# Patient Record
Sex: FEMALE | Race: WHITE | NOT HISPANIC OR LATINO | Employment: OTHER | URBAN - METROPOLITAN AREA
[De-identification: names, ages, dates, MRNs, and addresses within clinical notes are randomized per-mention and may not be internally consistent; named-entity substitution may affect disease eponyms.]

---

## 2017-01-03 ENCOUNTER — ALLSCRIPTS OFFICE VISIT (OUTPATIENT)
Dept: OTHER | Facility: OTHER | Age: 62
End: 2017-01-03

## 2017-01-12 ENCOUNTER — GENERIC CONVERSION - ENCOUNTER (OUTPATIENT)
Dept: OTHER | Facility: OTHER | Age: 62
End: 2017-01-12

## 2017-01-31 ENCOUNTER — GENERIC CONVERSION - ENCOUNTER (OUTPATIENT)
Dept: OTHER | Facility: OTHER | Age: 62
End: 2017-01-31

## 2017-02-02 ENCOUNTER — GENERIC CONVERSION - ENCOUNTER (OUTPATIENT)
Dept: OTHER | Facility: OTHER | Age: 62
End: 2017-02-02

## 2017-02-03 ENCOUNTER — APPOINTMENT (OUTPATIENT)
Dept: LAB | Age: 62
End: 2017-02-03
Payer: MEDICARE

## 2017-02-03 ENCOUNTER — TRANSCRIBE ORDERS (OUTPATIENT)
Dept: ADMINISTRATIVE | Age: 62
End: 2017-02-03

## 2017-02-03 DIAGNOSIS — E78.5 HYPERLIPIDEMIA: ICD-10-CM

## 2017-02-03 DIAGNOSIS — E11.9 TYPE 2 DIABETES MELLITUS WITHOUT COMPLICATIONS (HCC): ICD-10-CM

## 2017-02-03 LAB
ALBUMIN SERPL BCP-MCNC: 3.8 G/DL (ref 3.5–5)
ALP SERPL-CCNC: 122 U/L (ref 46–116)
ALT SERPL W P-5'-P-CCNC: 40 U/L (ref 12–78)
ANION GAP SERPL CALCULATED.3IONS-SCNC: 9 MMOL/L (ref 4–13)
AST SERPL W P-5'-P-CCNC: 25 U/L (ref 5–45)
BILIRUB SERPL-MCNC: 0.3 MG/DL (ref 0.2–1)
BUN SERPL-MCNC: 23 MG/DL (ref 5–25)
CALCIUM SERPL-MCNC: 10 MG/DL (ref 8.3–10.1)
CHLORIDE SERPL-SCNC: 101 MMOL/L (ref 100–108)
CHOLEST SERPL-MCNC: 160 MG/DL (ref 50–200)
CO2 SERPL-SCNC: 28 MMOL/L (ref 21–32)
CREAT SERPL-MCNC: 1.29 MG/DL (ref 0.6–1.3)
EST. AVERAGE GLUCOSE BLD GHB EST-MCNC: 146 MG/DL
GFR SERPL CREATININE-BSD FRML MDRD: 42.3 ML/MIN/1.73SQ M
GLUCOSE SERPL-MCNC: 122 MG/DL (ref 65–140)
HBA1C MFR BLD: 6.7 % (ref 4.2–6.3)
HDLC SERPL-MCNC: 57 MG/DL (ref 40–60)
LDLC SERPL CALC-MCNC: 81 MG/DL (ref 0–100)
POTASSIUM SERPL-SCNC: 3.7 MMOL/L (ref 3.5–5.3)
PROT SERPL-MCNC: 8.4 G/DL (ref 6.4–8.2)
SODIUM SERPL-SCNC: 138 MMOL/L (ref 136–145)
TRIGL SERPL-MCNC: 109 MG/DL

## 2017-02-03 PROCEDURE — 36415 COLL VENOUS BLD VENIPUNCTURE: CPT

## 2017-02-03 PROCEDURE — 83036 HEMOGLOBIN GLYCOSYLATED A1C: CPT

## 2017-02-03 PROCEDURE — 80053 COMPREHEN METABOLIC PANEL: CPT

## 2017-02-03 PROCEDURE — 80061 LIPID PANEL: CPT

## 2017-02-06 ENCOUNTER — APPOINTMENT (OUTPATIENT)
Dept: PHYSICAL THERAPY | Facility: CLINIC | Age: 62
End: 2017-02-06
Payer: MEDICARE

## 2017-02-06 PROCEDURE — 97116 GAIT TRAINING THERAPY: CPT

## 2017-02-06 PROCEDURE — G8978 MOBILITY CURRENT STATUS: HCPCS

## 2017-02-06 PROCEDURE — 97162 PT EVAL MOD COMPLEX 30 MIN: CPT

## 2017-02-06 PROCEDURE — G8979 MOBILITY GOAL STATUS: HCPCS

## 2017-02-14 ENCOUNTER — ALLSCRIPTS OFFICE VISIT (OUTPATIENT)
Dept: OTHER | Facility: OTHER | Age: 62
End: 2017-02-14

## 2017-02-14 DIAGNOSIS — E55.9 VITAMIN D DEFICIENCY: ICD-10-CM

## 2017-02-14 DIAGNOSIS — F11.20 UNCOMPLICATED OPIOID DEPENDENCE (HCC): ICD-10-CM

## 2017-02-14 DIAGNOSIS — Z79.891 LONG TERM CURRENT USE OF OPIATE ANALGESIC: ICD-10-CM

## 2017-02-14 DIAGNOSIS — M54.50 LOW BACK PAIN: ICD-10-CM

## 2017-02-14 DIAGNOSIS — E11.9 TYPE 2 DIABETES MELLITUS WITHOUT COMPLICATIONS (HCC): ICD-10-CM

## 2017-02-17 ENCOUNTER — ALLSCRIPTS OFFICE VISIT (OUTPATIENT)
Dept: OTHER | Facility: OTHER | Age: 62
End: 2017-02-17

## 2017-02-23 ENCOUNTER — GENERIC CONVERSION - ENCOUNTER (OUTPATIENT)
Dept: OTHER | Facility: OTHER | Age: 62
End: 2017-02-23

## 2017-02-24 ENCOUNTER — GENERIC CONVERSION - ENCOUNTER (OUTPATIENT)
Dept: OTHER | Facility: OTHER | Age: 62
End: 2017-02-24

## 2017-02-28 ENCOUNTER — GENERIC CONVERSION - ENCOUNTER (OUTPATIENT)
Dept: OTHER | Facility: OTHER | Age: 62
End: 2017-02-28

## 2017-03-21 ENCOUNTER — ALLSCRIPTS OFFICE VISIT (OUTPATIENT)
Dept: OTHER | Facility: OTHER | Age: 62
End: 2017-03-21

## 2017-05-16 ENCOUNTER — ALLSCRIPTS OFFICE VISIT (OUTPATIENT)
Dept: OTHER | Facility: OTHER | Age: 62
End: 2017-05-16

## 2017-05-16 DIAGNOSIS — F11.20 UNCOMPLICATED OPIOID DEPENDENCE (HCC): ICD-10-CM

## 2017-05-16 DIAGNOSIS — Z79.891 LONG TERM CURRENT USE OF OPIATE ANALGESIC: ICD-10-CM

## 2017-05-16 DIAGNOSIS — M47.816 SPONDYLOSIS OF LUMBAR REGION WITHOUT MYELOPATHY OR RADICULOPATHY: ICD-10-CM

## 2017-06-19 ENCOUNTER — TRANSCRIBE ORDERS (OUTPATIENT)
Dept: LAB | Age: 62
End: 2017-06-19

## 2017-06-19 ENCOUNTER — APPOINTMENT (OUTPATIENT)
Dept: LAB | Age: 62
End: 2017-06-19
Payer: MEDICARE

## 2017-06-19 DIAGNOSIS — F11.20 UNCOMPLICATED OPIOID DEPENDENCE (HCC): ICD-10-CM

## 2017-06-19 DIAGNOSIS — E55.9 VITAMIN D DEFICIENCY: ICD-10-CM

## 2017-06-19 DIAGNOSIS — Z79.891 LONG TERM CURRENT USE OF OPIATE ANALGESIC: ICD-10-CM

## 2017-06-19 DIAGNOSIS — E11.9 TYPE 2 DIABETES MELLITUS WITHOUT COMPLICATIONS (HCC): ICD-10-CM

## 2017-06-19 LAB
25(OH)D3 SERPL-MCNC: 22.4 NG/ML (ref 30–100)
EST. AVERAGE GLUCOSE BLD GHB EST-MCNC: 143 MG/DL
HBA1C MFR BLD: 6.6 % (ref 4.2–6.3)

## 2017-06-19 PROCEDURE — 83036 HEMOGLOBIN GLYCOSYLATED A1C: CPT

## 2017-06-19 PROCEDURE — 36415 COLL VENOUS BLD VENIPUNCTURE: CPT

## 2017-06-19 PROCEDURE — 82306 VITAMIN D 25 HYDROXY: CPT

## 2017-06-20 ENCOUNTER — ALLSCRIPTS OFFICE VISIT (OUTPATIENT)
Dept: OTHER | Facility: OTHER | Age: 62
End: 2017-06-20

## 2017-06-26 ENCOUNTER — GENERIC CONVERSION - ENCOUNTER (OUTPATIENT)
Dept: OTHER | Facility: OTHER | Age: 62
End: 2017-06-26

## 2017-07-11 ENCOUNTER — ALLSCRIPTS OFFICE VISIT (OUTPATIENT)
Dept: OTHER | Facility: OTHER | Age: 62
End: 2017-07-11

## 2017-07-11 DIAGNOSIS — Z79.899 OTHER LONG TERM (CURRENT) DRUG THERAPY: ICD-10-CM

## 2017-07-11 DIAGNOSIS — F11.20 UNCOMPLICATED OPIOID DEPENDENCE (HCC): ICD-10-CM

## 2017-07-11 DIAGNOSIS — G89.4 CHRONIC PAIN SYNDROME: ICD-10-CM

## 2017-09-05 ENCOUNTER — ALLSCRIPTS OFFICE VISIT (OUTPATIENT)
Dept: OTHER | Facility: OTHER | Age: 62
End: 2017-09-05

## 2017-09-22 ENCOUNTER — ALLSCRIPTS OFFICE VISIT (OUTPATIENT)
Dept: OTHER | Facility: OTHER | Age: 62
End: 2017-09-22

## 2017-09-22 DIAGNOSIS — M54.16 RADICULOPATHY OF LUMBAR REGION: ICD-10-CM

## 2017-09-22 DIAGNOSIS — M54.50 LOW BACK PAIN: ICD-10-CM

## 2017-09-22 DIAGNOSIS — M48.061 SPINAL STENOSIS OF LUMBAR REGION: ICD-10-CM

## 2017-10-02 ENCOUNTER — HOSPITAL ENCOUNTER (OUTPATIENT)
Dept: RADIOLOGY | Age: 62
Discharge: HOME | End: 2017-10-02
Payer: MEDICARE

## 2017-10-02 DIAGNOSIS — M54.50 LOW BACK PAIN: ICD-10-CM

## 2017-10-02 DIAGNOSIS — M54.16 RADICULOPATHY OF LUMBAR REGION: ICD-10-CM

## 2017-10-02 DIAGNOSIS — M48.061 SPINAL STENOSIS OF LUMBAR REGION: ICD-10-CM

## 2017-10-02 PROCEDURE — 72131 CT LUMBAR SPINE W/O DYE: CPT

## 2017-10-03 ENCOUNTER — GENERIC CONVERSION - ENCOUNTER (OUTPATIENT)
Dept: OTHER | Facility: OTHER | Age: 62
End: 2017-10-03

## 2017-10-18 ENCOUNTER — GENERIC CONVERSION - ENCOUNTER (OUTPATIENT)
Dept: OTHER | Facility: OTHER | Age: 62
End: 2017-10-18

## 2017-10-24 ENCOUNTER — ALLSCRIPTS OFFICE VISIT (OUTPATIENT)
Dept: RADIOLOGY | Facility: CLINIC | Age: 62
End: 2017-10-24
Payer: MEDICARE

## 2017-10-26 NOTE — PROGRESS NOTES
Assessment  1  Pain syndrome, chronic (338 4) (G89 4)   2  Low back pain (724 2) (M54 5)   3  Lumbar canal stenosis (724 02) (M48 06)   4  Lumbar radiculopathy (724 4) (M54 16)    Plan   Low back pain, Lumbar canal stenosis, Lumbar radiculopathy, Pain syndrome, chronic    · Topiramate 50 MG Oral Tablet; TAKE 2 TABLETS TWICE DAILY   Rx By: Valentino Dayhoff; Dispense: 30 Days ; #:120 Tablet; Refill: 1;For: Low back pain, Lumbar canal stenosis, Lumbar radiculopathy, Pain syndrome, chronic; LORENZA = N; Rx auto-faxed to [id number] Last Updated By: System, [employer]; 9/5/2017 10:40:33 AM  Low back pain, Lumbar canal stenosis, Lumbar radiculopathy, Pain syndrome, chronic,  Sacroiliitis, Spondylosis of lumbar region without myelopathy or radiculopathy    · TraMADol HCl - 50 MG Oral Tablet; 1-2 TAB PO TID PRN PAIN   Rx By: Valentino Dayhoff; Dispense: 30 Days ; #:180 Tablet; Refill: 1;For: Low back pain, Lumbar canal stenosis, Lumbar radiculopathy, Pain syndrome, chronic, Sacroiliitis, Spondylosis of lumbar region without myelopathy or radiculopathy; LORENZA = N; Print Rx  Pain syndrome, chronic    · Methocarbamol 750 MG Oral Tablet; TAKE 1/2 -1 TABLET 3 TIMES DAILY   Rx By: Valentino Dayhoff; Dispense: 30 Days ; #:90 Tablet; Refill: 1;For: Pain syndrome, chronic; LORENZA = N; Rx auto-faxed to [id number] Last Updated By: System, [employer]; 9/5/2017 10:40:31 AM    Follow-up visit in 2 months Evaluation and Treatment  Follow-up with [facility] for  med refills  Status: Complete  Done: [id number]  Ordered; For: Pain syndrome, chronic;  Ordered By: Valentino Dayhoff  Performed:   Due: 79VGY1822; Last Updated By: Jose Luis Lyons; 9/5/2017 10:49:46 AM     Discussion/Summary    The patient presents today for a follow-up office visit  The patient is currently being treated for chronic pain syndrome, low back pain, lumbar radiculopathy, and lumbar canal stenosis   The patient continues to take Topamax 50 mg 2 tablets twice per day, along with methocarbamol 1 three times per day, and tramadol 50mg she averages 3 tablets daily  Her primary care doctor gives her meloxicam 15 mg daily  She reports 50-75% relief of her pain symptoms at the current medications and no side effects or issues  this time, I would like the patient to continue the tramadol since it does provide her moderate relief of her pain symptoms  She was given a two-month supply of the medication  can continue with her Topamax, and methocarbamol as prescribed, both were refilled today  PDMP was reviewed today and was appropriate  will follow up in 8 weeks for medication refills  Patient is able to Self-Care  The treatment plan was reviewed with the patient/guardian  The patient/guardian understands and agrees with the treatment plan      Chief Complaint  1  Back Pain  low back and leg pain      History of Present Illness  The patient presents today for a follow-up office visit  The patient is currently being treated for chronic pain syndrome, low back pain, lumbar radiculopathy, and lumbar canal stenosis  The patient continues to take Topamax 50 mg 2 tablets twice per day, along with methocarbamol 1 three times per day, and tramadol 50mg she averages 3 tablets daily  Her primary care doctor gives her meloxicam 15 mg daily  She reports 50-75% relief of her pain symptoms at the current medications and no side effects or issues  the patient rates her pain 8/10, this is constant in nature and described as aching, cramping, and numbness to her low back and down her entire right leg to the foot   have personally reviewed and/or updated the patient's past medical history, past surgical history, family history, social history, current medications, allergies, and vital signs today       Shira Alexandra presents with complaints of gradual onset of constant episodes of severe bilateral lower back pain, described as sharp, dull and aching, radiating to the right buttock, right thigh and right lower leg  On a scale of 1 to 10, the patient rates the pain as 9  Symptoms are not improved by NSAIDs  Symptoms are made worse by standing, sitting, bending and walking down stairs  Symptoms are unchanged  Review of Systems    Constitutional: no fever,-- no recent weight gain-- and-- no recent weight loss  Eyes: no double vision-- and-- no blurry vision  Cardiovascular: no chest pain,-- no palpitations-- and-- no lower extremity edema  Respiratory: no complaints of shortness of breath-- and-- no wheezing  Musculoskeletal: difficulty walking, but-- no muscle weakness,-- no joint stiffness,-- no joint swelling,-- no limb swelling,-- no pain in extremity-- and-- no decreased range of motion  Neurological: no dizziness,-- no difficulty swallowing,-- no memory loss,-- no loss of consciousness-- and-- no seizures  Gastrointestinal: no nausea,-- no vomiting,-- no constipation-- and-- no diarrhea  Genitourinary: no difficulty initiating urine stream,-- no genital pain-- and-- no frequent urination  Integumentary: no complaints of skin rash  Psychiatric: no depression  Endocrine: no excessive thirst,-- no adrenal disease,-- no hypothyroidism-- and-- no hyperthyroidism  Hematologic/Lymphatic: no tendency for easy bruising-- and-- no tendency for easy bleeding  Active Problems  1  Analgesic use (V58 69) (Z79 899)   2  Asthma (493 90) (J45 909)   3  Depression (311) (F32 9)   4  DM2 (diabetes mellitus, type 2) (250 00) (E11 9)   5  Encounter for screening for cardiovascular disorders (V81 2) (Z13 6)   6  Esophageal reflux (530 81) (K21 9)   7  Hand paresthesia (782 0) (R20 2)   8  Hyperlipidemia (272 4) (E78 5)   9  Hypertension (401 9) (I10)   10  Low back pain (724 2) (M54 5)   11  Lumbar canal stenosis (724 02) (M48 06)   12  Lumbar radiculopathy (724 4) (M54 16)   13  Morbid or severe obesity due to excess calories (278 01) (E66 01)   14  Myalgia and myositis (729 1)   15   Opioid dependence (304 00) (F11 20)   16  Osteoporosis screening (V82 81) (Z13 820)   17  Pain of right heel (729 5) (M79 671)   18  Pain syndrome, chronic (338 4) (G89 4)   19  Sacroiliac pain (724 6) (M53 3)   20  Sacroiliitis (720 2) (M46 1)   21  Spondylosis of lumbar region without myelopathy or radiculopathy (721 3) (M47 816)   22  Urine, incontinence, stress female (625 6) (N39 3)   23  Visit for screening mammogram (V76 12) (Z12 31)   24   (V61 07) (Z63 4)    Past Medical History  1  History of Allergic reaction to bee sting (989 5,E905 3) (T63 441A)   2  History of Lyme disease (V12 09) (Z86 19)   3  Need for pneumococcal vaccination (V03 82) (Z23)    Surgical History  1  History of Appendectomy   2  History of Cholecystectomy   3  History of Knee Replacement   4  History of Shoulder Surgery   5  History of Spinal Stereotaxis Stimulation Of Cord   6  History of Total Hip Replacement    Family History  Mother    1  Family history of Diabetes Mellitus (V18 0)  Father    2  Family history of Cancer  Family History    3  Family history of Brain Cancer (V16 8)   4  Family history of Breast Cancer (V16 3)   5  Family history of Cervical Cancer   6  Family history of Diabetes Mellitus (V18 0)   7  Family history of Hypertension (V17 49)   8  Family history of Ovarian Cancer (V16 41)   9  Family history of Stroke Complications    Social History   · Denied: History of Alcohol Use (History)   · Current every day smoker (305 1) (F17 200)   · Denied: History of Drug Use (305 90)   ·  (V61 07) (Z63 4)    Current Meds   1  Atorvastatin Calcium 10 MG Oral Tablet; TAKE 1 TABLET DAILY; Therapy: 41EPZ6716 to (([phone] 797-170)  Requested for: 54Dsb9812; Last   Rx:09Dyo0827 Ordered   2  [employer] Vitamin D3 1000 UNIT CAPS; TAKE 1 CAPSULE BY MOUTH TWICE DAILY; Therapy: 08IGA1644 to (Evaluate:18Uce3015)  Requested for: 65CXE0540; Last   Rx:48Doc2918 Ordered   3   EPINEPHrine 0 3 MG/0 3ML Injection Solution Auto-injector; INJECT 0 3ML   INTRAMUSCULARLY AS DIRECTED; Therapy: 20COR3148 to (Last Rx:03Apr2017)  Requested for: 25Nwi3908 Ordered   4  Glimepiride 1 MG Oral Tablet; Take 1 tablet twice daily; Therapy: 16Fxg8614 to (Evaluate:14Nov2017)  Requested for: 97MWR8767; Last   Rx:35Cfe8234 Ordered   5  Lisinopril-Hydrochlorothiazide 20-25 MG Oral Tablet; TAKE 1 TABLET DAILY; Therapy: 84Las7341 to (Evaluate:38Fkm8256)  Requested for: 07YGD6514; Last   Rx:19Mar2017 Ordered   6  Meloxicam 15 MG Oral Tablet; take 1 tablet once daily with a meal;   Therapy: 80Tzk3541 to (Evaluate:47Blk4598)  Requested for: 69WHU8014; Last   Rx:19Mar2017 Ordered   7  Methocarbamol 750 MG Oral Tablet; TAKE 1/2 -1 TABLET 3 TIMES DAILY; Therapy: 39DFJ8270 to (Evaluate:09Sep2017)  Requested for: 51YHG4552; Last   Rx:60Lba9079 Ordered   8  Omeprazole 20 MG Oral Capsule Delayed Release; Therapy: 63XTK0660 to (Last [facility])  Requested for: 00FFH8937 Ordered   9  OneTouch Ultra Blue In [employer]; test twice daily; Therapy: 45NWF4677 to (Evaluate:12Nov2017)  Requested for: 26Apr2017; Last   Rx:26Apr2017 Ordered   10  OneTouch Ultra Mini w/Device Kit; TEST ONCE DAILY    USE AS DIRECTED; Therapy: 86VUH2543 to (Last Rx:19May2016)  Requested for: 96BWB6217 Ordered   11  Topiramate 50 MG Oral Tablet; TAKE 2 TABLETS TWICE DAILY; Therapy: 30HJZ2546 to (Evaluate:09Sep2017)  Requested for: 08TKU8058; Last    Rx:72Nqx6838 Ordered   12  TraMADol HCl - 50 MG Oral Tablet; 1-2 TAB PO TID PRN PAIN;    Therapy: 69IRH6805 to (Evaluate:99Mdd8709); Last Rx:94Dsw1601 Ordered   13  Venlafaxine HCl ER 75 MG Oral Capsule Extended Release 24 Hour; TAKE 1 CAPSULE    DAILY; Therapy: 34XYA3408 to (Evaluate:30Oox5645)  Requested for: 19PZY2672; Last    Rx:31Mar2017 Ordered   14  Vitamin D 1000 UNIT CAPS; Take 1 capsule twice daily; Therapy: 03Amc7742 to (Last Rx:12Nov2014)  Requested for: 80BFR8743 Ordered    Allergies  1  Motrin TABS   2  Penicillins   3  MetFORMIN HCl TABS   4  Nortriptyline HCl CAPS   5  Tradjenta TABS   6  Aleve TABS   7  Augmentin TABS   8  Sulfa Drugs  9  Bee sting    Vitals  Vital Signs    Recorded: 05Sep2017 10:26AM   Temperature 98 3 F   Heart Rate 72   Systolic 868   Diastolic 73   Height 5 ft 6 in   Weight 304 lb    BMI Calculated 49 07   BSA Calculated 2 39   Pain Scale 9     Physical Exam    Constitutional   General appearance: Well developed, well nourished, alert, in no distress, non-toxic and no overt pain behavior  -- Endomorphic body habitus  Eyes   Sclera: anicteric   HEENT   Hearing grossly intact  Pulmonary   Respiratory effort: Even and unlabored  Psychiatric   Mood and affect: Mood and affect appropriate  Neurologic   Cranial nerves: Cranial nerves II-XII grossly intact  Musculoskeletal   Gait and station: Normal     Lumbar/Sacral Spine examination demonstrates Lumbosacral Spine:   Tenderness: right paraspinal-- and-- left paraspinal    ROM Lumbosacral Spine: Full except as noted: Flexion was not restricted-- and-- was painless  Extension was restricted-- and-- was painless  Left lateral flexion was not restricted-- and-- was painless  Right lateral flexion was not restricted-- and-- was painless  Rotation to the left was not restricted-- and-- was painless  Rotation to the right was restricted-- and-- was painful  Foot and ankle strength was normal bilaterally  Knee strength was normal bilaterally  Hip strength was normal bilaterally  Future Appointments    Date/Time Provider Specialty Site   11/06/2017 11:00 AM ANASTASIYA Spaulding  Internal Medicine  [address] [facility] INTERNAL MEDICINE [place]   10/27/2017 11:00 AM MARVIN Patel [facility] [address]     Signatures   Electronically signed by : Ming Ladd;  Sep  5 2017 10:49AM EST                       (Author)    Electronically signed by : ANASTASIYA Cervantes ; Sep 13 2017  3:02PM EST                       (Author)

## 2017-10-27 NOTE — PROGRESS NOTES
Assessment  1  Acute lumbar radiculopathy (724 4) (M54 16)   2  Pain syndrome, chronic (338 4) (G89 4)   3  Low back pain (724 2) (M54 5)   4  Lumbar canal stenosis (724 02) (M48 06)   5  Lumbar radiculopathy (724 4) (M54 16)    Plan  Acute lumbar radiculopathy, Low back pain, Lumbar canal stenosis, Lumbar  radiculopathy    · MethylPREDNISolone 4 MG Oral Tablet Therapy Pack (Medrol); Take medication as  directed on package   Rx By: Magalys Kirby; Dispense: 0 Days ; #:1 Tablet Therapy Pack; Refill: 0;For: Acute lumbar radiculopathy, Low back pain, Lumbar canal stenosis, Lumbar radiculopathy; LORENZA = N; Verified Transmission to [facility] #[id number] Last Updated By: System, [employer]; 9/22/2017 11:57:27 AM  Low back pain, Lumbar canal stenosis, Lumbar radiculopathy    · * CT SPINE LUMBAR WO CONTRAST; Status:Hold For - Scheduling; Requested  for:84Kon2346;    [facility]; Due:84Xeo4484; Ordered; For:Low back pain, Lumbar canal stenosis, Lumbar radiculopathy; Ordered By:Mellisa Springer;  Lumbar radiculopathy    · Follow-up visit in 2 months Evaluation and Treatment  Follow-up  Status: Hold For -  Scheduling  Requested for: 33WXH1598   Ordered; For: Lumbar radiculopathy; Ordered By: Magalys Kirby Performed:  Due: 15TIT8678    Discussion/Summary    This is a 51-year-old female patient presents the office for follow-up visit today  The patient is currently being treated for chronic pain syndrome, low back pain, lumbar canal stenosis, and lumbar radiculopathy  The patient does have a [employer] spinal cord stimulator implantation status post battery change by Dr Giulia Lin 12/2016  The patient reports that she has scheduled an earlier appointment due to significantly increased low back pain with right lower extremity radiculopathy in the S1 distribution since Sunday 9/16/17   The patient denies any trauma or inciting event and reports that she was at her sister's house and was going from a sitting to standing position when she experienced significant increased pain  the patient reports that she has been taking tramadol 50 mg 2 tablets every 3 hours for her pain symptoms  The patient reports that she is taking Topamax 50 mg 1 tablet in the morning and 1 tablet at bedtime  She reports that she takes methocarbamol 750 mg 3 times a day for her myofascial pain and muscle spasm  The patient reports that she did not go to the emergency room following this event because she did not want to sit in the waiting room  plan for this patient is as follows: This patient cannot obtain MRIs due to her spinal cord stimulator implant  I will order a CT of the patient's lumbar spine to evaluate the patient's recent onset of increased lumbosacral back pain with right lower extremity radiculopathy in the S1 distribution  I discussed with this patient that she should not take her tramadol 50 mg anymore than it as prescribed  The patient does have tramadol 50 mg ordered 1?2 tablets 3 times a day when necessary for her pain symptoms  I advised this patient that we will not be able to provide her with any more tramadol at this time or any other narcotic prescriptions  will order this patient a Medrol Dosepak  I did discuss with this patient that she should call her primary care provider before starting this medication to ensure that she is okay with the patient taking this medication as she is being monitored for her diabetes  The patient's last A1c in February 2017 was 6 6%  discussed with this patient that if there are any procedural interventions that we can offer once obtaining the results of her CT scan we would call her regarding this  I advised this patient that if the pain becomes worse or changes, or if the patient experiences bowel or bladder incontinence that she should present to the emergency department for evaluation  The patient should continue with her topiramate 50 mg 1 in the morning 1 at bedtime    The patient can continue to take methocarbamol 750 mg 3 times a day ARNP for her myofascial pain and muscle spasm  I advised this patient that she should also contact the [address] [place] [address] representatives as this patient may benefit from a reprogramming of her stimulator at this time  I will follow-up with this patient in 2 months  The patient has the current Goals: To provide this patient with adequate pain relief to improve quality of life and level of functioning  The patent has the current Barriers: Chronic pain syndrome  Chronic opioid use  The treatment plan was reviewed with the patient/guardian  The patient/guardian understands and agrees with the treatment plan   The patient was counseled regarding instructions for management,-- risk factor reductions,-- prognosis,-- patient and family education,-- impressions,-- risks and benefits of treatment options-- and-- importance of compliance with treatment  total time of encounter was 25 minutes  Chief Complaint  1  Back Pain  Back and right leg pain  History of Present Illness  This is a 82-year-old female patient presents the office for follow-up visit today  The patient is currently being treated for chronic pain syndrome, low back pain, lumbar canal stenosis, and lumbar radiculopathy  The patient does have a [employer] spinal cord stimulator implantation status post battery change by Dr Mcnulty Breath 12/2016  The patient reports that she has scheduled an earlier appointment due to significantly increased low back pain with right lower extremity radiculopathy in the S1 distribution since Sunday 9/16/17  The patient denies any trauma or inciting event and reports that she was at her sister's house and was going from a sitting to standing position when she experienced significant increased pain  the patient reports that she has been taking tramadol 50 mg 2 tablets every 3 hours for her pain symptoms   The patient reports that she is taking Topamax 50 mg 1 tablet in the morning and 1 tablet at bedtime  She reports that she takes methocarbamol 750 mg 3 times a day for her myofascial pain and muscle spasm  The patient reports that she did not go to the emergency room following this event because she did not want to sit in the waiting room  patient reports that her pain is worse since her previous visit rates her pain a 9/10 on the numerical pain rating scale  The patient reports that her pain is worse in the morning, evening, night and that her pain is constant and describes the pain as sharp-like in nature  The patient reports that the amount of pain relief that she is obtaining now with her current medication regimen treatment plan is not enough to make a real difference in her life  patient denies any bowel or bladder incontinence or saddle anesthesia  have personally reviewed and/or updated the patient's past medical history, past surgical history, family history, social history, allergies, and vital signs today  Other than as stated above, the patient denies any interval changes in medications, medical condition, mental condition, symptoms, or allergies since last office visit  Jennifer Wesley presents with complaints of constant episodes of bilateral lower back pain, described as sharp, radiating to the bilateral lower leg  On a scale of 1 to 10, the patient rates the pain as 9  Symptoms are worsening  Review of Systems    Constitutional: no fever,-- no recent weight gain-- and-- no recent weight loss  Eyes: no double vision-- and-- no blurry vision  Cardiovascular: no chest pain,-- no palpitations-- and-- no lower extremity edema  Respiratory: no complaints of shortness of breath-- and-- no wheezing  Musculoskeletal: pain in extremity R leg , but-- no difficulty walking,-- no muscle weakness,-- no joint stiffness,-- no joint swelling,-- no limb swelling-- and-- no decreased range of motion     Neurological: no dizziness,-- no difficulty swallowing,-- no memory loss,-- no loss of consciousness-- and-- no seizures  Gastrointestinal: no nausea,-- no vomiting,-- no constipation-- and-- no diarrhea  Genitourinary: no difficulty initiating urine stream,-- no genital pain-- and-- no frequent urination  Integumentary: no complaints of skin rash  Psychiatric: no depression  Endocrine: no excessive thirst,-- no adrenal disease,-- no hypothyroidism-- and-- no hyperthyroidism  Hematologic/Lymphatic: no tendency for easy bruising-- and-- no tendency for easy bleeding  ROS reviewed  Active Problems  1  Analgesic use (V58 69) (Z79 899)   2  Asthma (493 90) (J45 909)   3  Depression (311) (F32 9)   4  DM2 (diabetes mellitus, type 2) (250 00) (E11 9)   5  Encounter for screening for cardiovascular disorders (V81 2) (Z13 6)   6  Esophageal reflux (530 81) (K21 9)   7  Hand paresthesia (782 0) (R20 2)   8  Hyperlipidemia (272 4) (E78 5)   9  Hypertension (401 9) (I10)   10  Low back pain (724 2) (M54 5)   11  Lumbar canal stenosis (724 02) (M48 06)   12  Lumbar radiculopathy (724 4) (M54 16)   13  Morbid or severe obesity due to excess calories (278 01) (E66 01)   14  Myalgia and myositis (729 1)   15  Opioid dependence (304 00) (F11 20)   16  Osteoporosis screening (V82 81) (Z13 820)   17  Pain of right heel (729 5) (M79 671)   18  Pain syndrome, chronic (338 4) (G89 4)   19  Sacroiliac pain (724 6) (M53 3)   20  Sacroiliitis (720 2) (M46 1)   21  Spondylosis of lumbar region without myelopathy or radiculopathy (721 3) (M47 816)   22  Urine, incontinence, stress female (625 6) (N39 3)   23  Visit for screening mammogram (V76 12) (Z12 31)   24   (V61 07) (Z63 4)    Past Medical History  1  History of Allergic reaction to bee sting (989 5,E905 3) (T63 441A)   2  History of Lyme disease (V12 09) (Z86 19)   3  Need for pneumococcal vaccination (V03 82) (Z23)    The active problems and past medical history were reviewed and updated today        Surgical History  1  History of Appendectomy   2  History of Cholecystectomy   3  History of Knee Replacement   4  History of Shoulder Surgery   5  History of Spinal Stereotaxis Stimulation Of Cord   6  History of Total Hip Replacement    The surgical history was reviewed and updated today  Family History  Mother    1  Family history of Diabetes Mellitus (V18 0)  Father    2  Family history of Cancer  Family History    3  Family history of Brain Cancer (V16 8)   4  Family history of Breast Cancer (V16 3)   5  Family history of Cervical Cancer   6  Family history of Diabetes Mellitus (V18 0)   7  Family history of Hypertension (V17 49)   8  Family history of Ovarian Cancer (V16 41)   9  Family history of Stroke Complications    The family history was reviewed and updated today  Social History   · Denied: History of Alcohol Use (History)   · Current every day smoker (305 1) (F17 200)   · Denied: History of Drug Use (305 90)   ·  (V61 07) (Z63 4)  The social history was reviewed and updated today  The social history was reviewed and is unchanged  Current Meds   1  Atorvastatin Calcium 10 MG Oral Tablet; TAKE 1 TABLET DAILY; Therapy: 83BOG6988 to (77 873 135)  Requested for: 36Hsr5695; Last   Rx:06Apw1157 Ordered   2  [employer] Vitamin D3 1000 UNIT CAPS; TAKE 1 CAPSULE BY MOUTH TWICE DAILY; Therapy: 71NKW1601 to (Evaluate:86Kim4448)  Requested for: 94DTN0621; Last   Rx:84Oum2251 Ordered   3  EPINEPHrine 0 3 MG/0 3ML Injection Solution Auto-injector; INJECT 0 3ML   INTRAMUSCULARLY AS DIRECTED; Therapy: 47ONX9876 to (Last Rx:03Apr2017)  Requested for: 19Pmx5166 Ordered   4  Glimepiride 1 MG Oral Tablet; Take 1 tablet twice daily; Therapy: 85Upw8829 to (Evaluate:14Nov2017)  Requested for: 09JXW8229; Last   Rx:68Bjz6980 Ordered   5  Lisinopril-Hydrochlorothiazide 20-25 MG Oral Tablet; TAKE 1 TABLET DAILY;    Therapy: 82Dtv7519 to (Evaluate:68Dxy6941)  Requested for: 13GPZ8706; Last   Rx:19Mar2017 Ordered   6  Meloxicam 15 MG Oral Tablet; take 1 tablet once daily with a meal;   Therapy: 03Qnm6538 to (Evaluate:11Mar2018)  Requested for: 33Ydv3577; Last   Rx:14Msw4862 Ordered   7  Methocarbamol 750 MG Oral Tablet; TAKE 1/2 -1 TABLET 3 TIMES DAILY; Therapy: 18MZO8124 to (Ana Ennis)  Requested for: 79Bpg7762; Last   Rx:40Vep8608 Ordered   8  Omeprazole 20 MG Oral Capsule Delayed Release; Therapy: 17WNQ3069 to (Last Chaitanyadave Graham)  Requested for: 63ODF2967 Ordered   9  OneTouch Ultra Blue In [employer]; test twice daily; Therapy: 89KNA8190 to (Evaluate:12Nov2017)  Requested for: 86Kgr5451; Last   Rx:29Oan8645 Ordered   10  OneTouch Ultra Mini w/Device Kit; TEST ONCE DAILY    USE AS DIRECTED; Therapy: 07QXZ9397 to (Last Rx:68Zqt3714)  Requested for: 21DTV1840 Ordered   11  Topiramate 50 MG Oral Tablet; TAKE 2 TABLETS TWICE DAILY; Therapy: 69KYL1253 to (Ana Ennis)  Requested for: 26Vqq5881; Last    Rx:84Wfd7919 Ordered   12  TraMADol HCl - 50 MG Oral Tablet; 1-2 TAB PO TID PRN PAIN;    Therapy: 75USE6134 to (Evaluate:04Nov2017); Last Rx:47Gml6451 Ordered   13  Venlafaxine HCl ER 75 MG Oral Capsule Extended Release 24 Hour; TAKE 1 CAPSULE    DAILY; Therapy: 01QSZ0413 to (Evaluate:30May2017)  Requested for: 26ADQ8886; Last    Rx:31Mar2017 Ordered   14  Vitamin D 1000 UNIT CAPS; Take 1 capsule twice daily; Therapy: 57FXK1537 to (Last Rx:12Nov2014)  Requested for: 97LRC7759 Ordered    The medication list was reviewed and updated today  Allergies  1  Motrin TABS   2  Penicillins   3  MetFORMIN HCl TABS   4  Nortriptyline HCl CAPS   5  Tradjenta TABS   6  Aleve TABS   7  Augmentin TABS   8  Sulfa Drugs  9   Bee sting    Vitals  Vital Signs    Recorded: 45GPY1305 11:11AM   Temperature 98 2 F   Heart Rate 94   Respiration 16   Systolic 144   Diastolic 68   Height 5 ft 6 in   Weight 301 lb    BMI Calculated 48 58   BSA Calculated 2 38   Pain Scale 9     Physical Exam    Constitutional General appearance: Abnormal  -- Endomorphic body habitus  Eyes   Sclera: anicteric   HEENT   Hearing grossly intact  Neck   Neck: Supple, symmetric, trachea midline, no masses  Pulmonary   Respiratory effort: Even and unlabored  Cardiovascular   Examination of extremities: No edema or pitting edema present  Abdomen   Abdomen: Soft, non-tender, non-distended  Skin   Skin and subcutaneous tissue: Normal without rashes or lesions, well hydrated  Psychiatric   Mood and affect: Abnormal  -- Agitated  Neurologic   Cranial nerves: Cranial nerves II-XII grossly intact  the muscle tone was normal   Musculoskeletal   Gait and station: Abnormal  -- Antalgic  Lumbar/Sacral Spine examination demonstrates  5/5 lower extremity strength in all muscle groups bilaterally  Positive seated straight leg raise on the right  Future Appointments    Date/Time Provider Specialty Site   11/06/2017 11:00 AM ANASTASIYA Castillo   Internal Medicine [facility] INTERNAL MEDICINE [place]   11/03/2017 10:00 AM MARVIN Lennon Pain Management [facility] SPINE     Signatures   Electronically signed by : Justyn Joseph; Sep 22 2017  1:02PM EST                       (Author)    Electronically signed by : ANASTASIYA Roe ; Sep 26 2017  8:16AM EST                       (Author)

## 2017-10-30 ENCOUNTER — ALLSCRIPTS OFFICE VISIT (OUTPATIENT)
Dept: OTHER | Facility: OTHER | Age: 62
End: 2017-10-30

## 2017-10-31 NOTE — PROGRESS NOTES
Assessment  1  Acute lumbar radiculopathy (724 4) (M54 16)   2  Pain syndrome, chronic (338 4) (G89 4)   3  Low back pain (724 2) (M54 5)   4  Lumbar canal stenosis (724 02) (M48 061)   5  Lumbar radiculopathy (724 4) (M54 16)    Plan  Low back pain, Lumbar canal stenosis, Lumbar radiculopathy, Pain syndrome, chronic,  Sacroiliitis, Spondylosis of lumbar region without myelopathy or radiculopathy    · TraMADol HCl - 50 MG Oral Tablet   Rx By: Jude Rodarte; Dispense: 30 Days ; #:180 Tablet; Refill: 1;For: Low back pain, Lumbar canal stenosis, Lumbar radiculopathy, Pain syndrome, chronic, Sacroiliitis, Spondylosis of lumbar region without myelopathy or radiculopathy; LORENZA = N; Print Rx  Lumbar radiculopathy    · Follow-up visit in 3 months Evaluation and Treatment  Follow-up with [facility] for med refills   Status: Hold For - Scheduling  Requested for: 11MYO9584   Ordered; For: Lumbar radiculopathy; Ordered By: Jude Rodarte Performed:  Due: 19BTW4342  Pain syndrome, chronic    · Methocarbamol 750 MG Oral Tablet; TAKE 1/2 -1 TABLET 3 TIMES DAILY   Rx By: Jude Rodarte; Dispense: 30 Days ; #:90 Tablet; Refill: 2;For: Pain syndrome, chronic; LORENZA = N; Verified Transmission to [id number] Last Updated By: SystemCheckr; 10/30/2017 11:25:38 AM    Discussion/Summary    The patient presents today for a follow-up office visit  The patient is currently being treated for chronic pain syndrome, low back pain, lumbar radiculopathy, and lumbar canal stenosis  The patient continues to take Topamax 50 mg 2 tablets twice per day, along with methocarbamol 1 three times per day, and tramadol 50mg twice daily that she has left from her september prescription  She was taking much more than prescribed at her last office visit which is a breach of contract and Zaheer List advised her we would not be able to prescribe this medication anymore    this time, the patient can continue with the methocarbamol and Topamax as prescribed, she only requires refills of the methocarbamol today  she was in the office she did have a reprogramming with Jacques Medina from [facility] she states that this is so far so good as it is covering her back pain that was higher up  did recently have a right L5-S1 transforaminal epidural steroid injection with Dr Jovanny Waldron on October 24, 2017  She states that her leg pain is completely gone at this point  PDMP was reviewed today and was appropriate  should return in 12 weeks for medication refills  Patient is able to Self-Care  The treatment plan was reviewed with the patient/guardian  The patient/guardian understands and agrees with the treatment plan      Chief Complaint  1  Back Pain  low back and leg pain; improved      History of Present Illness  The patient presents today for a follow-up office visit  The patient is currently being treated for chronic pain syndrome, low back pain, lumbar radiculopathy, and lumbar canal stenosis  The patient continues to take Topamax 50 mg 2 tablets twice per day, along with methocarbamol 1 three times per day, and tramadol 50mg twice daily that she has left from her september prescription  She was taking much more than prescribed at her last office visit which is a breach of contract and Kameron Jones advised her we would not be able to prescribe this medication anymore  the patient rates her pain 8/10, this is constant in nature and bothersome throughout the entirety of the day  She describes her pain as aching and localizes pain across her low back   have personally reviewed and/or updated the patient's past medical history, past surgical history, family history, social history, current medications, allergies, and vital signs today  Gloria Wilson presents with complaints of constant episodes of bilateral lower back pain, described as aching, radiating to the right buttock  On a scale of 1 to 10, the patient rates the pain as 8  Symptoms are unchanged        Review of Systems    Constitutional: no fever,-- no recent weight gain-- and-- no recent weight loss  Eyes: no double vision-- and-- no blurry vision  Cardiovascular: no chest pain,-- no palpitations-- and-- no lower extremity edema  Respiratory: no complaints of shortness of breath-- and-- no wheezing  Musculoskeletal: difficulty walking, but-- no muscle weakness,-- no joint stiffness,-- no joint swelling,-- no limb swelling,-- no pain in extremity-- and-- no decreased range of motion  Neurological: no dizziness,-- no difficulty swallowing,-- no memory loss,-- no loss of consciousness-- and-- no seizures  Gastrointestinal: no nausea,-- no vomiting,-- no constipation-- and-- no diarrhea  Genitourinary: no difficulty initiating urine stream,-- no genital pain-- and-- no frequent urination  Integumentary: no complaints of skin rash  Psychiatric: no depression  Endocrine: no excessive thirst,-- no adrenal disease,-- no hypothyroidism-- and-- no hyperthyroidism  Hematologic/Lymphatic: no tendency for easy bruising-- and-- no tendency for easy bleeding  Active Problems  1  Acute lumbar radiculopathy (724 4) (M54 16)   2  Analgesic use (V58 69) (Z79 899)   3  Asthma (493 90) (J45 909)   4  Depression (311) (F32 9)   5  DM2 (diabetes mellitus, type 2) (250 00) (E11 9)   6  Encounter for screening for cardiovascular disorders (V81 2) (Z13 6)   7  Esophageal reflux (530 81) (K21 9)   8  Hand paresthesia (782 0) (R20 2)   9  Hyperlipidemia (272 4) (E78 5)   10  Hypertension (401 9) (I10)   11  Low back pain (724 2) (M54 5)   12  Lumbar canal stenosis (724 02) (M48 061)   13  Lumbar radiculopathy (724 4) (M54 16)   14  Morbid or severe obesity due to excess calories (278 01) (E66 01)   15  Myalgia and myositis (729 1)   16  Opioid dependence (304 00) (F11 20)   17  Osteoporosis screening (V82 81) (Z13 820)   18  Pain of right heel (729 5) (M79 671)   19  Pain syndrome, chronic (338 4) (G89 4)   20   Sacroiliac pain (724 6) (M53 3)   21  Sacroiliitis (720 2) (M46 1)   22  Spondylosis of lumbar region without myelopathy or radiculopathy (721 3) (M47 816)   23  Urine, incontinence, stress female (625 6) (N39 3)   24  Visit for screening mammogram (V76 12) (Z12 31)   25   (V61 07) (Z63 4)    Past Medical History  1  History of Allergic reaction to bee sting (989 5,E905 3) (T63 441A)   2  History of Lyme disease (V12 09) (Z86 19)   3  Need for pneumococcal vaccination (V03 82) (Z23)    Surgical History  1  History of Appendectomy   2  History of Cholecystectomy   3  History of Knee Replacement   4  History of Shoulder Surgery   5  History of Spinal Stereotaxis Stimulation Of Cord   6  History of Total Hip Replacement    Family History  Mother    1  Family history of Diabetes Mellitus (V18 0)  Father    2  Family history of Cancer  Family History    3  Family history of Brain Cancer (V16 8)   4  Family history of Breast Cancer (V16 3)   5  Family history of Cervical Cancer   6  Family history of Diabetes Mellitus (V18 0)   7  Family history of Hypertension (V17 49)   8  Family history of Ovarian Cancer (V16 41)   9  Family history of Stroke Complications    Social History   · Denied: History of Alcohol Use (History)   · Current every day smoker (305 1) (F17 200)   · Denied: History of Drug Use (305 90)   ·  (V61 07) (Z63 4)    Current Meds   1  Atorvastatin Calcium 10 MG Oral Tablet; TAKE 1 TABLET DAILY; Therapy: 09RSH1093 to (96 543789)  Requested for: 25Oct2016; Last   Rx:54Ywe3472 Ordered   2  [employer] Vitamin D3 1000 UNIT CAPS; TAKE 1 CAPSULE BY MOUTH TWICE DAILY; Therapy: 61SRQ6791 to (Evaluate:29Pzn3227)  Requested for: 02COS1506; Last   Rx:18Xsi5774 Ordered   3  EPINEPHrine 0 3 MG/0 3ML Injection Solution Auto-injector; INJECT 0 3ML   INTRAMUSCULARLY AS DIRECTED; Therapy: 87YGU4975 to (Last Rx:05Cru3915)  Requested for: 28Cfa7573 Ordered   4  Glimepiride 1 MG Oral Tablet;  Take 1 tablet twice daily; Therapy: 44Kyh8233 to (Evaluate:14Nov2017)  Requested for: 05QOV4308; Last   Rx:73Bux5849 Ordered   5  Lisinopril-Hydrochlorothiazide 20-25 MG Oral Tablet; TAKE 1 TABLET DAILY; Therapy: 57Afu4271 to (Evaluate:54Sdi5293)  Requested for: 17TVD3198; Last   Rx:19Mar2017 Ordered   6  Meloxicam 15 MG Oral Tablet; take 1 tablet once daily with a meal;   Therapy: 10Xvg9643 to (Evaluate:11Mar2018)  Requested for: 23Ybh0450; Last   Rx:76Mge5477 Ordered   7  Methocarbamol 750 MG Oral Tablet; TAKE 1/2 -1 TABLET 3 TIMES DAILY; Therapy: 16DKE6357 to ([facility])  Requested for: 06Jvj6827; Last   Rx:61Hpg1863 Ordered   8  Omeprazole 20 MG Oral Capsule Delayed Release; Therapy: 41RYF6758 to (Last Farooq Sandoval)  Requested for: 77ZJI1759 Ordered   9  Postling Blue In [employer]; test twice daily; Therapy: 67WPS1104 to (Evaluate:12Nov2017)  Requested for: 60Khb9377; Last   Rx:66Puw0301 Ordered   10  OneTouch Ultra Mini w/Device Kit; TEST ONCE DAILY    USE AS DIRECTED; Therapy: 36LAX1258 to (Last Rx:19May2016)  Requested for: 05SNH3296 Ordered   11  Topiramate 50 MG Oral Tablet; TAKE 2 TABLETS TWICE DAILY; Therapy: 67DZK6807 to ([facility])  Requested for: 82Vgh4391; Last    Rx:56Hxo5989 Ordered   12  TraMADol HCl - 50 MG Oral Tablet; 1-2 TAB PO TID PRN PAIN;    Therapy: 16FPG9294 to (Evaluate:04Nov2017); Last Rx:25Gid9951 Ordered   13  Venlafaxine HCl ER 75 MG Oral Capsule Extended Release 24 Hour; TAKE 1 CAPSULE    DAILY; Therapy: 88TJI5657 to (Evaluate:30May2017)  Requested for: 85FSM7148; Last    Rx:31Mar2017 Ordered   14  Vitamin D 1000 UNIT CAPS; Take 1 capsule twice daily; Therapy: 35Xjl5880 to (Last Rx:12Nov2014)  Requested for: 07TVL5403 Ordered    Allergies  1  Motrin TABS   2  Penicillins   3  MetFORMIN HCl TABS   4  Nortriptyline HCl CAPS   5  Tradjenta TABS   6  Aleve TABS   7  Augmentin TABS   8  Sulfa Drugs  9   Bee sting    Vitals  Vital Signs    Recorded: 40VWB3508 11: 09AM   Heart Rate 88   Systolic 057   Diastolic 74   Height 5 ft 6 in   Weight 296 lb    BMI Calculated 47 78   BSA Calculated 2 36   Pain Scale 8     Physical Exam    Constitutional   General appearance: Well developed, well nourished, alert, in no distress, non-toxic and no overt pain behavior  -- Endomorphic body habitus  Eyes   Sclera: anicteric   HEENT   Hearing grossly intact  Pulmonary   Respiratory effort: Even and unlabored  Psychiatric   Mood and affect: Mood and affect appropriate  Neurologic   Cranial nerves: Cranial nerves II-XII grossly intact  Musculoskeletal   Gait and station: Normal        Future Appointments    Date/Time Provider Specialty Site   11/06/2017 11:00 AM ANASTASIYA Caldera   Internal Medicine [facility] [place] [facility]   01/23/2018 01:00 PM Roberto Salazar DO Pain Management [facility]     Signatures   Electronically signed by : Ming Murcia ; Oct 30 2017 11:52AM EST                       (Author)    Electronically signed by : ANASTASIYA Salamanca ; Oct 30 2017 12:09PM EST                       (Author)

## 2017-11-03 ENCOUNTER — GENERIC CONVERSION - ENCOUNTER (OUTPATIENT)
Dept: OTHER | Facility: OTHER | Age: 62
End: 2017-11-03

## 2017-11-06 ENCOUNTER — ALLSCRIPTS OFFICE VISIT (OUTPATIENT)
Dept: OTHER | Facility: OTHER | Age: 62
End: 2017-11-06

## 2017-11-06 DIAGNOSIS — M79.10 MYALGIA: ICD-10-CM

## 2017-11-06 DIAGNOSIS — M54.16 RADICULOPATHY OF LUMBAR REGION: ICD-10-CM

## 2017-11-06 DIAGNOSIS — G89.4 CHRONIC PAIN SYNDROME: ICD-10-CM

## 2017-11-06 DIAGNOSIS — M48.061 SPINAL STENOSIS OF LUMBAR REGION WITHOUT NEUROGENIC CLAUDICATION: ICD-10-CM

## 2017-11-06 DIAGNOSIS — M25.511 PAIN IN RIGHT SHOULDER: ICD-10-CM

## 2017-11-06 DIAGNOSIS — E78.5 HYPERLIPIDEMIA: ICD-10-CM

## 2017-11-06 DIAGNOSIS — E11.9 TYPE 2 DIABETES MELLITUS WITHOUT COMPLICATIONS (HCC): ICD-10-CM

## 2017-11-06 DIAGNOSIS — M54.50 LOW BACK PAIN: ICD-10-CM

## 2017-11-07 NOTE — PROGRESS NOTES
Assessment  1  DM2 (diabetes mellitus, type 2) (250 00) (E11 9)   2  Esophageal reflux (530 81) (K21 9)   3  Chronic right shoulder pain (719 41,338 29) (M25 511,G89 29)   4  Current every day smoker (305 1) (F17 200)   5  Hypertension (401 9) (I10)   6  Depression (311) (F32 9)   7  Pain syndrome, chronic (338 4) (G89 4)    Plan  Chronic right shoulder pain    · *1 - [facility]  *  Status: Active  Requested for: 07BIC0709  Care Summary provided  : Yes  Depression    · Venlafaxine HCl ER 75 MG Oral Capsule Extended Release 24 Hour (Effexor XR); TAKE 1  CAPSULE DAILY   · *VB - PHQ-9 Tool; Status:Complete;   Done: 61ZUV4889 12:00AM  DM2 (diabetes mellitus, type 2)    · (1) HEMOGLOBIN A1C; Status:Active; Requested QIM:59OXG1461;    · (1) MICROALBUMIN CREATININE RATIO, RANDOM URINE; Status:Active; Requested HON:89NYN4751;    · Begin a limited exercise program ; Status:Complete;   Done: 92RAO7571 11:39AM   · Continue with our present treatment plan ; Status:Complete;   Done: 02RMO4389 11:39AM   · Have your eyes examined by an eye doctor every year ; Status:Complete;   Done: 56WWF0648  11:39AM  Hyperlipidemia    · Atorvastatin Calcium 10 MG Oral Tablet (Lipitor); TAKE 1 TABLET DAILY  Low back pain, Lumbar canal stenosis, Lumbar radiculopathy, Pain syndrome, chronic    · Topiramate 50 MG Oral Tablet; TAKE 2 TABLETS TWICE DAILY  Need for immunization against influenza    · Fluzone Quadrivalent Intramuscular Suspension  Opioid dependence    · (1) MICROALBUMIN CREATININE RATIO, RANDOM URINE; Status:Canceled; Discussion/Summary  Discussion Summary:   She continues to follow with pain management  Counseling Documentation With Imm: The patient was counseled regarding instructions for management,-- risk factor reductions,-- prognosis,-- patient and family education,-- impressions,-- risks and benefits of treatment options,-- importance of compliance with treatment     Medication SE Review and Pt Understands Tx: Possible side effects of new medications were reviewed with the patient/guardian today  The treatment plan was reviewed with the patient/guardian  The patient/guardian understands and agrees with the treatment plan      Chief Complaint  Chief Complaint Free Text Note Form: Follow up visit and flu shotright shoulder pain      History of Present Illness  HPI: She got in trouble for taking an extra Tramadol  Her BS day rocketed with a steroid injection and no she has increased right shoulder pain   Gastroesophageal Reflux Disease (Follow-Up): The patient is being seen for follow-up of gastroesophageal reflux disease  The patient reports doing well  Comorbid Illnesses: NSDAIDs  The patient is currently asymptomatic  No associated symptoms are reported  Medications include omeprazole (Prilosec)  Medications:  the patient is adherent to her medication regimen, but-- she denies medication side effects  Disease management:  the patient is doing well with her goals  Shoulder Pain: The patient is being seen for an initial evaluation of shoulder pain  The patient is right hand dominant  Symptoms:  shoulder pain-- and-- shoulder stiffness, but-- no localized bruising,-- no localized swelling,-- no localized redness,-- no localized warmth,-- no localized numbness,-- no localized weakness-- and-- no decreased range of motion at the shoulder  The patient is currently experiencing symptoms  Current treatment includes activity modification, acetaminophen and ibuprofen  By report, there is good adherence with treatment, good tolerance of treatment and poor symptom control  Pertinent medical history:  prior shoulder surgery  The patient is currently able to do activities of daily living without limitations and unable to work  (  )   Hypertension (Follow-Up): The patient presents for follow-up of essential hypertension   The patient states she has been doing well with her blood pressure control since the last visit  She has no comorbid illnesses  Symptoms: The patient is currently asymptomatic  Home monitoring: The patient checks her blood pressure sporadically  Blood pressure control has been good  Lifestyle: Diet: She does not have a healthy diet  Weight Issues: She has weight concerns  Exercise: She does not exercise regularly  Smoking: She does not use tobacco Alcohol: She denies alcohol use  Drug Use: She denies drug use  Medications: Medication(s): a diuretic-- and-- an ACE inhibitor  Disease Management: the patient is doing well with her blood pressure goals  Diabetes Type II (Follow-Up): The patient states she has been stable with her Type II Diabetes control since the last visit  Comorbid Illnesses: hypertension,-- tobacco use,-- hyperlipidemia-- and-- obesity  She has no significant interval events  Symptoms: The patient is currently asymptomatic  Associated symptoms include no recent weight gain-- and-- no recent weight loss  Home monitoring: The patient checks her blood sugars regularly  -- Glycemic control has been good  Medications: the patient is adherent with her medication regimen  -- She denies medication side effects  Medication(s): Sulfonylurea-- and-- ACE inhibitors  The patient is doing well with her diabetes goals  Due For: a urine microalbumin,-- a hemoglobin A1c-- and-- a retinal exam    Depression (Follow-Up): The patient states her depression has improved since the last visit  They have had recurrent episodes of major depression  She describes this as mild  She has had no significant interval events  Interval Symptoms:   Structured Questionnaire:   Over the past 2 weeks, how often have you been bothered by the following problems? 1 ) Little interest or pleasure in doing things? Not at all    2 ) Feeling down, depressed or hopeless? Not at all    3 ) Trouble falling asleep or sleeping too much? Not at all    4 ) Feeling tired or having little energy?  Not at all    5 ) Poor appetite or overeating? Not at all    6 ) Feeling bad about yourself, or that you are a failure, or have let yourself or your family down? Not at all    7 ) Trouble concentrating on things, such as reading a newspaper or watching television? Not at all    8 ) Moving or speaking so slowly that other people could have noticed, or the opposite, moving or speaking faster than usual? Not at all  TOTAL SCORE: 0,-- severity of depression is mild  How difficult have these problems made it for you to do your work, take care of things at home, or get along with people? Not at all  Social Support: the patient has good social support  Medications: Medication(s): a SNRI  Review of Systems  Complete-Female:   Constitutional: as noted in HPI  Eyes: No complaints of eye pain, no red eyes, no eyesight problems, no discharge, no dry eyes, no itching of eyes  Cardiovascular: as noted in HPI  Respiratory: No complaints of shortness of breath, no wheezing, no cough, no SOB on exertion, no orthopnea, no PND  Gastrointestinal: as noted in HPI  Genitourinary: No complaints of dysuria, no incontinence, no pelvic pain, no dysmenorrhea, no vaginal discharge or bleeding  Musculoskeletal: as noted in HPI  Integumentary: No complaints of skin rash or lesions, no itching, no skin wounds, no breast pain or lump  Neurological: No complaints of headache, no confusion, no convulsions, no numbness, no dizziness or fainting, no tingling, no limb weakness, no difficulty walking  Psychiatric: as noted in HPI  Endocrine: as noted in HPI  Active Problems  1  Acute lumbar radiculopathy (724 4) (M54 16)   2  Analgesic use (V58 69) (Z79 899)   3  Asthma (493 90) (J45 909)   4  Depression (311) (F32 9)   5  DM2 (diabetes mellitus, type 2) (250 00) (E11 9)   6  Encounter for screening for cardiovascular disorders (V81 2) (Z13 6)   7  Esophageal reflux (530 81) (K21 9)   8  Hand paresthesia (782 0) (R20 2)   9  Hyperlipidemia (272 4) (E78 5)   10  Hypertension (401 9) (I10)   11  Low back pain (724 2) (M54 5)   12  Lumbar canal stenosis (724 02) (M48 061)   13  Lumbar radiculopathy (724 4) (M54 16)   14  Morbid or severe obesity due to excess calories (278 01) (E66 01)   15  Myalgia and myositis (729 1)   16  Opioid dependence (304 00) (F11 20)   17  Osteoporosis screening (V82 81) (Z13 820)   18  Pain of right heel (729 5) (M79 671)   19  Pain syndrome, chronic (338 4) (G89 4)   20  Sacroiliitis (720 2) (M46 1)   21  Spondylosis of lumbar region without myelopathy or radiculopathy (721 3) (M47 816)   22  Urine, incontinence, stress female (625 6) (N39 3)   23  Visit for screening mammogram (V76 12) (Z12 31)    Past Medical History  1  History of Allergic reaction to bee sting (989 5,E905 3) (T63 441A)   2  History of Lyme disease (V12 09) (Z86 19)  Active Problems And Past Medical History Reviewed: The active problems and past medical history were reviewed and updated today  Surgical History  1  History of Appendectomy   2  History of Cholecystectomy   3  History of Knee Replacement   4  History of Shoulder Surgery   5  History of Spinal Stereotaxis Stimulation Of Cord   6  History of Total Hip Replacement    Family History  Mother    1  Family history of Diabetes Mellitus (V18 0)  Father    2  Family history of Cancer  Family History    3  Family history of Brain Cancer (V16 8)   4  Family history of Breast Cancer (V16 3)   5  Family history of Cervical Cancer   6  Family history of Diabetes Mellitus (V18 0)   7  Family history of Hypertension (V17 49)   8  Family history of Ovarian Cancer (V16 41)   9  Family history of Stroke Complications    Social History   · Denied: History of Alcohol Use (History)   · Current every day smoker (305 1) (F17 200)   · Denied: History of Drug Use (305 90)  Social History Reviewed: The social history was reviewed and updated today  The social history was reviewed and is unchanged  Current Meds   1  Atorvastatin Calcium 10 MG Oral Tablet; TAKE 1 TABLET DAILY; Therapy: 87HOZ8402 to ([phone])  Requested for: 52Zfs3710; Last Rx:69Bcb5311   Ordered   2  [employer] Vitamin D3 1000 UNIT CAPS; TAKE 1 CAPSULE BY MOUTH TWICE DAILY; Therapy: 95AWY3013 to (Evaluate:61Ita1043)  Requested for: 69OCI2693; Last Rx:79Vyj6651   Ordered   3  EPINEPHrine 0 3 MG/0 3ML Injection Solution Auto-injector; INJECT 0 3ML INTRAMUSCULARLY AS   DIRECTED; Therapy: 15UES8742 to (Last Rx:03Apr2017)  Requested for: 19Zzk5811 Ordered   4  Glimepiride 1 MG Oral Tablet; Take 1 tablet twice daily; Therapy: 92Cjo7079 to (Evaluate:14Nov2017)  Requested for: 91SOB5332; Last Rx:18Bhf4163   Ordered   5  Lisinopril-Hydrochlorothiazide 20-25 MG Oral Tablet; TAKE 1 TABLET DAILY; Therapy: 85Lrj4120 to (Evaluate:26Wil5231)  Requested for: 58TLT7121; Last Rx:19Mar2017   Ordered   6  Meloxicam 15 MG Oral Tablet; take 1 tablet once daily with a meal;   Therapy: 32Ykw0862 to (Evaluate:11Mar2018)  Requested for: 86Luv4676; Last Rx:86Stf6030   Ordered   7  Methocarbamol 750 MG Oral Tablet; TAKE 1/2 -1 TABLET 3 TIMES DAILY; Therapy: 51OMI2487 to (([phone])  Requested for: 09RSH1816; Last Rx:30Oct2017   Ordered   8  Omeprazole 20 MG Oral Capsule Delayed Release; Therapy: 73SWE3669 to (Last Mary Reyes)  Requested for: 60CKO2681 Ordered   9  OneTouch Ultra Blue In [employer]; test twice daily; Therapy: 32ZOU7868 to (Evaluate:12Nov2017)  Requested for: 18Ujb4686; Last Rx:26Apr2017   Ordered   10  Topiramate 50 MG Oral Tablet; TAKE 2 TABLETS TWICE DAILY; Therapy: 84GPG8398 to (Katlyn Pritchard)  Requested for: 46Vjo0762; Last Rx:03Xoy8201    Ordered   11  Venlafaxine HCl ER 75 MG Oral Capsule Extended Release 24 Hour; TAKE 1 CAPSULE DAILY; Therapy: 81SLQ9964 to (Evaluate:76Ptu9782)  Requested for: 04GKN6336; Last Rx:31Mar2017    Ordered   12  Vitamin D 1000 UNIT CAPS; Take 1 capsule twice daily;     Therapy: 61CUE0228 to (Last Rx:12Nov2014)  Requested for: 66CFN0579 Ordered  Medication List Reviewed: The medication list was reviewed and updated today  Allergies  1  Motrin TABS   2  Penicillins   3  MetFORMIN HCl TABS   4  Nortriptyline HCl CAPS   5  Tradjenta TABS   6  Aleve TABS   7  Augmentin TABS   8  Sulfa Drugs  9  Bee sting    Vitals  Vital Signs    Recorded: 08UWX9312 11:32AM Recorded: 84GRA0266 10:49AM   Temperature  99 2 F   Heart Rate  92   Systolic 694 483   Diastolic 75 66   Height  5 ft 6 in   Weight  298 lb    BMI Calculated  48 1   BSA Calculated  2 37   O2 Saturation  98     Physical Exam    Constitutional   General appearance: Abnormal   morbidly obese  Pulmonary   Respiratory effort: No increased work of breathing or signs of respiratory distress  Auscultation of lungs: Clear to auscultation  Cardiovascular   Auscultation of heart: Normal rate and rhythm, normal S1 and S2, without murmurs  Examination of extremities for edema and/or varicosities: Normal     Carotid pulses: Normal     Abdomen   Abdomen: Non-tender, no masses  Lymphatic   Palpation of lymph nodes in neck: No lymphadenopathy  Musculoskeletal   Gait and station: Abnormal  -- wide based  Skin   Skin and subcutaneous tissue: Normal without rashes or lesions  Neurologic   Cranial nerves: Cranial nerves 2-12 intact  Psychiatric   Orientation to person, place, and time: Normal     Mood and affect: Normal          Results/Data  *VB - Foot Exam 20Jun2017 12:00AM Jay Camacho     Test Name Result Flag Reference   FOOT EXAM [id number]         [facility] Management  DM2 (diabetes mellitus, type 2)   *VB - Eye Exam; every 1 year; Next Due: 84DVS8483; Overdue    Future Appointments    Date/Time Provider Specialty Site   12/18/2017 11:30 AM ANASTASIYA Charlton   Internal Medicine [address] [facility] INTERNAL MEDICINE [place]   01/23/2018 01:00 PM Dallas Casas,  Pain Management [address]     Signatures   Electronically signed by : Rossy Pennington ANASTASIYA Downs ; Nov 6 2017 11:40AM EST                       (Author)

## 2017-11-15 ENCOUNTER — APPOINTMENT (OUTPATIENT)
Dept: PHYSICAL THERAPY | Facility: REHABILITATION | Age: 62
End: 2017-11-15
Payer: MEDICARE

## 2017-11-15 DIAGNOSIS — M25.511 PAIN IN RIGHT SHOULDER: ICD-10-CM

## 2017-11-15 PROCEDURE — G8991 OTHER PT/OT GOAL STATUS: HCPCS

## 2017-11-15 PROCEDURE — 97163 PT EVAL HIGH COMPLEX 45 MIN: CPT

## 2017-11-15 PROCEDURE — G8990 OTHER PT/OT CURRENT STATUS: HCPCS

## 2017-11-21 ENCOUNTER — APPOINTMENT (OUTPATIENT)
Dept: PHYSICAL THERAPY | Facility: REHABILITATION | Age: 62
End: 2017-11-21
Payer: MEDICARE

## 2017-11-22 ENCOUNTER — HOSPITAL ENCOUNTER (EMERGENCY)
Facility: HOSPITAL | Age: 62
Discharge: HOME | End: 2017-11-22
Attending: EMERGENCY MEDICINE | Admitting: EMERGENCY MEDICINE
Payer: MEDICARE

## 2017-11-22 ENCOUNTER — APPOINTMENT (EMERGENCY)
Dept: RADIOLOGY | Facility: HOSPITAL | Age: 62
End: 2017-11-22
Payer: MEDICARE

## 2017-11-22 VITALS
HEART RATE: 91 BPM | SYSTOLIC BLOOD PRESSURE: 144 MMHG | DIASTOLIC BLOOD PRESSURE: 60 MMHG | WEIGHT: 293 LBS | OXYGEN SATURATION: 100 % | TEMPERATURE: 98 F | RESPIRATION RATE: 20 BRPM | BODY MASS INDEX: 49.09 KG/M2

## 2017-11-22 DIAGNOSIS — M54.9 BACK PAIN: Primary | ICD-10-CM

## 2017-11-22 PROCEDURE — 71020 HB CHEST X-RAY 2VW FRONTAL&LATL: CPT

## 2017-11-22 PROCEDURE — 99283 EMERGENCY DEPT VISIT LOW MDM: CPT

## 2017-11-22 RX ORDER — TOPIRAMATE 50 MG/1
50 TABLET, FILM COATED ORAL DAILY
COMMUNITY
End: 2018-04-19

## 2017-11-22 RX ORDER — BUPIVACAINE HYDROCHLORIDE 5 MG/ML
10 INJECTION, SOLUTION EPIDURAL; INTRACAUDAL ONCE
Status: COMPLETED | OUTPATIENT
Start: 2017-11-22 | End: 2017-11-22

## 2017-11-22 RX ORDER — LIDOCAINE 50 MG/G
1 PATCH TOPICAL ONCE
Status: DISCONTINUED | OUTPATIENT
Start: 2017-11-22 | End: 2017-11-22 | Stop reason: HOSPADM

## 2017-11-22 RX ADMIN — BUPIVACAINE HYDROCHLORIDE 10 ML: 5 INJECTION, SOLUTION EPIDURAL; INTRACAUDAL at 15:06

## 2017-11-22 RX ADMIN — LIDOCAINE 1 PATCH: 50 PATCH TOPICAL at 15:06

## 2017-11-22 NOTE — ED ATTENDING ATTESTATION
Leon Bledsoe DO, saw and evaluated the patient  I have discussed the patient with the resident/non-physician practitioner and agree with the resident's/non-physician practitioner's findings, Plan of Care, and MDM as documented in the resident's/non-physician practitioner's note, except where noted  All available labs and Radiology studies were reviewed  At this point I agree with the current assessment done in the Emergency Department  I have conducted an independent evaluation of this patient including a focused history and a physical exam     ED Note - Raissa Ramirez 63 y o  female MRN: [id number]  Unit/Bed#: ED 14 Encounter: [id number]    History of Present Illness   HPI  Raissa Ramirez is a 63 y o  female who presents for evaluation of persistent left-sided back pain  Patient states that she woke approximately 2 weeks ago and noted pain to the left mid back  Exacerbated by movement  Slightly improved with rest   Poor sleep due to the pain  She denies any obvious injury  No recent illness  No excessive coughing  No urinary symptoms  No fever chills  Patient states that she has been trying to nurse the pain for the last 2 weeks but home prescriptions have been relatively ineffective  Patient does follow with Dr Lisbeth Plaza from Pain Management and had recently been on Tramadol until she had a right sciatic nerve injection  Patient denies any focal neurological deficits  No saddle anesthesia  No bowel or bladder abnormality  Patient is able to ambulate without difficulty  No abnormal or unintentional weight loss  No constitutional symptoms  Otherwise feels well  No other complaints  REVIEW OF SYSTEMS  See HPI for further details  12 systems reviewed and otherwise negative except as noted     Historical Information     PAST MEDICAL HISTORY  Past Medical History:   Diagnosis Date    Asthma     Chronic narcotic dependence ([facility] [address] )     Chronic pain syndrome     Depression     Diabetes mellitus ([facility] )     Esophageal reflux     Hyperlipidemia     Hypertension     Lumbar radiculopathy     Lyme disease     Obesity     Opioid dependence ([facility] )     PPD positive     had treatment w/ INH 15 years ago    Vitamin D deficiency        FAMILY HISTORY  Family History   Problem Relation Age of Onset    Diabetes Mother     Cancer Father        SOCIAL HISTORY  Social History     Social History    Marital status: /Civil Union     Spouse name: N/A    Number of children: N/A    Years of education: N/A     Social History Main Topics    Smoking status: Current Every Day Smoker     Packs/day: 1 00     Years: 5 00    Smokeless tobacco: Never Used      Comment: pt desires to quit in January    Alcohol use No    Drug use: No    Sexual activity: Not Asked     Other Topics Concern    None     Social History Narrative    None       SURGICAL HISTORY  Past Surgical History:   Procedure Laterality Date    APPENDECTOMY      CHOLECYSTECTOMY      JOINT REPLACEMENT      knee, total hip    MT REVISE/REMOVE SPINAL NEUROSTIM/ Left 12/13/2016    Procedure: REPLACEMENT BUTTOCK SPINAL CORD STIMULATOR IPG;  Surgeon: Tanmay Stewart MD;  Location:  MAIN OR;  Service: Neurosurgery    SHOULDER SURGERY      SPINAL CORD STIMULATOR IMPLANT       Meds/Allergies     CURRENT MEDICATIONS    Current Facility-Administered Medications:     lidocaine (LIDODERM) 5 % patch 1 patch, 1 patch, Transdermal, Once, Marcelina Cornet, DO    Current Outpatient Prescriptions:     atorvastatin (LIPITOR) 10 mg tablet, Take by mouth, Disp: , Rfl:     Canagliflozin (INVOKANA) 300 MG TABS, Take by mouth, Disp: , Rfl:     Cholecalciferol (CVS VITAMIN D3) 1000 UNITS capsule, Take by mouth, Disp: , Rfl:     EPINEPHrine (EPIPEN 2-JIMI) 0 3 mg/0 3 mL SOAJ, EpiPen 2-Jimi 0 3 MG/0 3ML Injection Solution Auto-injector INJECT 0 3ML INTRAMUSCULARLY AS DIRECTED    Quantity: 1;  Refills: 1   Eron Jason ;  [place] 10-Mar-2011 Active 2 Solution Auto-injector Pen, Disp: , Rfl:     Gabapentin, Once-Daily, (GRALISE) 600 MG TABS, Take by mouth, Disp: , Rfl:     glimepiride (AMARYL) 1 mg tablet, Take by mouth, Disp: , Rfl:     HYDROcodone-acetaminophen (NORCO) 7 5-325 mg per tablet, Take by mouth, Disp: , Rfl:     lisinopril-hydrochlorothiazide (PRINZIDE,ZESTORETIC) 20-25 MG per tablet, Take by mouth, Disp: , Rfl:     meloxicam (MOBIC) 15 mg tablet, Take by mouth, Disp: , Rfl:     methocarbamol (ROBAXIN) 750 mg tablet, Take by mouth, Disp: , Rfl:     Omeprazole 20 MG TBEC, Omeprazole 20 MG Oral Capsule Delayed Release  Quantity: 30 00; Refills: 0   Start [id number]-[id number] Active, Disp: , Rfl:     oxyCODONE-acetaminophen (PERCOCET) 5-325 mg per tablet, Take by mouth, Disp: , Rfl:     venlafaxine (EFFEXOR XR) 75 mg 24 hr capsule, Take by mouth, Disp: , Rfl:     (Not in a hospital admission)    ALLERGIES  Allergies   Allergen Reactions    Bee Venom Anaphylaxis    Other Other (See Comments)     Stainless steel and surgical steel  *Severe blistering*    Aleve [Naproxen Sodium] Hives    Augmentin [Amoxicillin-Pot Clavulanate] Hives    Metformin And Related Hives    Morphine And Related Hives    Motrin [Ibuprofen] Hives    Nortriptyline Hives    Penicillins Hives    Soy Lecithin [Lecithin] Hives    Sulfa Antibiotics Hives    Tradjenta [Linagliptin] Hives     Objective     PHYSICAL EXAM    VITAL SIGNS: Blood pressure 144/60, pulse 91, temperature 98 °F (36 7 °C), temperature source Oral, resp  rate 20, weight 134 kg (295 lb), SpO2 100 %      Constitutional:  Well developed, well nourished, no acute distress, non-toxic appearance   Eyes:  PERRL, EOMI, conjunctivae pink, sclerae non-icteric    HENT:  Normocephalic/Atraumatic, no rhinorrhea, mucous membranes moist, Tympanic Membranes clear   Neck: normal range of motion, no tenderness, supple   Respiratory:  No respiratory distress, normal breath sounds   Cardiovascular:  Normal rate, normal rhythm    GI:  Soft, non-tender, non-distended   :  No CVAT, no flank ecchymosis  Musculoskeletal:  BACK:  Focal paraspinal muscle tenderness on palpation noted to the left mid back at the area of the 7th rib  No crepitus or deformity  No rash  No midline spinal tenderness  No swelling or edema  Integument:  Pink, warm, dry, Well hydrated, no rash, no erythema, no bullae   Lymphatic:  No cervical/ tonsillar/ submandibular lymphadenopathy noted   Neurologic:  Awake, Alert & oriented x 3, CN 2-12 intact, no focal neurological deficits, motor function intact, strength 5/5 all extremities, normal sensory function, reflexes within normal limits, intact finger to nose, intact heal to shin, negative dysdiadochokinesia  Able to ambulate  Psychiatric:  Speech and behavior appropriate       ED COURSE and MDM:    Assessment/Plan   Assessment:  64 yo female presents for evaluation of persistent left-sided back pain  Patient states that she woke approximately 2 weeks ago and noted pain to the left mid back  Exacerbated by movement  Slightly improved with rest   Poor sleep due to the pain  She denies any obvious injury  No recent illness  No excessive coughing  No urinary symptoms  No fever chills  Patient states that she has been trying to nurse the pain for the last 2 weeks but home prescriptions have been relatively ineffective  Patient does follow with Dr Luis Ross from Pain Management and had recently been on Tramadol until she had a right sciatic nerve injection  Patient denies any focal neurological deficits  No saddle anesthesia  No bowel or bladder abnormality  Patient is able to ambulate without difficulty  No abnormal or unintentional weight loss  No constitutional symptoms  Otherwise feels well  No other complaints  Plan:  CXR, symptom management, disposition as appropriate  Portions of the record may have been created with voice recognition software   Occasional wrong word or "sound a like" substitutions may have occurred due to the inherent limitations of voice recognition software       ED Provider  Electronically Signed by

## 2017-11-22 NOTE — ED PROVIDER NOTES
History  Chief Complaint   Patient presents with    Back Pain     left sided back pain over past two weeks  pain worse with movement  denies abd pain/nausea/urinary symptoms  29-year-old female presenting to the ER today with a chief complaint of left-sided back pain  States the pain started 2 weeks ago  Describes sharp pain with side bending and rotation  Pain does not move  When symptoms continued came to the ER today for further evaluation treatment  Denies bowel or bladder incontinence  Denies saddle anesthesia  Denies weight loss, night sweats, history of cancer  Denies history of IV drug abuse  History provided by:  Patient   used: No    Back Pain   Location:  Thoracic spine  Quality:  Aching  Pain severity:  Mild  Pain is:  Unable to specify  Timing:  Constant  Progression:  Worsening  Chronicity:  New  Context: twisting    Relieved by:  Nothing  Worsened by: Movement and twisting  Ineffective treatments:  None tried  Associated symptoms: no abdominal pain, no abdominal swelling, no bladder incontinence, no bowel incontinence, no chest pain, no dysuria, no fever, no headaches, no leg pain, no numbness, no paresthesias, no pelvic pain, no perianal numbness, no tingling, no weakness and no weight loss    Risk factors: obesity    Risk factors: no hx of cancer, no hx of osteoporosis, no lack of exercise, no menopause, not pregnant, no recent surgery, no steroid use and no vascular disease        Prior to Admission Medications   Prescriptions Last Dose Informant Patient Reported?  Taking?   atorvastatin (LIPITOR) 10 mg tablet 11/21/2017 at Unknown time  Yes Yes   Sig: Take by mouth   glimepiride (AMARYL) 1 mg tablet 11/21/2017 at Unknown time  Yes Yes   Sig: Take by mouth 2 (two) times a day     lisinopril-hydrochlorothiazide (PRINZIDE,ZESTORETIC) 20-25 MG per tablet 11/21/2017 at Unknown time  Yes Yes   Sig: Take by mouth   meloxicam (MOBIC) 15 mg tablet 11/21/2017 at Unknown time  Yes Yes   Sig: Take by mouth   methocarbamol (ROBAXIN) 750 mg tablet 11/21/2017 at Unknown time  Yes Yes   Sig: Take 750 mg by mouth 2 (two) times a day     topiramate (TOPAMAX) 50 MG tablet 11/21/2017 at Unknown time  Yes Yes   Sig: Take 50 mg by mouth every 12 (twelve) hours   venlafaxine (EFFEXOR XR) 75 mg 24 hr capsule 11/21/2017 at Unknown time  Yes Yes   Sig: Take by mouth      Facility-Administered Medications: None       Past Medical History:   Diagnosis Date    Asthma     Chronic narcotic dependence (HCC)     Chronic pain syndrome     Depression     Diabetes mellitus ([facility]     Esophageal reflux     Hyperlipidemia     Hypertension     Lumbar radiculopathy     Lyme disease     Obesity     Opioid dependence ([facility] [facility]     PPD positive     had treatment w/ INH 15 years ago    Vitamin D deficiency        Past Surgical History:   Procedure Laterality Date    APPENDECTOMY      CHOLECYSTECTOMY      JOINT REPLACEMENT      knee, total hip    AR REVISE/REMOVE SPINAL NEUROSTIM/ Left 12/13/2016    Procedure: REPLACEMENT BUTTOCK SPINAL CORD STIMULATOR IPG;  Surgeon: Eleni Hernandez MD;  [facility];  Service: Neurosurgery    SHOULDER SURGERY      SPINAL CORD STIMULATOR IMPLANT         Family History   Problem Relation Age of Onset    Diabetes Mother     Cancer Father      I have reviewed and agree with the history as documented  Social History   Substance Use Topics    Smoking status: Current Every Day Smoker     Packs/day: 1 00     Years: 5 00    Smokeless tobacco: Never Used      Comment: pt desires to quit in January    Alcohol use No        Review of Systems   Constitutional: Negative  Negative for appetite change, chills, diaphoresis, fatigue, fever and weight loss  HENT: Negative  Eyes: Negative  Respiratory: Negative  Cardiovascular: Negative  Negative for chest pain     Gastrointestinal: Negative for abdominal pain, blood in stool, bowel incontinence, constipation, diarrhea, nausea and vomiting  Endocrine: Negative  Genitourinary: Negative for bladder incontinence, decreased urine volume, difficulty urinating, dyspareunia, dysuria, flank pain, frequency, hematuria, pelvic pain, urgency, vaginal bleeding, vaginal discharge and vaginal pain  Musculoskeletal: Positive for back pain  Skin: Negative  Allergic/Immunologic: Negative  Neurological: Negative  Negative for tingling, weakness, numbness, headaches and paresthesias  Psychiatric/Behavioral: Negative  All other systems reviewed and are negative  Physical Exam  ED Triage Vitals [11/22/17 1340]   Temperature Pulse Respirations Blood Pressure SpO2   98 °F (36 7 °C) 91 20 144/60 100 %      Temp Source Heart Rate Source Patient Position - Orthostatic VS BP Location FiO2 (%)   Oral -- -- -- --      Pain Score       Worst Possible Pain           Orthostatic Vital Signs  Vitals:    11/22/17 1340   BP: 144/60   Pulse: 91       Physical Exam   Constitutional: She is oriented to person, place, and time  She appears well-developed and well-nourished  HENT:   Head: Normocephalic and atraumatic  Right Ear: External ear normal    Left Ear: External ear normal    Nose: Nose normal    Mouth/Throat: Oropharynx is clear and moist    Eyes: Conjunctivae and EOM are normal  Pupils are equal, round, and reactive to light  Neck: Normal range of motion  Neck supple  No JVD present  No tracheal deviation present  No thyromegaly present  Cardiovascular: Normal rate, regular rhythm, normal heart sounds and intact distal pulses  Exam reveals no gallop and no friction rub  No murmur heard  Pulmonary/Chest: Effort normal and breath sounds normal  No stridor  No respiratory distress  She has no wheezes  She has no rales  She exhibits no tenderness  Abdominal: Soft  Bowel sounds are normal  She exhibits no distension and no mass  There is no tenderness  There is no rebound and no guarding  No hernia  Musculoskeletal: Normal range of motion  She exhibits tenderness  She exhibits no edema or deformity  Thoracic back: She exhibits tenderness and pain  She exhibits normal range of motion and no bony tenderness  Back:    Lymphadenopathy:     She has no cervical adenopathy  Neurological: She is alert and oriented to person, place, and time  She has normal reflexes  She displays normal reflexes  No cranial nerve deficit  She exhibits normal muscle tone  Coordination normal    Skin: Skin is warm  No rash noted  No erythema  No pallor  Psychiatric: She has a normal mood and affect  Her behavior is normal  Judgment and thought content normal    Nursing note and vitals reviewed  ED Medications  Medications   lidocaine (LIDODERM) 5 % patch 1 patch (1 patch Transdermal Medication Applied 11/22/17 1506)   bupivacaine (PF) (MARCAINE) 0 5 % injection 10 mL (10 mL Infiltration Given 11/22/17 1506)       Diagnostic Studies  Results Reviewed     None                 XR chest 2 views    (Results Pending)         Procedures  Nerve Block  Date/Time: 11/22/2017 3:20 PM  Performed by: Td Spann  Authorized by: Allison Escalante     Patient location:  ED  Consent:     Consent obtained:  Verbal    Consent given by:  Patient  Universal protocol:     Patient identity confirmed:  Verbally with patient  Indications:     Indications:  Pain relief  Location:     Body area:  Trunk    Laterality:  Left  Pre-procedure details:     Skin preparation:  2% chlorhexidine  Skin anesthesia (see MAR for exact dosages):     Skin anesthesia method:  None  Procedure details (see MAR for exact dosages): Block needle gauge:  24 G    Anesthetic injected:  Bupivacaine 0 5% w/o epi    Steroid injected:  None    Injection procedure:  Anatomic landmarks identified  Post-procedure details:     Dressing:  None    Outcome:  Anesthesia achieved    Patient tolerance of procedure:   Tolerated well, no immediate complications          Phone Consults  ED Phone Contact    ED Course  ED Course                                MDM  Number of Diagnoses or Management Options  Back pain: new and requires workup     Amount and/or Complexity of Data Reviewed  Clinical lab tests: ordered and reviewed  Tests in the radiology section of CPT®: ordered and reviewed  Tests in the medicine section of CPT®: reviewed and ordered  Decide to obtain previous medical records or to obtain history from someone other than the patient: yes  Independent visualization of images, tracings, or specimens: yes    Patient Progress  Patient progress: stable    CritCare Time    Disposition  Final diagnoses:   Back pain     Time reflects when diagnosis was documented in both MDM as applicable and the Disposition within this note     Time User Action Codes Description Comment    11/22/2017  3:20 PM Adrianna De Leon Add [M54 9] Back pain       ED Disposition     ED Disposition Condition Comment    Discharge  Jaylin Ferrer discharge to home/self care  Condition at discharge: Good        Follow-up Information     Follow up With Specialties Details Why Contact Info    Ines Fernandez MD Internal Medicine Schedule an appointment as soon as possible for a visit  [address] [place] [address]  [phone]          Patient's Medications   Discharge Prescriptions    No medications on file     No discharge procedures on file  ED Provider  Attending physically available and evaluated Jaylin Ferrer I managed the patient along with the ED Attending      Electronically Signed by         Rosemarie Velasco DO  Resident  11/22/17 7386

## 2017-11-22 NOTE — DISCHARGE INSTRUCTIONS
Acute Low Back Pain   WHAT YOU NEED TO KNOW:   Acute low back pain is sudden discomfort in your lower back area that lasts for up to 6 weeks  The discomfort makes it difficult to tolerate activity  DISCHARGE INSTRUCTIONS:   Return to the emergency department if:   · You have severe pain  · You have sudden stiffness and heaviness on both buttocks down to both legs  · You have numbness or weakness in one leg, or pain in both legs  · You have numbness in your genital area or across your lower back  · You cannot control your urine or bowel movements  Contact your healthcare provider if:   · You have a fever  · You have pain at night or when you rest     · Your pain does not get better with treatment  · You have pain that worsens when you cough or sneeze  · You suddenly feel something pop or snap in your back  · You have questions or concerns about your condition or care  Medicines: The following medicines may be ordered by your healthcare provider:  · Acetaminophen  decreases pain  It is available without a doctor's order  Ask how much to take and how often to take it  Follow directions  Acetaminophen can cause liver damage if not taken correctly  · NSAIDs  help decrease swelling and pain  This medicine is available with or without a doctor's order  NSAIDs can cause stomach bleeding or kidney problems in certain people  If you take blood thinner medicine, always ask your healthcare provider if NSAIDs are safe for you  Always read the medicine label and follow directions  · Prescription pain medicine  may be given  Ask your healthcare provider how to take this medicine safely  · Muscle relaxers  decrease pain by relaxing the muscles in your lower spine  · Take your medicine as directed  Contact your healthcare provider if you think your medicine is not helping or if you have side effects  Tell him of her if you are allergic to any medicine   Keep a list of the medicines, vitamins, and herbs you take  Include the amounts, and when and why you take them  Bring the list or the pill bottles to follow-up visits  Carry your medicine list with you in case of an emergency  Self-care:   · Stay active  as much as you can without causing more pain  Bed rest could make your back pain worse  Start with some light exercises such as walking  Avoid heavy lifting until your pain is gone  Ask for more information about the activities or exercises that are right for you  · Ice  helps decrease swelling, pain, and muscle spams  Put crushed ice in a plastic bag  Cover it with a towel  Place it on your lower back for 20 to 30 minutes every 2 hours  Do this for about 2 to 3 days after your pain starts, or as directed  · Heat  helps decrease pain and muscle spasms  Start to use heat after treatment with ice has stopped  Use a small towel dampened with warm water or a heating pad, or sit in a warm bath  Apply heat on the area for 20 to 30 minutes every 2 hours for as many days as directed  Alternate heat and ice  Prevent acute low back pain:   · Use proper body mechanics  ¨ Bend at the hips and knees when you  objects  Do not bend from the waist  Use your leg muscles as you lift the load  Do not use your back  Keep the object close to your chest as you lift it  Try not to twist or lift anything above your waist     ¨ Change your position often when you stand for long periods of time  Rest one foot on a small box or footrest, and then switch to the other foot often  ¨ Try not to sit for long periods of time  When you do, sit in a straight-backed chair with your feet flat on the floor  Never reach, pull, or push while you are sitting  · Do exercises that strengthen your back muscles  Warm up before you exercise  Ask your healthcare provider the best exercises for you  · Maintain a healthy weight  Ask your healthcare provider how much you should weigh   Ask him to help you create a weight loss plan if you are overweight  Follow up with your healthcare provider as directed:  Return for a follow-up visit if you still have pain after 1 to 3 weeks of treatment  You may need to visit an orthopedist if your back pain lasts more than 12 weeks  Write down your questions so you remember to ask them during your visits  © 2017 [employer]  Information is for End User's use only and may not be sold, redistributed or otherwise used for commercial purposes  All illustrations and images included in CareNotes® are the copyrighted property of [employer]  or [address]  The above information is an  only  It is not intended as medical advice for individual conditions or treatments  Talk to your doctor, nurse or pharmacist before following any medical regimen to see if it is safe and effective for you

## 2017-11-26 ENCOUNTER — APPOINTMENT (EMERGENCY)
Dept: RADIOLOGY | Facility: HOSPITAL | Age: 62
End: 2017-11-26
Payer: MEDICARE

## 2017-11-26 ENCOUNTER — HOSPITAL ENCOUNTER (OUTPATIENT)
Facility: HOSPITAL | Age: 62
Setting detail: OBSERVATION
Discharge: HOME | End: 2017-11-27
Attending: EMERGENCY MEDICINE | Admitting: INTERNAL MEDICINE
Payer: MEDICARE

## 2017-11-26 DIAGNOSIS — R29.898 TRANSIENT WEAKNESS OF LEFT LOWER EXTREMITY: ICD-10-CM

## 2017-11-26 DIAGNOSIS — M48.061 LUMBAR CANAL STENOSIS: ICD-10-CM

## 2017-11-26 DIAGNOSIS — Z96.89 STATUS POST INSERTION OF SPINAL CORD STIMULATOR: ICD-10-CM

## 2017-11-26 DIAGNOSIS — M54.42 ACUTE LEFT-SIDED LOW BACK PAIN WITH LEFT-SIDED SCIATICA: Primary | ICD-10-CM

## 2017-11-26 DIAGNOSIS — G89.4 CHRONIC PAIN SYNDROME: ICD-10-CM

## 2017-11-26 PROBLEM — E66.9 OBESITY: Status: ACTIVE | Noted: 2017-11-26

## 2017-11-26 PROBLEM — E11.9 DMII (DIABETES MELLITUS, TYPE 2) (HCC): Status: ACTIVE | Noted: 2017-11-26

## 2017-11-26 PROBLEM — F32.A DEPRESSION: Status: ACTIVE | Noted: 2017-11-26

## 2017-11-26 PROBLEM — M54.59 INTRACTABLE LOW BACK PAIN: Status: ACTIVE | Noted: 2017-11-26

## 2017-11-26 PROBLEM — I10 HTN (HYPERTENSION): Status: ACTIVE | Noted: 2017-11-26

## 2017-11-26 LAB
ANION GAP BLD CALC-SCNC: 17 MMOL/L (ref 4–13)
ATRIAL RATE: 66 BPM
BUN BLD-MCNC: 16 MG/DL (ref 5–25)
CA-I BLD-SCNC: 1.26 MMOL/L (ref 1.12–1.32)
CHLORIDE BLD-SCNC: 99 MMOL/L (ref 100–108)
CREAT BLD-MCNC: 1.1 MG/DL (ref 0.6–1.3)
GFR SERPL CREATININE-BSD FRML MDRD: 55 ML/MIN/1.73SQ M
GLUCOSE SERPL-MCNC: 186 MG/DL (ref 65–140)
GLUCOSE SERPL-MCNC: 74 MG/DL (ref 65–140)
HCT VFR BLD CALC: 42 % (ref 34.8–46.1)
HGB BLDA-MCNC: 14.3 G/DL (ref 11.5–15.4)
P AXIS: 55 DEGREES
PCO2 BLD: 27 MMOL/L (ref 21–32)
POTASSIUM BLD-SCNC: 3.5 MMOL/L (ref 3.5–5.3)
PR INTERVAL: 172 MS
QRS AXIS: 34 DEGREES
QRSD INTERVAL: 90 MS
QT INTERVAL: 406 MS
QTC INTERVAL: 425 MS
SODIUM BLD-SCNC: 138 MMOL/L (ref 136–145)
SPECIMEN SOURCE: ABNORMAL
T WAVE AXIS: 29 DEGREES
VENTRICULAR RATE: 66 BPM

## 2017-11-26 PROCEDURE — 99285 EMERGENCY DEPT VISIT HI MDM: CPT

## 2017-11-26 PROCEDURE — 72131 CT LUMBAR SPINE W/O DYE: CPT

## 2017-11-26 PROCEDURE — 85014 HEMATOCRIT: CPT

## 2017-11-26 PROCEDURE — 80047 BASIC METABLC PNL IONIZED CA: CPT

## 2017-11-26 PROCEDURE — 82948 REAGENT STRIP/BLOOD GLUCOSE: CPT

## 2017-11-26 PROCEDURE — 93005 ELECTROCARDIOGRAM TRACING: CPT | Performed by: EMERGENCY MEDICINE

## 2017-11-26 PROCEDURE — 96360 HYDRATION IV INFUSION INIT: CPT

## 2017-11-26 PROCEDURE — 71275 CT ANGIOGRAPHY CHEST: CPT

## 2017-11-26 RX ORDER — TOPIRAMATE 25 MG/1
100 TABLET ORAL EVERY 12 HOURS SCHEDULED
Status: DISCONTINUED | OUTPATIENT
Start: 2017-11-26 | End: 2017-11-27 | Stop reason: HOSPADM

## 2017-11-26 RX ORDER — GLIMEPIRIDE 1 MG/1
1 TABLET ORAL 2 TIMES DAILY
Status: DISCONTINUED | OUTPATIENT
Start: 2017-11-26 | End: 2017-11-27 | Stop reason: HOSPADM

## 2017-11-26 RX ORDER — ONDANSETRON 2 MG/ML
4 INJECTION INTRAMUSCULAR; INTRAVENOUS EVERY 6 HOURS PRN
Status: DISCONTINUED | OUTPATIENT
Start: 2017-11-26 | End: 2017-11-27 | Stop reason: HOSPADM

## 2017-11-26 RX ORDER — MELOXICAM 7.5 MG/1
15 TABLET ORAL DAILY
Status: DISCONTINUED | OUTPATIENT
Start: 2017-11-26 | End: 2017-11-27 | Stop reason: HOSPADM

## 2017-11-26 RX ORDER — ACETAMINOPHEN 325 MG/1
975 TABLET ORAL EVERY 8 HOURS SCHEDULED
Status: DISCONTINUED | OUTPATIENT
Start: 2017-11-26 | End: 2017-11-27 | Stop reason: HOSPADM

## 2017-11-26 RX ORDER — ATORVASTATIN CALCIUM 10 MG/1
10 TABLET, FILM COATED ORAL
Status: DISCONTINUED | OUTPATIENT
Start: 2017-11-26 | End: 2017-11-27 | Stop reason: HOSPADM

## 2017-11-26 RX ORDER — ACETAMINOPHEN 325 MG/1
975 TABLET ORAL ONCE
Status: COMPLETED | OUTPATIENT
Start: 2017-11-26 | End: 2017-11-26

## 2017-11-26 RX ORDER — LIDOCAINE 50 MG/G
1 PATCH TOPICAL ONCE
Status: COMPLETED | OUTPATIENT
Start: 2017-11-26 | End: 2017-11-27

## 2017-11-26 RX ORDER — DEXAMETHASONE SODIUM PHOSPHATE 4 MG/ML
4 INJECTION, SOLUTION INTRA-ARTICULAR; INTRALESIONAL; INTRAMUSCULAR; INTRAVENOUS; SOFT TISSUE EVERY 12 HOURS SCHEDULED
Status: DISCONTINUED | OUTPATIENT
Start: 2017-11-26 | End: 2017-11-27 | Stop reason: HOSPADM

## 2017-11-26 RX ORDER — VENLAFAXINE HYDROCHLORIDE 75 MG/1
75 CAPSULE, EXTENDED RELEASE ORAL DAILY
Status: DISCONTINUED | OUTPATIENT
Start: 2017-11-27 | End: 2017-11-27 | Stop reason: HOSPADM

## 2017-11-26 RX ORDER — TOPIRAMATE 100 MG/1
100 TABLET, FILM COATED ORAL
COMMUNITY
End: 2018-04-19 | Stop reason: SDUPTHER

## 2017-11-26 RX ORDER — METHOCARBAMOL 750 MG/1
750 TABLET, FILM COATED ORAL EVERY 8 HOURS SCHEDULED
Status: DISCONTINUED | OUTPATIENT
Start: 2017-11-26 | End: 2017-11-27 | Stop reason: HOSPADM

## 2017-11-26 RX ORDER — TOPIRAMATE 25 MG/1
50 TABLET ORAL EVERY 12 HOURS SCHEDULED
Status: DISCONTINUED | OUTPATIENT
Start: 2017-11-26 | End: 2017-11-26

## 2017-11-26 RX ORDER — TRAMADOL HYDROCHLORIDE 50 MG/1
100 TABLET ORAL EVERY 8 HOURS
Status: DISCONTINUED | OUTPATIENT
Start: 2017-11-26 | End: 2017-11-27 | Stop reason: HOSPADM

## 2017-11-26 RX ORDER — PANTOPRAZOLE SODIUM 40 MG/1
40 TABLET, DELAYED RELEASE ORAL
Status: DISCONTINUED | OUTPATIENT
Start: 2017-11-27 | End: 2017-11-27 | Stop reason: HOSPADM

## 2017-11-26 RX ORDER — NICOTINE 21 MG/24HR
1 PATCH, TRANSDERMAL 24 HOURS TRANSDERMAL DAILY
Status: DISCONTINUED | OUTPATIENT
Start: 2017-11-27 | End: 2017-11-27 | Stop reason: HOSPADM

## 2017-11-26 RX ADMIN — METHOCARBAMOL 750 MG: 750 TABLET ORAL at 22:03

## 2017-11-26 RX ADMIN — TOPIRAMATE 100 MG: 25 TABLET, FILM COATED ORAL at 22:02

## 2017-11-26 RX ADMIN — ACETAMINOPHEN 975 MG: 325 TABLET, FILM COATED ORAL at 13:54

## 2017-11-26 RX ADMIN — DEXAMETHASONE SODIUM PHOSPHATE 4 MG: 4 INJECTION, SOLUTION INTRAMUSCULAR; INTRAVENOUS at 20:05

## 2017-11-26 RX ADMIN — TRAMADOL HYDROCHLORIDE 100 MG: 50 TABLET, FILM COATED ORAL at 20:32

## 2017-11-26 RX ADMIN — IOHEXOL 85 ML: 350 INJECTION, SOLUTION INTRAVENOUS at 14:31

## 2017-11-26 RX ADMIN — GLIMEPIRIDE 1 MG: 1 TABLET ORAL at 20:02

## 2017-11-26 RX ADMIN — ACETAMINOPHEN 975 MG: 325 TABLET, FILM COATED ORAL at 22:02

## 2017-11-26 RX ADMIN — SODIUM CHLORIDE 1000 ML: 0.9 INJECTION, SOLUTION INTRAVENOUS at 13:54

## 2017-11-26 RX ADMIN — LIDOCAINE 1 PATCH: 50 PATCH TOPICAL at 13:55

## 2017-11-26 NOTE — ED ATTENDING ATTESTATION
Dallin Ohara MD, saw and evaluated the patient  I have discussed the patient with the resident/non-physician practitioner and agree with the resident's/non-physician practitioner's findings, Plan of Care, and MDM as documented in the resident's/non-physician practitioner's note, except where noted  All available labs and Radiology studies were reviewed  At this point I agree with the current assessment done in the Emergency Department  I have conducted an independent evaluation of this patient a history and physical is as follows:      Critical Care Time  CritCare Time  OA: 64 y/o f with h/o chronic back pain s/p spinal cord stimulator p/w L thoracic back pain x weeks  Pt denies any trauma/falls  Seen recently for same sxms within the past week, had trigger point injection which seemed to help initially and then pain returned so returns to the ED  Pt describes pain as burning/aching, constant, worse with laying flat and improves with leaning forward  She denies any change in urinary/bowel patterns but does admit to having occasional numbness in her LLE  No n/v  No cp/sob  No headache/dizziness  Per records pt has been treated by pain management for similar in the past including radiculopathy  PE, well developed f, uncomfortable in NAD, VSS ,Nc/AT, MMM, neck supple/FROM, -midline ttp over C, T or L spine, no stepoff or deformity, + ttp over low T and high L spine paraspinal region with trigger point reproduction of pain, 5/5 strength and intact sensation x 4, intact gait able to ambulate from wheelchair to stretcher but with discomfort  Negative babinski  A/p back pain, acute on chronic, pt with a spinal stimulator, unable to receive an MRI, will obtain CT T and L spine as well as CTA chest  Treat pain  Re-eval    Portions of the record may have been created with voice recognition software    Occasional wrong word or sound-a-like" substitutions may have occurred due to the inherent limitations of voice recognition software  Review chart carefully and recognize, using context, where substitutions have occurred

## 2017-11-26 NOTE — ED NOTES
Dr Talat Miller at patient bedside to medically evaluate patient       Bassam Anna RN  11/26/17 1998

## 2017-11-26 NOTE — ED PROVIDER NOTES
History  Chief Complaint   Patient presents with    Back Pain     L side of back hurts, L leg weakness  82-year-old female with past medical history of chronic back pain status post spinal cord stimulator placement in the lumbar region, chronic pain syndrome, chronic narcotic dependence, diabetes mellitus, hypertension, hyperlipidemia, and obesity who is presenting with acute on chronic back pain  Patient states that for the past several weeks she has had increasing left thoracic/upper lumbar paraspinal pain  This is different from patient's usual chronic pain, which has been well controlled after spinal cord stimulator placement  Patient states the pain waxes and wanes but never truly goes away  Patient describes the pain as if "someone is cutting me open " She does report associated weakness of her left lower extremity which occurred couple days prior to presentation  She has never had similar symptoms in the past   Patient was evaluated for this pain 5 days ago and received a trigger point injection  She does have a follow-up appointment with her pain management physician on Tuesday, November 28th  Otherwise, patient denies fever, unintentional weight loss, urinary incontinence, urinary retention, saddle anesthesia  Patient does not have a history of cancer, immunosuppression, or IV drug use  Plan:  Pain control with Tylenol and Lidoderm given patient's multiple allergies and history of chronic narcotic dependence  I-STAT to evaluate for creatinine and GFR  CT to evaluate for pulmonary embolism as potential cause for referred thoracic pain with thoracic and lumbar spine reconstructions to evaluate for acute pathology of the thoracic and lumbar spine  Prior to Admission Medications   Prescriptions Last Dose Informant Patient Reported?  Taking?   atorvastatin (LIPITOR) 10 mg tablet 11/26/2017 at Unknown time  Yes Yes   Sig: Take by mouth   glimepiride (AMARYL) 1 mg tablet 11/26/2017 at Unknown time  Yes Yes   Sig: Take by mouth 2 (two) times a day     lisinopril-hydrochlorothiazide (PRINZIDE,ZESTORETIC) 20-25 MG per tablet 11/26/2017 at Unknown time  Yes Yes   Sig: Take by mouth   meloxicam (MOBIC) 15 mg tablet 11/26/2017 at Unknown time  Yes Yes   Sig: Take by mouth   methocarbamol (ROBAXIN) 750 mg tablet 11/26/2017 at Unknown time  Yes Yes   Sig: Take 750 mg by mouth 2 (two) times a day     topiramate (TOPAMAX) 50 MG tablet 11/26/2017 at Unknown time  Yes Yes   Sig: Take 50 mg by mouth every 12 (twelve) hours   venlafaxine (EFFEXOR XR) 75 mg 24 hr capsule 11/25/2017 at Unknown time  Yes Yes   Sig: Take by mouth      Facility-Administered Medications: None       Past Medical History:   Diagnosis Date    Asthma     Chronic narcotic dependence (HCC)     Chronic pain syndrome     Depression     Diabetes mellitus (HCC)     Esophageal reflux     Hyperlipidemia     Hypertension     Lumbar radiculopathy     Lyme disease     Obesity     Opioid dependence ([facility] [address] )     PPD positive     had treatment w/ INH 15 years ago    Vitamin D deficiency        Past Surgical History:   Procedure Laterality Date    APPENDECTOMY      CHOLECYSTECTOMY      JOINT REPLACEMENT      knee, total hip    IL REVISE/REMOVE SPINAL NEUROSTIM/ Left 12/13/2016    Procedure: REPLACEMENT BUTTOCK SPINAL CORD STIMULATOR IPG;  Surgeon: David Solis MD;  Location: [facility] OR;  Service: Neurosurgery    SHOULDER SURGERY      SPINAL CORD STIMULATOR IMPLANT         Family History   Problem Relation Age of Onset    Diabetes Mother     Cancer Father      I have reviewed and agree with the history as documented      Social History   Substance Use Topics    Smoking status: Current Every Day Smoker     Packs/day: 1 00     Years: 5 00    Smokeless tobacco: Never Used      Comment: pt desires to quit in January    Alcohol use No        Review of Systems   Constitutional: Negative for diaphoresis, fever and unexpected weight change  HENT: Negative for congestion, rhinorrhea and sore throat  Eyes: Negative for pain, discharge and visual disturbance  Respiratory: Negative for cough, shortness of breath and wheezing  Cardiovascular: Negative for chest pain, palpitations and leg swelling  Gastrointestinal: Negative for abdominal pain, blood in stool, constipation, diarrhea, nausea and vomiting  Genitourinary: Negative for dysuria, flank pain and hematuria  Musculoskeletal: Positive for back pain  Negative for arthralgias and joint swelling  Skin: Negative for rash and wound  Allergic/Immunologic: Negative for environmental allergies and food allergies  Neurological: Positive for weakness  Negative for dizziness, seizures and numbness  Hematological: Negative for adenopathy  Psychiatric/Behavioral: Negative for confusion and hallucinations  Physical Exam  ED Triage Vitals   Temperature Pulse Respirations Blood Pressure SpO2   11/26/17 1124 11/26/17 1124 11/26/17 1124 11/26/17 1124 11/26/17 1124   98 2 °F (36 8 °C) 92 18 143/63 97 %      Temp Source Heart Rate Source Patient Position - Orthostatic VS BP Location FiO2 (%)   11/26/17 1912 11/26/17 1404 11/26/17 1404 11/26/17 1404 --   Oral Monitor Sitting Right arm       Pain Score       11/26/17 1124       Worst Possible Pain           Orthostatic Vital Signs  Vitals:    11/26/17 1124 11/26/17 1404 11/26/17 1642 11/26/17 1912   BP: 143/63 109/61 151/67 107/52   Pulse: 92 73 69 72   Patient Position - Orthostatic VS:  Sitting Sitting Sitting       Physical Exam   Constitutional: She is oriented to person, place, and time  She appears well-developed and well-nourished  No distress  HENT:   Head: Normocephalic and atraumatic  Right Ear: External ear normal    Left Ear: External ear normal    Eyes: Conjunctivae and EOM are normal  Pupils are equal, round, and reactive to light  Cardiovascular: Normal rate, regular rhythm and normal heart sounds      No murmur heard  Pulmonary/Chest: Effort normal and breath sounds normal  No respiratory distress  She has no wheezes  She has no rales  Abdominal: Soft  Bowel sounds are normal  She exhibits no distension  There is no tenderness  There is no guarding  Genitourinary:   Genitourinary Comments: Normal rectal tone on digital rectal examination  Musculoskeletal: Normal range of motion  She exhibits tenderness  She exhibits no deformity  Right shoulder: She exhibits tenderness  She exhibits no bony tenderness  Arms:  Neurological: She is alert and oriented to person, place, and time  She exhibits normal muscle tone  Patient has absent patellar reflexes bilaterally  She states this is her baseline  She has 5/5 strength with dorsiflexion, plantar flexion, EHL function, knee extension, and knee flexion the bilateral lower extremities  Skin: Skin is warm and dry  Capillary refill takes less than 2 seconds  Psychiatric: She has a normal mood and affect  Her behavior is normal  Thought content normal    Nursing note and vitals reviewed        ED Medications  Medications   lidocaine (LIDODERM) 5 % patch 1 patch (1 patch Transdermal Medication Applied 11/26/17 1355)   acetaminophen (TYLENOL) tablet 975 mg (not administered)   traMADol (ULTRAM) tablet 100 mg (not administered)   dexamethasone (DECADRON) injection 4 mg (not administered)   pantoprazole (PROTONIX) EC tablet 40 mg (not administered)   atorvastatin (LIPITOR) tablet 10 mg (not administered)   glimepiride (AMARYL) tablet 1 mg (not administered)   lisinopril-hydrochlorothiazide (PRINZIDE 20/25) combo dose (not administered)   meloxicam (MOBIC) tablet 15 mg (not administered)   methocarbamol (ROBAXIN) tablet 750 mg (not administered)   topiramate (TOPAMAX) tablet 50 mg (not administered)   venlafaxine (EFFEXOR-XR) 24 hr capsule 75 mg (not administered)   nicotine (NICODERM CQ) 14 mg/24hr TD 24 hr patch 1 patch (not administered)   ondansetron [facility]) injection 4 mg (not administered)   enoxaparin (LOVENOX) subcutaneous injection 40 mg (not administered)   insulin lispro (HumaLOG) 100 units/mL subcutaneous injection 2-12 Units (not administered)   sodium chloride 0 9 % bolus 1,000 mL (0 mL Intravenous Stopped 11/26/17 1454)   acetaminophen (TYLENOL) tablet 975 mg (975 mg Oral Given 11/26/17 1354)   iohexol (OMNIPAQUE) 350 MG/ML injection (SINGLE-DOSE) 85 mL (85 mL Intravenous Given 11/26/17 1431)       Diagnostic Studies  Results Reviewed     Procedure Component Value Units Date/Time    POCT Chem 8+ [[id number]]  (Abnormal) Collected:  11/26/17 1404    Lab Status:  Final result Updated:  11/26/17 1409     SODIUM, I-STAT 138 mmol/l      Potassium, i-STAT 3 5 mmol/L      Chloride, istat 99 (L) mmol/L      CO2, i-STAT 27 mmol/L      Anion Gap, Istat 17 (H) mmol/L      Calcium, Ionized i-STAT 1 26 mmol/L      BUN, I-STAT 16 mg/dl      Creatinine, i-STAT 1 1 mg/dl      eGFR 55 ml/min/1 73sq m      Glucose, i-STAT 74 mg/dl      Hct, i-STAT 42 %      Hgb, i-STAT 14 3 g/dl      Specimen Type VENOUS                 CT lumbar spine without contrast   Final Result by Marii Orozco MD (11/26 1602)      Stable lumbar spine when compared to 10/2/2017  Developmental narrowing the spinal canal       Moderate to severe degrees of stenosis as detailed level by level  Workstation performed: [id number]         CTA ED chest PE study   Final Result by LOW Yoon MD (11/26 7623)      Negative for pulmonary embolism  No acute pulmonary disease                    Workstation performed: [id number]               Procedures  ECG 12 Lead Documentation  Date/Time: 11/26/2017 5:20 PM  Performed by: Patrica Bhatti by: Meseret Rush     ECG reviewed by me, the ED Provider: yes    Patient location:  ED  Previous ECG:     Previous ECG:  Compared to current    Comparison ECG info:  December 6, 2016    Similarity:  No change Comparison to cardiac monitor: Yes    Interpretation:     Interpretation: normal    Rate:     ECG rate:  66    ECG rate assessment: normal    Rhythm:     Rhythm: sinus rhythm    Ectopy:     Ectopy: none    QRS:     QRS axis:  Normal    QRS intervals:  Normal  Conduction:     Conduction: normal    ST segments:     ST segments:  Normal  T waves:     T waves: normal    Comments:       EKG demonstrates normal sinus rhythm at 66 beats per minute  No change from prior EKG  Voltage is slightly decreased  No acute ischemic changes  Phone Consults  ED Phone Contact    ED Course  ED Course as of Nov 26 1950   Renee Pitch Nov 26, 2017   1203 Blood Pressure: 143/63   1203 Temperature: 98 2 °F (36 8 °C)   1203 Pulse: 92   1203 Respirations: 18   1203 SpO2: 97 %   1203 Triage vitals noted  Unremarkable  1410 eGFR: 55   1411 CREATININE, I-STAT: 1 1   1723 SLIM paged  MDM  Number of Diagnoses or Management Options  Acute left-sided low back pain with left-sided sciatica: new and requires workup  Chronic pain syndrome: established and worsening  Status post insertion of spinal cord stimulator: established and improving  Transient weakness of left lower extremity: new and requires workup  Diagnosis management comments:     72-year-old female with past medical history of chronic back pain who is presenting with acute pain different from her usual chronic pain  1   Back pain:  Patient does have a history of chronic back pain and is status post spinal cord stimulator placement for this  She states that this relieved her usual chronic pain  Patient does follow with pain management has an appointment for November 28th  However, she states that her current symptoms are different from her usual chronic pain  She also reported an episode of left lower extremity weakness which is new for her  CT results are reviewed above    CTA did not demonstrate any pulmonary embolism or acute pathology in thoracic spine, although evaluation below the level of T7 is limited due to artifact from the patient's spinal cord stimulator  CT of the lumbar spine did not demonstrate any changes from prior  Due to patient's new and increasing pain as well as transient neurologic deficit, she will require admission for pain control as well as further workup for her back pain  Patient does not have risk factors for spinal epidural abscess  No neoplastic lesions were visualized on CT therefore, this is an unlikely cause for back pain  Cauda equina remains a possibility given the patient's known disc disease and transient neurological deficit  CritCare Time    Disposition  Final diagnoses:   Acute left-sided low back pain with left-sided sciatica   Transient weakness of left lower extremity   Chronic pain syndrome   Status post insertion of spinal cord stimulator     Time reflects when diagnosis was documented in both MDM as applicable and the Disposition within this note     Time User Action Codes Description Comment    11/26/2017  5:32 PM Miachel Muzzy Add [M54 42] Acute left-sided low back pain with left-sided sciatica     11/26/2017  5:32 PM Miachel Muzzy Add [R29 898] Transient weakness of left lower extremity     11/26/2017  5:32 PM Miachel Muzzy Add [G89 4] Chronic pain syndrome     11/26/2017  5:33 PM Miachel Muzzy Add [Z98 890] Status post insertion of spinal cord stimulator     11/26/2017  7:39 PM Laymon Moulding Add [M48 061] Lumbar canal stenosis       ED Disposition     ED Disposition Condition Comment    Admit  Case was discussed with TANISHA and the patient's admission status was agreed to be Admission Status: observation status to the service of Dr Sixto Menendez          Follow-up Information    None       Current Discharge Medication List      CONTINUE these medications which have NOT CHANGED    Details   atorvastatin (LIPITOR) 10 mg tablet Take by mouth      glimepiride (AMARYL) 1 mg tablet Take by mouth 2 (two) times a day        lisinopril-hydrochlorothiazide (PRINZIDE,ZESTORETIC) 20-25 MG per tablet Take by mouth      meloxicam (MOBIC) 15 mg tablet Take by mouth      methocarbamol (ROBAXIN) 750 mg tablet Take 750 mg by mouth 2 (two) times a day        topiramate (TOPAMAX) 50 MG tablet Take 50 mg by mouth every 12 (twelve) hours      venlafaxine (EFFEXOR XR) 75 mg 24 hr capsule Take by mouth           No discharge procedures on file  ED Provider  Attending physically available and evaluated Budd Leyden I managed the patient along with the ED Attending      Electronically Signed by         Julian Tenorio MD  Resident  11/26/17 7865

## 2017-11-27 ENCOUNTER — GENERIC CONVERSION - ENCOUNTER (OUTPATIENT)
Dept: OTHER | Facility: OTHER | Age: 62
End: 2017-11-27

## 2017-11-27 VITALS
WEIGHT: 293 LBS | DIASTOLIC BLOOD PRESSURE: 53 MMHG | TEMPERATURE: 98.4 F | HEIGHT: 66 IN | RESPIRATION RATE: 18 BRPM | SYSTOLIC BLOOD PRESSURE: 96 MMHG | HEART RATE: 63 BPM | BODY MASS INDEX: 47.09 KG/M2 | OXYGEN SATURATION: 96 %

## 2017-11-27 PROBLEM — Z96.89 STATUS POST INSERTION OF SPINAL CORD STIMULATOR: Status: RESOLVED | Noted: 2017-11-26 | Resolved: 2017-11-27

## 2017-11-27 LAB
EST. AVERAGE GLUCOSE BLD GHB EST-MCNC: 154 MG/DL
GLUCOSE SERPL-MCNC: 146 MG/DL (ref 65–140)
GLUCOSE SERPL-MCNC: 238 MG/DL (ref 65–140)
HBA1C MFR BLD: 7 % (ref 4.2–6.3)
PLATELET # BLD AUTO: 216 THOUSANDS/UL (ref 149–390)
PMV BLD AUTO: 10.9 FL (ref 8.9–12.7)

## 2017-11-27 PROCEDURE — 85049 AUTOMATED PLATELET COUNT: CPT | Performed by: INTERNAL MEDICINE

## 2017-11-27 PROCEDURE — 82948 REAGENT STRIP/BLOOD GLUCOSE: CPT

## 2017-11-27 PROCEDURE — 83036 HEMOGLOBIN GLYCOSYLATED A1C: CPT | Performed by: INTERNAL MEDICINE

## 2017-11-27 PROCEDURE — 97162 PT EVAL MOD COMPLEX 30 MIN: CPT

## 2017-11-27 PROCEDURE — G8979 MOBILITY GOAL STATUS: HCPCS

## 2017-11-27 PROCEDURE — G8978 MOBILITY CURRENT STATUS: HCPCS

## 2017-11-27 RX ORDER — CYCLOBENZAPRINE HCL 10 MG
10 TABLET ORAL 3 TIMES DAILY PRN
Qty: 30 TABLET | Refills: 0 | Status: SHIPPED | OUTPATIENT
Start: 2017-11-27 | End: 2018-01-24

## 2017-11-27 RX ORDER — ACETAMINOPHEN 325 MG/1
975 TABLET ORAL 3 TIMES DAILY
Qty: 30 TABLET | Refills: 0 | Status: SHIPPED | OUTPATIENT
Start: 2017-11-27 | End: 2018-10-11

## 2017-11-27 RX ADMIN — PANTOPRAZOLE SODIUM 40 MG: 40 TABLET, DELAYED RELEASE ORAL at 05:47

## 2017-11-27 RX ADMIN — METHOCARBAMOL 750 MG: 750 TABLET ORAL at 14:36

## 2017-11-27 RX ADMIN — INSULIN LISPRO 4 UNITS: 100 INJECTION, SOLUTION INTRAVENOUS; SUBCUTANEOUS at 11:43

## 2017-11-27 RX ADMIN — GLIMEPIRIDE 1 MG: 1 TABLET ORAL at 08:49

## 2017-11-27 RX ADMIN — DEXAMETHASONE SODIUM PHOSPHATE 4 MG: 4 INJECTION, SOLUTION INTRAMUSCULAR; INTRAVENOUS at 08:42

## 2017-11-27 RX ADMIN — TRAMADOL HYDROCHLORIDE 100 MG: 50 TABLET, FILM COATED ORAL at 03:24

## 2017-11-27 RX ADMIN — NICOTINE 1 PATCH: 14 PATCH, EXTENDED RELEASE TRANSDERMAL at 08:44

## 2017-11-27 RX ADMIN — TOPIRAMATE 100 MG: 25 TABLET, FILM COATED ORAL at 08:42

## 2017-11-27 RX ADMIN — VENLAFAXINE HYDROCHLORIDE 75 MG: 75 CAPSULE, EXTENDED RELEASE ORAL at 08:44

## 2017-11-27 RX ADMIN — TRAMADOL HYDROCHLORIDE 100 MG: 50 TABLET, FILM COATED ORAL at 10:41

## 2017-11-27 RX ADMIN — ACETAMINOPHEN 975 MG: 325 TABLET, FILM COATED ORAL at 14:36

## 2017-11-27 RX ADMIN — ENOXAPARIN SODIUM 40 MG: 40 INJECTION SUBCUTANEOUS at 08:42

## 2017-11-27 RX ADMIN — ACETAMINOPHEN 975 MG: 325 TABLET, FILM COATED ORAL at 05:47

## 2017-11-27 RX ADMIN — METHOCARBAMOL 750 MG: 750 TABLET ORAL at 05:47

## 2017-11-27 NOTE — PLAN OF CARE
Problem: Potential for Falls  Goal: Patient will remain free of falls  INTERVENTIONS:  - Assess patient frequently for physical needs  -  Identify cognitive and physical deficits and behaviors that affect risk of falls    -  [place] fall precautions as indicated by assessment   - Educate patient/family on patient safety including physical limitations  - Instruct patient to call for assistance with activity based on assessment  - Modify environment to reduce risk of injury  - Consider OT/PT consult to assist with strengthening/mobility   Outcome: Progressing      Problem: PAIN - ADULT  Goal: Verbalizes/displays adequate comfort level or baseline comfort level  Interventions:  - Encourage patient to monitor pain and request assistance  - Assess pain using appropriate pain scale 0-10  - Administer analgesics based on type and severity of pain and evaluate response  - Implement non-pharmacological measures as appropriate and evaluate response  - Consider cultural and social influences on pain and pain management  - Notify physician/advanced practitioner if interventions unsuccessful or patient reports new pain   Outcome: Progressing      Problem: INFECTION - ADULT  Goal: Absence or prevention of progression during hospitalization  INTERVENTIONS:  - Assess and monitor for signs and symptoms of infection  - Monitor lab/diagnostic results  - Monitor all insertion sites, i e  indwelling lines, tubes, and drains  - Monitor endotracheal (as able) and nasal secretions for changes in amount and color  - [place] appropriate cooling/warming therapies per order  - Administer medications as ordered  - Instruct and encourage patient and family to use good hand hygiene technique  - Identify and instruct in appropriate isolation precautions for identified infection/condition   Outcome: Progressing    Goal: Absence of fever/infection during neutropenic period  INTERVENTIONS:  - Monitor WBC     Outcome: Progressing      Problem: SAFETY ADULT  Goal: Maintain or return to baseline ADL function  INTERVENTIONS:  -  Assess patient's ability to carry out ADLs; assess patient's baseline for ADL function and identify physical deficits which impact ability to perform ADLs (bathing, care of mouth/teeth, toileting, grooming, dressing, etc )  - Assess/evaluate cause of self-care deficits   - Assess range of motion  - Assess patient's mobility; develop plan if impaired  - Assess patient's need for assistive devices and provide as appropriate  - Encourage maximum independence but intervene and supervise when necessary  ¯ Involve family in performance of ADLs  ¯ Assess for home care needs following discharge   ¯ Request OT consult to assist with ADL evaluation and planning for discharge  ¯ Provide patient education as appropriate   Outcome: Progressing    Goal: Maintain or return mobility status to optimal level  INTERVENTIONS:  - Assess patient's baseline mobility status (ambulation, transfers, stairs, etc )    - Identify cognitive and physical deficits and behaviors that affect mobility  - Identify mobility aids required to assist with transfers and/or ambulation (gait belt, sit-to-stand, lift, walker, cane, etc )  - [place] fall precautions as indicated by assessment  - Record patient progress and toleration of activity level on Mobility SBAR; progress patient to next Phase/Stage  - Instruct patient to call for assistance with activity based on assessment  - Request Rehabilitation consult to assist with strengthening/weightbearing, etc    Outcome: Progressing      Problem: DISCHARGE PLANNING  Goal: Discharge to home or other facility with appropriate resources  INTERVENTIONS:  - Identify barriers to discharge w/patient and caregiver  - Arrange for needed discharge resources and transportation as appropriate  - Identify discharge learning needs (meds, wound care, etc )  - Arrange for interpretive services to assist at discharge as needed  - Refer to Case Management Department for coordinating discharge planning if the patient needs post-hospital services based on physician/advanced practitioner order or complex needs related to functional status, cognitive ability, or social support system   Outcome: Progressing      Problem: Knowledge Deficit  Goal: Patient/family/caregiver demonstrates understanding of disease process, treatment plan, medications, and discharge instructions  Complete learning assessment and assess knowledge base    Interventions:  - Provide teaching at level of understanding  - Provide teaching via preferred learning methods   Outcome: Progressing

## 2017-11-27 NOTE — PROGRESS NOTES
[facility] Internal Medicine Progress Note  Patient: Padmini Lee 63 y o  female   MRN: [id number]  PCP: Ganesh Ron MD  [facility] Encounter: [id number]  Date Of Visit: 11/27/17    Assessment/Plan:  ·   Principal Problem:    Intractable low back pain  Active Problems:    Chronic pain syndrome    Status post insertion of spinal cord stimulator    DMII (diabetes mellitus, type 2) (HCC)    Obesity    HTN (hypertension)    Lumbar canal stenosis    Depression    Intractable lower back pain  CTPE: unremarkable  CT Lumbar spine 11/26/17  Stable lumbar spine when compared to 10/2/2017  Developmental narrowing the spinal canal  Moderate to severe degrees of stenosis as detailed level by level  Morbid obesity may worsen this lower back pain based on the significant findings on CT   - avoid Mobic to low GFR  -continue pain  Control  -f/u Neurosurgery and Acute pain service    Diabetes mellitus type 2 - A1c7 0; controlled; continue those control ; continue sliding scale  Chronic pain syndrome- continue pain management and patient  Hypertension- continue meds  Morbid obesity- diet exercise and weight loss advised  Depression- continue med  Smoking cessation-nicotine patch            VTE Pharmacologic Prophylaxis:   Pharmacologic: Enoxaparin (Lovenox)  Mechanical VTE Prophylaxis in Place: Yes    Patient Centered Rounds: I have performed bedside rounds with nursing staff today      Discussions with Specialists or Other Care Team Provider: yes    Education and Discussions with Family / Patient: yes    Current Length of Stay: 0 day(s)    Current Patient Status: Observation   Certification Statement: The patient will continue to require additional inpatient hospital stay due to Management    Discharge Plan:  Home    Code Status: Level 1 - Full Code      Subjective:   Juanna Kelley left lumbar back pain, at level L3   No complaints of fever, chills, shortness of breath, chest pain, weakness/numbness/paresthesias to the extremities  Review of system negative otherwise    Objective:     Vitals:   Temp (24hrs), Av 6 °F (37 °C), Min:98 2 °F (36 8 °C), Max:99 °F (37 2 °C)    HR:  [63-92] 63  Resp:  [18] 18  BP: ()/(52-67) 96/53  SpO2:  [95 %-97 %] 96 %  Body mass index is 47 61 kg/m²  Input and Output Summary (last 24 hours): Intake/Output Summary (Last 24 hours) at 17 1005  Last data filed at 17 0928   Gross per 24 hour   Intake             2200 ml   Output                0 ml   Net             2200 ml       Physical Exam:  General Appearance: Morbidly obese Alert, cooperative, no distress, appropriately responsive    Head:    Normocephalic, without obvious abnormality, atraumatic, mucous membranes moist    Eyes:    Conjunctiva/corneas clear, EOM's intact   Neck:   Supple   Lungs:     Clear to auscultation bilaterally, respirations unlabored, no crackles or wheeze heard    Heart:    Regular rate and rhythm, S1 and S2 no murmur   Abdomen:     Soft, non-tender, bowel sounds active all four quadrants,     no masses, no organomegaly negative CTA tenderness   Extremities:   Extremities normal, atraumatic, no cyanosis or edema   Neurologic:  nonfocal         Additional Data:     Labs:      Results from last 7 days  Lab Units 17  0502 17  1404   I STAT HEMOGLOBIN g/dl  --  14 3   PLATELETS Thousands/uL 216  --        Results from last 7 days  Lab Units 17  1404   GLUCOSE, ISTAT mg/dl 74           * I Have Reviewed All Lab Data Listed Above  * Additional Pertinent Lab Tests Reviewed:  [facility] Admission Reviewed    Cultures:   Blood Culture: No results found for: BLOODCX  Urine Culture:   Lab Results   Component Value Date    URINECX >100,000 cfu/ml Mixed Contaminants X4 2016     Sputum Culture: No components found for: SPUTUMCX  Wound Culture: No results found for: WOUNDCULT    Last 24 Hours Medication List:     acetaminophen 975 mg Oral Q8H [address]   atorvastatin 10 mg Oral Daily With Dinner   dexamethasone 4 mg Intravenous Q12H [facility]   enoxaparin 40 mg Subcutaneous Daily   glimepiride 1 mg Oral BID   insulin lispro 2-12 Units Subcutaneous TID AC   lisinopril-hydrochlorothiazide (PRINZIDE 20/25) combo dose  Oral Daily   meloxicam 15 mg Oral Daily   methocarbamol 750 mg Oral Q8H [facility]   nicotine 1 patch Transdermal Daily   pantoprazole 40 mg Oral Early Morning   topiramate 100 mg Oral Q12H SUDHAKAR   traMADol 100 mg Oral Q8H   venlafaxine 75 mg Oral Daily        Today, Patient Was Seen By: Aissatou Luque MD    ** Please Note: Dragon 360 Dictation voice to text software may have been used in the creation of this document   **

## 2017-11-27 NOTE — SOCIAL WORK
Cm met with patient to discuss discharge for today  Per patient she stated that her daughter is en route to the hospital to transport her home  Cm inquired if she wanted to be part of the MEds to [facility], but patient stated that she wanted her prescriptions sent to her home pharmacy, [facility] [address]  Cholo obtained fax number from company, and sent fax  Cholo then informed that patient's ride is here and that she did not want home PT since she did not want and will not be home bound  She would prefer to have outpatient script mailed to her house  After consenting to mailing of script, patient walked off the floor

## 2017-11-27 NOTE — CONSULTS
Consultation - Inpatient Pain Management   Reva Caputo 63 y o  female MRN: [id number]  Unit/Bed#: [facility] 933-01 Encounter: [id number]               Assessment/Plan     Assessment:     Principal Problem:    Intractable low back pain  Active Problems:    Chronic pain syndrome    Status post insertion of spinal cord stimulator    DMII (diabetes mellitus, type 2) (HCC)    Obesity    HTN (hypertension)    Lumbar canal stenosis    Depression      Plan/Recommendations:   Chronic back pain  · Continue Tylenol 975mg Q8 hours   · Decadron per primary team  · Trial Flexeril 10mg x 1; if effective would stop the Robaxin and use Flexeril 10mg TID for spasms  · Decrease Ultram to 50mg Q8 hours PRN now and stop at time of d/c home  · Patient is no longer on opioid therapy at home  Bowel Management  · Add Colace 100mg BID and Senna 8 6mg tablet daily    Ok for discharge from a pain standpoint  No opioid prescriptions at time of discharge  Patient has a pain management follow-up appointment tomorrow with Dr Emeli Olea at 930am      Will sign off  Please call with any questions  Thank you for the consult  Reviewed with SLIM    History of Present Illness    Admit Date:  11/26/2017  Hospital Day:  0 days  Primary Service:  General Medicine  Attending Provider:  Meseret Stewart MD  Physician Requesting Consult: Meseret Stewart MD  Reason for Consult / Principal Problem: chronic back pain   HPI: Reva Caputo is a 63y o  year old female who presents with increased left sided back pain which has been worsening over the past month, inhibiting her ability to perform daily activities  History of chronic lower back and spinal cord stimulator that was recently adjusted  She follows with out patient pain management and was taken off tramadol in October due to breech of her opioid contract  Also s/p L5-S1 SHERLY on October 24th 2017 with complete resolution of RLE pain       I have reviewed the patient's controlled substance dispensing history in the Prescription Drug Monitoring Program in compliance with the [facility] regulations before recommending any controlled substances  Review of Systems:  + right arm paresthesias  + chronic back pain  + left sided acute back pain  Denied abdominal pain   + activity intolerance    Pain History:  Current pain location(s): left back  Pain Scale:   6-9  Severity:  Moderate to severe  Quality: sharp, burning  Aggravating and alleviating factors: pain is worse with increased ambulation or activity   Pain Management Physician:  [address] [facility]  Treatment History:  Lidocaine patches, lidocaine injections, and current pain regimen has not been effective in decreasing overall level of pain     Bowel Regimen: None    Historical Information   Past Medical History:   Diagnosis Date    Asthma     [facility] [address] [facility]     Chronic pain syndrome     Depression     Diabetes mellitus [facility] [address] [facility]     Esophageal reflux     Hyperlipidemia     Hypertension     Lumbar radiculopathy     Lyme disease     Obesity     Opioid dependence [facility] [address] [facility]     PPD positive     had treatment w/ INH 15 years ago    Vitamin D deficiency      Past Surgical History:   Procedure Laterality Date    APPENDECTOMY      CHOLECYSTECTOMY      JOINT REPLACEMENT      R knee, B/l total hip    NC REVISE/REMOVE SPINAL NEUROSTIM/ Left 12/13/2016    Procedure: REPLACEMENT BUTTOCK SPINAL CORD STIMULATOR IPG;  Surgeon: Marek Schuler MD;  Location: [facility] OR;  Service: Neurosurgery    SHOULDER SURGERY      SPINAL CORD STIMULATOR IMPLANT       Social History   History   Alcohol Use No     History   Drug Use No     History   Smoking Status    Current Every Day Smoker    Packs/day: 1 00    Years: 5 00   Smokeless Tobacco    Never Used     Comment: pt desires to quit in January     Family History: non-contributory    Meds/Allergies   Prior to Admission Medications  Prescriptions Prior to Admission   Medication    atorvastatin (LIPITOR) 10 mg tablet    glimepiride (AMARYL) 1 mg tablet    lisinopril-hydrochlorothiazide (PRINZIDE,ZESTORETIC) 20-25 MG per tablet    meloxicam (MOBIC) 15 mg tablet    methocarbamol (ROBAXIN) 750 mg tablet    topiramate (TOPAMAX) 100 mg tablet    topiramate (TOPAMAX) 50 MG tablet    venlafaxine (EFFEXOR XR) 75 mg 24 hr capsule     Hospital Medications  Current Facility-Administered Medications   Medication Dose Route Frequency    acetaminophen (TYLENOL) tablet 975 mg  975 mg Oral Q8H [address]    atorvastatin (LIPITOR) tablet 10 mg  10 mg Oral Daily With Dinner    dexamethasone (DECADRON) injection 4 mg  4 mg Intravenous Q12H [address]    enoxaparin (LOVENOX) subcutaneous injection 40 mg  40 mg Subcutaneous Daily    glimepiride (AMARYL) tablet 1 mg  1 mg Oral BID    insulin lispro (HumaLOG) 100 units/mL subcutaneous injection 2-12 Units  2-12 Units Subcutaneous TID AC    lisinopril-hydrochlorothiazide (PRINZIDE 20/25) combo dose   Oral Daily    meloxicam (MOBIC) tablet 15 mg  15 mg Oral Daily    methocarbamol (ROBAXIN) tablet 750 mg  750 mg Oral Q8H [address]    nicotine (NICODERM CQ) 14 mg/24hr TD 24 hr patch 1 patch  1 patch Transdermal Daily    ondansetron (ZOFRAN) injection 4 mg  4 mg Intravenous Q6H PRN    pantoprazole (PROTONIX) EC tablet 40 mg  40 mg Oral Early Morning    topiramate (TOPAMAX) tablet 100 mg  100 mg Oral Q12H SUDHAKAR    traMADol (ULTRAM) tablet 100 mg  100 mg Oral Q8H    venlafaxine (EFFEXOR-XR) 24 hr capsule 75 mg  75 mg Oral Daily       Allergies   Allergen Reactions    Bee Venom Anaphylaxis    Other Other (See Comments)     Stainless steel and surgical steel  *Severe blistering*    Aleve [Naproxen Sodium] Hives    Augmentin [Amoxicillin-Pot Clavulanate] Hives    Metformin And Related Hives    Morphine And Related Hives    Motrin [Ibuprofen] Hives    Nortriptyline Hives    Penicillins Hives    Soy Lecithin [Lecithin] Hives    Sulfa Antibiotics Hives    Tradjenta [Linagliptin] Hives       Objective   Temp:  [98 4 °F (36 9 °C)-99 °F (37 2 °C)] 98 4 °F (36 9 °C)  HR:  [63-76] 63  Resp:  [18] 18  BP: ()/(52-67) 96/53    Intake/Output Summary (Last 24 hours) at 11/27/17 1313  Last data filed at 11/27/17 1959   Gross per 24 hour   Intake             2200 ml   Output                0 ml   Net             2200 ml       Physical Exam:  General Appearance:    Alert, cooperative, no distress, appears stated age   Neurological:   Oriented to person, place, and time, normal affect    Head:    Normocephalic, without obvious abnormality, atraumatic   Eyes:    EOM's intact   Back:     Symmetric, left para-spinal muscle tenderness   Lungs:     Decreased, Clear to auscultation bilaterally, respirations unlabored   Chest Wall:    No tenderness or deformity   Abdomen:        Obese, Soft, no distention or tenderness, bowel sounds present    Heart:    Regular rate and rhythm, S1 and S2 normal   Extremities:   Extremities intact sensation to light touch   Skin:   Warm and dry     Lab Results:   Results from last 7 days  Lab Units 11/27/17  0502 11/26/17  1404   I STAT HEMOGLOBIN g/dl  --  14 3   PLATELETS Thousands/uL 216  --       Results from last 7 days  Lab Units 11/26/17  1404   GLUCOSE, ISTAT mg/dl 74       Imaging Studies: I have personally reviewed pertinent reports  Counseling / Coordination of Care  Total floor / unit time spent today 45 minutes  Greater than 50% of total time was spent with the patient and / or family counseling and / or coordination of care   A description of the counseling / coordination of care: Reviewed plan of care and medications with patient, RN staff, and primary care team     Ham Correa MS, RN-BC  Acute Pain

## 2017-11-27 NOTE — CONSULTS
Consultation - Neurosurgery   Radha Goddard 63 y o  female MRN: [id number]  [facility] Encounter: [id number]  Consult completed on 11/27/17 at 12:30      Consults    Assessment/Plan     Assessment:  1  Intractable low back pain  2  Lumbar canal stenosis  3  Chronic pain syndrome with opioid dependency  4  S/p insertion of Spinal cord stimulator in 2013  5  Obesity  6  Diabetes Mellitus, type II  7  Depression   8  hypertension    Plan:  · Exam: AAOx3  ROSE with weakness in left hip extension  LT and PP intact  3/3 JPS intact  DSS intact  · Imaging: personally reviewed and reviewed with attending on 11/27:  · 11/26 - CT lumbar wo: 1) stable lumbar spine when compared to 10/2/2017   2) Developmental narrowing the spinal canal   3) Moderate to severe degrees of stenosis  · PT/mobilize: recommend home PT when medically stable  Needs to be cleared for 2 steps  · Pain control: APS consulted  Patient has appointment with Dr Alireza Joseph tomorrow in regards to cervical stenosis  · SCS in place  · DVT ppx: SCDs, mobilize as tolerated, lovenox  · No neurosurgical intervention at this time anticipated  Patient may follow up with pain management as scheduled and outpatient in nsx office as needed  History of Present Illness   HPI: Radha Goddard is a 63y o  year old female with PMH including chronic pain syndrome, history of spinal cord stimulator, opioid dependency, obesity, hypertension, diabetes who presents with complaint of intractable low back pain  Patient has a history of SCS placement in 3/2013 with a recent reprogramming from the [address] [place] [address] representative a few months ago  She states she has a history of low back pain but typically rated 2-3 out of 10  She has had progression of her pain over the last 1-2 months that is inhibiting her daily activities  She follows with Dr Aysha You office   She states she takes Tylenol for the pain since over the past 2 months the PA-CLAU in the office discontinued her pain medications  The patient states she was honest with the SHERIF stating one day she required an extra Tramadol 50mg tablet  She saw Dr Asuncion Harrison for an steroid inject last on 10/24 which helped the pain  She state the pain is currently located slightly left of midline in her low back, has been constant and rates it 8-9 out of 10  She denies any radiation of the pain  She states lately her gait has been off but denies use of walker or cane  Review of Systems   Constitutional: Positive for activity change (due to pain)  HENT: Negative for tinnitus and trouble swallowing  Eyes: Negative for photophobia and visual disturbance  Respiratory: Negative for shortness of breath  Cardiovascular: Negative for chest pain  Gastrointestinal: Negative for abdominal pain, constipation, nausea and vomiting  Genitourinary: Negative for difficulty urinating, frequency and urgency  Musculoskeletal: Positive for back pain  Skin: Negative for rash  Neurological: Negative for weakness, numbness and headaches  Hematological: Does not bruise/bleed easily  Psychiatric/Behavioral: The patient is not nervous/anxious          Historical Information   Past Medical History:   Diagnosis Date    Asthma     Chronic [facility]otic dependence ([facility] [address] [facility]     Chronic pain syndrome     Depression     Diabetes mellitus (HCC)     Esophageal reflux     Hyperlipidemia     Hypertension     Lumbar radiculopathy     Lyme disease     Obesity     Opioid dependence ([facility] [address] [facility]     PPD positive     had treatment w/ INH 15 years ago    Vitamin D deficiency      Past Surgical History:   Procedure Laterality Date    APPENDECTOMY      CHOLECYSTECTOMY      JOINT REPLACEMENT      R knee, B/l total hip    OK REVISE/REMOVE SPINAL NEUROSTIM/ Left 12/13/2016    Procedure: REPLACEMENT BUTTOCK SPINAL CORD STIMULATOR IPG;  Surgeon: Jakub Vidal MD;  Location:  MAIN OR;  Service: Neurosurgery    SHOULDER SURGERY  SPINAL CORD STIMULATOR IMPLANT       History   Alcohol Use No     History   Drug Use No     History   Smoking Status    Current Every Day Smoker    Packs/day: 1 00    Years: 5 00   Smokeless Tobacco    Never Used     Comment: pt desires to quit in January     Family History   Problem Relation Age of Onset    Diabetes Mother     Cancer Mother     Cancer Father     Diabetes Maternal Grandmother     Cancer Paternal Uncle        Meds/Allergies   all current active meds have been reviewed and current meds:   Current Facility-Administered Medications   Medication Dose Route Frequency    acetaminophen (TYLENOL) tablet 975 mg  975 mg Oral Q8H [address]    atorvastatin (LIPITOR) tablet 10 mg  10 mg Oral Daily With Dinner    dexamethasone (DECADRON) injection 4 mg  4 mg Intravenous Q12H [address]    enoxaparin (LOVENOX) subcutaneous injection 40 mg  40 mg Subcutaneous Daily    glimepiride (AMARYL) tablet 1 mg  1 mg Oral BID    insulin lispro (HumaLOG) 100 units/mL subcutaneous injection 2-12 Units  2-12 Units Subcutaneous TID AC    lisinopril-hydrochlorothiazide (PRINZIDE 20/25) combo dose   Oral Daily    meloxicam (MOBIC) tablet 15 mg  15 mg Oral Daily    methocarbamol (ROBAXIN) tablet 750 mg  750 mg Oral Q8H [address]    nicotine (NICODERM CQ) 14 mg/24hr TD 24 hr patch 1 patch  1 patch Transdermal Daily    ondansetron (ZOFRAN) injection 4 mg  4 mg Intravenous Q6H PRN    pantoprazole (PROTONIX) EC tablet 40 mg  40 mg Oral Early Morning    topiramate (TOPAMAX) tablet 100 mg  100 mg Oral Q12H SUDHAKAR    traMADol (ULTRAM) tablet 100 mg  100 mg Oral Q8H    venlafaxine (EFFEXOR-XR) 24 hr capsule 75 mg  75 mg Oral Daily     Allergies   Allergen Reactions    Bee Venom Anaphylaxis    Other Other (See Comments)     Stainless steel and surgical steel  *Severe blistering*    Aleve [Naproxen Sodium] Hives    Augmentin [Amoxicillin-Pot Clavulanate] Hives    Metformin And Related Hives    Morphine And Related Hives    Motrin [Ibuprofen] Hives    Nortriptyline Hives    Penicillins Hives    Soy Lecithin [Lecithin] Hives    Sulfa Antibiotics Hives    Tradjenta [Linagliptin] Hives       Objective   I/O       11/25 0701 - 11/26 0700 11/26 0701 - 11/27 0700 11/27 0701 - 11/28 0700    P  O   960 240    IV Piggyback  1000     Total Intake(mL/kg)  1960 (14 6) 240 (1 8)    Net   +1960 +240           Unmeasured Urine Occurrence  2 x           Physical Exam   Constitutional: She is oriented to person, place, and time  Vital signs are normal  She appears well-developed and well-nourished  HENT:   Head: Normocephalic and atraumatic  Eyes: EOM are normal  Pupils are equal, round, and reactive to light  Cardiovascular: Normal rate  Pulmonary/Chest: Effort normal  No respiratory distress  Abdominal: Soft  There is no tenderness  Musculoskeletal:        Cervical back: She exhibits no bony tenderness  Thoracic back: She exhibits no bony tenderness  Lumbar back: She exhibits tenderness  Midline vertical lumbar scar present  Tenderness over the well healed scar  Left lumbar paraspinous muscle tenderness on palpation  Neurological: She is alert and oriented to person, place, and time  She has a normal Finger-Nose-Finger Test    Reflex Scores:       Bicep reflexes are 1+ on the right side and 1+ on the left side  Brachioradialis reflexes are 1+ on the right side and 1+ on the left side  Patellar reflexes are 0 on the right side and 0 on the left side  Achilles reflexes are 1+ on the right side and 1+ on the left side  Skin: Skin is warm  Psychiatric: She has a normal mood and affect  Her speech is normal      Neurologic Exam     Mental Status   Oriented to person, place, and time  Registration: recalls 3 of 3 objects  Follows 2 step commands  Attention: normal  Concentration: normal    Speech: speech is normal   Level of consciousness: alert  Knowledge: good  Able to name object  Able to repeat  Normal comprehension  Cranial Nerves     CN II   Visual fields full to confrontation  CN III, IV, VI   Pupils are equal, round, and reactive to light  Extraocular motions are normal    Right pupil: Size: 3 mm  Shape: regular  Reactivity: brisk  Left pupil: Size: 3 mm  Shape: regular  Reactivity: brisk  CN III: no CN III palsy  CN VI: no CN VI palsy  Nystagmus: none   Diplopia: none  Ophthalmoparesis: none  Upgaze: normal  Downgaze: normal  Conjugate gaze: present    CN VII   Facial expression full, symmetric  CN XII   Tongue deviation: none    Motor Exam   Muscle bulk: increased  Overall muscle tone: normal  Right arm pronator drift: absent  Left arm pronator drift: absent    Strength   Strength 5/5 except as noted  Left hip extension: 5-/5     Sensory Exam   Light touch normal    Proprioception normal    Pinprick normal      Gait, Coordination, and Reflexes     Coordination   Finger to nose coordination: normal    Tremor   Resting tremor: absent    Reflexes   Right brachioradialis: 1+  Left brachioradialis: 1+  Right biceps: 1+  Left biceps: 1+  Right patellar: 0  Left patellar: 0  Right achilles: 1+  Left achilles: 1+  Right Coyle: absent  Left Coyle: absent  Right ankle clonus: absent  Left ankle clonus: absent      Vitals:Blood pressure 96/53, pulse 63, temperature 98 4 °F (36 9 °C), temperature source Oral, resp  rate 18, height 5' 6" (1 676 m), weight 134 kg (295 lb), SpO2 96 %  ,Body mass index is 47 61 kg/m²  Lab Results:     Results from last 7 days  Lab Units 11/27/17  0502 11/26/17  1404   I STAT HEMOGLOBIN g/dl  --  14 3   PLATELETS Thousands/uL 216  --        Results from last 7 days  Lab Units 11/26/17  1404   GLUCOSE, ISTAT mg/dl 74                 No results found for: TROPONINT  ABG:No results found for: PHART, BJT1RHH, PO2ART, QUY7PNA, D7DQEFRV, BEART, SOURCE    Imaging Studies: I have personally reviewed pertinent reports     and I have personally reviewed pertinent films in PACS    EKG, Pathology, and Other Studies: I have personally reviewed pertinent reports  and I have personally reviewed pertinent films in PACS    VTE Prophylaxis: Sequential compression device (Venodyne)  and Enoxaparin (Lovenox)    Code Status: Level 1 - Full Code  Advance Directive and Living Will:      Power of :    POLST:      Counseling / Coordination of Care  I spent 45 minutes with the patient

## 2017-11-27 NOTE — DISCHARGE INSTR - AVS FIRST PAGE
Advised weight loss  Avoid taking tylenol, since it can cause severe liver damage, worsened when taking the statin cholesterol medication  Follow up with pain management specialist outpatient  Follow up with PCP in 1 week to evaluate your general well being

## 2017-11-27 NOTE — SOCIAL WORK
CM met with the Pt at bedside to explain the CM role and discuss any potential D/C needs  Pt lives alone in a 2 story home with 2STE  PTA IADL's, No DME, Pt drives  Pt reports hx of HHC and IP rehab at [facility] S/p hip replacement  Pt reports hx of Depression with no MH admission and does not see a psychiatrist  [facility] [address]  Pt's daughter can provide transportation home at D/C

## 2017-11-27 NOTE — PHYSICAL THERAPY NOTE
PHYSICAL THERAPY NOTE          Patient Name: Julio Lawrence  [id number] Date: 11/27/2017 11/27/17 1157   Note Type   Note type Eval only   Pain Assessment   Pain Assessment 0-10   Pain Score 6   Pain Type Acute pain   Pain Location Back   Pain Orientation Mid   Pain Descriptors Aching   Pain Frequency Constant/continuous   Pain Onset Ongoing   Clinical Progression Gradually improving   Effect of Pain on Daily Activities increased pain with all activities    Patient's Stated Pain Goal 2   Hospital Pain Intervention(s) Ambulation/increased activity; Emotional support   Response to Interventions pt reports feeing more comfortable post session    Home Living   Type of [address] [facility] Two level; Able to live on main level with bedroom/bathroom;Stairs to enter with rails  (2 GIDEON)   [address] [facility]  (pt denies the use of an AD for ambuation PTA)   Additional Comments pt reports living alone, however her daughter and son in law live next door and are able to assist pt if needed    Prior Function   Level of [place] Independent with ADLs and functional mobility   Lives With [address] in the last 6 months 0   Vocational Retired   Comments pt denies the use of an AD for ambulation PTA    Restrictions/Precautions   Weight Bearing Precautions Per Order No   Braces or Orthoses (pt denes)   Other Precautions Pain;Telemetry; Fall Risk   Cognition   Overall Cognitive Status WFL   Arousal/Participation Alert   Orientation Level Oriented to person;Oriented to place;Oriented to time   Memory Within functional limits   Following Commands Follows one step commands without difficulty   Comments pt denies any new complaints    RUE Assessment   RUE Assessment WFL   LUE Assessment   LUE Assessment WFL   RLE Assessment   RLE Assessment WFL   Strength RLE   RLE Overall Strength 4-/5 LLE Assessment   LLE Assessment WFL   Strength LLE   LLE Overall Strength 4-/5   Bed Mobility   Supine to Sit 6  Modified independent   Additional items Increased time required   Transfers   Sit to Stand 5  Supervision   Additional items Increased time required   Stand to Sit 5  Supervision   Additional items Increased time required   Ambulation/Elevation   Gait pattern Excessively slow; Short stride; Foward flexed; Wide LUZ   Gait Assistance 5  Supervision   Additional items Verbal cues   Assistive Device None   Distance 30ft x 2   Balance   Static Sitting Fair +   Static Standing Fair +   Ambulatory Fair   Endurance Deficit   Endurance Deficit Yes   Endurance Deficit Description limited by fatigue and pain    Activity Tolerance   Activity Tolerance Patient limited by fatigue;Patient limited by pain   Nurse Made Aware pt able to be seen per nsg   Assessment   Prognosis Fair   Problem List Decreased strength;Decreased range of motion;Decreased endurance;Decreased mobility;Pain;Obesity   Assessment Pt is 63 y o  female seen for PT evaluation s/p admit to [facility] on 11/26/2017  Pt presented w/ intractable low back pain  Pt was admitted with a primary dx of: intractable low back pain  PT now consulted for assessment of mobility and d/c needs  Pts current co morbidities effecting treatment include: DM, obesity, HTN, depression, lumbar spinal canal stenosis  Pts current clinical presentation is evolving due to ongoing pain with al activities, decreased mobility compared to baseline, and increased assistance needed from caregiver at current time  Prior to admission, pt was I with mobility at home without the use of an AD  Upon evaluation, pt currently is requiring mod I for bed mobility; S for transfers and S for ambulation w/ no AD    Pt presents at PT eval functioning below baseline and currently w/ overall mobility deficits 2* to: gait deviations, pain, decreased activity tolerance, decreased functional mobility tolerance and fall risk, decreased BLE strength  At conclusion of PT session pt was positioned back in chair/bed with phone and call bell within reach  Pt denies any further questions at this time  Pt will continue to benefit from skilled PT interventions to address stated impairments; to maximize functional mobility; for ongoing pt/ family training; and DME needs  PT is currently recommending Home PT  Pt/ family agreeable to plan and goals as stated on evaluation  PT will continue to follow during hospital stay  Barriers to Discharge Inaccessible home environment   Goals   Patient Goals to go home    STG Expiration Date 12/07/17   Short Term Goal #1 In 10 days pt will complete: 1) Bed mobility skills with mod I  2) Functional transfers with mod I  3) Ambulate 150' with least restrictive AD without LOB and stable vitals mod I  4) Stair training up/ down 2 step/s using rail/s with S  5) Improve balance grades to Good 6) Improve BLE strength by 1/2 grade  7) PT for ongoing pt and family education; DME needs and D/C planning to promote highest level of function in least restrictive environment  Plan   Treatment/Interventions Functional transfer training;LE strengthening/ROM; Elevations; Therapeutic exercise; Endurance training;Bed mobility;Gait training;Spoke to nursing   PT Frequency 5x/wk   Recommendation   Recommendation Home PT   Equipment Recommended (none)   PT - OK to Discharge Yes  (must clear 2 steps )   Modified Reji Scale   Modified Reji Scale 3   Barthel Index   Feeding 10   Bathing 5   Grooming Score 5   Dressing Score 5   Bladder Score 10   Bowels Score 10   Toilet Use Score 10   Transfers (Bed/Chair) Score 10   Mobility (Level Surface) Score 0   Stairs Score 0   Barthel Index Score 65   Melani Michel, PT

## 2017-11-27 NOTE — H&P
History and Physical - [facility] Internal Medicine    Patient Information: Corrinne Dally 63 y o  female MRN: [id number]  Unit/Bed#: [facility] 933-01 Encounter: [id number]  Admitting Physician: Charles Jones MD  PCP: Zeinab Corona MD  Date of Admission:  11/26/17    Assessment/Plan:    Hospital Problem List:     Principal Problem:    Intractable low back pain  Active Problems:    Chronic pain syndrome    Status post insertion of spinal cord stimulator    DMII (diabetes mellitus, type 2) (HCC)    Obesity    HTN (hypertension)    Lumbar canal stenosis    Depression      Plan for the Primary Problem(s):  · Acute on chronic lower back pain/lumbar radiculopathy in the setting of lumbar canal/foraminal  stenosis  · Pain control with Tylenol around the clock, Lidoderm  patch, muscle relaxant and short course steroid  Heating pad  · Patient is known to Dr Tho Barksdale  Will consult Neurosurgery for evaluation  · PT eval  · Consult acute pain service      Plan for Additional Problems:   · Type 2 diabetes--last A1c 6 6  Anticipate possible steroid induced hyperglycemia  NovoLog sliding scale for the time being and will adjust accordingly  · Chronic pain syndrome-- she is known to [facility]  · Hypertension--continue home meds  · Obesity  · Depression-- continue Effexor  · Smoking cessation-- nicotine patch    VTE Prophylaxis: Enoxaparin (Lovenox)  /  Code Status: Full code  POLST: POLST form is not discussed and not completed at this time  Anticipated Length of Stay:  Patient will be admitted on an Observation basis with an anticipated length of stay of  < 2 midnights  Justification for Hospital Stay: Above    Total Time for Visit, including Counseling / Coordination of Care: 20 minutes  Greater than 50% of this total time spent on direct patient counseling and coordination of care      Chief Complaint:   Back pain    History of Present Illness:    Corrinne Dally is a 63 y o  female with history of chronic back pain status post spinal cord stimulator placement, chronic pain syndrome, type 2 diabetes, obesity, hypertension and depression who presents with progressively worsening lower back pain for several days  Patient denies any notable trauma to her back  She reports 8 to 9/10 pain in her lower back and  leaning forward would help allleviate her pain  She had 2 episodes when she awoke feeling her left leg was numb whick quickly resolved  Currently denies paresthesia or weakness  Denies bowel or bladder dysfunction  She has been taking Tylenol and Robaxin for pain  Denies fever or chills  Denies dysuria or increasing urinary frequency  Review of Systems:    Review of Systems   Constitutional: Negative for appetite change, chills, diaphoresis, fatigue and fever  Respiratory: Negative for cough, choking, shortness of breath, wheezing and stridor  Neurological: Negative for tremors, syncope, speech difficulty and light-headedness  All other systems reviewed and are negative        Past Medical and Surgical History:     Past Medical History:   Diagnosis Date    Asthma     [facility]     Chronic pain syndrome     Depression     Diabetes mellitus (HCC)     Esophageal reflux     Hyperlipidemia     Hypertension     Lumbar radiculopathy     Lyme disease     Obesity     Opioid dependence ([facility] )     PPD positive     had treatment w/ INH 15 years ago    Vitamin D deficiency        Past Surgical History:   Procedure Laterality Date    APPENDECTOMY      CHOLECYSTECTOMY      JOINT REPLACEMENT      knee, total hip    AL REVISE/REMOVE SPINAL NEUROSTIM/ Left 12/13/2016    Procedure: REPLACEMENT BUTTOCK SPINAL CORD STIMULATOR IPG;  Surgeon: Cindy Huggins MD;  Location:  MAIN OR;  Service: Neurosurgery    SHOULDER SURGERY      SPINAL CORD STIMULATOR IMPLANT         Meds/Allergies:    Prior to Admission medications    Medication Sig Start Date End Date Taking? Authorizing Provider   atorvastatin (LIPITOR) 10 mg tablet Take by mouth 10/25/16  Yes Historical Provider, MD   glimepiride (AMARYL) 1 mg tablet Take by mouth 2 (two) times a day   7/19/16  Yes Historical Provider, MD   lisinopril-hydrochlorothiazide (PRINZIDE,ZESTORETIC) 20-25 MG per tablet Take by mouth 2/23/12  Yes Historical Provider, MD   meloxicam (MOBIC) 15 mg tablet Take by mouth 9/11/16  Yes Historical Provider, MD   methocarbamol (ROBAXIN) 750 mg tablet Take 750 mg by mouth 2 (two) times a day   7/31/15  Yes Historical Provider, MD   topiramate (TOPAMAX) 50 MG tablet Take 50 mg by mouth every 12 (twelve) hours   Yes Historical Provider, MD   venlafaxine (EFFEXOR XR) 75 mg 24 hr capsule Take by mouth 5/16/11  Yes Historical Provider, MD         Allergies:    Allergies   Allergen Reactions    Bee Venom Anaphylaxis    Other Other (See Comments)     Stainless steel and surgical steel  *Severe blistering*    Aleve [Naproxen Sodium] Hives    Augmentin [Amoxicillin-Pot Clavulanate] Hives    Metformin And Related Hives    Morphine And Related Hives    Motrin [Ibuprofen] Hives    Nortriptyline Hives    Penicillins Hives    Soy Lecithin [Lecithin] Hives    Sulfa Antibiotics Hives    Tradjenta [Linagliptin] Hives       Social History:     Marital Status:   Patient Pre-hospital Living Situation: Home  Patient Pre-hospital Level of Mobility:  Ambulatory  Patient Pre-hospital Diet Restrictions:  Diabetic diet    History   Alcohol Use No     History   Smoking Status    Current Every Day Smoker    Packs/day: 1 00    Years: 5 00   Smokeless Tobacco    Never Used     Comment: pt desires to quit in January     History   Drug Use No       Family History:    Family History   Problem Relation Age of Onset    Diabetes Mother     Cancer Father        Physical Exam:     Vitals:   Blood Pressure: 107/52 (11/26/17 1912)  Pulse: 72 (11/26/17 1912)  Temperature: 99 °F (37 2 °C) (11/26/17 1912)  Temp Source: Oral (11/26/17 1912)  Respirations: 18 (11/26/17 1912)  Height: 5' 6" (167 6 cm) (11/26/17 1912)  Weight - Scale: 134 kg (295 lb) (11/26/17 1912)  SpO2: 97 % (11/26/17 1912)    Physical Exam   Constitutional: She is oriented to person, place, and time  No distress  Eyes: Conjunctivae and EOM are normal  Pupils are equal, round, and reactive to light  Neck: Normal range of motion  Neck supple  Cardiovascular: Normal rate, regular rhythm, normal heart sounds and intact distal pulses  Pulmonary/Chest: Effort normal and breath sounds normal  No respiratory distress  She has no wheezes  Abdominal: Soft  Bowel sounds are normal  She exhibits no distension  There is no tenderness  Musculoskeletal: She exhibits no edema  Paravertebral tenderness   Neurological: She is alert and oriented to person, place, and time  Muscle power  grossly intact   Skin: Skin is warm and dry  She is not diaphoretic  No erythema  Psychiatric: She has a normal mood and affect  Additional Data:     Lab Results: I have personally reviewed pertinent reports  Results from last 7 days  Lab Units 11/26/17  1404   I STAT HEMOGLOBIN g/dl 14 3       Results from last 7 days  Lab Units 11/26/17  1404   GLUCOSE, ISTAT mg/dl 74           Imaging: I have personally reviewed pertinent reports  Xr Chest 2 Views    Result Date: 11/22/2017  Narrative: CHEST - DUAL ENERGY INDICATION:  Chronic back pain increasing in severity COMPARISON:  December 6, 2016 VIEWS:  PA (including soft tissue/bone algorithms) and lateral projections IMAGES:  4 FINDINGS:     Heart mediastinum are stable  There is a spinal stimulator device in place terminating at the T8 level unchanged in position  Visualized portions of the lead wires are intact  The lungs are clear  No pneumothorax or pleural effusion  Visualized osseous structures appear within normal limits for the patient's age       Impression: No active pulmonary disease  Workstation performed: [id number]     Ct Lumbar Spine Without Contrast    Result Date: 11/26/2017  Narrative: CT LUMBAR SPINE INDICATION: Back pain COMPARISON: CT performed on 10/2/2017 TECHNIQUE:  Contiguous axial images through the lumbar spine were obtained  Sagittal and coronal reconstructions were performed  Radiation dose length product (DLP) for this visit:  1263 63 mGy-cm   This examination, like all CT scans performed in the [facility], was performed utilizing techniques to minimize radiation dose exposure, including the use of iterative reconstruction and automated exposure control  IMAGE QUALITY:  Diagnostic  FINDINGS: ALIGNMENT:  Alignment is maintained  Developmental narrowing of the spinal canal  VERTEBRAL BODIES:  No focal lytic or blastic lesion  No acute fracture  DEGENERATIVE CHANGES: Lower Thoracic spine:  Normal lower thoracic disc spaces ] L1-2:  Bilateral facet arthropathy with ligament flavum thickening  Circumferential disc bulge  This is superimposed on developmental narrowing  Mild to moderate spinal canal stenosis  Mild right and mild left neural foraminal stenosis  No significant  interval change  L2-3:  Circumferential disc bulge  Ligamentum flavum thickening and facet arthropathy  This is superimposed on developmental narrowing  Mild to moderate spinal canal stenosis  Moderate right and moderate left neural foraminal stenosis  L3-4:  Circumferential disc bulge with ligamentum flavum and facet arthropathy  This is superimposed on developmental narrowing  Moderate spinal canal stenosis  Moderate to severe right and moderate to severe left neural foraminal stenosis  L4-5:  Circumferential disc bulge  Bilateral facet arthropathy  This is superimposed on developmental narrowing  Moderate spinal canal stenosis  Severe right and severe left neural foraminal stenosis  No significant interval change  L5-S1:  Circumferential disc bulge    Bilateral facet arthropathy  Of omental Mild spinal canal stenosis  Moderate to severe right and moderate to severe left neural foraminal stenosis  PARASPINAL SOFT TISSUES:  Aortoiliac calcifications  Residual contrast from prior examination  Impression: Stable lumbar spine when compared to 10/2/2017  Developmental narrowing the spinal canal  Moderate to severe degrees of stenosis as detailed level by level  Workstation performed: [id number]     Cta Ed Chest Pe Study    Result Date: 11/26/2017  Narrative: CTA - CHEST WITH IV CONTRAST - PULMONARY ANGIOGRAM INDICATION: Left-sided chest pain  COMPARISON: None  TECHNIQUE: CTA examination of the chest was performed using angiographic technique according to a protocol specifically tailored to evaluate for pulmonary embolism  Reformatted images were created in axial, sagittal, and coronal planes  In addition, coronal 3D MIP postprocessing was performed on the acquisition scanner  Radiation dose length product (DLP) for this visit:  505 58 mGy-cm   This examination, like all CT scans performed in the [facility], was performed utilizing techniques to minimize radiation dose exposure, including the use of iterative  reconstruction and automated exposure control  IV Contrast:  85 mL of iohexol (OMNIPAQUE)      FINDINGS: PULMONARY ARTERIAL TREE:  No pulmonary embolus is seen  LUNGS:  Lungs are clear  There is no tracheal or endobronchial lesion  PLEURA:  Unremarkable  HEART/AORTA:  Unremarkable for patient's age  MEDIASTINUM AND ANGE:  Unremarkable  CHEST WALL AND LOWER NECK:   Spinal cord stimulation electrode lead tip terminates at the superior endplate of T7  Visualized thyroid lobes unremarkable  VISUALIZED STRUCTURES IN THE UPPER ABDOMEN:  Unremarkable  OSSEOUS STRUCTURES:  No acute fracture or destructive osseous lesion  Impression: Negative for pulmonary embolism  No acute pulmonary disease      Workstation performed: [id number]         Allscripts / Epic Records Reviewed: Yes     ** Please Note: This note has been constructed using a voice recognition system   **

## 2017-11-27 NOTE — PROGRESS NOTES
Pt care rounding with Ludin Hernandez- plan is for pt to be seen by neurosx and pain management   mobic on hold do to pt  Kidney levels

## 2017-11-27 NOTE — DISCHARGE SUMMARY
Discharge Summary - [facility] Internal Medicine    Patient Information: Maryam Francis 63 y o  female MRN: [id number]  [facility] Encounter: [id number]    Discharging Physician / Practitioner: Richie Siddiqui MD  PCP: Madison Chicas MD  Admission Date: 11/26/2017  Discharge Date: 11/27/17    Reason for Admission: lower back pain    Discharge Diagnoses:     Principal Problem:    Intractable low back pain  Active Problems:    Chronic pain syndrome    Status post insertion of spinal cord stimulator    DMII (diabetes mellitus, type 2) (HCC)    Obesity    HTN (hypertension)    Lumbar canal stenosis    Depression  Resolved Problems:    * No resolved hospital problems  *      Consultations During Hospital Stay:  · Neurosurgery  · Acute pain service    Procedures Performed:     CT lumbar spine  CTA ruled out PE    Significant Findings / Test Results:     · none    Incidental Findings:   · none    Test Results Pending at Discharge (will require follow up):   · none     Outpatient Tests Requested:  none  Complications:none  Hospital Course:     Maryam Francis is a 63 y o  female patient who originally presented to the hospital on 11/26/2017 due to chronic lower back pain  On imaging of lumbar spine, narrowing of spinal canal noted, with moderate to severe degrees of stenosis noted  As per pain management service, patient should be on tylenol 975mg q8h, decadron, flexeril 10mg  Tid  Tramadol to discontinued upon discharge  Neurosurgery no acute intervention at this time  Condition at Discharge: good     Discharge Day Visit / Exam: 11/27/17    * Please refer to separate progress note for these details *    Discussion with Family: no, with patient      Discharge instructions/Information to patient and family:   See after visit summary for information provided to patient and family        Provisions for Follow-Up Care:  See after visit summary for information related to follow-up care and any pertinent home health orders  Disposition:     Home    For Discharges to [facility]F:   · Not Applicable to this Patient - Not Applicable to this Patient    Planned Readmission: none    Discharge Statement:  I spent 30 minutes discharging the patient  This time was spent on the day of discharge  I had direct contact with the patient on the day of discharge  Greater than 50% of the total time was spent examining patient, answering all patient questions, arranging and discussing plan of care with patient as well as directly providing post-discharge instructions  Additional time then spent on discharge activities  Discharge Medications:  See after visit summary for reconciled discharge medications provided to patient and family  ** Please Note: This note has been constructed using a voice recognition system   **

## 2017-11-27 NOTE — PLAN OF CARE
Problem: Potential for Falls  Goal: Patient will remain free of falls  INTERVENTIONS:  - Assess patient frequently for physical needs  -  Identify cognitive and physical deficits and behaviors that affect risk of falls    -  [place] fall precautions as indicated by assessment   - Educate patient/family on patient safety including physical limitations  - Instruct patient to call for assistance with activity based on assessment  - Modify environment to reduce risk of injury  - Consider OT/PT consult to assist with strengthening/mobility   Outcome: Progressing      Problem: PAIN - ADULT  Goal: Verbalizes/displays adequate comfort level or baseline comfort level  Interventions:  - Encourage patient to monitor pain and request assistance  - Assess pain using appropriate pain scale 0-10  - Administer analgesics based on type and severity of pain and evaluate response  - Implement non-pharmacological measures as appropriate and evaluate response  - Consider cultural and social influences on pain and pain management  - Notify physician/advanced practitioner if interventions unsuccessful or patient reports new pain   Outcome: Progressing      Problem: INFECTION - ADULT  Goal: Absence or prevention of progression during hospitalization  INTERVENTIONS:  - Assess and monitor for signs and symptoms of infection  - Monitor lab/diagnostic results  - Monitor all insertion sites, i e  indwelling lines, tubes, and drains  - Monitor endotracheal (as able) and nasal secretions for changes in amount and color  - [place] appropriate cooling/warming therapies per order  - Administer medications as ordered  - Instruct and encourage patient and family to use good hand hygiene technique  - Identify and instruct in appropriate isolation precautions for identified infection/condition   Outcome: Progressing    Goal: Absence of fever/infection during neutropenic period  INTERVENTIONS:  - Monitor WBC     Outcome: Progressing      Problem: SAFETY ADULT  Goal: Maintain or return to baseline ADL function  INTERVENTIONS:  -  Assess patient's ability to carry out ADLs; assess patient's baseline for ADL function and identify physical deficits which impact ability to perform ADLs (bathing, care of mouth/teeth, toileting, grooming, dressing, etc )  - Assess/evaluate cause of self-care deficits   - Assess range of motion  - Assess patient's mobility; develop plan if impaired  - Assess patient's need for assistive devices and provide as appropriate  - Encourage maximum independence but intervene and supervise when necessary  ¯ Involve family in performance of ADLs  ¯ Assess for home care needs following discharge   ¯ Request OT consult to assist with ADL evaluation and planning for discharge  ¯ Provide patient education as appropriate   Outcome: Progressing    Goal: Maintain or return mobility status to optimal level  INTERVENTIONS:  - Assess patient's baseline mobility status (ambulation, transfers, stairs, etc )    - Identify cognitive and physical deficits and behaviors that affect mobility  - Identify mobility aids required to assist with transfers and/or ambulation (gait belt, sit-to-stand, lift, walker, cane, etc )  - [place] fall precautions as indicated by assessment  - Record patient progress and toleration of activity level on Mobility SBAR; progress patient to next Phase/Stage  - Instruct patient to call for assistance with activity based on assessment  - Request Rehabilitation consult to assist with strengthening/weightbearing, etc    Outcome: Progressing      Problem: DISCHARGE PLANNING  Goal: Discharge to home or other facility with appropriate resources  INTERVENTIONS:  - Identify barriers to discharge w/patient and caregiver  - Arrange for needed discharge resources and transportation as appropriate  - Identify discharge learning needs (meds, wound care, etc )  - Arrange for interpretive services to assist at discharge as needed  - Refer to Case Management Department for coordinating discharge planning if the patient needs post-hospital services based on physician/advanced practitioner order or complex needs related to functional status, cognitive ability, or social support system   Outcome: Progressing      Problem: Knowledge Deficit  Goal: Patient/family/caregiver demonstrates understanding of disease process, treatment plan, medications, and discharge instructions  Complete learning assessment and assess knowledge base    Interventions:  - Provide teaching at level of understanding  - Provide teaching via preferred learning methods   Outcome: Progressing

## 2017-11-27 NOTE — CASE MANAGEMENT
Initial Clinical Review    Admission: Date/Time/Statement: 11/26/2017 @1734    Orders Placed This Encounter   Procedures    Place in Observation (expected length of stay for this patient is less than two midnights)     Standing Status:   Standing     Number of Occurrences:   1     Order Specific Question:   Admitting Physician     Answer:   Layne Kilgore     Order Specific Question:   Level of Care     Answer:   Med Surg [16]         ED: Date/Time/Mode of Arrival:   ED Arrival Information     Expected Arrival Acuity Means of Arrival Escorted By Service Admission Type    - 11/26/2017 11:18 Urgent Walk-In Self General Medicine Urgent    Arrival Complaint    back pain          Chief Complaint:   Chief Complaint   Patient presents with    Back Pain     L side of back hurts, L leg weakness  History of Illness:   Rikki Urbina is a 63 y o  female with history of chronic back pain status post spinal cord stimulator placement, chronic pain syndrome, type 2 diabetes, obesity, hypertension and depression who presents with progressively worsening lower back pain for several days  Patient denies any notable trauma to her back  She reports 8 to 9/10 pain in her lower back and  leaning forward would help allleviate her pain  She had 2 episodes when she awoke feeling her left leg was numb whick quickly resolved  Currently denies paresthesia or weakness  Denies bowel or bladder dysfunction  She has been taking Tylenol and Robaxin for pain  Denies fever or chills    Denies dysuria or increasing urinary frequency      ED Vital Signs:   ED Triage Vitals   Temperature Pulse Respirations Blood Pressure SpO2   11/26/17 1124 11/26/17 1124 11/26/17 1124 11/26/17 1124 11/26/17 1124   98 2 °F (36 8 °C) 92 18 143/63 97 %      Temp Source Heart Rate Source Patient Position - Orthostatic VS BP Location FiO2 (%)   11/26/17 1912 11/26/17 1404 11/26/17 1404 11/26/17 1404 --   Oral Monitor Sitting Right arm       Pain Score 11/26/17 1124       Worst Possible Pain        Wt Readings from Last 1 Encounters:   11/26/17 134 kg (295 lb)       Vital Signs (abnormal): WNL    Abnormal Labs/Diagnostic Test Results:  WNL  CT lumbar spine:  Stable lumbar spine when compared to 10/2/2017  Developmental narrowing the spinal canal     Moderate to severe degrees of stenosis as detailed level by level  ED Treatment:   Medication Administration from 11/26/2017 1118 to 11/26/2017 1808       Date/Time Order Dose Route Action     11/26/2017 1354 sodium chloride 0 9 % bolus 1,000 mL 1,000 mL Intravenous given     11/26/2017 1354 acetaminophen (TYLENOL) tablet 975 mg 975 mg Oral Given     11/26/2017 1355 lidocaine (LIDODERM) 5 % patch 1 patch 1 patch Transdermal Medication Applied     11/26/2017 1431 iohexol (OMNIPAQUE) 350 MG/ML injection (SINGLE-DOSE) 85 mL 85 mL Intravenous Given          Past Medical/Surgical History:    Active Ambulatory Problems     Diagnosis Date Noted    No Active Ambulatory Problems     Resolved Ambulatory Problems     Diagnosis Date Noted    No Resolved Ambulatory Problems     Past Medical History:   Diagnosis Date    Asthma     Chronic narcotic dependence ([address] )     Chronic pain syndrome     Depression     Diabetes mellitus ([address] )     Esophageal reflux     Hyperlipidemia     Hypertension     Lumbar radiculopathy     Lyme disease     Obesity     [facility]io[facility]     PPD positive     Vitamin D deficiency        Admitting Diagnosis: Back pain [M54 9]  Chronic pain syndrome [G89 4]  Transient weakness of left lower extremity [R29 898]  Status post insertion of spinal cord stimulator [Z98 890]  Acute left-sided low back pain with left-sided sciatica [M54 42]    Age/Sex: 63 y o  female    Assessment/Plan:    Hospital Problem List:      Principal Problem:    Intractable low back pain  Active Problems:    Chronic pain syndrome    Status post insertion of spinal cord stimulator    DMII (diabetes mellitus, type 2) ([facility] [address] [facility]    Obesity    HTN (hypertension)    Lumbar canal stenosis    Depression        Plan for the Primary Problem(s):  · Acute on chronic lower back pain/lumbar radiculopathy in the setting of lumbar canal/foraminal  stenosis  ? Pain control with Tylenol around the clock, Lidoderm  patch, muscle relaxant and short course steroid  Heating pad     ? Patient is known to Dr Ulloa Later  Will consult Neurosurgery for evaluation  ? PT eval  ? Consult acute pain service        Plan for Additional Problems:   · Type 2 diabetes--last A1c 6 6  Anticipate possible steroid induced hyperglycemia    NovoLog sliding scale for the time being and will adjust accordingly  · Chronic pain syndrome-- she is known to [facility]  · Hypertension--continue home meds  · Obesity  · Depression-- continue Effexor  · Smoking cessation-- nicotine patch     VTE Prophylaxis: Enoxaparin (Lovenox)  /  Code Status: Full code    Admission Orders:  Scheduled Meds:   acetaminophen 975 mg Oral Q8H [facility]   atorvastatin 10 mg Oral Daily With Dinner   dexamethasone 4 mg Intravenous Q12H [facility]   enoxaparin 40 mg Subcutaneous Daily   glimepiride 1 mg Oral BID   insulin lispro 2-12 Units Subcutaneous TID AC   lisinopril-hydrochlorothiazide (PRINZIDE 20/25) combo dose  Oral Daily   meloxicam 15 mg Oral Daily   methocarbamol 750 mg Oral Q8H [facility]   nicotine 1 patch Transdermal Daily   pantoprazole 40 mg Oral Early Morning   topiramate 100 mg Oral Q12H SUDHAKAR   traMADol 100 mg Oral Q8H   venlafaxine 75 mg Oral Daily     Continuous Infusions:    PRN Meds: ondansetron   Regular diet  Consult Neurosurgery  Consult Acute Pain Service  PT eval and treat

## 2017-11-28 ENCOUNTER — ALLSCRIPTS OFFICE VISIT (OUTPATIENT)
Dept: OTHER | Facility: OTHER | Age: 62
End: 2017-11-28

## 2017-11-29 ENCOUNTER — GENERIC CONVERSION - ENCOUNTER (OUTPATIENT)
Dept: OTHER | Facility: OTHER | Age: 62
End: 2017-11-29

## 2017-11-29 NOTE — PROGRESS NOTES
Assessment    1  Pain syndrome, chronic (338 4) (G89 4)   2  Low back pain (724 2) (M54 5)   3  Myofascial pain syndrome (729 1) (M79 1)    Plan  Low back pain, Lumbar canal stenosis, Lumbar radiculopathy, Myofascial painsyndrome, Pain syndrome, chronic    · *1 - [facility]  Consult, Evaluate and Treat, [place], Advance to [facility]  Status: Active  Requested for: [id number]   Ordered; Low back pain, Lumbar canal stenosis, Lumbar radiculopathy, Myofascial pain syndrome, Pain syndrome, chronic; Ordered By: [email or web address] 17 Performed:  Due: 00DQD3821  Care Summary provided  : Yes  Low back pain, Myofascial pain syndrome, Pain syndrome, chronic    · Follow-up visit in 6 weeks Evaluation and Treatment  Follow-up  Status: Hold For -Scheduling  Requested for: [id number]   Ordered; For: Low back pain, Myofascial pain syndrome, Pain syndrome, chronic; Ordered By: [address] Performed:  Due: 92HKT0007   · Injection Trigger Point 3 or More Muscles - POC; Status:Complete;   Done: 03ZAU9365   Perform: In Office; [phone]; Ordered;back pain, Myofascial pain syndrome, Pain syndrome, chronic; Ordered By:Benny Boston;    Discussion/Summary    My impressions and treatment recommendations were discussed in detail with the patient, who verbalized understanding and had no further questions  patient appears to have an acute muscle spasm involving her lumbar paraspinal, thoracic paraspinal, and left gluteal musculature  As such, I felt a reasonable to offer the patient trigger point injections since this could be potentially therapeutic   procedures, its risks, and benefits were explained in detail to the patient  Risks include but are not limited to bleeding, infection, hematoma formation, abscess formation, weakness, headache, failure the pain to improve, nerve irritation or damage, and potential worsening of the pain  The patient verbalized understanding and wished to proceed with the procedure    patient was also recently admitted to the hospital with severe muscle spasms  She reports that they prescribed her cyclobenzaprine as well as Tylenol over-the-counter  I discussed with her that she could take Tylenol over-the-counter to a maximum of 3000 mg per day  I also asked her to trial cyclobenzaprine over the next few days to see if that works better than the methocarbamol that I had prescribed for her  The patient verbalized understanding  The patient is to continue taking her topiramate as prescribed  also felt a reasonable to have the patient undergo a course of physical therapy 2-3 times per week for 4-6 weeks  is planned with the patient in 6 weeks time or sooner as warranted  Discharge instructions were provided  I personally saw and examined the patient and I agree with the above discussed plan of care  The patient has the current Goals: Decreased pain and improved level of functioning  Patient is able to Self-Care  Chief Complaint    1  Back Pain    History of Present Illness  Ms Shruti Russell is a pleasant 60-year-old  female who presents to [facility] for interval re-evaluation of the above-stated pain complaints  The patient has a past medical and chronic pain history as outlined in the impression section below  She was last seen on October 30, 2017 at which time she was maintained on topiramate 50 mg 2 tablets twice daily along with methocarbamol 750 mg 1/2 to 1 tablet 3 times daily as needed for muscle spasms  today's office visit, the patient's pain score is 9/10 on the verbal numerical pain rating scale  The patient is complaining of severe pain in the left side of her low back  She states that her pain is worse in the morning, evening, and night  Her pain is constant in nature and she reports the quality of her pain as âvision burning, dull/aching, sharp, and knife cutting like  â She has several trigger points noted in the left lumbar paraspinal musculature  She is amenable to undergoing trigger point injections at today's visit  than as stated above, the patient denies any interval changes in medications, medical condition, mental condition, symptoms, or allergies since the last office visit  [place] presents with complaints of gradual onset of constant episodes of severe left lower back pain, described as sharp, dull, aching and burning  On a scale of 1 to 10, the patient rates the pain as 9  Symptoms are unchanged  Review of Systems   Constitutional: no fever,-- no recent weight gain-- and-- no recent weight loss  Eyes: no double vision-- and-- no blurry vision  Cardiovascular: no chest pain,-- no palpitations-- and-- no lower extremity edema  Respiratory: no complaints of shortness of breath-- and-- no wheezing  Musculoskeletal: difficulty walking,-- joint stiffness-- and-- decreased range of motion, but-- no muscle weakness,-- no joint swelling,-- no limb swelling-- and-- no pain in extremity  Neurological: no dizziness,-- no difficulty swallowing,-- no memory loss,-- no loss of consciousness-- and-- no seizures  Gastrointestinal: no nausea,-- no vomiting,-- no constipation-- and-- no diarrhea  Genitourinary: no difficulty initiating urine stream,-- no genital pain-- and-- no frequent urination  Integumentary: no complaints of skin rash  Psychiatric: no depression  Endocrine: no excessive thirst,-- no adrenal disease,-- no hypothyroidism-- and-- no hyperthyroidism  Hematologic/Lymphatic: no tendency for easy bruising-- and-- no tendency for easy bleeding  ROS reviewed  Active Problems    1  Acute lumbar radiculopathy (724 4) (M54 16)   2  Analgesic use (V58 69) (Z79 899)   3  Asthma (493 90) (J45 909)   4  Chronic right shoulder pain (719 41,338 29) (M25 511,G89 29)   5  Depression (311) (F32 9)   6  DM2 (diabetes mellitus, type 2) (250 00) (E11 9)   7   Encounter for screening for cardiovascular disorders (V81 2) (Z13 6)   8  Esophageal reflux (530 81) (K21 9)   9  Hand paresthesia (782 0) (R20 2)   10  Hyperlipidemia (272 4) (E78 5)   11  Hypertension (401 9) (I10)   12  Low back pain (724 2) (M54 5)   13  Lumbar canal stenosis (724 02) (M48 061)   14  Lumbar radiculopathy (724 4) (M54 16)   15  Morbid or severe obesity due to excess calories (278 01) (E66 01)   16  Myalgia and myositis (729 1)   17  Need for immunization against influenza (V04 81) (Z23)   18  Opioid dependence (304 00) (F11 20)   19  Osteoporosis screening (V82 81) (Z13 820)   20  Pain of right heel (729 5) (M79 671)   21  Pain syndrome, chronic (338 4) (G89 4)   22  Sacroiliitis (720 2) (M46 1)   23  Spondylosis of lumbar region without myelopathy or radiculopathy (721 3) (M47 816)   24  Urine, incontinence, stress female (625 6) (N39 3)   25  Visit for screening mammogram (V76 12) (Z12 31)    Past Medical History  1  History of Allergic reaction to bee sting (989 5,E905 3) (T63 441A)   2  History of Lyme disease (V12 09) (Z86 19)    Surgical History  1  History of Appendectomy   2  History of Cholecystectomy   3  History of Knee Replacement   4  History of Shoulder Surgery   5  History of Spinal Stereotaxis Stimulation Of Cord   6  History of Total Hip Replacement    Family History  Mother    1  Family history of Diabetes Mellitus (V18 0)  Father    2  Family history of Cancer  Family History    3  Family history of Brain Cancer (V16 8)   4  Family history of Breast Cancer (V16 3)   5  Family history of Cervical Cancer   6  Family history of Diabetes Mellitus (V18 0)   7  Family history of Hypertension (V17 49)   8  Family history of Ovarian Cancer (V16 41)   9  Family history of Stroke Complications    Social History     · Denied: History of Alcohol Use (History)   · Current every day smoker (305 1) (F17 200)   · Denied: History of Drug Use (305 90)    Current Meds   1  Atorvastatin Calcium 10 MG Oral Tablet; TAKE 1 TABLET DAILY;  Therapy: 65IQD3098 to ([facility])  Requested for: 23KQS7008; Last Rx:06Nov2017 Ordered   2  [employer] Vitamin D3 1000 UNIT CAPS; TAKE 1 CAPSULE BY MOUTH TWICE DAILY; Therapy: 91TDW0466 to (Evaluate:72Sne7310)  Requested for: 88QDW7743; Last Rx:24Cgc3397 Ordered   3  EPINEPHrine 0 3 MG/0 3ML Injection Solution Auto-injector; INJECT 0 3ML INTRAMUSCULARLY AS DIRECTED; Therapy: 87SPL8274 to (Last Rx:03Apr2017)  Requested for: 77Jcx0007 Ordered   4  Glimepiride 1 MG Oral Tablet; Take 1 tablet twice daily; Therapy: 99Wqa3338 to (Evaluate:14Nov2017)  Requested for: 64VIW9911; Last Rx:26Lzc3637 Ordered   5  Lisinopril-Hydrochlorothiazide 20-25 MG Oral Tablet; TAKE 1 TABLET DAILY; Therapy: 46Paq2944 to (Evaluate:14Dec2017)  Requested for: 61PIE6158; Last Rx:19Mar2017 Ordered   6  Meloxicam 15 MG Oral Tablet; take 1 tablet once daily with a meal; Therapy: 97Jxg7940 to (Evaluate:11Mar2018)  Requested for: 50Kvi6272; Last Rx:27Vid6379 Ordered   7  Methocarbamol 750 MG Oral Tablet; TAKE 1/2 -1 TABLET 3 TIMES DAILY; Therapy: 08LNW0782 to ()  Requested for: 70NVY1455; Last Rx:22Coa8747 Ordered   8  Omeprazole 20 MG Oral Capsule Delayed Release; Therapy: 79ROK2131 to (Last Liana Danish)  Requested for: 89OMZ7904 Ordered   9  OneTouch Ultra Blue In [employer]; test twice daily; Therapy: 84RSD0661 to (Evaluate:12Nov2017)  Requested for: 91Amx8839; Last Rx:26Apr2017 Ordered   10  Topiramate 50 MG Oral Tablet; TAKE 2 TABLETS TWICE DAILY; Therapy: 87VJL5601 to (Evaluate:05Jan2018)  Requested for: 77BTV4554; Last  Rx:06Nov2017 Ordered   11  Venlafaxine HCl ER 75 MG Oral Capsule Extended Release 24 Hour; TAKE 1 CAPSULE  DAILY; Therapy: 56LZB8434 to )  Requested for: 64YKC3683; Last  Rx:06Nov2017 Ordered   12  Vitamin D 1000 UNIT CAPS; Take 1 capsule twice daily; Therapy: 45Bjn0726 to (Last Rx:12Nov2014)  Requested for: 85TKH5171 Ordered    Allergies  1  Motrin TABS   2  Penicillins   3  MetFORMIN HCl TABS   4  Nortriptyline HCl CAPS   5  Tradjenta TABS   6  Aleve TABS   7  Augmentin TABS   8  Sulfa Drugs  9  Bee sting    Vitals  Vital Signs    Recorded: 59TYF4285 09:15AM   Temperature 98 F   Heart Rate 88   Systolic 015   Diastolic 71   Height 5 ft 6 in   Weight 305 lb    BMI Calculated 49 23   BSA Calculated 2 39   Pain Scale 9       Physical Exam   Constitutional  General appearance: Well developed, well nourished, alert, in no distress, non-toxic and no overt pain behavior  Eyes  Sclera: anicteric  HEENT  Hearing grossly intact  Pulmonary  Respiratory effort: Even and unlabored  Cardiovascular  Examination of extremities: No edema or pitting edema present  Skin  Skin and subcutaneous tissue: Normal without rashes or lesions, well hydrated  Psychiatric  Mood and affect: Mood and affect appropriate  Neurologic  Cranial nerves: Cranial nerves II-XII grossly intact  Musculoskeletal  Gait and station: Normal    Lumbar/Sacral Spine examination demonstrates Lumbosacral Spine:  Appearance: Normal  Spinal alignment exhibits normal lordosis  Tenderness: None except at lumbar spine  Tender nodes is over several trigger points in the left lumbar paraspinal musculature  ROM Hips: Full  Foot and ankle strength was normal bilaterally  Knee strength was normal bilaterally  Hip strength was normal bilaterally  Procedure  ATTENDING PHYSICIAN: Morena Andrews MD   Trigger point injections x1 to the left thoracic paraspinal, x1 to the left lower trapezius,x 3 to the left lumbar paraspinal, and x1 to the left gluteal musculature with local anesthetic and steroid  DIAGNOSIS: Myofascial pain syndrome  DIAGNOSIS: Myofascial pain syndrome  BLOOD LOSS: Minimal   None  None  Today's procedure, its potential benefits as well as its risks and side effects were reviewed   Discussed risks of the procedure including bleeding, infection, reactions to the medications, failure the pain to improve, and potential worsening of the pain as well as pneumothorax were explained in detail to the patient, who verbalized understanding and wished to proceed  Written informed consent was thereby obtained  OF THE PROCEDURE: After written informed consent was obtained, the patient was placed in the sitting position on the examination table  Anatomical landmarks were identified via palpation  The trigger points were identified and marked after palpation  The skin overlying these 6 marked trigger points was prepared using Betadine swabs x3 in the usual sterile fashion  Strict aseptic technique was utilized throughout the procedure  A 6 mL injectate consisting of 5 mL of 1% lidocaine mixed with 1 mL of Depo-Medrol 40 mg/mL was drawn up sterilely  Using a 25-gauge 1 25 inch needle, 1 mL of the above injectate was administered to each of the marked trigger points following negative aspiration  patient tolerated the procedure well and all needles were removed with the tips intact  Hemostasis was maintained  There were no apparent complications  The skin was wiped clean and Band-Aids were placed as appropriate  The patient was monitored for an appropriate period of time following the procedure and remained hemodynamically stable and neurovascularly intact following the procedure as she was prior to the procedure  The patient was ultimately discharged to home with supervision in good condition  I was present for and participated in all key and critical portions of this procedure  Future Appointments    Date/Time Provider Specialty Site   12/18/2017 11:30 AM ANASTASIYA Hagen   Internal Medicine  [address] [facility] INTERNAL MEDICINE [place]   12/19/2017 03:00 PM Souleymane Elias DO Pain Management [address]       Signatures   Electronically signed by : ANASTASIYA Alexandre ; Nov 28 2017 10:19AM EST                       (Author)

## 2017-12-04 ENCOUNTER — ALLSCRIPTS OFFICE VISIT (OUTPATIENT)
Dept: OTHER | Facility: OTHER | Age: 62
End: 2017-12-04

## 2017-12-05 ENCOUNTER — APPOINTMENT (OUTPATIENT)
Dept: PHYSICAL THERAPY | Facility: REHABILITATION | Age: 62
End: 2017-12-05
Payer: MEDICARE

## 2017-12-05 ENCOUNTER — GENERIC CONVERSION - ENCOUNTER (OUTPATIENT)
Dept: OTHER | Facility: OTHER | Age: 62
End: 2017-12-05

## 2017-12-05 PROCEDURE — 97164 PT RE-EVAL EST PLAN CARE: CPT | Performed by: PHYSICAL THERAPIST

## 2017-12-05 PROCEDURE — G8982 BODY POS GOAL STATUS: HCPCS

## 2017-12-05 PROCEDURE — G8981 BODY POS CURRENT STATUS: HCPCS | Performed by: PHYSICAL THERAPIST

## 2017-12-08 ENCOUNTER — GENERIC CONVERSION - ENCOUNTER (OUTPATIENT)
Dept: PAIN MEDICINE | Facility: CLINIC | Age: 62
End: 2017-12-08

## 2017-12-08 ENCOUNTER — APPOINTMENT (OUTPATIENT)
Dept: PHYSICAL THERAPY | Facility: REHABILITATION | Age: 62
End: 2017-12-08
Payer: MEDICARE

## 2017-12-08 PROCEDURE — G8983 BODY POS D/C STATUS: HCPCS

## 2017-12-08 PROCEDURE — 97140 MANUAL THERAPY 1/> REGIONS: CPT

## 2017-12-08 PROCEDURE — 97014 ELECTRIC STIMULATION THERAPY: CPT

## 2017-12-08 PROCEDURE — G8982 BODY POS GOAL STATUS: HCPCS

## 2017-12-12 ENCOUNTER — APPOINTMENT (OUTPATIENT)
Dept: PHYSICAL THERAPY | Facility: REHABILITATION | Age: 62
End: 2017-12-12
Payer: MEDICARE

## 2017-12-15 ENCOUNTER — APPOINTMENT (OUTPATIENT)
Dept: PHYSICAL THERAPY | Facility: REHABILITATION | Age: 62
End: 2017-12-15
Payer: MEDICARE

## 2017-12-20 ENCOUNTER — GENERIC CONVERSION - ENCOUNTER (OUTPATIENT)
Dept: OTHER | Facility: OTHER | Age: 62
End: 2017-12-20

## 2017-12-21 ENCOUNTER — GENERIC CONVERSION - ENCOUNTER (OUTPATIENT)
Dept: OTHER | Facility: OTHER | Age: 62
End: 2017-12-21

## 2017-12-29 ENCOUNTER — GENERIC CONVERSION - ENCOUNTER (OUTPATIENT)
Dept: OTHER | Facility: OTHER | Age: 62
End: 2017-12-29

## 2018-01-03 DIAGNOSIS — M54.12 RADICULOPATHY OF CERVICAL REGION: ICD-10-CM

## 2018-01-03 DIAGNOSIS — Z12.31 ENCOUNTER FOR SCREENING MAMMOGRAM FOR MALIGNANT NEOPLASM OF BREAST: ICD-10-CM

## 2018-01-03 DIAGNOSIS — M54.2 CERVICALGIA: ICD-10-CM

## 2018-01-08 ENCOUNTER — GENERIC CONVERSION - ENCOUNTER (OUTPATIENT)
Dept: OTHER | Facility: OTHER | Age: 63
End: 2018-01-08

## 2018-01-08 ENCOUNTER — APPOINTMENT (OUTPATIENT)
Dept: LAB | Age: 63
End: 2018-01-08
Payer: MEDICARE

## 2018-01-08 ENCOUNTER — TRANSCRIBE ORDERS (OUTPATIENT)
Dept: LAB | Age: 63
End: 2018-01-08

## 2018-01-08 DIAGNOSIS — E78.5 HYPERLIPIDEMIA: ICD-10-CM

## 2018-01-08 DIAGNOSIS — E11.9 TYPE 2 DIABETES MELLITUS WITHOUT COMPLICATIONS (HCC): ICD-10-CM

## 2018-01-08 LAB
ALBUMIN SERPL BCP-MCNC: 3.5 G/DL (ref 3.5–5)
ALP SERPL-CCNC: 111 U/L (ref 46–116)
ALT SERPL W P-5'-P-CCNC: 29 U/L (ref 12–78)
ANION GAP SERPL CALCULATED.3IONS-SCNC: 8 MMOL/L (ref 4–13)
AST SERPL W P-5'-P-CCNC: 20 U/L (ref 5–45)
BILIRUB SERPL-MCNC: 0.29 MG/DL (ref 0.2–1)
BUN SERPL-MCNC: 27 MG/DL (ref 5–25)
CALCIUM SERPL-MCNC: 9.9 MG/DL (ref 8.3–10.1)
CHLORIDE SERPL-SCNC: 102 MMOL/L (ref 100–108)
CHOLEST SERPL-MCNC: 152 MG/DL (ref 50–200)
CO2 SERPL-SCNC: 26 MMOL/L (ref 21–32)
CREAT SERPL-MCNC: 1.13 MG/DL (ref 0.6–1.3)
CREAT UR-MCNC: 137 MG/DL
EST. AVERAGE GLUCOSE BLD GHB EST-MCNC: 151 MG/DL
GFR SERPL CREATININE-BSD FRML MDRD: 53 ML/MIN/1.73SQ M
GLUCOSE P FAST SERPL-MCNC: 141 MG/DL (ref 65–99)
HBA1C MFR BLD: 6.9 % (ref 4.2–6.3)
HDLC SERPL-MCNC: 66 MG/DL (ref 40–60)
LDLC SERPL CALC-MCNC: 70 MG/DL (ref 0–100)
MICROALBUMIN UR-MCNC: 16.3 MG/L (ref 0–20)
MICROALBUMIN/CREAT 24H UR: 12 MG/G CREATININE (ref 0–30)
POTASSIUM SERPL-SCNC: 3.9 MMOL/L (ref 3.5–5.3)
PROT SERPL-MCNC: 8 G/DL (ref 6.4–8.2)
SODIUM SERPL-SCNC: 136 MMOL/L (ref 136–145)
TRIGL SERPL-MCNC: 80 MG/DL

## 2018-01-08 PROCEDURE — 82043 UR ALBUMIN QUANTITATIVE: CPT

## 2018-01-08 PROCEDURE — 83036 HEMOGLOBIN GLYCOSYLATED A1C: CPT

## 2018-01-08 PROCEDURE — 36415 COLL VENOUS BLD VENIPUNCTURE: CPT

## 2018-01-08 PROCEDURE — 82570 ASSAY OF URINE CREATININE: CPT

## 2018-01-08 PROCEDURE — 80053 COMPREHEN METABOLIC PANEL: CPT

## 2018-01-08 PROCEDURE — 80061 LIPID PANEL: CPT

## 2018-01-10 NOTE — RESULT NOTES
Message   Recorded as Task   Date: 12/27/2016 01:15 PM, Created By: Jacqui Khan   Task Name: Med Renewal Request   Assigned To: [facility]   Regarding Patient: Yordan Deal, Status: Active   Comment:    Elaine Prasad - 27 Dec 2016 1:15 PM     TASK CREATED  Caller: Self; Renew Medication; [phone] (Home); [phone] (Work)  Received a Meera So from [employer] today from the pt  requesting a refill on the methocarbamol last filled on 11/8  She stated [facility] to [facility]  Thanks   Benny Boston - 27 Dec 2016 2:22 PM     TASK REPLIED TO: Previously Assigned To [facility]  Sent  Elaine Prasad - 27 Dec 2016 2:30 PM     TASK EDITED  S/w the pt  and she is aware          Signatures   Electronically signed by : Natividad Recinos, ; Dec 27 2016  2:30PM EST                       (Author)

## 2018-01-11 NOTE — MISCELLANEOUS
Message   Recorded as Task   Date: 01/31/2017 10:30 AM, Created By: Nuris Antonio   Task Name: Follow Up   Assigned To: [facility]   Regarding Patient: Deepa Pham, Status: Active   Comment:    Elva Marie - 31 Jan 2017 10:30 AM     TASK CREATED  Caller: Self; General Medical Question; [phone] (Home)  Pt called stating that she is having surgery w/Dr Bernard(podiatrist) for a heel spur the end of feb  Pt was asked by podiatrist if she has been diagnosed w/restless leg syndrome due to her leg spasms  Pt can be reached at [phone]  Reviewed progress notes, I don't see that diagnosis, will confirm w/Germaine Blanton - 31 Jan 2017 1:15 PM     TASK REPLIED TO: Previously Assigned To Wilhemenia Runner  no I have never diagnosed any patient I have seen here with restless leg syndrome  Elva Marie - 31 Jan 2017 1:29 PM     TASK EDITED   Nuris Antonio - 31 Jan 2017 1:38 PM     TASK EDITED  Pt aware  Active Problems    1  Allergic reaction to bee sting (989 5,E905 3) (T63 441A)   2  Analgesic use (V58 69) (Z79 899)   3  Asthma (493 90) (J45 909)   4  Battery end of life of spinal cord stimulator (V53 02) (Z46 2)   5  Depression (311) (F32 9)   6  DM2 (diabetes mellitus, type 2) (250 00) (E11 9)   7  Encounter for long-term use of opiate analgesic (V58 69) (Z79 891)   8  Encounter for screening for cardiovascular disorders (V81 2) (Z13 6)   9  Encounter for staple removal (V58 32) (Z48 02)   10  Esophageal reflux (530 81) (K21 9)   11  Hand paresthesia (782 0) (R20 2)   12  Hyperlipidemia (272 4) (E78 5)   13  Hypertension (401 9) (I10)   14  Low back pain (724 2) (M54 5)   15  Lumbar canal stenosis (724 02) (M48 06)   16  Lumbar radiculopathy (724 4) (M54 16)   17  Lyme disease (088 81) (A69 20)   18  Morbid or severe obesity due to excess calories (278 01) (E66 01)   19  Myalgia and myositis (729 1)   20   Opioid dependence (304 00) (F11 20) 21  Osteoporosis screening (V82 81) (Z13 820)   22  Pain of right heel (729 5) (M79 671)   23  Pain syndrome, chronic (338 4) (G89 4)   24  Postop check (V67 00) (Z09)   25  Sacroiliac pain (724 6) (M53 3)   26  Sacroiliitis (720 2) (M46 1)   27  Spondylosis of lumbar region without myelopathy or radiculopathy (721 3) (M47 816)   28  Urine, incontinence, stress female (625 6) (N39 3)   29  Visit for screening mammogram (V76 12) (Z12 31)   30  Vitamin D deficiency (268 9) (E55 9)    Current Meds   1  Atorvastatin Calcium 10 MG Oral Tablet (Lipitor); TAKE 1 TABLET DAILY; Therapy: 14ZAP7013 to (77 873 135)  Requested for: 25Oct2016; Last   Rx:36Meg7348 Ordered   2  [facility] Vitamin D3 1000 UNIT CAPS; TAKE 1 CAPSULE BY MOUTH TWICE DAILY; Therapy: 88HRC9347 to (Evaluate:63Mcg2435)  Requested for: 47POD1204; Last   Rx:83Jus5231 Ordered   3  Effexor XR 75 MG Oral Capsule Extended Release 24 Hour (Venlafaxine HCl ER); TAKE   1 CAPSULE DAILY; Therapy: 31FKQ8659 to (Dyanne Spurling)  Requested for: 39YGR5627; Last   Rx:17Oct2012 Ordered   4  EpiPen 2-Jimi 0 3 MG/0 3ML Injection Solution Auto-injector; INJECT 0 3ML   INTRAMUSCULARLY AS DIRECTED; Therapy: 19UIY9679 to (Evaluate:15Jun2016)  Requested for: 63PVZ4816; Last   Rx:39Uni1336 Ordered   5  Lisinopril-Hydrochlorothiazide 20-25 MG Oral Tablet; TAKE 1 TABLET DAILY; Therapy: 97Mvj5860 to (Evaluate:13Mar2017)  Requested for: 29EDQ1796; Last   Rx:16Jun2016 Ordered   6  Meloxicam 15 MG Oral Tablet; take 1 tablet once daily with a meal;   Therapy: 94Vlr5532 to (Evaluate:10Mar2017)  Requested for: 69Fhr8088; Last   Rx:05Hfd6260 Ordered   7  Methocarbamol 750 MG Oral Tablet; TAKE 1/2 -1 TABLET 3 TIMES DAILY; Therapy: 34GVH0712 to (Eryn Reilly)  Requested for: 40YOD4432; Last   SS:54JUB3952 Ordered   8  Omeprazole 20 MG Oral Capsule Delayed Release; Therapy: 55VHV2432 to (Trent Michael)  Requested for: 69GBC0646 Ordered   9   OneTouch Ultra Blue In Vitro Strip; TEST TWICE DAILY; Therapy: 89APP2607 to (Evaluate:11Nov2016)  Requested for: 32Rup1472; Last   Rx:25Apr2016 Ordered   10  OneTouch Ultra Mini w/Device Kit; TEST ONCE DAILY    USE AS DIRECTED; Therapy: 72XQA3561 to (Last Rx:73Hpw5846)  Requested for: 83HMX8880 Ordered   11  Oxycodone-Acetaminophen 5-325 MG Oral Tablet; TAKE 1 TABLET 2 TIMES DAILY AS    NEEDED FOR PAIN;    Therapy: 08FLS1928 to (Evaluate:03Mar2017); Last CD:78WWH5089 Ordered   12  Topiramate 50 MG Oral Tablet; TAKE 2 TABLETS TWICE DAILY; Therapy: 15CZK3761 to (Evaluate:10Feb2017)  Requested for: 59FSR1330; Last    Rx:11Jan2017 Ordered   13  Vitamin D 1000 UNIT CAPS; Take 1 capsule twice daily; Therapy: 85Qfe9252 to (Last Rx:12Nov2014)  Requested for: 64EGN2210 Ordered    Allergies    1  Motrin TABS   2  Penicillins   3  MetFORMIN HCl TABS   4  Nortriptyline HCl CAPS   5  Tradjenta TABS   6  Aleve TABS   7  Augmentin TABS   8  Sulfa Drugs    9   Bee sting    Signatures   Electronically signed by : Gregoria Trammell RN; Jan 31 2017  1:38PM EST                       (Author)

## 2018-01-12 VITALS
SYSTOLIC BLOOD PRESSURE: 134 MMHG | HEIGHT: 66 IN | DIASTOLIC BLOOD PRESSURE: 71 MMHG | TEMPERATURE: 98 F | WEIGHT: 293 LBS | BODY MASS INDEX: 47.09 KG/M2 | HEART RATE: 88 BPM

## 2018-01-12 NOTE — MISCELLANEOUS
Message   Recorded as Task   Date: 02/15/2017 10:51 AM, Created By: Sixto Olson   Task Name: Miscellaneous   Assigned To: [facility],Team   Regarding Patient: Christen Corbett, Status: In Progress   ChristopherRobert Fairbanks - 15 Feb 9988 47:47 AM     TASK CREATED  Submitted PA request for Nucynta 75mg through cover my meds  *** Pending ***    [employer] ID # [id number]   [facility] - 15 Feb 8172 50:14 AM     TASK EDITED  PA for Nucynta 75mg was denied, Dejan Engle is requesting the prescriber to explain why the preferred drugs such as Morphine IR tablet or Hydromorphone tablets have not worked or have had bad side effects  If the patient has not trialed these meds they are requesting that one of them be prescribed  Please advise  Ev Vick - 15 Feb 2017 2:17 PM     TASK REPLIED TO: Previously Assigned To Ev Vick  patient has trialed oxycodone and hydrocodone with no relief  Radha Rose is necessary due to its dual action mechanism to provide relief of her back and neuropathic pain  Mitzi Suresh - 20 Feb 2017 10:02 AM     TASK EDITED  **See Germaine's task below and please add the info from this call when you provide info to Dejan Engle  ***    Pt called and said she got a letter from Dejan Engle that they denied the Katheran Sails and want her to try Morphine and she said she wanted us to know that she is allergic to morphine  Told pt that I will add that to the info that our office provides to Dejan Engle  [facility] - 21 Feb 5277 4:28 PM     TASK EDITED  [employer] and started [employer]  Will hear back in 1-2 days per Lady Adrianna  [facility] - 21 Feb 5973 6:14 PM     TASK IN PROGRESS   [facility] - 22 Feb 6576 91:12 AM     TASK EDITED  [facility] comp and spoke with Kirk Wade - Case is still under review   [facility] - 23 Feb 3334 26:88 PM     TASK EDITED  Katheran Sails appeal approved  Patient aware  Active Problems    1   Allergic reaction to bee sting (989 5,E905 3) (T63 441A)   2  Analgesic use (V58 69) (Z79 899)   3  Asthma (493 90) (J45 909)   4  Battery end of life of spinal cord stimulator (V53 02) (Z46 2)   5  Depression (311) (F32 9)   6  DM2 (diabetes mellitus, type 2) (250 00) (E11 9)   7  Encounter for long-term use of opiate analgesic (V58 69) (Z79 891)   8  Encounter for screening for cardiovascular disorders (V81 2) (Z13 6)   9  Encounter for staple removal (V58 32) (Z48 02)   10  Esophageal reflux (530 81) (K21 9)   11  Hand paresthesia (782 0) (R20 2)   12  Hyperlipidemia (272 4) (E78 5)   13  Hypertension (401 9) (I10)   14  Low back pain (724 2) (M54 5)   15  Lumbar canal stenosis (724 02) (M48 06)   16  Lumbar radiculopathy (724 4) (M54 16)   17  Lyme disease (088 81) (A69 20)   18  Morbid or severe obesity due to excess calories (278 01) (E66 01)   19  Myalgia and myositis (729 1)   20  Opioid dependence (304 00) (F11 20)   21  Osteoporosis screening (V82 81) (Z13 820)   22  Pain of right heel (729 5) (M79 671)   23  Pain syndrome, chronic (338 4) (G89 4)   24  Postop check (V67 00) (Z09)   25  Sacroiliac pain (724 6) (M53 3)   26  Sacroiliitis (720 2) (M46 1)   27  Spondylosis of lumbar region without myelopathy or radiculopathy (721 3) (M47 816)   28  Urine, incontinence, stress female (625 6) (N39 3)   29  Visit for screening mammogram (V76 12) (Z12 31)   30  Vitamin D deficiency (268 9) (E55 9)    Current Meds   1  Atorvastatin Calcium 10 MG Oral Tablet (Lipitor); TAKE 1 TABLET DAILY; Therapy: 60MXJ2903 to (43 103 135)  Requested for: 34Zpg4007; Last   Rx:70Goy9263 Ordered   2  CVS Vitamin D3 1000 UNIT CAPS; TAKE 1 CAPSULE BY MOUTH TWICE DAILY; Therapy: 08PBQ8340 to (Evaluate:51Igl3759)  Requested for: 73YNV4616; Last   Rx:45Wrs7538 Ordered   3  Effexor XR 75 MG Oral Capsule Extended Release 24 Hour (Venlafaxine HCl ER); TAKE   1 CAPSULE DAILY;    Therapy: 26SHP4881 to (Betty Niece)  Requested for: 34KLE1185; Last Rx: 62RMB8073 Ordered   4  EpiPen 2-Jimi 0 3 MG/0 3ML Injection Solution Auto-injector; INJECT 0 3ML   INTRAMUSCULARLY AS DIRECTED; Therapy: 62WIH5995 to (Evaluate:15Jun2016)  Requested for: 29FNX9181; Last   Rx:13Jun2016 Ordered   5  Glimepiride 1 MG Oral Tablet; Take 1 tablet twice daily; Therapy: 67Fum2119 to (Evaluate:14Nov2017)  Requested for: 62ZFP9808; Last   Rx:29Cju5584 Ordered   6  Lisinopril-Hydrochlorothiazide 20-25 MG Oral Tablet; TAKE 1 TABLET DAILY; Therapy: 40Yuo7195 to (Evaluate:13Mar2017)  Requested for: 39YRB2629; Last   Rx:16Jun2016 Ordered   7  Meloxicam 15 MG Oral Tablet; take 1 tablet once daily with a meal;   Therapy: 41Tjx2471 to (Evaluate:10Mar2017)  Requested for: 40Hbi3427; Last   Rx:72Agk3353 Ordered   8  Methocarbamol 750 MG Oral Tablet; TAKE 1/2 -1 TABLET 3 TIMES DAILY; Therapy: 93BEH3110 to (Evaluate:15Apr2017)  Requested for: 78RTO3150; Last   Rx:39Yer4608 Ordered   9  Nucynta 75 MG Oral Tablet; TAKE 1 TABLET 3 TIMES DAILY AS NEEDED FOR PAIN;   Therapy: 01WNP7837 to (Evaluate:16Mar2017); Last Rx:71Ecz5903 Ordered   10  Omeprazole 20 MG Oral Capsule Delayed Release; Therapy: 22LXH6793 to (Last [facility])  Requested for: 34HED9755 Ordered   11  OneTouch Ultra Blue In Vitro Strip; TEST TWICE DAILY; Therapy: 76MOC3762 to (Evaluate:11Nov2016)  Requested for: 77Awb2977; Last    Rx:13Uqk5953 Ordered   12  OneTouch Ultra Mini w/Device Kit; TEST ONCE DAILY    USE AS DIRECTED; Therapy: 60KFO0081 to (Last Rx:19Fyo4578)  Requested for: 63MPH8474 Ordered   13  Topiramate 50 MG Oral Tablet; TAKE 2 TABLETS TWICE DAILY; Therapy: 04WRR6470 to (Evaluate:16Mar2017)  Requested for: 17ODT8194; Last    Rx:10Ghk1751 Ordered   14  Vitamin D 1000 UNIT CAPS; Take 1 capsule twice daily; Therapy: 55Sxj4445 to (Last Rx:12Nov2014)  Requested for: 31PDZ8738 Ordered    Allergies    1  Motrin TABS   2  Penicillins   3  MetFORMIN HCl TABS   4  Nortriptyline HCl CAPS   5  Tradjenta TABS   6  Aleve TABS   7  Augmentin TABS   8  Sulfa Drugs    9   Bee sting    Signatures   Electronically signed by : Jef Hassan, ; Feb 23 2017 12:25PM EST                       (Author)

## 2018-01-12 NOTE — RESULT NOTES
Verified Results  * CT SPINE LUMBAR WO CONTRAST 12YPM7280 12:13PM Roger Gay Order Number: [id number]    - Patient Instructions: To schedule this appointment, please contact Central Scheduling at [phone]  Test Name Result Flag Reference   CT SPINE LUMBAR WO CONTRAST (Report)     CT LUMBAR SPINE     INDICATION: M54 5: Low back pain   M54 16: Radiculopathy, lumbar region   M48 06: Spinal stenosis, lumbar region  History taken directly from the electronic ordering system  COMPARISON: 9/29/2014     TECHNIQUE: Contiguous axial images through the lumbar spine were obtained  Sagittal and coronal reconstructions were performed  Radiation dose length product (DLP) for this visit: 1730 mGy-cm   This examination, like all CT scans performed in the [facility], was performed utilizing techniques to minimize radiation dose exposure, including the use of iterative    reconstruction and automated exposure control  IMAGE QUALITY: Diagnostic  FINDINGS:     ALIGNMENT: Normal alignment of the lumbar spine  No spondylolisthesis or spondylolysis  VERTEBRAL BODIES: Scattered degenerative endplate changes noted with endplate osteophyte formation noted at multiple levels  Endplate osteophyte formation is similar to perhaps minimally progressed compared to the previous study  DEGENERATIVE CHANGES: Central canal is low normal in AP diameter throughout the visualized spine  For example at the mid L2 vertebral body level AP diameter of the central canal is approximately 8 to 9 mm  Lower Thoracic spine: At T11-T12 there is advanced facet hypertrophy and a right paracentral disc protrusion  This level was not imaged on the prior study  There is moderate to severe tricompartmental narrowing at this level ]     T12-L1: Advanced facet hypertrophy again noted  Central canal patent  Mild left and moderate right bony neural foraminal narrowing   This level is similar to the prior study      L1-2: There is a small left neural foraminal disc protrusion with bilateral facet hypertrophy  Mild to moderate left neural foraminal narrowing  Central canal right neural foramen patent  This level is similar to the prior study  L2-3: There is a diffuse disc bulge  There is bilateral facet hypertrophy  There is infolding of ligamentum flavum  Moderate tricompartmental narrowing  This level is similar to the prior study  L3-4: Advanced facet hypertrophy noted  Infolding of ligamentum flavum  There is diffuse disc bulge  Severe central canal narrowing  Moderate to severe bilateral neural foraminal narrowing  This level is similar to the prior study  L4-5: There is advanced facet hypertrophy  There is a left neural foraminal disc protrusion  There is moderate to severe central canal narrowing  Severe left neural foraminal narrowing  Moderate right neural foraminal narrowing  L5-S1: There is a vacuum disc phenomenon  There is advanced facet hypertrophy  There is a left neural foraminal disc protrusion  Severe left neural foraminal narrowing  Mild central canal narrowing  Right neural foramen patent  PARASPINAL SOFT TISSUES: Spinal cord stimulator leads posteriorly at the lower thoracic spine in the midline are incompletely imaged, present previously  IMPRESSION:     Advanced multilevel spondylosis is similar to the previous study  An element of underlying developmental/congenital spinal stenosis exacerbated by superimposed acquired spondylotic narrowing noted excluded  Workstation performed: [id number]     Signed by:    Tamanna Gibson MD   10/3/17

## 2018-01-12 NOTE — RESULT NOTES
Message   Recorded as Task   Date: 01/06/2017 10:06 AM, Created By: Gilberto Orr   Task Name: Miscellaneous   Assigned To: [facility]   Regarding Patient: Vera Johnson, Status: Active   CommentGaghada Martinez - 06 Jan 9578 81:98 AM     TASK CREATED  [facility]vd [facility] [address]  Patient has new insurance (Medicare) and Nikita Nuñez is leaving a copayment of $392 60 and she does not qualify for any coupons because she has Medicare  Patient does not want the refill on med because of cost, please eval for new med  Thanks   Benny Boston - 06 Jan 2017 10:18 AM     TASK REPLIED TO: Previously Assigned To Ary Augustin  Has she trialed regular gabapentin? Gilberto Orr - [address] 2159 1:50 PM     TASK EDITED  Yes patient has tried the Gabapentin in the past and she had an allergic reactiion which caused hives  Patient has about 2-3 days left of the gralise  Ary Augustin - 11 Jan 2017 8:57 AM     TASK REPLIED TO: Previously Assigned To [facility]  Will trial patient on topiramate --    Topiramate 50 mg titration schedule:    Week 1:  Topiramate 50 mg QHS  Week 2:  Topiramate 50 mg BID  Week 3:  Topiramate 50 mg qam and 100 mg qpm  Week 4:  Topiramate 100 mg BID   Elva Marie - 11 Jan 2017 9:07 AM     TASK EDITED  Lmom for pt to call the office to give titration instructions  Also need to confirm pharmacy,does not appear that the topiramate has been ordered by Elva Hernandez - 11 Jan 2017 12:42 PM     TASK EDITED  Please sent topiramate to [facility] & send the task back to [facility] clinical team,thank you  Pt returned call, she will call backin approx and hour when she has something to write on to review the titration schedule  Ary Augustin - 11 Jan 2017 12:46 PM     TASK REPLIED TO: Previously Assigned To Benny Boston  Topiramate already sent to pharmacy when I responded with the titration instructions  Elav Marie - 11 Jan 2017 4:18 PM     TASK EDITED  Spoke to pt, discussed titration schedule  Pt wrote it down and read the instructions back  verbalized understanding  Instructed to call w/any questions or concerns  [address] - 12 Jan 2017 11:13 AM     TASK REPLIED TO: Previously Assigned To [place]  Thanks          Signatures   Electronically signed by : Emeli Olea, ; Jan 12 2017 11:49AM EST                       (Author)

## 2018-01-12 NOTE — RESULT NOTES
Message   Recorded as Task   Date: 09/21/2016 04:22 PM, Created By: Paresh Encarnacion   Task Name: Call Back   Assigned To: [facility]   Regarding Patient: Jesus Alvarado, Status: Active   Comment:    Elaine Prasad - 21 Sep 2016 4:22 PM     TASK CREATED  Caller: Self; General Medical Question; [phone] (Home); [phone] (Work)  Received a Maribel Saint Landry from the pt  at [phone], requesting a CB, no message Zaki Julien - 22 Sep 2016 9:28 AM     TASK REPLIED TO: Previously Assigned To [employer] with pt who is req refill of gralise 600mg 2 tabs qhs to [facility] order  States she has 2 weeks left of medication,is tolerating well with no s/e's  F/U with you 10-13-16 with you  Nikia Mccarthy - 22 Sep 2016 11:13 AM     TASK REPLIED TO: Previously Assigned To Nikia Mccarthy                      refilled to [facility] - 22 Sep 2016 12:15 PM     TASK REPLIED TO: Previously Assigned To [facility] [facility]  Pt aware  Signatures   Electronically signed by :  Keyona Mccracken, ; Sep 22 2016 12:15PM EST                       (Author)

## 2018-01-12 NOTE — MISCELLANEOUS
Message   Recorded as Task   Date: 10/06/2016 02:43 PM, Created By: Thais Paz   Task Name: Care Coordination   Assigned To: [facility]   Regarding Patient: Lubna Mccloud, Status: Active   Umm Romero - 06 Oct 2016 2:43 PM     TASK CREATED  Caller: Self; [phone] (Home); [phone] (Work)  Pt lmom stating that her stimulator is not working  Called pt she states she has a permanent SCS from [address]  Advised pt to contact [facility],states she recently left a message for Bessie to call her back  Instructed pt to call the office if she does not get a return lani and we can give her another specialists number to contact  Nikia Mccarthy - 06 Oct 2016 4:18 PM     TASK REPLIED TO: Previously Assigned To [facility] [facility]                      Agree with your recommendations  Active Problems    1  Allergic reaction to bee sting (989 5,E905 3) (T63 441A)   2  Analgesic use (V58 69) (Z79 899)   3  Asthma (493 90) (J45 909)   4  Depression (311) (F32 9)   5  DM2 (diabetes mellitus, type 2) (250 00) (E11 9)   6  Encounter for screening for cardiovascular disorders (V81 2) (Z13 6)   7  Esophageal reflux (530 81) (K21 9)   8  Hand paresthesia (782 0) (R20 2)   9  Hyperlipidemia (272 4) (E78 5)   10  Hypertension (401 9) (I10)   11  Low back pain (724 2) (M54 5)   12  Lumbar canal stenosis (724 02) (M48 06)   13  Lumbar radiculopathy (724 4) (M54 16)   14  Lyme disease (088 81) (A69 20)   15  Morbid or severe obesity due to excess calories (278 01) (E66 01)   16  Myalgia and myositis (729 1)   17  Opioid dependence (304 00) (F11 20)   18  Pain of right heel (729 5) (M79 671)   19  Pain syndrome, chronic (338 4) (G89 4)   20  Sacroiliac pain (724 6) (M53 3)   21  Sacroiliitis (720 2) (M46 1)   22  Spondylosis of lumbar region without myelopathy or radiculopathy (721 3) (M46 816)   23  Visit for screening mammogram (V76 12) (Z12 31)   24   Vitamin D deficiency (268 9) (E55 9)    Current Meds   1  Atorvastatin Calcium 10 MG Oral Tablet (Lipitor); TAKE 1 TABLET DAILY; Therapy: 51OEE3590 to (Evaluate:37Fmf8149)  Requested for: 91AWR0353; Last   Rx:04Jan2016 Ordered   2  [facility] Vitamin D3 1000 UNIT CAPS; TAKE 1 CAPSULE BY MOUTH TWICE DAILY; Therapy: 35QAW9448 to (Evaluate:77Itm1957)  Requested for: 74KUI6516; Last   Rx:27Fvn2608 Ordered   3  Effexor XR 75 MG Oral Capsule Extended Release 24 Hour (Venlafaxine HCl ER); TAKE   1 CAPSULE DAILY; Therapy: 49AJD0537 to (Dyanne Spurling)  Requested for: 29YMM8210; Last   Rx:17Oct2012 Ordered   4  EpiPen 2-Jimi 0 3 MG/0 3ML Injection Solution Auto-injector; INJECT 0 3ML   INTRAMUSCULARLY AS DIRECTED; Therapy: 66YJJ3158 to (Evaluate:15Jun2016)  Requested for: 11PGM0129; Last   Rx:13Jun2016 Ordered   5  Glimepiride 1 MG Oral Tablet; take 1 tablet by mouth every day; Therapy: 31Ffy3342 to (Evaluate:15Apr2017)  Requested for: 12GFX6062; Last   Rx:03Txo2157 Ordered   6  Gralise 600 MG Oral Tablet; take 2 tablets at bedtime; Therapy: 59FIL5162 to (Evaluate:46Guh5579)  Requested for: 74MWL2007; Last   Rx:56Zcq2565 Ordered   7  Hydrocodone-Acetaminophen 7 5-325 MG Oral Tablet (Norco); TAKE 1 TABLET 2 - 3   TIMES DAILY AS NEEDED FOR PAIN;   Therapy: 03RGN5545 to (Evaluate:11Qvk0588); Last Rx:73Xan0685 Ordered   8  Invokana 300 MG Oral Tablet; Take 1 tablet daily; Therapy: 77Ndp7677 to (Evaluate:96Gjh2482)  Requested for: 80Zzy5795; Last   Rx:22Ide9412 Ordered   9  Lisinopril-Hydrochlorothiazide 20-25 MG Oral Tablet; TAKE 1 TABLET DAILY; Therapy: 55Trf8886 to (Evaluate:13Mar2017)  Requested for: 07QXH4719; Last   Rx:16Jun2016 Ordered   10  Meloxicam 15 MG Oral Tablet; take 1 tablet once daily with a meal;    Therapy: 24Puo6723 to (Evaluate:10Mar2017)  Requested for: 81Vcq2102; Last    Rx:36Tac0889 Ordered   11  Methocarbamol 750 MG Oral Tablet; TAKE 1/2 -1 TABLET 3 TIMES DAILY;     Therapy: 61SMP2227 to (Evaluate:62Dvx7005)  Requested for: 50Xbf3623; Last    Rx:10Rlz9037 Ordered   12  Omeprazole 20 MG Oral Capsule Delayed Release; Therapy: 96ZQL3098 to (Last Jackelin Palin)  Requested for: 88BCP1747 Ordered   13  OneTouch Ultra Blue In Vitro Strip; TEST TWICE DAILY; Therapy: 79ECN4508 to (Evaluate:11Nov2016)  Requested for: 74Xjr9612; Last    Rx:50Zbc5338 Ordered   14  OneTouch Ultra Mini w/Device Kit; TEST ONCE DAILY    USE AS DIRECTED; Therapy: 26MSG6825 to (Last Rx:46Won1203)  Requested for: 22DPN4475 Ordered   15  Vitamin D 1000 UNIT CAPS; Take 1 capsule twice daily; Therapy: 51Xyn3234 to (Last Rx:12Nov2014)  Requested for: 02USR5291 Ordered    Allergies    1  Motrin TABS   2  Penicillins   3  MetFORMIN HCl TABS   4  Nortriptyline HCl CAPS   5  Tradjenta TABS   6  Aleve TABS   7  Augmentin TABS   8  Sulfa Drugs    9   Bee sting    Signatures   Electronically signed by : Jatin Plaza RN; Oct  6 2016  4:21PM EST                       (Author)

## 2018-01-12 NOTE — MISCELLANEOUS
Message   Recorded as Task   Date: 02/23/2017 03:32 PM, Created By: Angeles Valle   Task Name: Follow Up   Assigned To: [facility]   Regarding Patient: Renay Song, Status: Active   Comment:    Ary Bernabe - 23 Feb 2017 3:32 PM     TASK CREATED  Caller: Self; General Medical Question; [phone] (Home)    Pt LM on [facility] triage line today at [phone], states she went to  the [facility] and it will cost her $400 and she cannot afford that  I spoke with pt, states she did go to get script filled and it will cost her $400 and she cannot afford it  Advised will see what I can do and CB    As per another task, Annabella Salinaslorenza was denying, but we were able to appeal and get it approved  I did speak with the pharmacist and states it will cost the pt $400  How would you like to proceed? I am not sure if she can use copay card  ************   HADLEY TY - 23 Feb 2017 3:43 PM     TASK REPLIED TO: Previously Assigned To [facility]  Any way to get prior auth? BernardorockyAry - 23 Feb 2017 4:33 PM     TASK REASSIGNED: Previously Assigned To [facility]    Carmine Maki, I think you did do this, not sure if pt can use copay card? [facility] - 24 Feb 4631 90:48 AM     TASK EDITED  Pt can not use a copay card because she has Medicare  A prior auth and appeal was submitted and medication was approved  This is just the copay for the med for the patient  Terrance Rivera - 24 Feb 2017 10:40 AM     TASK REPLIED TO: Previously Assigned To [facility]  Printed prescription for tramadol 50 mg 1-2 tabs PO TID PRN pain  Ary Bernabe - 24 Feb 2017 10:53 AM     TASK EDITED    I spoke with pt, advised above    Pt will be over to PU script   ***************   BernardorockyAry - 24 Feb 2017 10:53 AM     TASK COMPLETED   Ary Bernabe - 24 Feb 2017 11:40 AM     TASK REACTIVATED    **********Renzo Camejo************  Pt came to office to  script for Tramadol was asking what meds she should be taking, I advised this replaces Nucynta, which replaced Percocet  Advised she can continue to take Topamax, meloxicam and Robaxin  Pt verbalizes understand  Elva Marie - 28 Feb 2017 10:22 AM     TASK EDITED  Pt called the office requesting to speak kaelyn/Germaine, states that she took the tramadol 50mg at 8pm on sunday and then had a headache that started at 9pm which see feels was a migraine  She then took tramadol lastnight and again received another headache/migraine which remained until this morning  Pt inquiring if this is normal and/or if it should subside  Pt states that it does help with her pain  Germaine Regan - 28 Feb 2017 1:00 PM     TASK REPLIED TO: Previously Assigned To Migue Drilling  a headache is a common reaction to the medication  If it is helping her pain, I would encourage her to try again today to see if the headaches continue  Elva Marie - 28 Feb 2017 1:12 PM     TASK EDITED  Spoke to pt, verbalized understanding  Active Problems    1  Allergic reaction to bee sting (989 5,E905 3) (T63 441A)   2  Analgesic use (V58 69) (Z79 899)   3  Asthma (493 90) (J45 909)   4  Battery end of life of spinal cord stimulator (V53 02) (Z46 2)   5  Depression (311) (F32 9)   6  DM2 (diabetes mellitus, type 2) (250 00) (E11 9)   7  Encounter for long-term use of opiate analgesic (V58 69) (Z79 891)   8  Encounter for screening for cardiovascular disorders (V81 2) (Z13 6)   9  Encounter for staple removal (V58 32) (Z48 02)   10  Esophageal reflux (530 81) (K21 9)   11  Hand paresthesia (782 0) (R20 2)   12  Hyperlipidemia (272 4) (E78 5)   13  Hypertension (401 9) (I10)   14  Low back pain (724 2) (M54 5)   15  Lumbar canal stenosis (724 02) (M48 06)   16  Lumbar radiculopathy (724 4) (M54 16)   17  Lyme disease (088 81) (A69 20)   18  Morbid or severe obesity due to excess calories (278 01) (E66 01)   19  Myalgia and myositis (729 1)   20  Opioid dependence (304 00) (F11 20)   21  Osteoporosis screening (V82 81) (Z13 820)   22  Pain of right heel (729 5) (M79 671)   23  Pain syndrome, chronic (338 4) (G89 4)   24  Postop check (V67 00) (Z09)   25  Sacroiliac pain (724 6) (M53 3)   26  Sacroiliitis (720 2) (M46 1)   27  Spondylosis of lumbar region without myelopathy or radiculopathy (721 3) (M47 816)   28  Urine, incontinence, stress female (625 6) (N39 3)   29  Visit for screening mammogram (V76 12) (Z12 31)   30  Vitamin D deficiency (268 9) (E55 9)    Current Meds   1  Atorvastatin Calcium 10 MG Oral Tablet (Lipitor); TAKE 1 TABLET DAILY; Therapy: 84GHV3209 to (05 12 73 93 30)  Requested for: 25Oct2016; Last   Rx:25Oct2016 Ordered   2  [employer]S Vitamin D3 1000 UNIT CAPS; TAKE 1 CAPSULE BY MOUTH TWICE DAILY; Therapy: 80PUN7516 to (Evaluate:80Ckv5210)  Requested for: 31PPU5997; Last   Rx:63Ytg7811 Ordered   3  Effexor XR 75 MG Oral Capsule Extended Release 24 Hour (Venlafaxine HCl ER); TAKE   1 CAPSULE DAILY; Therapy: 66NEG7868 to (Yared Orellana)  Requested for: 86SXV0176; Last   Rx:17Oct2012 Ordered   4  EpiPen 2-Jimi 0 3 MG/0 3ML Injection Solution Auto-injector; INJECT 0 3ML   INTRAMUSCULARLY AS DIRECTED; Therapy: 44RIT2614 to (Evaluate:15Jun2016)  Requested for: 39WOD4605; Last   Rx:13Jun2016 Ordered   5  Glimepiride 1 MG Oral Tablet; Take 1 tablet twice daily; Therapy: 30Xcf4734 to (Evaluate:14Nov2017)  Requested for: 63LMT9017; Last   Rx:17Feb2017 Ordered   6  Lisinopril-Hydrochlorothiazide 20-25 MG Oral Tablet; TAKE 1 TABLET DAILY; Therapy: 26Ybw0722 to (Evaluate:13Mar2017)  Requested for: 64NUN7014; Last   Rx:16Jun2016 Ordered   7  Meloxicam 15 MG Oral Tablet; take 1 tablet once daily with a meal;   Therapy: 85Qpu3329 to (Evaluate:10Mar2017)  Requested for: 02Myg1088; Last   Rx:26Hrh0254 Ordered   8  Methocarbamol 750 MG Oral Tablet; TAKE 1/2 -1 TABLET 3 TIMES DAILY;    Therapy: 15RCJ2401 to (Evaluate:15Apr2017)  Requested for: 85PXC1773; Last   Rx:87Kpp0502 Ordered   9  Omeprazole 20 MG Oral Capsule Delayed Release; Therapy: 69VVU4787 to (Last [facility])  Requested for: 30ZIE0574 Ordered   10  OneTouch Ultra Blue In Vitro Strip; TEST TWICE DAILY; Therapy: 22LTG6683 to (Evaluate:11Nov2016)  Requested for: 65Ecp2836; Last    Rx:57Dni1087 Ordered   11  OneTouch Ultra Mini w/Device Kit; TEST ONCE DAILY    USE AS DIRECTED; Therapy: 83JWR3552 to (Last Rx:84Vvq5693)  Requested for: 99LXB1653 Ordered   12  Topiramate 50 MG Oral Tablet; TAKE 2 TABLETS TWICE DAILY; Therapy: 16AKD9912 to (Evaluate:16Mar2017)  Requested for: 18XCQ6753; Last    Rx:59Kns1026 Ordered   13  TraMADol HCl - 50 MG Oral Tablet; 1-2 TAB PO TID PRN PAIN;    Therapy: 27IVC3718 to (Evaluate:26Mar2017); Last Rx:44Yai7496 Ordered   14  Vitamin D 1000 UNIT CAPS; Take 1 capsule twice daily; Therapy: 42Fjy5341 to (Last Rx:12Nov2014)  Requested for: 59ZTM1195 Ordered    Allergies    1  Motrin TABS   2  Penicillins   3  MetFORMIN HCl TABS   4  Nortriptyline HCl CAPS   5  Tradjenta TABS   6  Aleve TABS   7  Augmentin TABS   8  Sulfa Drugs    9   Bee sting    Signatures   Electronically signed by : Cassandra Collins RN; Feb 28 2017  1:12PM EST                       (Author)

## 2018-01-12 NOTE — MISCELLANEOUS
Message   Recorded as Task   Date: 02/19/2016 12:15 PM, Created By: Deep Vaca   Task Name: Follow Up   Assigned To: [facility]   Regarding Patient: Chadd Alford, Status: Active   CommentJanie Tijerina - 19 Feb 2016 12:15 PM     TASK CREATED  Caller: Self; General Medical Question; [phone] (Home); [phone] (Work)  Received VM from pt  on [facility] triage line from 1138 am  Pt  states that her 's insurance is now asking for a letter stating that the pt  is disabled  Pt  wondering if Dr Dona Lyman can provide this letter  Pt  requesting c/b at [phone]  Zaki Qureshi - 19 Feb 2016 2:32 PM     TASK REPLIED TO: Previously Assigned To Michelle 18 with pt who states her  got a FT job and their new insurance,BC/BS,is requesting a letter from a doctor as to why Fatimah Burger is disabled  I asked pt who determined she was disabled? Pt states Dr Samra Peng  Pt states she has no paperwork-they are asking for a letter from doctor as to why she is disabled-pt states she could  at office if you agree  Please advise  Nikia Phillips - 19 Feb 2016 3:51 PM     TASK REPLIED TO: Previously Assigned To Avani Rubalcava  I am unable to locate anything that Dr Samra Peng wrote to document disability  I have seen 2 forms filled by Dr Becca Titus and Dr Rivas Alu  She should have forms filled out for disability and which company it was submitted with  If required we can send her for eval for disability  Mitzi Suresh - 22 Feb 2016 9:06 AM     TASK EDITED  **Left vm on number provided for pt to call the [facility] office to s/w the nurse  **   Mitzi Suresh - 22 Feb 2016 9:06 AM     TASK IN PROGRESS   Mitzi Suresh - 25 Feb 2016 3:43 PM     TASK EDITED  2nd attempt to reach pt, left vm for pt to c/b  Ashli Montejo - 26 Feb 2016 1:31 PM     TASK EDITED  Received VM from pt   on [facility] triage line from 1253 pm  Pt  states that Thuanaj Gascan called yest  and she is returning the call  Pt  states she can be reached at [phone]  KerwinMitzi pineda - 26 Feb 2016 2:03 PM     TASK EDITED  S/W pt and advised of the same  Pt said for now she is going to hold off on doing anything further  Pt will contact the office if she decides she wants referral  Pt said she has disability with medicare and doesn't know why her 's ins is requesting a letter  Nikia Phillips - 26 Feb 2016 3:38 PM     TASK REPLIED TO: Previously Assigned To Rebecca Matute  Thank you  Active Problems    1  Acute upper respiratory infection (465 9) (J06 9)   2  Analgesic use (V58 69) (Z79 899)   3  Asthma (493 90) (J45 909)   4  Depression (311) (F32 9)   5  DM2 (diabetes mellitus, type 2) (250 00) (E11 9)   6  Encounter for screening for cardiovascular disorders (V81 2) (Z13 6)   7  Esophageal reflux (530 81) (K21 9)   8  Exogenous obesity (278 00) (E66 9)   9  Hyperlipidemia (272 4) (E78 5)   10  Hypertension (401 9) (I10)   11  Low back pain (724 2) (M54 5)   12  Lumbar canal stenosis (724 02) (M48 06)   13  Lumbar radiculopathy (724 4) (M54 16)   14  Morbid or severe obesity due to excess calories (278 01) (E66 01)   15  Myalgia and myositis (729 1) (M79 1,M60 9)   16  Need for pneumococcal vaccine (V03 82) (Z23)   17  Opioid dependence (304 00) (F11 20)   18  Pain syndrome, chronic (338 4) (G89 4)   19  Puncture wound (879 8) (T14 8)   20  Sacroiliac pain (724 6) (M53 3)   21  Spondylosis of lumbar region without myelopathy or radiculopathy (721 3) (M47 816)   22  Visit for screening mammogram (V76 12) (Z12 31)   23  Vitamin D deficiency (268 9) (E55 9)    Current Meds   1  Atorvastatin Calcium 10 MG Oral Tablet (Lipitor); TAKE 1 TABLET DAILY; Therapy: 89DDQ0031 to (Evaluate:96Tzs7805)  Requested for: 73UDX3482; Last   Rx:33Npl1395 Ordered   2  CVS Vitamin D3 1000 UNIT CAPS; TAKE 1 CAPSULE BY MOUTH TWICE DAILY; Therapy: 98URC7189 to (Evaluate:42Img5125)  Requested for: 27HCO9859;  Last Rx:51Pik7220 Ordered   3  Effexor XR 75 MG Oral Capsule Extended Release 24 Hour (Venlafaxine HCl ER); TAKE   1 CAPSULE DAILY; Therapy: 23JIJ3571 to (Venora Levi)  Requested for: 43KYZ2726; Last   Rx:49Wef3183 Ordered   4  EpiPen 2-Jimi 0 3 MG/0 3ML ISAEL; USE AS DIRECTED; Therapy: 25ZYW1068 to (Evaluate:27Jun2013)  Requested for: 62OOX5970; Last   Rx:72Vkd4124 Ordered   5  Glimepiride 1 MG Oral Tablet; take 1 tablet by mouth every day; Therapy: 22Nwk9784 to (Jhon Sonido)  Requested for: 99Udg8147; Last   Rx:54Hpz0131 Ordered   6  Hydrocodone-Acetaminophen 7 5-325 MG Oral Tablet (Norco); TAKE 1 TABLET 2 - 3   TIMES DAILY AS NEEDED FOR PAIN;   Therapy: 43RHY1429 to (Evaluate:05Apr2016); Last Rx:42Dvm5362 Ordered   7  Lisinopril-Hydrochlorothiazide 20-25 MG Oral Tablet; TAKE 1 TABLET DAILY; Therapy: 46Xvh1310 to (Evaluate:12Jun2016)  Requested for: 05Dqq1992; Last   Rx:77Wzx2299 Ordered   8  Lyrica 75 MG Oral Capsule; TAKE 1 CAPSULE TWICE DAILY; Therapy: 12GQK4904 to (Evaluate:09Jan2016); Last Rx:01Qme1495 Ordered   9  Meloxicam 15 MG Oral Tablet; take 1 tablet once daily with a meal;   Therapy: 15VFF7236 to (Jaci Dena)  Requested for: 75OEY5747; Last   Rx:76Zfy7654 Ordered   10  Methocarbamol 750 MG Oral Tablet; TAKE 1/2 -1 TABLET 3 TIMES DAILY; Therapy: 30PLW2863 to (Ruiz Spells)  Requested for: 22LUI6346; Last    Rx:47Wwh7207 Ordered   11  Omeprazole 20 MG Oral Capsule Delayed Release; Therapy: 89BKX3041 to (Last Jamse Fillers)  Requested for: 91IQJ7597 Ordered   12  OneTouch Ultra Blue In Vitro Strip; TEST ONCE DAILY; Therapy: 98FAB2223 to (Evaluate:19Mar2016)  Requested for: 41JWY5050; Last    Rx:20Nov2015 Ordered   13  Vitamin D 1000 UNIT CAPS; Take 1 capsule twice daily; Therapy: 97Epg2873 to (Last Rx:12Nov2014)  Requested for: 14BMF9633 Ordered    Allergies    1  Motrin TABS   2  Penicillins   3  MetFORMIN HCl TABS   4  Nortriptyline HCl CAPS   5   Nuris Makaweli TABS   6  Aleve TABS   7  Augmentin TABS   8  Sulfa Drugs    9  Bee sting    Signatures   Electronically signed by :  Shaw Limon, ; Feb 26 2016  3:45PM EST                       (Author)

## 2018-01-13 VITALS
HEART RATE: 94 BPM | DIASTOLIC BLOOD PRESSURE: 68 MMHG | HEIGHT: 66 IN | BODY MASS INDEX: 47.09 KG/M2 | TEMPERATURE: 98.2 F | SYSTOLIC BLOOD PRESSURE: 132 MMHG | WEIGHT: 293 LBS | RESPIRATION RATE: 16 BRPM

## 2018-01-13 VITALS
DIASTOLIC BLOOD PRESSURE: 79 MMHG | HEIGHT: 66 IN | TEMPERATURE: 98.3 F | BODY MASS INDEX: 47.09 KG/M2 | WEIGHT: 293 LBS | HEART RATE: 99 BPM | SYSTOLIC BLOOD PRESSURE: 132 MMHG

## 2018-01-13 VITALS
WEIGHT: 293 LBS | HEART RATE: 96 BPM | BODY MASS INDEX: 47.09 KG/M2 | HEIGHT: 66 IN | SYSTOLIC BLOOD PRESSURE: 128 MMHG | TEMPERATURE: 97.8 F | DIASTOLIC BLOOD PRESSURE: 76 MMHG

## 2018-01-13 VITALS
HEART RATE: 96 BPM | DIASTOLIC BLOOD PRESSURE: 83 MMHG | HEIGHT: 66 IN | SYSTOLIC BLOOD PRESSURE: 150 MMHG | WEIGHT: 293 LBS | TEMPERATURE: 98.5 F | BODY MASS INDEX: 47.09 KG/M2

## 2018-01-13 VITALS
DIASTOLIC BLOOD PRESSURE: 68 MMHG | HEIGHT: 66 IN | TEMPERATURE: 97.1 F | BODY MASS INDEX: 47.09 KG/M2 | HEART RATE: 100 BPM | SYSTOLIC BLOOD PRESSURE: 138 MMHG | WEIGHT: 293 LBS

## 2018-01-13 VITALS
WEIGHT: 293 LBS | SYSTOLIC BLOOD PRESSURE: 128 MMHG | BODY MASS INDEX: 47.09 KG/M2 | TEMPERATURE: 98.3 F | HEART RATE: 72 BPM | HEIGHT: 66 IN | DIASTOLIC BLOOD PRESSURE: 73 MMHG

## 2018-01-13 NOTE — MISCELLANEOUS
Message   Recorded as Task   Date: 11/29/2017 01:24 PM, Created By: Jesu Ball   Task Name: Care Coordination   Assigned To: Juan Alonso   Regarding Patient: Marta Cm, Status: Active   Comment:    CelesteJuan - 29 Nov 2017 1:24 PM     TASK CREATED  Had TPI yesterday and one part of her low back is still bothering her and pt states you told her to call if the pain didn't go away and to schedule another TPI  Do you want to schedule another TPI? [facility] - 29 Nov 2017 1:27 PM     TASK REPLIED TO: Previously Assigned To [facility]  Not this quickly  I would have her start PT to work out that last spot  If PT is not working over the next 2-3 weeks, I'll inject it again  Also, her pain may get better over the next 2-3 days as well, so give it more time  I would suggest she go to [facility] as they have a massage therapist on staff at their physical therapy location  Juan Alonso - 29 Nov 2017 1:32 PM     TASK EDITED  Called pt, advised of the below  Pt will call to set up PT and update us in 2-3 weeks  Active Problems    1  Acute lumbar radiculopathy (724 4) (M54 16)   2  Analgesic use (V58 69) (Z79 899)   3  Asthma (493 90) (J45 909)   4  Chronic right shoulder pain (719 41,338 29) (M25 511,G89 29)   5  Depression (311) (F32 9)   6  DM2 (diabetes mellitus, type 2) (250 00) (E11 9)   7  Encounter for screening for cardiovascular disorders (V81 2) (Z13 6)   8  Esophageal reflux (530 81) (K21 9)   9  Hand paresthesia (782 0) (R20 2)   10  Hyperlipidemia (272 4) (E78 5)   11  Hypertension (401 9) (I10)   12  Low back pain (724 2) (M54 5)   13  Lumbar canal stenosis (724 02) (M48 061)   14  Lumbar radiculopathy (724 4) (M54 16)   15  Morbid or severe obesity due to excess calories (278 01) (E66 01)   16  Myalgia and myositis (729 1)   17  Myofascial pain syndrome (729 1) (M79 1)   18  Need for immunization against influenza (V04 81) (Z23)   19  Opioid dependence (304 00) (F11 20)   20  Osteoporosis screening (V82 81) (Z13 820)   21  Pain of right heel (729 5) (M79 671)   22  Pain syndrome, chronic (338 4) (G89 4)   23  Sacroiliitis (720 2) (M46 1)   24  Spondylosis of lumbar region without myelopathy or radiculopathy (721 3) (M47 816)   25  Urine, incontinence, stress female (625 6) (N39 3)   26  Visit for screening mammogram (V76 12) (Z12 31)    Current Meds   1  Atorvastatin Calcium 10 MG Oral Tablet (Lipitor); TAKE 1 TABLET DAILY; Therapy: 58ZKV5927 to (Lethaniel Quest)  Requested for: 99WYC9016; Last   Rx:06Nov2017 Ordered   2  CVS Vitamin D3 1000 UNIT CAPS; TAKE 1 CAPSULE BY MOUTH TWICE DAILY; Therapy: 55OPU6836 to (Evaluate:42Rwg4206)  Requested for: 85NJE5170; Last   Rx:88Mpw8967 Ordered   3  EPINEPHrine 0 3 MG/0 3ML Injection Solution Auto-injector; INJECT 0 3ML   INTRAMUSCULARLY AS DIRECTED; Therapy: 60YLN4781 to (Last Rx:03Apr2017)  Requested for: 98Hfd0151 Ordered   4  Glimepiride 1 MG Oral Tablet; Take 1 tablet twice daily; Therapy: 51Xew5153 to (Evaluate:14Nov2017)  Requested for: 83NEA6683; Last   Rx:66Fjl7816 Ordered   5  Lisinopril-Hydrochlorothiazide 20-25 MG Oral Tablet; TAKE 1 TABLET DAILY; Therapy: 82Gue5801 to (Evaluate:66Hlb6898)  Requested for: 88FDJ1136; Last   Rx:19Mar2017 Ordered   6  Meloxicam 15 MG Oral Tablet; take 1 tablet once daily with a meal;   Therapy: 60Jxn6135 to (Evaluate:11Mar2018)  Requested for: 48Zzz8508; Last   Rx:50Ycx1254 Ordered   7  Methocarbamol 750 MG Oral Tablet; TAKE 1/2 -1 TABLET 3 TIMES DAILY; Therapy: 68GXN5541 to [phone])  Requested for: 33YUZ6369; Last   Rx:05Qkb7274 Ordered   8  Omeprazole 20 MG Oral Capsule Delayed Release; Therapy: 16AHS2782 to (Last Ladean Bonds)  Requested for: 76MIQ4931 Ordered   9  OneTouch Ultra Blue In [employer]; test twice daily; Therapy: 68UPG6893 to (Evaluate:12Nov2017)  Requested for: 26Apr2017; Last   Rx:26Apr2017 Ordered   10   Topiramate 50 MG Oral Tablet; TAKE 2 TABLETS TWICE DAILY; Therapy: 54SYM9625 to (Evaluate:05Jan2018)  Requested for: 03WWF2063; Last    Rx:06Nov2017 Ordered   11  Venlafaxine HCl ER 75 MG Oral Capsule Extended Release 24 Hour (Effexor XR); TAKE    1 CAPSULE DAILY; Therapy: 23WRS0320 to [phone])  Requested for: 66RBP4945; Last    Rx:06Nov2017 Ordered   12  Vitamin D 1000 UNIT CAPS; Take 1 capsule twice daily; Therapy: 75Ozq9622 to (Last Rx:12Nov2014)  Requested for: 59OGL5590 Ordered    Allergies    1  Motrin TABS   2  Penicillins   3  MetFORMIN HCl TABS   4  Nortriptyline HCl CAPS   5  Tradjenta TABS   6  Aleve TABS   7  Augmentin TABS   8  Sulfa Drugs    9   Bee sting    Signatures   Electronically signed by : Claude Medley, ; Nov 29 2017  1:32PM EST                       (Author)

## 2018-01-13 NOTE — RESULT NOTES
Message   Recorded as Task   Date: 11/01/2017 01:31 PM, Created By: Juan Miguel Jackson   Task Name: Follow Up   Assigned To: [facility]   Regarding Patient: Vandana Sequeira, Status: Active   Comment:    Nadia Dominguez - 01 Nov 2017 1:31 PM     TASK CREATED  l/m to return call with % of relief  S/p RIGHT L5-S1 TFESI done 10/24/17 by Dr Niyah DominguezJully - 01 Nov 2017 2:32 PM     TASK EDITED   Mp Brannon - 94 Nov 5075 98:95 AM     TASK EDITED  Patient reports that the pain going down the leg is gone but she still has an dull ache pain at night in her right leg  Patient was instructed to give it more time and she said she would just f/u at her next OV  [facility] - 02 Nov 2017 12:40 PM     TASK REPLIED TO: Previously Assigned To [place]  Thanks          Signatures   Electronically signed by : Reji Obando, ; Nov  3 2017  8:53AM EST                       (Author)

## 2018-01-13 NOTE — MISCELLANEOUS
Message   Recorded as Task   Date: 06/26/2017 09:41 AM, Created By: Ada Posadas   Task Name: Miscellaneous   Assigned To: [facility]   Regarding Patient: Marlena Pina, Status: Active   Comment:    Carey Robertson - 26 Jun 2017 9:41 AM     TASK CREATED  pt needs med refill: methocarbamol  pt has 4 or 5 left  [facility]  please call pt back at [phone] - 26 Jun 2017 10:16 AM     TASK EDITED  S/w pt needs refill of methocarbamol pt has 4 or 5 left   [facility]  [facility] [employer] [facility] - 26 Jun 2017 10:53 AM     TASK REPLIED TO: Previously Assigned To [facility]  Yanci Conroy - 26 Jun 2017 11:37 AM     TASK EDITED  S/w pt and made her aware that her script for methocarbamol was sent to the pharmacy  The pt stated that she forgot to tell me that she needs her topamax 50mg refilled also  [facility]  [facility] - 26 Jun 2017 4:20 PM     TASK REPLIED TO: Previously Assigned To [facility]  Active Problems    1  Analgesic use (V58 69) (Z79 899)   2  Asthma (493 90) (J45 909)   3  Depression (311) (F32 9)   4  DM2 (diabetes mellitus, type 2) (250 00) (E11 9)   5  Encounter for screening for cardiovascular disorders (V81 2) (Z13 6)   6  Esophageal reflux (530 81) (K21 9)   7  Hand paresthesia (782 0) (R20 2)   8  Hyperlipidemia (272 4) (E78 5)   9  Hypertension (401 9) (I10)   10  Low back pain (724 2) (M54 5)   11  Lumbar canal stenosis (724 02) (M48 06)   12  Lumbar radiculopathy (724 4) (M54 16)   13  Morbid or severe obesity due to excess calories (278 01) (E66 01)   14  Myalgia and myositis (729 1)   15  Opioid dependence (304 00) (F11 20)   16  Osteoporosis screening (V82 81) (Z13 820)   17  Pain of right heel (729 5) (M79 671)   18  Pain syndrome, chronic (338 4) (G89 4)   19  Sacroiliac pain (724 6) (M53 3)   20  Sacroiliitis (720 2) (M46 1)   21   Spondylosis of lumbar region without myelopathy or radiculopathy (721 3) (M47 816)   22  Urine, incontinence, stress female (625 6) (N39 3)   23  Visit for screening mammogram (V76 12) (Z12 31)   24   (V61 07) (Z63 4)    Current Meds   1  Atorvastatin Calcium 10 MG Oral Tablet (Lipitor); TAKE 1 TABLET DAILY; Therapy: 29JYS3298 to (21 )  Requested for: 22Ppc0131; Last   Rx:73Ihp3345 Ordered   2  CVS Vitamin D3 1000 UNIT CAPS; TAKE 1 CAPSULE BY MOUTH TWICE DAILY; Therapy: 55DPN1075 to (Evaluate:75Hgc5124)  Requested for: 58KCE9780; Last   Rx:49Ahp3390 Ordered   3  EPINEPHrine 0 3 MG/0 3ML Injection Solution Auto-injector; INJECT 0 3ML   INTRAMUSCULARLY AS DIRECTED; Therapy: 55RMR0144 to (Last Rx:03Apr2017)  Requested for: 72Ijx9450 Ordered   4  Glimepiride 1 MG Oral Tablet; Take 1 tablet twice daily; Therapy: 80Mzu3156 to (Evaluate:14Nov2017)  Requested for: 06IXC8059; Last   Rx:50Xoj4856 Ordered   5  Lisinopril-Hydrochlorothiazide 20-25 MG Oral Tablet; TAKE 1 TABLET DAILY; Therapy: 88Ycs1143 to (Evaluate:66Uap2495)  Requested for: 85EZB2548; Last   Rx:19Mar2017 Ordered   6  Meloxicam 15 MG Oral Tablet; take 1 tablet once daily with a meal;   Therapy: 33Suh9089 to (Evaluate:23Cxh8837)  Requested for: 16UZB2960; Last   Rx:19Mar2017 Ordered   7  Methocarbamol 750 MG Oral Tablet; TAKE 1/2 -1 TABLET 3 TIMES DAILY; Therapy: 15HXF4853 to (Eleanora Sicks)  Requested for: 26Jun2017; Last   ZZ:99PSL8315 Ordered   8  Omeprazole 20 MG Oral Capsule Delayed Release; Therapy: 91RCQ1843 to (Last Dossie Malibu)  Requested for: 58AGV9881 Ordered   9  OneTouch Ultra Blue In [employer]; test twice daily; Therapy: 76YGL3759 to (Evaluate:12Nov2017)  Requested for: 26Apr2017; Last   Rx:26Apr2017 Ordered   10  OneTouch Ultra Mini w/Device Kit; TEST ONCE DAILY    USE AS DIRECTED; Therapy: 68FYQ8047 to (Last Rx:16Bnf0059)  Requested for: 51FOY4912 Ordered   11   Topiramate 50 MG Oral Tablet; TAKE 2 TABLETS TWICE DAILY; Therapy: 43BKP7296 to (Ji Maxim)  Requested for: 90QDV6385; Last    UI:99XGJ0156 Ordered   12  TraMADol HCl - 50 MG Oral Tablet; 1-2 TAB PO TID PRN PAIN;    Therapy: 81SYO4739 to (Evaluate:02Rxk2520); Last Rx:97Sxx2665 Ordered   13  Venlafaxine HCl ER 75 MG Oral Capsule Extended Release 24 Hour (Effexor XR); TAKE    1 CAPSULE DAILY; Therapy: 96UEO9884 to (Evaluate:62Uzf7612)  Requested for: 51XGX8577; Last    Rx:31Mar2017 Ordered   14  Vitamin D 1000 UNIT CAPS; Take 1 capsule twice daily; Therapy: 57Qrk9452 to (Last Rx:12Nov2014)  Requested for: 27QYL3292 Ordered    Allergies    1  Motrin TABS   2  Penicillins   3  MetFORMIN HCl TABS   4  Nortriptyline HCl CAPS   5  Tradjenta TABS   6  Aleve TABS   7  Augmentin TABS   8  Sulfa Drugs    9   Bee sting    Signatures   Electronically signed by : Kian Falcon, ; Jun 26 2017  4:31PM EST                       (Author)

## 2018-01-13 NOTE — PROGRESS NOTES
Assessment    1  Acute upper respiratory infection (465 9) (J06 9)   2  Asthma (493 90) (J45 909)    Plan  Acute upper respiratory infection    · Follow Up if Not Better Evaluation and Treatment  Follow-up  Status: Complete  Done:  25BZP5275 03:50PM  Acute upper respiratory infection, Asthma    · Levofloxacin 500 MG Oral Tablet (Levaquin); TAKE 1 TABLET DAILY AS DIRECTED    Discussion/Summary  The patient was counseled regarding instructions for management, risk factor reductions, prognosis, impressions, risks and benefits of treatment options, importance of compliance with treatment  Possible side effects of new medications were reviewed with the patient/guardian today  The treatment plan was reviewed with the patient/guardian  The patient/guardian understands and agrees with the treatment plan      Chief Complaint  Pt exp cough/ chest congestion/ fatigue x 2 days      History of Present Illness  HPI: getting a cold anit feels like it is going in her chest   Asthma (Initial): The patient's asthma is classified as intermittent  no wheezing chest tightness no dyspnea at rest productive cough Associated symptoms:  upper respiratory infection symptoms, but no orthopnea, no fever and no chills  The patient is not currently being treated for this problem  Review of Systems    Constitutional: as noted in HPI    ENT: as noted in HPI  Cardiovascular: no complaints of slow or fast heart rate, no chest pain, no palpitations, no leg claudication or lower extremity edema  Respiratory: as noted in HPI  Active Problems    1  Analgesic use (V58 69) (Z79 899)   2  Asthma (493 90) (J45 909)   3  Depression (311) (F32 9)   4  DM2 (diabetes mellitus, type 2) (250 00) (E11 9)   5  Encounter for screening for cardiovascular disorders (V81 2) (Z13 6)   6  Esophageal reflux (530 81) (K21 9)   7  Exogenous obesity (278 00) (E66 9)   8  Hyperlipidemia (272 4) (E78 5)   9  Hypertension (401 9) (I10)   10   Low back pain (724 2) (M54 5)   11  Lumbar canal stenosis (724 02) (M48 06)   12  Lumbar radiculopathy (724 4) (M54 16)   13  Morbid or severe obesity due to excess calories (278 01) (E66 01)   14  Myalgia and myositis (729 1) (M79 1,M60 9)   15  Need for pneumococcal vaccine (V03 82) (Z23)   16  Pain syndrome, chronic (338 4) (G89 4)   17  Puncture wound (879 8) (T14 8)   18  Sacroiliac pain (724 6) (M53 3)   19  Spondylosis of lumbar region without myelopathy or radiculopathy (721 3) (M47 816)   20  Visit for screening mammogram (V76 12) (Z12 31)   21  Vitamin D deficiency (268 9) (E55 9)    Past Medical History  Active Problems And Past Medical History Reviewed: The active problems and past medical history were reviewed and updated today  Social History    · Denied: History of Alcohol Use (History)   · Current Every Day Smoker (305 1)   · Denied: History of Drug Use (305 90)   · Marital History - Currently   The social history was reviewed and updated today  The social history was reviewed and is unchanged  Current Meds   1  Atorvastatin Calcium 10 MG Oral Tablet; TAKE 1 TABLET DAILY; Therapy: 69EOA4700 to (Evaluate:13Nrv1224)  Requested for: 06LLE5138; Last   Rx:04Jan2016 Ordered   2  [employer] Vitamin D3 1000 UNIT CAPS; TAKE 1 CAPSULE BY MOUTH TWICE DAILY; Therapy: 97PHN3576 to (Evaluate:58Xqp2863)  Requested for: 76UAB9188; Last   Rx:52Umc8822 Ordered   3  Effexor XR 75 MG Oral Capsule Extended Release 24 Hour; TAKE 1 CAPSULE DAILY; Therapy: 45XKZ1478 to (Julianna Michael)  Requested for: 99YOS6463; Last   Rx:17Oct2012 Ordered   4  EpiPen 2-Jimi 0 3 MG/0 3ML ISAEL; USE AS DIRECTED; Therapy: 79KFV3321 to (Evaluate:27Jun2013)  Requested for: 93ZYQ6327; Last   Rx:25Jun2013 Ordered   5  Glimepiride 1 MG Oral Tablet; take 1 tablet by mouth every day; Therapy: 98Amh0811 to (Cheyenne Redmond)  Requested for: 05Tvb1156; Last   Rx:50Asy4345 Ordered   6   Hydrocodone-Acetaminophen 7 5-325 MG Oral Tablet; TAKE 1 TABLET 2 - 3 TIMES DAILY   AS NEEDED FOR PAIN;   Therapy: 45WKP1309 to (Evaluate:07Feb2016); Last Rx:38Cqy9223 Ordered   7  Lisinopril-Hydrochlorothiazide 20-25 MG Oral Tablet; TAKE 1 TABLET DAILY; Therapy: 03Wnx9752 to (Evaluate:12Jun2016)  Requested for: 05Sjh9183; Last   Rx:02Csg1712 Ordered   8  Lyrica 75 MG Oral Capsule; TAKE 1 CAPSULE TWICE DAILY; Therapy: 01PPQ0325 to (Evaluate:09Jan2016); Last Rx:07Zkn2232 Ordered   9  Meloxicam 15 MG Oral Tablet; take 1 tablet once daily with a meal;   Therapy: 41CZQ7155 to (Ramon Trejo)  Requested for: 40EEC0992; Last   Rx:50Jfc7718 Ordered   10  Methocarbamol 750 MG Oral Tablet; TAKE 1/2 -1 TABLET 3 TIMES DAILY; Therapy: 17UBB1692 to (Evaluate:08Feb2016)  Requested for: 39NVE4246; Last    Rx:24Lgp2473 Ordered   11  Omeprazole 20 MG Oral Capsule Delayed Release; Therapy: 56KAE3750 to (Last Airam Goldstein)  Requested for: 58VDR5213 Ordered   12  OneTouch Ultra Blue In Vitro Strip; TEST ONCE DAILY; Therapy: 48RVZ8718 to (Evaluate:19Mar2016)  Requested for: 17QCL3104; Last    Rx:20Nov2015 Ordered   13  Vitamin D 1000 UNIT CAPS; Take 1 capsule twice daily; Therapy: 14NQJ3315 to (Last Rx:12Nov2014)  Requested for: 43MDC1564 Ordered    The medication list was reviewed and updated today  Allergies    1  Motrin TABS   2  Penicillins   3  MetFORMIN HCl TABS   4  Nortriptyline HCl CAPS   5  Tradjenta TABS   6  Aleve TABS   7  Augmentin TABS   8  Sulfa Drugs    9  Bee sting    Vitals   Recorded: [id number] 02:38PM   Temperature 98 F, Tympanic   Heart Rate 92   Systolic 161, LUE, Sitting   Diastolic 82, LUE, Sitting   Height 5 ft 6 in   Weight 315 lb    BMI Calculated 50 84   BSA Calculated 2 43     Physical Exam    Constitutional   General appearance: No acute distress, well appearing and well nourished  morbidly obese  Eyes   Conjunctiva and lids: No swelling, erythema or discharge  Pupils and irises: Equal, round and reactive to light      Ears, Nose, Mouth, and Throat   External inspection of ears and nose: Normal     Otoscopic examination: Tympanic membranes translucent with normal light reflex  Canals patent without erythema  Nasal mucosa, septum, and turbinates: Abnormal   congested  Oropharynx: Abnormal   hoarse  Pulmonary   Respiratory effort: Abnormal   moist couch no wheeze  Auscultation of lungs: Clear to auscultation  Cardiovascular   Auscultation of heart: Normal rate and rhythm, normal S1 and S2, without murmurs  Future Appointments    Date/Time Provider Specialty Site   04/20/2016 10:30 AM ANASTASIYA Ricks   Internal Medicine [facility] INTERNAL MEDICINE [place]   02/04/2016 10:30 AM Crow Hernandez MD Pain Management [address]     Signatures   Electronically signed by : ANASTASIYA Harper ; Jan 19 2016  3:51PM EST                       (Author)

## 2018-01-14 VITALS
HEART RATE: 101 BPM | HEIGHT: 66 IN | DIASTOLIC BLOOD PRESSURE: 76 MMHG | SYSTOLIC BLOOD PRESSURE: 142 MMHG | TEMPERATURE: 97.9 F | BODY MASS INDEX: 47.09 KG/M2 | WEIGHT: 293 LBS

## 2018-01-14 VITALS
WEIGHT: 293 LBS | TEMPERATURE: 98.2 F | SYSTOLIC BLOOD PRESSURE: 132 MMHG | DIASTOLIC BLOOD PRESSURE: 86 MMHG | HEIGHT: 66 IN | BODY MASS INDEX: 47.09 KG/M2 | OXYGEN SATURATION: 96 % | HEART RATE: 88 BPM

## 2018-01-14 VITALS
BODY MASS INDEX: 47.09 KG/M2 | SYSTOLIC BLOOD PRESSURE: 132 MMHG | DIASTOLIC BLOOD PRESSURE: 76 MMHG | HEIGHT: 66 IN | HEART RATE: 72 BPM | WEIGHT: 293 LBS

## 2018-01-14 VITALS
BODY MASS INDEX: 47.09 KG/M2 | HEART RATE: 88 BPM | SYSTOLIC BLOOD PRESSURE: 118 MMHG | WEIGHT: 293 LBS | HEIGHT: 66 IN | DIASTOLIC BLOOD PRESSURE: 74 MMHG

## 2018-01-14 NOTE — PROGRESS NOTES
Chief Complaint  Replacement of a left buttock implantable pulse generator and electronic analysis complex programming spinal cord stimulator system postoperative period approximately 1 hour on 12/13/16 by Dr Corin Jacques  History of Present Illness  HPI: The patient presents today for her two week incision check and staple removal  She states that before the battery change she was having severe leg cramps  These have since stopped  She has no other reports of pain at this time  [place] was present and offered the patient additional programming  Hospital Based Practices Required Assessment:   Pain Assessment   the patient states they do not have pain  Abuse And Domestic Violence Screen    Yes, the patient is safe at home  The patient states no one is hurting them  Depression And Suicide Screen  No, the patient has not had thoughts of hurting themself  No, the patient has not felt depressed in the past 7 days  Readiness To Learn: Receptive  Barriers To Learning: none  Preferred Learning: verbal   Education Completed: medications, further treatment/follow-up and treatment/procedure   Teaching Method: verbal   Person Taught: patient   Evaluation Of Learning: verbalized/demonstrated understanding      Active Problems    1  Allergic reaction to bee sting (989 5,E905 3) (T63 441A)   2  Analgesic use (V58 69) (Z79 899)   3  Asthma (493 90) (J45 909)   4  Battery end of life of spinal cord stimulator (V53 02) (Z46 2)   5  Depression (311) (F32 9)   6  DM2 (diabetes mellitus, type 2) (250 00) (E11 9)   7  Encounter for long-term use of opiate analgesic (V58 69) (Z79 891)   8  Encounter for screening for cardiovascular disorders (V81 2) (Z13 6)   9  Esophageal reflux (530 81) (K21 9)   10  Hand paresthesia (782 0) (R20 2)   11  Hyperlipidemia (272 4) (E78 5)   12  Hypertension (401 9) (I10)   13  Low back pain (724 2) (M54 5)   14  Lumbar canal stenosis (724 02) (M48 06)   15   Lumbar radiculopathy (724 4) (M54 16)   16  Lyme disease (088 81) (A69 20)   17  Morbid or severe obesity due to excess calories (278 01) (E66 01)   18  Myalgia and myositis (729 1)   19  Opioid dependence (304 00) (F11 20)   20  Osteoporosis screening (V82 81) (Z13 820)   21  Pain of right heel (729 5) (M79 671)   22  Pain syndrome, chronic (338 4) (G89 4)   23  Sacroiliac pain (724 6) (M53 3)   24  Sacroiliitis (720 2) (M46 1)   25  Spondylosis of lumbar region without myelopathy or radiculopathy (721 3) (M47 816)   26  Urine, incontinence, stress female (625 6) (N39 3)   27  Visit for screening mammogram (V76 12) (Z12 31)   28  Vitamin D deficiency (268 9) (E55 9)    Current Meds   1  Atorvastatin Calcium 10 MG Oral Tablet; TAKE 1 TABLET DAILY; Therapy: 79GGJ2331 to (([phone] 1524-1834)  Requested for: 78Frl4990; Last   Rx:94Tfx1530 Ordered   2  CVS Vitamin D3 1000 UNIT CAPS; TAKE 1 CAPSULE BY MOUTH TWICE DAILY; Therapy: 20YPL6974 to (Evaluate:48Hpt6445)  Requested for: 54BVG3403; Last   Rx:66Nlb4701 Ordered   3  Effexor XR 75 MG Oral Capsule Extended Release 24 Hour; TAKE 1 CAPSULE DAILY; Therapy: 03CNH7896 to (Georgetown Stage)  Requested for: 92HVN5095; Last   Rx:53Fps2832 Ordered   4  EpiPen 2-Jimi 0 3 MG/0 3ML Injection Solution Auto-injector; INJECT 0 3ML   INTRAMUSCULARLY AS DIRECTED; Therapy: 85ITW0552 to (Evaluate:93Abu0323)  Requested for: 70JIX8689; Last   Rx:79Yex4160 Ordered   5  Glimepiride 1 MG Oral Tablet; take 1 tablet by mouth every day; Therapy: 77Pyi2864 to (Evaluate:63Xrg4172)  Requested for: 04DRH2455; Last   Rx:80Jxq3468 Ordered   6  Gralise 600 MG Oral Tablet; take 2 tablets at bedtime; Therapy: 96XXB9756 to (Evaluate:69Zes2371)  Requested for: 16INC5399; Last   Rx:37Wls6392 Ordered   7  Invokana 300 MG Oral Tablet; Take 1 tablet daily; Therapy: 21Apr2016 to (Evaluate:25Jun2017)  Requested for: 50Obh4956; Last   Rx:88Ezz8290 Ordered   8   Lisinopril-Hydrochlorothiazide 20-25 MG Oral Tablet; TAKE 1 TABLET DAILY; Therapy: 83Itr2616 to (Evaluate:13Mar2017)  Requested for: 80LPT1280; Last   Rx:16Jun2016 Ordered   9  Meloxicam 15 MG Oral Tablet; take 1 tablet once daily with a meal;   Therapy: 21Hce3904 to (Evaluate:10Mar2017)  Requested for: 74Rea2515; Last   Rx:09Xdj7566 Ordered   10  Methocarbamol 750 MG Oral Tablet; TAKE 1/2 -1 TABLET 3 TIMES DAILY; Therapy: 96XEI2458 to (Simran Boss)  Requested for: 58DLZ5536; Last    Rx:08Nov2016 Ordered   11  Omeprazole 20 MG Oral Capsule Delayed Release; Therapy: 76EQD2330 to (Trent Garcia)  Requested for: 40VBB5147 Ordered   12  OneTouch Ultra Blue In Vitro Strip; TEST TWICE DAILY; Therapy: 28GAM4013 to (Evaluate:11Nov2016)  Requested for: 77Ocl7361; Last    Rx:79Vsw9996 Ordered   13  OneTouch Ultra Mini w/Device Kit; TEST ONCE DAILY    USE AS DIRECTED; Therapy: 16CHB0673 to (Last Rx:69Oqv0208)  Requested for: 69PKZ8176 Ordered   14  Oxycodone-Acetaminophen 5-325 MG Oral Tablet; TAKE 1 TABLET 4 times daily PRN    pain; Therapy: 48UHB2800 to (Evaluate:11Jan2017); Last Rx:08Nov2016 Ordered   15  Vitamin D 1000 UNIT CAPS; Take 1 capsule twice daily; Therapy: 25Ojg8040 to (Last Rx:12Nov2014)  Requested for: 26MTT8484 Ordered    Allergies    1  Motrin TABS   2  Penicillins   3  MetFORMIN HCl TABS   4  Nortriptyline HCl CAPS   5  Tradjenta TABS   6  Aleve TABS   7  Augmentin TABS   8  Sulfa Drugs    9  Bee sting    Vitals  Signs    Temperature: 98 2 F, Tympanic  Heart Rate: 88, L Radial  Pulse Quality: Normal, L Radial  Respiration Quality: Normal  Respiration: 16  Systolic: 256, LUE, Sitting  Diastolic: 77, LUE, Sitting  Height: 5 ft 6 in  Weight: 323 lb   BMI Calculated: 52 13  BSA Calculated: 2 45    Procedure    Wound Exam: well healed with no sign of infection  There was mild erythema around the wound edges  Procedure Note: 12 left buttock staples were removed  Patient Status:  the patient tolerated the procedure well     Complications: C/D/I, no drainage or signs of infection, mild erythema around incision edges  there were no complications  Assessment    1  Postop check (V67 00) (Z09)   2  Encounter for staple removal (V58 32) (Z48 02)    Discussion/Summary    Proper incision care and s/s of infection were reviewed with the patient  She was asked to contact the office with any complaints of chills, fever >101, edema, erythema, drainage or gaping of incision  She was advised to cleanse the incision with mild soap and water, avoiding lotions and creams  She will follow up with the office as needed  Future Appointments    Date/Time Provider Specialty Site   01/27/2017 11:00 AM Zell Bloch, M D   Internal Medicine [address] INTERNAL MEDICINE [place]   01/03/2017 11:00 AM MARVIN Oliveira Pain Management [facility] 15     Signatures   Electronically signed by : Goyo Coles RN; Dec 27 2016 11:18AM EST                       (Author)    Electronically signed by : ANASTASIYA Ruiz ; Dec 30 2016  8:44AM EST                       (Author)

## 2018-01-14 NOTE — MISCELLANEOUS
Message   Recorded as Task   Date: 04/21/2016 11:34 AM, Created By: Hans Spence   Task Name: Care Coordination   Assigned To: [facility]   Regarding Patient: Dena Kaye, Status: In Progress   Comment:    Elva Marie - 21 Apr 2016 11:34 AM     TASK CREATED  Caller: Self; Care Coordination; [phone] (Home)  Pt lmom stating that she is up to 1500mg of gralise and having issues   told her she was having shallow respirations and she reports stumbling  Plans to decrease to 1200mg  Called pt, pt sates that the symptoms occured once she increased to 1500mg  She c/o always being tired and also having dizziness, stumbling  Pt planned to decrease to 1200mg tonight  Please advise  Damian Garcia - 21 Apr 2016 2:12 PM     TASK REPLIED TO: Previously Assigned To [facility],Team  In her previous task she did not have SE  She should return to 1200 mg and not advance  If she still has symptoms, decrease again to 900mg  If that's her tolerable dose, then I will order it   please ask her to call us to update us  Elva Marie - 21 Apr 2016 2:17 PM     TASK IN PROGRESS   Elva Marie - 21 Apr 2016 2:19 PM     TASK EDITED  Spoke to pt informed of recommendations,verbalized understanding  Active Problems    1  Analgesic use (V58 69) (Z79 899)   2  Asthma (493 90) (J45 909)   3  Depression (311) (F32 9)   4  DM2 (diabetes mellitus, type 2) (250 00) (E11 9)   5  Encounter for screening for cardiovascular disorders (V81 2) (Z13 6)   6  Esophageal reflux (530 81) (K21 9)   7  Hyperlipidemia (272 4) (E78 5)   8  Hypertension (401 9) (I10)   9  Low back pain (724 2) (M54 5)   10  Lumbar canal stenosis (724 02) (M48 06)   11  Lumbar radiculopathy (724 4) (M54 16)   12  Morbid or severe obesity due to excess calories (278 01) (E66 01)   13  Myalgia and myositis (729 1) (M79 1,M60 9)   14  Opioid dependence (304 00) (F11 20)   15   Pain syndrome, chronic (338 4) (G89 4)   16  Puncture wound (879 8) (T14 8)   17  Sacroiliac pain (724 6) (M53 3)   18  Sacroiliitis (720 2) (M46 1)   19  Spondylosis of lumbar region without myelopathy or radiculopathy (721 3) (M47 816)   20  Visit for screening mammogram (V76 12) (Z12 31)   21  Vitamin D deficiency (268 9) (E55 9)    Current Meds   1  Atorvastatin Calcium 10 MG Oral Tablet (Lipitor); TAKE 1 TABLET DAILY; Therapy: 59SUI7612 to (Evaluate:63Opo9041)  Requested for: 98UXN1106; Last   Rx:04Jan2016 Ordered   2  CVS Vitamin D3 1000 UNIT CAPS; TAKE 1 CAPSULE BY MOUTH TWICE DAILY; Therapy: 96OGV2335 to (Evaluate:58Saa2132)  Requested for: 04BEO8738; Last   Rx:79Vfs1493 Ordered   3  Effexor XR 75 MG Oral Capsule Extended Release 24 Hour (Venlafaxine HCl ER); TAKE   1 CAPSULE DAILY; Therapy: 10QAC3265 to (Jaison Phillips)  Requested for: 90SXX5048; Last   Rx:23Ycf3297 Ordered   4  EpiPen 2-Jimi 0 3 MG/0 3ML ISAEL; USE AS DIRECTED; Therapy: 19SPX8138 to (Evaluate:27Jun2013)  Requested for: 11HFQ5283; Last   Rx:25Jun2013 Ordered   5  Glimepiride 1 MG Oral Tablet; take 1 tablet by mouth every day; Therapy: 66Bon9318 to (Torie Cordero)  Requested for: 81Gdq5016; Last   Rx:03Aug2015 Ordered   6  Gralise 300 MG Oral Tablet; take one tab at the end of evening meal;   Therapy: 75ATA7568 to (Evaluate:37Daf9217); Last Rx:31Mar2016 Ordered   7  Hydrocodone-Acetaminophen 7 5-325 MG Oral Tablet (Norco); TAKE 1 TABLET 2 - 3   TIMES DAILY AS NEEDED FOR PAIN;   Therapy: 58VXC5169 to (Evaluate:01Jun2016); Last Rx:31Mar2016 Ordered   8  Invokana 300 MG Oral Tablet; Take 1 tablet daily; Therapy: 77Kcn9960 to (Last Rx:21Apr2016) Ordered   9  Lisinopril-Hydrochlorothiazide 20-25 MG Oral Tablet; TAKE 1 TABLET DAILY; Therapy: 11Uyq3713 to (Evaluate:12Jun2016)  Requested for: 16Sep2015; Last   Rx:36Paj3737 Ordered   10   Meloxicam 15 MG Oral Tablet; take 1 tablet once daily with a meal;    Therapy: 33Hyz9797 to (Evaluate:59Ahj7292)  Requested for: 41EGU3345; Last    Rx:14Mar2016 Ordered   11  Methocarbamol 750 MG Oral Tablet; TAKE 1/2 -1 TABLET 3 TIMES DAILY; Therapy: 36DFL0763 to (Basil Pimple)  Requested for: 31AIH5878; Last    Rx:31Mar2016 Ordered   12  Omeprazole 20 MG Oral Capsule Delayed Release; Therapy: 71KFZ1823 to (Last Cullen Marixa)  Requested for: 13XRY1212 Ordered   13  OneTouch Ultra Blue In Vitro Strip; TEST ONCE DAILY; Therapy: 46TXQ7843 to (Evaluate:19Mar2016)  Requested for: 75OKD1162; Last    Rx:20Nov2015 Ordered   14  Vitamin D 1000 UNIT CAPS; Take 1 capsule twice daily; Therapy: 81Ifk3708 to (Last Rx:12Nov2014)  Requested for: 42YKC3664 Ordered    Allergies    1  Motrin TABS   2  Penicillins   3  MetFORMIN HCl TABS   4  Nortriptyline HCl CAPS   5  Tradjenta TABS   6  Aleve TABS   7  Augmentin TABS   8  Sulfa Drugs    9   Bee sting    Signatures   Electronically signed by : Garett Camp RN; Apr 21 2016  2:19PM EST                       (Author)

## 2018-01-15 ENCOUNTER — GENERIC CONVERSION - ENCOUNTER (OUTPATIENT)
Dept: OTHER | Facility: OTHER | Age: 63
End: 2018-01-15

## 2018-01-15 ENCOUNTER — ALLSCRIPTS OFFICE VISIT (OUTPATIENT)
Dept: OTHER | Facility: OTHER | Age: 63
End: 2018-01-15

## 2018-01-15 VITALS
TEMPERATURE: 99.2 F | BODY MASS INDEX: 47.09 KG/M2 | OXYGEN SATURATION: 98 % | HEART RATE: 92 BPM | HEIGHT: 66 IN | WEIGHT: 293 LBS | DIASTOLIC BLOOD PRESSURE: 75 MMHG | SYSTOLIC BLOOD PRESSURE: 125 MMHG

## 2018-01-15 NOTE — RESULT NOTES
Message   Recorded as Task   Date: 02/01/2017 02:39 PM, Created By: [employer]   Task Name: Med Renewal Request   Assigned To: [facility],Team   Regarding Patient: Damian Paris, Status: Active   CommentBreanne Washburn - 01 Feb 2017 2:39 PM     TASK CREATED  Caller: Self; Renew Medication; [phone] (Home); [phone] (Work)  Pt lmom stating that the pharmacy told her she can not fill her oxycodone 5/325mg and would like to know what to do for her pain  Spoke to pt, her script should be able to be filled today 2/1/17,pt states that the pharmacist told her not until the 10th  Pt is out of the medication  I asked the pt how she is taking the medication she is taking one tab in the am and two tabs in the pm  I explained to the pt that this is an opioid contract violation and she can only take the medication as prescribed, which is one tab bid if she was having increased pain she should have contacted the office  Told pt I would make [facility] aware and call her back  [place] [facility] - 01 Feb 2017 2:41 PM     TASK EDITED   Farhan Marquez - 01 Feb 2017 4:28 PM     TASK REPLIED TO: Previously Assigned To Farhan Marquez  aware, she will have to wait until she can refill  She can take ibuprofen or Tylenol for pain in the mean time  If she continues to take more medication that prescribed we will no longer be able to provide opioid therapy Elaine Samuel - 02 Feb 2017 10:01 AM     TASK EDITED  S/w the pt  and during discussion she had to have her daughter s/w me in regards to some issues she has been having  S/w Patricia her daughter with whom the pt  lives with  She stated she has noticed that since her mother has started the Topamax her gait seems less steady  She seems to be off balance especially with turning  She has been on the Topamax for three weeks and just increased it to 150mg in the am and 100mg in the pm  this past sunday   She stated her mother is scheduled for foot surgery on 2/22  Her mother is also having episodes of memory loss and she sometimes stutters while speaking  She also had a severe HA over the weekend and over the past couple of days had episodes of N/V/D  Encouraged the pt  to F/U c/ PCP if episodes of N/V/D increase because of GI virus going around  As to the other symptoms, AS to be notified  Pt  continues on the other medications prescribed  [employer] 17 - 02 Feb 2017 10:33 AM     TASK REPLIED TO: Previously Assigned To Benny Boston  Please clarify topiramate dosage  If she is taking topiramate 150 mg in the morning and topiramate 100 mg at night, please have her reduce to topiramate 100 mg BID  If she is taking topiramate 50 mg in the morning and 100 mg at night, have her reduce to taking just topiramate 50 mg BID  Please have the patient call us in one weeks time to see how she is doing  Stacia Prasady - 02 Feb 2017 10:43 AM     TASK EDITED  S/w Patricia in regards to her mother and clarified that the pt  is taking 50mg in the am and 100mg in the pm of the Topamax  Per AS,  pt  is to decrease to 50mg BID  Reiterated and clarified c/ Patricia about new instructions and she verbalized understanding and she will CB next week c/ an update  The pt  is scheduled for a sovs on 2/28 for F/U  Encouraged pt  to see PCP if she continues c/ N/V/D  Patricia verbalized understanding and was appreciative of the call  [employer] 17 - 02 Feb 2017 10:59 AM     TASK REPLIED TO: Previously Assigned To [place]  Thanks          Signatures   Electronically signed by : Sherley Bear, ; Feb 2 2017 11:10AM EST                       (Author)

## 2018-01-15 NOTE — RESULT NOTES
Message   Recorded as Task   Date: 11/22/2017 03:05 PM, Created By: Mark Montoya   Task Name: Care Coordination   Assigned To: Elaine Prasad   Regarding Patient: Marta Cm, Status: Active   Comment:    Elaine Prasad - 22 Nov 2017 3:05 PM     TASK CREATED  Caller: Self; Medical Complaint; [phone] (Home); [phone] (Work)  Received a call from the call center from Lynne Watson stating she had the pt  on the phone, but who was curently being seen in the ER because of increasing back pain  The pt  was calling us to inform us that her pain was really bad especially when she was trying to sit up  So she went to the ER  Encouraged her to f/u and make an earlier sovs  Pt  verbally understood and will CB to move her appt  up  She was an AS pt , now scheduled with Dilma Neil - 22 Nov 2017 3:43 PM     TASK EDITED  Just an FYI pt came in bc she was upset and in pain just left the ER  I put her on w/ Dr Pacheco Ceja on [facility] he has openings  She can f/u with you after he leaves     Jimbo Washburn - 22 Nov 2017 4:06 PM     TASK REPLIED TO: Previously Assigned To Jimbo Washburn  That is absolutely ok        Signatures   Electronically signed by : Pacheco Ceja, ; Nov 27 2017  8:10AM EST                       (Author)

## 2018-01-15 NOTE — MISCELLANEOUS
Message   Recorded as Task   Date: 02/23/2017 03:32 PM, Created By: Noy Kaiser   Task Name: Follow Up   Assigned To: [facility]   Regarding Patient: Rosangela Arana, Status: In Progress   Comment:    Ary Bernabe - 23 Feb 2017 3:32 PM     TASK CREATED  Caller: Self; General Medical Question; [phone] (Home)    Pt LM on [facility] triage line today at [phone], states she went to  the [facility] and it will cost her $400 and she cannot afford that  I spoke with pt, states she did go to get script filled and it will cost her $400 and she cannot afford it  Advised will see what I can do and CB    As per another task, [facility] was denying, but we were able to appeal and get it approved  I did speak with the pharmacist and states it will cost the pt $400  How would you like to proceed? I am not sure if she can use copay card  ************   DIANNE SHARPE - 23 Feb 2017 3:43 PM     TASK REPLIED TO: Previously Assigned To [facility]  Any way to get prior auth? Ary Bernabe - 23 Feb 2017 4:33 PM     TASK REASSIGNED: Previously Assigned To [facility]    Roscoe, I think you did do this, not sure if pt can use copay card? Juan Randall - 24 Feb 4230 14:32 AM     TASK EDITED  Pt can not use a copay card because she has Medicare  A prior auth and appeal was submitted and medication was approved  This is just the copay for the med for the patient  [facility] [place] [facility] - 24 Feb 2017 10:40 AM     TASK REPLIED TO: Previously Assigned To [facility]  Printed prescription for tramadol 50 mg 1-2 tabs PO TID PRN pain  Ary Bernabe - 24 Feb 2017 10:53 AM     TASK EDITED    I spoke with pt, advised above  Pt will be over to [facility] script   ***************        Active Problems    1  Allergic reaction to bee sting (989 5,E905 3) (T63 441A)   2  Analgesic use (V58 69) (Z79 899)   3  Asthma (493 90) (J53 935)   4   Battery end of life of spinal cord stimulator (V53 02) (Z46 2)   5  Depression (311) (F32 9)   6  DM2 (diabetes mellitus, type 2) (250 00) (E11 9)   7  Encounter for long-term use of opiate analgesic (V58 69) (Z79 891)   8  Encounter for screening for cardiovascular disorders (V81 2) (Z13 6)   9  Encounter for staple removal (V58 32) (Z48 02)   10  Esophageal reflux (530 81) (K21 9)   11  Hand paresthesia (782 0) (R20 2)   12  Hyperlipidemia (272 4) (E78 5)   13  Hypertension (401 9) (I10)   14  Low back pain (724 2) (M54 5)   15  Lumbar canal stenosis (724 02) (M48 06)   16  Lumbar radiculopathy (724 4) (M54 16)   17  Lyme disease (088 81) (A69 20)   18  Morbid or severe obesity due to excess calories (278 01) (E66 01)   19  Myalgia and myositis (729 1)   20  Opioid dependence (304 00) (F11 20)   21  Osteoporosis screening (V82 81) (Z13 820)   22  Pain of right heel (729 5) (M79 671)   23  Pain syndrome, chronic (338 4) (G89 4)   24  Postop check (V67 00) (Z09)   25  Sacroiliac pain (724 6) (M53 3)   26  Sacroiliitis (720 2) (M46 1)   27  Spondylosis of lumbar region without myelopathy or radiculopathy (721 3) (M47 816)   28  Urine, incontinence, stress female (625 6) (N39 3)   29  Visit for screening mammogram (V76 12) (Z12 31)   30  Vitamin D deficiency (268 9) (E55 9)    Current Meds   1  Atorvastatin Calcium 10 MG Oral Tablet (Lipitor); TAKE 1 TABLET DAILY; Therapy: 59PJP1681 to [phone])  Requested for: 27Ihh0564; Last   Rx:45Zhr7138 Ordered   2  [facility]S Vitamin D3 1000 UNIT CAPS; TAKE 1 CAPSULE BY MOUTH TWICE DAILY; Therapy: 91TJP9432 to (Evaluate:51Xgg1573)  Requested for: 18PNO8155; Last   Rx:79Xqy8945 Ordered   3  Effexor XR 75 MG Oral Capsule Extended Release 24 Hour (Venlafaxine HCl ER); TAKE   1 CAPSULE DAILY; Therapy: 50LGT9751 to (Amy Yepez)  Requested for: 01NYS6023; Last   Rx:17Oct2012 Ordered   4   EpiPen 2-Jimi 0 3 MG/0 3ML Injection Solution Auto-injector; INJECT 0 3ML   INTRAMUSCULARLY AS DIRECTED; Therapy: 99LMZ2307 to (Evaluate:15Jun2016)  Requested for: 75ZGD6321; Last   Rx:13Jun2016 Ordered   5  Glimepiride 1 MG Oral Tablet; Take 1 tablet twice daily; Therapy: 80Ovd5844 to (Evaluate:14Nov2017)  Requested for: 11WXE2740; Last   Rx:97Hne2131 Ordered   6  Lisinopril-Hydrochlorothiazide 20-25 MG Oral Tablet; TAKE 1 TABLET DAILY; Therapy: 74Zez5444 to (Evaluate:13Mar2017)  Requested for: 88ZUO4792; Last   Rx:16Jun2016 Ordered   7  Meloxicam 15 MG Oral Tablet; take 1 tablet once daily with a meal;   Therapy: 26Lcc0587 to (Evaluate:10Mar2017)  Requested for: 05Hvy8315; Last   Rx:98Mns1894 Ordered   8  Methocarbamol 750 MG Oral Tablet; TAKE 1/2 -1 TABLET 3 TIMES DAILY; Therapy: 02EVI5807 to (Evaluate:15Apr2017)  Requested for: 40VAP1869; Last   Rx:21Piv5481 Ordered   9  Omeprazole 20 MG Oral Capsule Delayed Release; Therapy: 63CKL6118 to (Last Refugia Neighbor)  Requested for: 93PQV6844 Ordered   10  OneTouch Ultra Blue In Vitro Strip; TEST TWICE DAILY; Therapy: 32PYN7753 to (Evaluate:11Nov2016)  Requested for: 25Apr2016; Last    Rx:25Apr2016 Ordered   11  OneTouch Ultra Mini w/Device Kit; TEST ONCE DAILY    USE AS DIRECTED; Therapy: 45HMI4077 to (Last Rx:54Nav0160)  Requested for: 00OMH3217 Ordered   12  Topiramate 50 MG Oral Tablet; TAKE 2 TABLETS TWICE DAILY; Therapy: 13FRD2816 to (Evaluate:16Mar2017)  Requested for: 41VET8464; Last    Rx:36Vsr3464 Ordered   13  TraMADol HCl - 50 MG Oral Tablet; 1-2 TAB PO TID PRN PAIN;    Therapy: 87QHJ3073 to (Evaluate:26Mar2017); Last Rx:00Qyt7770 Ordered   14  Vitamin D 1000 UNIT CAPS; Take 1 capsule twice daily; Therapy: 46Yax5009 to (Last Rx:12Nov2014)  Requested for: 37DDU1621 Ordered    Allergies    1  Motrin TABS   2  Penicillins   3  MetFORMIN HCl TABS   4  Nortriptyline HCl CAPS   5  Tradjenta TABS   6  Aleve TABS   7  Augmentin TABS   8  Sulfa Drugs    9  Bee sting    Signatures   Electronically signed by :  Tammy Rodriguez, ; Feb 24 2017 10: 53AM EST                       (Author)

## 2018-01-16 NOTE — RESULT NOTES
Message   Recorded as Task   Date: 04/14/2016 08:40 AM, Created By: Eben Nicholson   Task Name: Follow Up   Assigned To: [facility]   Regarding Patient: Jasmyn Tomas, Status: Active   CommentCitamiko Monzon - 14 Apr 1573 4:71 AM     TASK CREATED  S/P LEFT SIJ INJ ON 4/6/2016 W/ DR PHILLIPS - F/U SCHEDULED ON 5/26/2016 W/ Elaine Gongora - 15 Apr 2016 10:31 AM     TASK EDITED  Received a call from the pt  today stating the shot did not help her at all but the new medication that she started, gralise, did  She has not more numbess/tingling and the majority of the pain is gone  Reiterated to the pt  that it takes up to two weeks to get the full effect of the medication  Elaine Prasad - 15 Apr 2016 10:31 AM     TASK REASSIGNED: Previously Assigned To Maryam Marmolejo - 15 Apr 2016 3:17 PM     TASK REPLIED TO: Previously Assigned To Abilio Mota  she was to tell us how much she is taking every night and this dose needs to be ordered  Zaki Qureshi - 20 Apr 2016 10:06 AM     TASK REPLIED TO: Previously Assigned To [facility]  Spoke with pt who states she is following instructions on pill pack  States she is on day #10 and is taking 2 tabs q evening  She states she has 2 weeks worth left of medication  Pt states she is tolerating gralise well-denies any s/e's  States she is sleeping better,her legs feel better and she has no leg cramps  Nikia Phillips - 21 Apr 2016 2:13 PM     TASK REPLIED TO: Previously Assigned To Nikia Phillips  There is another task regardign Gralise  I will await her response prior to ordering  Keila Logan - 22 Apr 0930 41:99 AM     TASK EDITED  Addressed in another task          Signatures   Electronically signed by : Mary Ellen Hightower, ; Apr 22 2016 11:59AM EST                       (Author) 20

## 2018-01-17 NOTE — MISCELLANEOUS
Message   Recorded as Task   Date: 03/11/2016 10:52 AM, Created By: Isabella Marinelli   Task Name: Care Coordination   Assigned To: [facility]   Regarding Patient: Wesley Monson, Status: In Progress   Comment:    Ashli Montejo - 11 Mar 2016 10:52 AM     TASK CREATED  Caller: [address], [address], Pharmacist; Care Coordination; [phone] (Work)  Received VM from [address], [address] on [place] triage line from [address] [place] am  Pending [facility] to [facility] states that they have a script for Hydrocodone 7 5/325 mg  [facility] pharmacist states that the signature on the rx looks like it has been copied on a copy machine and not written w/ regular ink  [facility] pharmacist requesting c/b at [phone]  Elaine Prasad - 11 Mar 2016 11:03 AM     TASK EDITED  S/w Mihir Cordova at [facility]  The signature on the script is not clear  Just need a new signature  Sim Gil - 14 Mar 2016 12:20 PM     TASK REPLIED TO: Previously Assigned To Nikia Phillips  Please ask patient to bring back prescription or fax prescription to look at it  If new prescription is required, patient will need to  from office  Elaine Prasad - 14 Mar 2016 1:23 PM     TASK EDITED  S/w pt  and the problem was rectified  Nikia Phillips - 14 Mar 2016 2:03 PM     TASK REPLIED TO: Previously Assigned To Sim Gil  Thank you  Gibson Thurston - 14 Mar 2016 3:13 PM     TASK EDITED        Active Problems    1  Acute upper respiratory infection (465 9) (J06 9)   2  Analgesic use (V58 69) (Z79 899)   3  Asthma (493 90) (J45 909)   4  Depression (311) (F32 9)   5  DM2 (diabetes mellitus, type 2) (250 00) (E11 9)   6  Encounter for screening for cardiovascular disorders (V81 2) (Z13 6)   7  Esophageal reflux (530 81) (K21 9)   8  Exogenous obesity (278 00) (E66 9)   9  Hyperlipidemia (272 4) (E78 5)   10  Hypertension (401 9) (I10)   11  Low back pain (724 2) (M54 5)   12  Lumbar canal stenosis (724 02) (M48 06)   13  Lumbar radiculopathy (724 4) (M54 16)   14   Morbid or severe obesity due to excess calories (278 01) (E66 01)   15  Myalgia and myositis (729 1) (M79 1,M60 9)   16  Need for pneumococcal vaccine (V03 82) (Z23)   17  Opioid dependence (304 00) (F11 20)   18  Pain syndrome, chronic (338 4) (G89 4)   19  Puncture wound (879 8) (T14 8)   20  Sacroiliac pain (724 6) (M53 3)   21  Spondylosis of lumbar region without myelopathy or radiculopathy (721 3) (M47 816)   22  Visit for screening mammogram (V76 12) (Z12 31)   23  Vitamin D deficiency (268 9) (E55 9)    Current Meds   1  Atorvastatin Calcium 10 MG Oral Tablet (Lipitor); TAKE 1 TABLET DAILY; Therapy: 71WYY7040 to (Evaluate:10Fbk6746)  Requested for: 17KPT6810; Last   Rx:04Jan2016 Ordered   2  CVS Vitamin D3 1000 UNIT CAPS; TAKE 1 CAPSULE BY MOUTH TWICE DAILY; Therapy: 89KAU4294 to (Evaluate:05Flv3783)  Requested for: 82WLD3627; Last   Rx:49Yti0228 Ordered   3  Effexor XR 75 MG Oral Capsule Extended Release 24 Hour (Venlafaxine HCl ER); TAKE   1 CAPSULE DAILY; Therapy: 12GXG1749 to (Eleanora Antis)  Requested for: 33IXA6009; Last   Rx:17Oct2012 Ordered   4  EpiPen 2-Jimi 0 3 MG/0 3ML ISAEL; USE AS DIRECTED; Therapy: 70QNA4641 to (Evaluate:27Jun2013)  Requested for: 56ICC4797; Last   Rx:25Jun2013 Ordered   5  Glimepiride 1 MG Oral Tablet; take 1 tablet by mouth every day; Therapy: 60Jcn8608 to (Hai Gonzalezer)  Requested for: 11Ucp3588; Last   Rx:90Nqf6124 Ordered   6  Hydrocodone-Acetaminophen 7 5-325 MG Oral Tablet (Norco); TAKE 1 TABLET 2 - 3   TIMES DAILY AS NEEDED FOR PAIN;   Therapy: 85VTO5822 to (Evaluate:05Apr2016); Last Rx:15Iac4982 Ordered   7  Lisinopril-Hydrochlorothiazide 20-25 MG Oral Tablet; TAKE 1 TABLET DAILY; Therapy: 07Pvl1466 to (Evaluate:12Jun2016)  Requested for: 16Sep2015; Last   Rx:16Sep2015 Ordered   8  Lyrica 75 MG Oral Capsule; TAKE 1 CAPSULE TWICE DAILY; Therapy: 53XNL4192 to (Evaluate:09Jan2016); Last Rx:05Uhc2415 Ordered   9   Meloxicam 15 MG Oral Tablet; take 1 tablet once daily with a meal;   Therapy: 59Wur1072 to (Evaluate:01Qjb4348)  Requested for: 37DUK1036; Last   Rx:14Mar2016 Ordered   10  Methocarbamol 750 MG Oral Tablet; TAKE 1/2 -1 TABLET 3 TIMES DAILY; Therapy: 00FYE1164 to [phone])  Requested for: 85PBQ7211; Last    Rx:45Oor5537 Ordered   11  Omeprazole 20 MG Oral Capsule Delayed Release; Therapy: 77GIO2194 to (Last Diannia Bridges)  Requested for: 19VNM5300 Ordered   12  OneTouch Ultra Blue In Vitro Strip; TEST ONCE DAILY; Therapy: 84ONP5079 to (Evaluate:19Mar2016)  Requested for: 29AZE1317; Last    Rx:20Nov2015 Ordered   13  Vitamin D 1000 UNIT CAPS; Take 1 capsule twice daily; Therapy: 53Ovf9360 to (Last Rx:12Nov2014)  Requested for: 10ZVF9607 Ordered    Allergies    1  Motrin TABS   2  Penicillins   3  MetFORMIN HCl TABS   4  Nortriptyline HCl CAPS   5  Tradjenta TABS   6  Aleve TABS   7  Augmentin TABS   8  Sulfa Drugs    9   Bee sting    Signatures   Electronically signed by : Caitlin Cuellar, ; Mar 14 2016  3:14PM EST                       (Author)

## 2018-01-17 NOTE — RESULT NOTES
Message   Recorded as Task   Date: 10/03/2017 12:59 PM, Created By: Joselo Hagen   Task Name: Call Patient with results   Assigned To: [facility] [employer] [facility]   Regarding Patient: Marcus Torrez, Status: Active   CommentCelester Cynthia - 03 Oct 2017 12:59 PM     Patient Phone: [phone]    Can you please call this patient and let her know that the CT scan of her lumbar spine did reveal advanced multilevel spondylosis which is similar to the previous study  The patient was complaining of symptoms in her right lower extremity down the back of the leg that made me think it was in the S1 distribution, however, at the S1 distribution there is nothing significant going on especially on the right side  We can offer this patient a right sided L5 and S1 transforaminal epidural steroid injection if her symptoms are still persisting  Katerin Toscano - 11 Oct 2017 3:39 PM     TASK EDITED  phone call from patient stating she is experiencing a lot of pain  please call patient at [phone]  Elaine Prasad - 11 Oct 2017 4:00 PM     TASK EDITED  S/w the pt  and she stated she is having trouble laying flat and has to sleep in her recliner  Reviewed the previous findings and scheduled a Right L5-S1 TFESI c/ AS on 10/24 at 0745  Pt  will stop the meloxicam on 10/20 and verbalized understanding of the preoperative instructions and will contact us if she should become sick     Elaine Prasad - 18 Oct 2017 2:44 PM     TASK EDITED        Signatures   Electronically signed by : Jonathan Contreras, ; Oct 18 2017  2:45PM EST                       (Author)

## 2018-01-18 ENCOUNTER — GENERIC CONVERSION - ENCOUNTER (OUTPATIENT)
Dept: OTHER | Facility: OTHER | Age: 63
End: 2018-01-18

## 2018-01-22 ENCOUNTER — GENERIC CONVERSION - ENCOUNTER (OUTPATIENT)
Dept: OTHER | Facility: OTHER | Age: 63
End: 2018-01-22

## 2018-01-23 VITALS
TEMPERATURE: 97.7 F | WEIGHT: 293 LBS | HEIGHT: 66 IN | OXYGEN SATURATION: 97 % | SYSTOLIC BLOOD PRESSURE: 130 MMHG | BODY MASS INDEX: 47.09 KG/M2 | DIASTOLIC BLOOD PRESSURE: 74 MMHG | RESPIRATION RATE: 20 BRPM | HEART RATE: 87 BPM

## 2018-01-23 NOTE — RESULT NOTES
Message   Recorded as Task   Date: 12/04/2017 11:07 AM, Created By: Clara Sofia   Task Name: Miscellaneous   Assigned To: [facility]   Regarding Patient: Hussein Noguera, Status: Active   Comment:    Clara Sofia - 04 Dec 2017 11:07 AM     TASK CREATED  Pt called stating that she was seen in the office last week and is still in a lot of pain  States that the Flexrill and Tylenol are not helping at all  Pt uses [facility] pharmacy on [address] (on file)  Pt would like at call back at [phone]  [facility] - 04 Dec 2017 11:18 AM     TASK EDITED  Pt  last seen on 11/28  AS to advise  Thanks ****[facility] Pt ****   Benny Boston - 04 Dec 2017 12:04 PM     TASK REPLIED TO: Previously Assigned To Caralyn Dancer  Did she start PT? PT will help her more than anything  I can trial her on baclofen in place of the cyclobenzaprine if she is interested  Let me know and I can send that to her pharmacy  [facility] - 04 Dec 2017 1:40 PM     TASK EDITED  S/w the pt  and she stated she starts PT tomorrow  She is agreeable to trying  the baclofen and [facility] [address]  please  Pt  was appreciative of the call  Benny Boston - 04 Dec 2017 2:14 PM     TASK REPLIED TO: Previously Assigned To Caralyn Dancer  baclofen sent to the pharmacy   [facility] - 04 Dec 2017 2:22 PM     TASK EDITED  S/w the pt  and she stated the pharmacy contacted her and they are concerned if it will interfere with the effexor she takes  AS to advise  Thanks   Benny Boston - 04 Dec 2017 4:22 PM     TASK REPLIED TO: Previously Assigned To Benny Boston  Discontinue baclofen  I sent chlorzoxazone to the pharmacy  [facility] - 05 Dec 2017 8:18 AM     TASK EDITED  Pt  aware          Signatures   Electronically signed by : Valerie Shin, ; Dec  5 2017  8:18AM EST                       (Author)

## 2018-01-23 NOTE — MISCELLANEOUS
Message   Recorded as Task   Date: 12/20/2017 12:48 PM, Created By: Fabiano Acevedo   Task Name: Miscellaneous   Assigned To: [address] [facility] [address] [facility] Clinical,Team   Regarding Patient: Twan Mancia, Status: Active   Comment:    Fabiano Acevedo - 20 Dec 2017 12:48 PM     TASK CREATED  Pt called stating that Dr Jose Alberto Laguna wants her to have an MRI cervical spine as soon as possible  However, pt states that she spoke with Monisha Piper from [facility] and after looking through her records, he does not want her to have the MRI done  Pt would like a call back to discuss at [phone]  Elaine Prasad - 20 Dec 2017 2:29 PM     TASK EDITED  What do you think AS? Benny Boston - 20 Dec 2017 2:33 PM     TASK REPLIED TO: Previously Assigned To Iesha Duran  Can we confirm with Mike Lucero or Estrada Ward that the patient cannot have a MRI? Shantal Mancuso - 20 Dec 2017 2:50 PM     TASK EDITED  Attempted to reach [facility] at [phone]  [facility] with triage c/b #, location  Left active for follow up  India Osuna - 21 Dec 2017 9:14 AM     TASK EDITED  Called pt to find out if she heard anything as to why she can not get the c-spine MRI, Monisha Piper from [facility] told her it is b/c she has the stimulator  Pt  also wanted to inform us that her shot that she got yesterday is really helping  Iesha Duran - 21 Dec 2017 9:44 AM     TASK REPLIED TO: Previously Assigned To [employer] about the shot  I still want to hear back from Mike Lucero or Estrada Ward though  India Osuna - 21 Dec 2017 10:26 AM     TASK EDITED   Shantal Mancuso - 26 Dec 2017 1:59 PM     TASK EDITED  [facility] for Raman at [phone] to c/b; AS would still like to follow up with him regarding c-spine MRI  Flaca Ackerman - 63 Jan 2018 10:24 AM     TASK EDITED  Did AS talk to Estrada Ward or Mike Lucero regarding MRI? Please advise  Iesha Duran - 02 Jan 2018 11:04 AM     TASK REPLIED TO: Previously Assigned To Iesha Flavors  I have not   Please reach out once more to [place]/Raman and, if not, I will send her for CT  Flaca Ackerman - 09 Jan 2018 11:15 AM     TASK EDITED  LMOM on Reji's cell # for him to C/B, C/B # provided  [facility] [employer] [facility] - 03 Jan 2018 12:19 PM     TASK REASSIGNED: Previously Assigned To [address] [place] [address] to [employer] [place]  The patient has a tripole lead rather than a penta lead  Her system is not MRI compatible  As such, I d/c'ed the MRI and placed CT order in [employer]  Please inform patient  Flaca Ackerman - 86 Jan 2018 1:20 PM     TASK EDITED   [facility] - 74 Jan 2018 1:37 PM     TASK EDITED  [facility] on home/cell # for pt to C/B, C/B # provided  Flaca Ackerman - 05 Jan 2018 4:00 PM     TASK EDITED  S/W pt  Advised pt of the same  Pt verbalized understanding  Gave pt phone number for central scheduling to schedule CT  [facility] [facility] - 08 Jan 2018 7:18 AM     TASK REPLIED TO: [facility]  Active Problems    1  Acute lumbar radiculopathy (724 4) (M54 16)   2  Analgesic use (V58 69) (Z79 899)   3  Asthma (493 90) (J45 909)   4  Cervical radiculopathy (723 4) (M54 12)   5  Depression (311) (F32 9)   6  DM2 (diabetes mellitus, type 2) (250 00) (E11 9)   7  Encounter for screening for cardiovascular disorders (V81 2) (Z13 6)   8  Esophageal reflux (530 81) (K21 9)   9  Hyperlipidemia (272 4) (E78 5)   10  Hypertension (401 9) (I10)   11  Low back pain (724 2) (M54 5)   12  Lumbar canal stenosis (724 02) (M48 061)   13  Lumbar radiculopathy (724 4) (M54 16)   14  Morbid or severe obesity due to excess calories (278 01) (E66 01)   15  Myalgia and myositis (729 1)   16  Myofascial pain syndrome (729 1) (M79 1)   17  Neck pain (723 1) (M54 2)   18  Opioid dependence (304 00) (F11 20)   19  Osteoporosis screening (V82 81) (Z13 820)   20  Other acute sinusitis (461 8) (J01 80)   21  Pain of right heel (729 5) (M79 671)   22  Pain syndrome, chronic (338 4) (G89 4)   23  Sacroiliitis (720 2) (M46 1)   24   Spondylosis of lumbar region without myelopathy or radiculopathy (721 3) (M47 816)   25  Urine, incontinence, stress female (625 6) (N39 3)   26  Visit for screening mammogram (V76 12) (Z12 31)    Current Meds   1  Atorvastatin Calcium 10 MG Oral Tablet (Lipitor); TAKE 1 TABLET DAILY; Therapy: 10CSN9312 to (Karolyn Wren)  Requested for: 14RSC0572; Last   Rx:06Nov2017 Ordered   2  CVS Vitamin D3 1000 UNIT CAPS; TAKE 1 CAPSULE BY MOUTH TWICE DAILY; Therapy: 82OBA5468 to (Evaluate:81Pra7853)  Requested for: 59ZXA4248; Last   Rx:48Ger3632 Ordered   3  EPINEPHrine 0 3 MG/0 3ML Injection Solution Auto-injector; INJECT 0 3ML   INTRAMUSCULARLY AS DIRECTED; Therapy: 72LXU6286 to (Last Rx:03Apr2017)  Requested for: 03Apr2017 Ordered   4  Glimepiride 1 MG Oral Tablet; Take 1 tablet twice daily; Therapy: 06Cuw1721 to (Evaluate:14Nov2017)  Requested for: 87OFU3798; Last   Rx:42Cll6095 Ordered   5  Lisinopril-Hydrochlorothiazide 20-25 MG Oral Tablet; TAKE 1 TABLET DAILY; Therapy: 24Oac8368 to (Evaluate:47Zys1534)  Requested for: 78VCJ4515; Last   Rx:66Loy8689 Ordered   6  Omeprazole 20 MG Oral Capsule Delayed Release; Therapy: 60KBM1364 to (Last Kian Greco)  Requested for: 59LDU5098 Ordered   7  OneTouch Ultra Blue In [employer]; test twice daily; Therapy: 14JRZ5770 to (Evaluate:12Nov2017)  Requested for: 26Apr2017; Last   Rx:26Apr2017 Ordered   8  TiZANidine HCl - 4 MG Oral Capsule; TAKE 1 CAPSULE 3 times daily PRN MUSCLE   SPASMS; Therapy: 32OUV8358 to (Evaluate:07Jan2018)  Requested for: 22GTJ6065; Last   Rx:00Yrn1676 Ordered   9  TiZANidine HCl - 4 MG Oral Tablet; Therapy: 91KGG4757 to  Requested for: 05BYQ7509; Status: ACTIVE - Renewal Denied   Recorded   10  Topiramate 50 MG Oral Tablet; TAKE 2 TABLETS TWICE DAILY; Therapy: 98OYH7854 to (Evaluate:05Jan2018)  Requested for: 26RQQ3279; Last    Rx:06Nov2017 Ordered   11   Venlafaxine HCl ER 75 MG Oral Capsule Extended Release 24 Hour (Effexor XR); TAKE    1 CAPSULE DAILY; Therapy: 37LSG3774 to [phone])  Requested for: 22AGJ0465; Last    Rx:06Nov2017 Ordered   12  Vitamin D 1000 UNIT CAPS; Take 1 capsule twice daily; Therapy: 23Jdu8264 to (Last Rx:12Nov2014)  Requested for: 85YVJ1980 Ordered    Allergies    1  Motrin TABS   2  Penicillins   3  MetFORMIN HCl TABS   4  Nortriptyline HCl CAPS   5  Tradjenta TABS   6  Aleve TABS   7  Augmentin TABS   8  Sulfa Drugs    9   Bee sting    Signatures   Electronically signed by : Shailesh Wheeler, ; Jan 8 2018  8:42AM EST                       (Author)

## 2018-01-23 NOTE — MISCELLANEOUS
Assessment    1  Low back pain (724 2) (M54 5)   2  Depression (311) (F32 9)   3  Pain syndrome, chronic (338 4) (G89 4)   4  Hypertension (401 9) (I10)   5  DM2 (diabetes mellitus, type 2) (250 00) (E11 9)    Plan  Depression    · There are ways to decrease your stress and improve your sense of well-being  We  encourage you to keep active and exercise regularly  Make time to take care of yourself  and participate in activities that you enjoy  Stay connected to friends and family that can  support and comfort you  If at any time you have thoughts of harming yourself or  someone else, contact us immediately ; Status:Active; Requested for:81Spo6676;    Ordered; For:Depression; Ordered By:June Ferreira;   · You need to quit smoking ; Status:Complete;   Done: 80GZX0846 12:44PM   Ordered; For:Depression; Ordered By:June Ferreira;   · Call [phone] if: Your depression is worse ; Status:Complete;   Done: 95NUQ4215  12:44PM   Ordered; For:Depression; Ordered By:June Ferreira;  Depression, Low back pain, Pain syndrome, chronic    · DULoxetine HCl - 60 MG Oral Capsule Delayed Release Particles (Cymbalta); take  1 capsule daily   Rx By: Vaughn Bernard; Dispense: 30 Days ; #:30 Capsule Delayed Release Particles; Refill: 2; For: Depression, Low back pain, Pain syndrome, chronic; LORENZA = N; Verified Transmission to [id number] Last Updated By: System, [employer]; 12/4/2017 10:49:21 AM  DM2 (diabetes mellitus, type 2)    · (1) COMPREHENSIVE METABOLIC PANEL; Status:Active; Requested for:87Zfm9736;    Perform:[facility]; BFL:63ZDP2341; Ordered; For:DM2 (diabetes mellitus, type 2); Ordered By:June Ferreira;   · (1) HEMOGLOBIN A1C; Status:Active; Requested for:08Lkz2961;    Perform:[facility]; KQE:72PIG9018; Ordered; For:DM2 (diabetes mellitus, type 2); Ordered By:June Ferreira;   · (1) MICROALBUMIN CREATININE RATIO, RANDOM URINE; Status:Active;  Requested  for:16Ylb0187;    Perform:[facility]; KWW:86FTH3636; Ordered; For:DM2 (diabetes mellitus, type 2); Ordered By:June Ferreira;  DM2 (diabetes mellitus, type 2), Hyperlipidemia    · (1) LIPID PANEL, FASTING; Status:Active; Requested for:72Axn5997;    Perform:[facility]; WIF:61KZZ7558; Ordered; For:DM2 (diabetes mellitus, type 2), Hyperlipidemia; Ordered By:Melissa Ferreira; Low back pain    · Cyclobenzaprine HCl - 10 MG Oral Tablet; TAKE 1 TABLET 3 TIMES DAILY AS  NEEDED   Rx By: Kaiser Gordon; Dispense: 30 Days ; #:90 Tablet; Refill: 1; For: Low back pain; LORENZA = N; Record    Discussion/Summary  Discussion Summary:   Hopefully the Cymbalta will help both her back pain and depression  She will follow up in 1 month for follow-up  Counseling Documentation With Imm: The was counseled regarding instructions for management, risk factor reductions, patient and family education, impressions, risks and benefits of treatment options, importance of compliance with treatment  Medication SE Review and Pt Understands Tx: Possible side effects of new medications were reviewed with the patient/guardian today  The treatment plan was reviewed with the patient/guardian  The patient/guardian understands and agrees with the treatment plan      Chief Complaint  Chief Complaint Free Text Note Form: pt here for a zunilda   Chief Complaint Chronic Condition [facility]: Patient is here today for follow up of chronic conditions described in HPI  History of Present Illness  TCM Communication [facility]: The patient is being contacted for follow-up after hospitalization and Dr Ferreira  She was hospitalized at [facility]  The dates of hospitalization: 11/26/2017, date of discharge: 11/27/2017  She was discharged to home  She scheduled a follow up appointment     Symptoms: back pain left side, but no fever, no weakness, no dizziness, no headache, no fatigue, no cough, no shortness of breath, no chest pain, no back pain on right side, no arm pain left side, no arm pain on right side, no leg pain on left side, no leg pain on right side, no upper abdominal pain, no middle abdominal pain, no lower abdominal pain, no rash:, no anorexia, no nausea, no vomiting, no loose stools, no constipation, no pain with urinating, no incisional pain, no wound drainage and no swelling  Counseling was provided to the patient  Communication performed and completed by Davon Armendariz   HPI: Patient was admitted to [address] [place] [address] overnight for increasing back pain  Her medications were slightly adjusted and she was discharged the following day  The next day she went for pain management doctor who did several trigger point injections in her back  She states she has had no relief she has contacted them for further treatment and is being sent to physical therapy  Patient states she can't sleep at night due to the pain and has to sleep sitting up    In the past she has been on both Lyrica and Neurontin but did not do well  She is on medications for chronic depression however she is obviously having a flare and is crying frequently due to it and the pain  She does not have suicidal ideation but is losing weight since then    Otherwise her hypertension diabetes is unchanged and stable  Review of Systems  Complete-Female:   Constitutional: as noted in HPI  Cardiovascular: as noted in HPI  Respiratory: No complaints of shortness of breath, no wheezing, no cough, no SOB on exertion, no orthopnea, no PND  Gastrointestinal: No complaints of abdominal pain, no constipation, no nausea or vomiting, no diarrhea, no bloody stools  Genitourinary: No complaints of dysuria, no incontinence, no pelvic pain, no dysmenorrhea, no vaginal discharge or bleeding  Musculoskeletal: as noted in HPI  Integumentary: No complaints of skin rash or lesions, no itching, no skin wounds, no breast pain or lump  Neurological: as noted in HPI  Endocrine: as noted in HPI  Active Problems    1   Acute lumbar radiculopathy (724 4) (M54 16)   2  Analgesic use (V58 69) (Z79 899)   3  Asthma (493 90) (J45 909)   4  Chronic right shoulder pain (719 41,338 29) (M25 511,G89 29)   5  Depression (311) (F32 9)   6  DM2 (diabetes mellitus, type 2) (250 00) (E11 9)   7  Encounter for screening for cardiovascular disorders (V81 2) (Z13 6)   8  Esophageal reflux (530 81) (K21 9)   9  Hand paresthesia (782 0) (R20 2)   10  Hyperlipidemia (272 4) (E78 5)   11  Hypertension (401 9) (I10)   12  Low back pain (724 2) (M54 5)   13  Lumbar canal stenosis (724 02) (M48 061)   14  Lumbar radiculopathy (724 4) (M54 16)   15  Morbid or severe obesity due to excess calories (278 01) (E66 01)   16  Myalgia and myositis (729 1)   17  Myofascial pain syndrome (729 1) (M79 1)   18  Need for immunization against influenza (V04 81) (Z23)   19  Opioid dependence (304 00) (F11 20)   20  Osteoporosis screening (V82 81) (Z13 820)   21  Pain of right heel (729 5) (M79 671)   22  Pain syndrome, chronic (338 4) (G89 4)   23  Sacroiliitis (720 2) (M46 1)   24  Spondylosis of lumbar region without myelopathy or radiculopathy (721 3) (M47 816)   25  Urine, incontinence, stress female (625 6) (N39 3)   26  Visit for screening mammogram (V76 12) (Z12 31)    Past Medical History    1  History of Allergic reaction to bee sting (989 5,E905 3) (T63 441A)   2  History of Lyme disease (V12 09) (Z86 19)    Surgical History    1  History of Appendectomy   2  History of Cholecystectomy   3  History of Knee Replacement   4  History of Shoulder Surgery   5  History of Spinal Stereotaxis Stimulation Of Cord   6  History of Total Hip Replacement  Surgical History Reviewed: The surgical history was reviewed and updated today  Family History  Mother    1  Family history of Diabetes Mellitus (V18 0)  Father    2  Family history of Cancer  Family History    3  Family history of Brain Cancer (V16 8)   4  Family history of Breast Cancer (V16 3)   5   Family history of Cervical Cancer   6  Family history of Diabetes Mellitus (V18 0)   7  Family history of Hypertension (V17 49)   8  Family history of Ovarian Cancer (V16 41)   9  Family history of Stroke Complications  Family History Reviewed: The family history was reviewed and updated today  Social History    · Denied: History of Alcohol Use (History)   · Current every day smoker (305 1) (F17 200)   · Denied: History of Drug Use (305 90)  Social History Reviewed: The social history was reviewed and updated today  The social history was reviewed and is unchanged  Current Meds   1  Atorvastatin Calcium 10 MG Oral Tablet; TAKE 1 TABLET DAILY; Therapy: 56ZMG4242 to (Viona Adler)  Requested for: 26WTA2210; Last   Rx:06Nov2017 Ordered   2  [employer] Vitamin D3 1000 UNIT CAPS; TAKE 1 CAPSULE BY MOUTH TWICE DAILY; Therapy: 92RKF6227 to (Evaluate:55Yjm1869)  Requested for: 40XEG6945; Last   Rx:36Tii6516 Ordered   3  EPINEPHrine 0 3 MG/0 3ML Injection Solution Auto-injector; INJECT 0 3ML   INTRAMUSCULARLY AS DIRECTED; Therapy: 94KRV1845 to (Last Rx:03Apr2017)  Requested for: 03Apr2017 Ordered   4  Glimepiride 1 MG Oral Tablet; Take 1 tablet twice daily; Therapy: 77Kip5910 to (Evaluate:14Nov2017)  Requested for: 19LRW5414; Last   Rx:61Jku9051 Ordered   5  Lisinopril-Hydrochlorothiazide 20-25 MG Oral Tablet; TAKE 1 TABLET DAILY; Therapy: 65Vpk1134 to (Evaluate:05Wsn8026)  Requested for: 92ERM1392; Last   Rx:19Mar2017 Ordered   6  Omeprazole 20 MG Oral Capsule Delayed Release; Therapy: 89UDA7173 to (Last Raynald Panning)  Requested for: 44QJC9969 Ordered   7  OneTouch Ultra Blue In [employer]; test twice daily; Therapy: 94OPZ2250 to (Evaluate:12Nov2017)  Requested for: 26Apr2017; Last   Rx:26Apr2017 Ordered   8  Topiramate 50 MG Oral Tablet; TAKE 2 TABLETS TWICE DAILY; Therapy: 89GJN7106 to (Evaluate:05Jan2018)  Requested for: 90AEL8883; Last   Rx:06Nov2017 Ordered   9   Venlafaxine HCl ER 75 MG Oral Capsule Extended Release 24 Hour; TAKE 1 CAPSULE   DAILY; Therapy: 93HXB6012 to )  Requested for: 68VPY6102; Last   Rx:06Nov2017 Ordered   10  Vitamin D 1000 UNIT CAPS; Take 1 capsule twice daily; Therapy: 75Uxm2160 to (Last Rx:12Nov2014)  Requested for: 73WVT3137 Ordered  Medication List Reviewed: The medication list was reviewed and updated today  Allergies    1  Motrin TABS   2  Penicillins   3  MetFORMIN HCl TABS   4  Nortriptyline HCl CAPS   5  Tradjenta TABS   6  Aleve TABS   7  Augmentin TABS   8  Sulfa Drugs    9  Bee sting    Vitals  Signs   Recorded: 20JEJ8001 10:31AM   Temperature: 97 7 F, Tympanic  Heart Rate: 87  Respiration: 20  Systolic: 261  Diastolic: 74  Height: 5 ft 6 in  Weight: 294 lb   BMI Calculated: 47 45  BSA Calculated: 2 36  O2 Saturation: 97    Physical Exam    Constitutional   General appearance: Abnormal   morbidly obese and acutely exhausted  obviously in discomfort  Pulmonary   Respiratory effort: No increased work of breathing or signs of respiratory distress  Auscultation of lungs: Clear to auscultation  Cardiovascular   Auscultation of heart: Normal rate and rhythm, normal S1 and S2, without murmurs  Examination of extremities for edema and/or varicosities: Normal     Abdomen   Abdomen: Non-tender, no masses  Lymphatic   Palpation of lymph nodes in neck: No lymphadenopathy  Musculoskeletal   Gait and station: Abnormal   using can  Skin   Skin and subcutaneous tissue: Normal without rashes or lesions  Neurologic   Cranial nerves: Cranial nerves 2-12 intact  Psychiatric   Orientation to person, place, and time: Normal     Mood and affect: Abnormal   Mood and Affect: depressed, frustrated and tearful  Health Management  DM2 (diabetes mellitus, type 2)   *VB - Eye Exam; every 1 year; Next Due: 69TBV4587; Overdue    Future Appointments    Date/Time Provider Specialty Site   01/15/2018 10:00 AM ANASTASIYA Valenzuela   Internal Medicine [facility] [place]     Signatures   Electronically signed by : ANASTASIYA Del Rio ; Dec  4 2017 12:46PM EST                       (Author)

## 2018-01-24 ENCOUNTER — TELEPHONE (OUTPATIENT)
Dept: PAIN MEDICINE | Facility: MEDICAL CENTER | Age: 63
End: 2018-01-24

## 2018-01-24 ENCOUNTER — HOSPITAL ENCOUNTER (EMERGENCY)
Facility: HOSPITAL | Age: 63
Discharge: HOME | End: 2018-01-24
Attending: EMERGENCY MEDICINE | Admitting: EMERGENCY MEDICINE
Payer: MEDICARE

## 2018-01-24 ENCOUNTER — TELEPHONE (OUTPATIENT)
Dept: PAIN MEDICINE | Facility: CLINIC | Age: 63
End: 2018-01-24

## 2018-01-24 VITALS
HEART RATE: 87 BPM | DIASTOLIC BLOOD PRESSURE: 50 MMHG | HEIGHT: 66 IN | TEMPERATURE: 97.1 F | RESPIRATION RATE: 19 BRPM | SYSTOLIC BLOOD PRESSURE: 130 MMHG | BODY MASS INDEX: 47.09 KG/M2 | WEIGHT: 293 LBS

## 2018-01-24 VITALS
OXYGEN SATURATION: 97 % | DIASTOLIC BLOOD PRESSURE: 68 MMHG | SYSTOLIC BLOOD PRESSURE: 116 MMHG | BODY MASS INDEX: 46.45 KG/M2 | HEART RATE: 114 BPM | TEMPERATURE: 98.2 F | WEIGHT: 289 LBS | HEIGHT: 66 IN

## 2018-01-24 VITALS
TEMPERATURE: 97.1 F | HEART RATE: 86 BPM | RESPIRATION RATE: 18 BRPM | SYSTOLIC BLOOD PRESSURE: 110 MMHG | DIASTOLIC BLOOD PRESSURE: 62 MMHG | WEIGHT: 229 LBS | HEIGHT: 66 IN | BODY MASS INDEX: 36.8 KG/M2

## 2018-01-24 VITALS
OXYGEN SATURATION: 96 % | TEMPERATURE: 98.3 F | WEIGHT: 292 LBS | DIASTOLIC BLOOD PRESSURE: 76 MMHG | BODY MASS INDEX: 47.13 KG/M2 | SYSTOLIC BLOOD PRESSURE: 141 MMHG | HEART RATE: 84 BPM | RESPIRATION RATE: 22 BRPM

## 2018-01-24 VITALS
HEART RATE: 92 BPM | WEIGHT: 292 LBS | TEMPERATURE: 98.2 F | DIASTOLIC BLOOD PRESSURE: 66 MMHG | SYSTOLIC BLOOD PRESSURE: 114 MMHG | HEIGHT: 66 IN | BODY MASS INDEX: 46.93 KG/M2

## 2018-01-24 DIAGNOSIS — M54.59 INTRACTABLE LOW BACK PAIN: ICD-10-CM

## 2018-01-24 DIAGNOSIS — M79.18 MYOFASCIAL PAIN SYNDROME: Primary | ICD-10-CM

## 2018-01-24 DIAGNOSIS — M54.9 BACK PAIN: Primary | ICD-10-CM

## 2018-01-24 DIAGNOSIS — M62.830 MUSCLE SPASM OF BACK: ICD-10-CM

## 2018-01-24 LAB
BACTERIA UR QL AUTO: NORMAL /HPF
BILIRUB UR QL STRIP: NEGATIVE
CLARITY UR: CLEAR
COLOR UR: YELLOW
GLUCOSE UR STRIP-MCNC: NEGATIVE MG/DL
HGB UR QL STRIP.AUTO: NEGATIVE
HYALINE CASTS #/AREA URNS LPF: NORMAL /LPF
KETONES UR STRIP-MCNC: NEGATIVE MG/DL
LEUKOCYTE ESTERASE UR QL STRIP: ABNORMAL
NITRITE UR QL STRIP: NEGATIVE
NON-SQ EPI CELLS URNS QL MICRO: NORMAL /HPF
PH UR STRIP.AUTO: 5.5 [PH] (ref 4.5–8)
PROT UR STRIP-MCNC: NEGATIVE MG/DL
RBC #/AREA URNS AUTO: NORMAL /HPF
SP GR UR STRIP.AUTO: <=1.005 (ref 1–1.03)
UROBILINOGEN UR QL STRIP.AUTO: 0.2 E.U./DL
WBC #/AREA URNS AUTO: NORMAL /HPF

## 2018-01-24 PROCEDURE — 96372 THER/PROPH/DIAG INJ SC/IM: CPT

## 2018-01-24 PROCEDURE — 81001 URINALYSIS AUTO W/SCOPE: CPT

## 2018-01-24 PROCEDURE — 99284 EMERGENCY DEPT VISIT MOD MDM: CPT

## 2018-01-24 RX ORDER — KETOROLAC TROMETHAMINE 30 MG/ML
15 INJECTION, SOLUTION INTRAMUSCULAR; INTRAVENOUS ONCE
Status: COMPLETED | OUTPATIENT
Start: 2018-01-24 | End: 2018-01-24

## 2018-01-24 RX ORDER — METHOCARBAMOL 750 MG/1
750 TABLET, FILM COATED ORAL 3 TIMES DAILY PRN
Qty: 90 TABLET | Refills: 0 | Status: SHIPPED | OUTPATIENT
Start: 2018-01-24 | End: 2018-01-24

## 2018-01-24 RX ORDER — DICLOFENAC SODIUM 75 MG/1
50 TABLET, DELAYED RELEASE ORAL 2 TIMES DAILY
COMMUNITY
End: 2018-03-06

## 2018-01-24 RX ORDER — OXYCODONE HYDROCHLORIDE 5 MG/1
5 TABLET ORAL EVERY 6 HOURS PRN
Qty: 14 TABLET | Refills: 0 | Status: SHIPPED | OUTPATIENT
Start: 2018-01-24 | End: 2018-01-27

## 2018-01-24 RX ORDER — LIDOCAINE 50 MG/G
1 PATCH TOPICAL ONCE
Status: DISCONTINUED | OUTPATIENT
Start: 2018-01-24 | End: 2018-01-24 | Stop reason: HOSPADM

## 2018-01-24 RX ADMIN — LIDOCAINE 1 PATCH: 50 PATCH TOPICAL at 20:55

## 2018-01-24 RX ADMIN — KETOROLAC TROMETHAMINE 15 MG: 30 INJECTION, SOLUTION INTRAMUSCULAR at 20:55

## 2018-01-24 NOTE — PROGRESS NOTES
Assessment    1  Encounter for preventive health examination (V70 0) (Z00 00)   2  Depression (311) (F32 9)   3  Urine, incontinence, stress female (625 6) (N39 3)   4  Osteoporosis screening (V82 81) (Z13 820)    Plan  Depression    · (1) COMPREHENSIVE METABOLIC PANEL; Status:Active; Requested for:14Nov2016;   Depression, DM2 (diabetes mellitus, type 2)    · (1) HEMOGLOBIN A1C; Status:Active; Requested for:14Nov2016;   Depression, Hyperlipidemia    · (1) LIPID PANEL, FASTING; Status:Active; Requested for:14Nov2016; Health Maintenance    · The plan of care for urinary incontinence is detailed in the plan and/or discussion section  of today's note ; Status:Complete;   Done: 21MJQ5822   · *VB-Depression Screening; Status:Complete;   Done: 68OHK9405 12:00AM  Osteoporosis screening    · * DXA BONE DENSITY SPINE HIP AND PELVIS; Status:Hold For - Scheduling;  Requested for:14Nov2016;   Visit for screening mammogram    · * MAMMO SCREENING BILATERAL W CAD; Status:Hold For - Scheduling; Requested  for:14Nov2016;     Discussion/Summary    Pt is not interest in further testing or medication for incontinence  Impression: Subsequent Annual Wellness Visit, with preventive exam as well as age and risk appropriate counseling completed  Cardiovascular screening and counseling: screening is current  Diabetes screening and counseling: screening is current  Colorectal cancer screening and counseling: due for fecal occult blood testing  Breast cancer screening and counseling: due for a screening mammogram    Cervical cancer screening and counseling: the patient declines screening  Osteoporosis screening and counseling: due for DXA axial skeleton  Abdominal aortic aneurysm screening and counseling: screening not indicated  Glaucoma screening and counseling: optometrist referral    HIV screening and counseling: screening not indicated   Immunizations: influenza vaccine is up to date this year, influenza vaccination is recommended annually, the lifetime pneumococcal vaccine has been completed, hepatitis B vaccination series is not indicated at this time due to the patient's low risk of yue the disease, the risks and benefits of the Zostavax vaccine were discussed with the patient and the risks and benefits of the Tdap vaccine were discussed with the patient  Advance Directive Planning: not complete  Advice and education were given regarding increasing physical activity and weight loss  Patient Discussion: plan discussed with the patient, follow-up visit needed in one year  Chief Complaint  medicare well exam      History of Present Illness  Welcome to Medicare and Wellness Visits: The patient is being seen for the subsequent annual wellness visit  Medicare Screening and Risk Factors   Hospitalizations: she has been previously hospitalizied  Once per lifetime medicare screening tests: ECG  Medicare Screening Tests Risk Questions   Abdominal aortic aneurysm risk assessment: none indicated  Osteoporosis risk assessment: , female gender and over 48years of age  HIV risk assessment: none indicated  Drug and Alcohol Use: The patient is a former cigarette smoker  The patient reports rare alcohol use  She has never used illicit drugs  Diet and Physical Activity: Current diet includes low carbohydrate food choices  She is sedentary  Exercise: walking  Mood Disorder and Cognitive Impairment Screening: She reports feeling down, depressed, or hopeless over the past two weeks  She reports feeling little interest or pleasure in doing things over the past two weeks  Cognitive impairment screening: denies difficulty learning/retaining new information, denies difficulty handling complex tasks, denies difficulty with reasoning, denies difficulty with spatial ability and orientation, denies difficulty with language and denies difficulty with behavior     Functional Ability/Level of Safety: Hearing is normal bilaterally  The patient is currently able to do activities of daily living without limitations, able to do instrumental activities of daily living without limitations, able to participate in social activities without limitations and able to drive without limitations  Fall risk factors: The patient fell 0 times in the past 12 months  Advance Directives: Advance directives: no living will, no durable power of  for health care directives and no advance directives  Co-Managers and Medical Equipment/Suppliers: See Patient Care Team   Reviewed Updated [facility]:   Last Medicare Wellness Visit Information was reviewed, patient interviewed and updates made to the record today  Depression (Follow-Up): The patient states her depression has improved since the last visit  They have had recurrent episodes of major depression  She describes this as mild  She is also being followed by a counselor  She has no comorbid illnesses  Interval Events:  committed suicide  Interval Symptoms: stable depression and improved depressed mood  Associated symptoms include: no recent weight gain, no recent weight loss and no thoughts of suicide  Structured Questionnaire:   Over the past 2 weeks, how often have you been bothered by the following problems? 1 ) Little interest or pleasure in doing things? Not at all    2 ) Feeling down, depressed or hopeless? Not at all    3 ) Trouble falling asleep or sleeping too much? Not at all    4 ) Feeling tired or having little energy? Several days  5 ) Poor appetite or overeating? Not at all    6 ) Feeling bad about yourself, or that you are a failure, or have let yourself or your family down? Not at all    7 ) Trouble concentrating on things, such as reading a newspaper or watching television? Not at all    8 ) Moving or speaking so slowly that other people could have noticed, or the opposite, moving or speaking faster than usual? Not at all  TOTAL SCORE: 1     How difficult have these problems made it for you to do your work, take care of things at home, or get along with people? Somewhat difficult  Social Support: the patient has good social support  Medications: the patient is adherent with her medication regimen  She denies medication side effects  Medication(s): a SNRI  Patient Care Team    Care Team Member Role Specialty Office Number   Laila Doug DA SILVA  Specialist Neurosurgery [phone]   Tanya DA SILVA  Pain Management [phone]     Review of Systems    Constitutional: as noted in HPI  Eyes: negative  ENT: negative  Cardiovascular: negative  Respiratory: negative  Gastrointestinal: negative  Genitourinary: urinary urgency, but no urinary frequency  Musculoskeletal: chronic back pain  Integumentary and Breasts: negative  Neurological: negative  Psychiatric: as noted in HPI  Endocrine: DM, but as noted in HPI  Active Problems    1  Allergic reaction to bee sting (989 5,E905 3) (T63 441A)   2  Analgesic use (V58 69) (Z79 899)   3  Asthma (493 90) (J45 909)   4  Battery end of life of spinal cord stimulator (V53 02) (Z46 2)   5  Depression (311) (F32 9)   6  DM2 (diabetes mellitus, type 2) (250 00) (E11 9)   7  Encounter for long-term use of opiate analgesic (V58 69) (Z79 891)   8  Encounter for screening for cardiovascular disorders (V81 2) (Z13 6)   9  Esophageal reflux (530 81) (K21 9)   10  Hand paresthesia (782 0) (R20 2)   11  Hyperlipidemia (272 4) (E78 5)   12  Hypertension (401 9) (I10)   13  Low back pain (724 2) (M54 5)   14  Lumbar canal stenosis (724 02) (M48 06)   15  Lumbar radiculopathy (724 4) (M54 16)   16  Lyme disease (088 81) (A69 20)   17  Morbid or severe obesity due to excess calories (278 01) (E66 01)   18  Myalgia and myositis (729 1)   19  Opioid dependence (304 00) (F11 20)   20  Pain of right heel (729 5) (M79 671)   21  Pain syndrome, chronic (338 4) (G89 4)   22   Sacroiliac pain (724 6) (M53 3)   23  Sacroiliitis (720 2) (M46 1)   24  Spondylosis of lumbar region without myelopathy or radiculopathy (721 3) (M47 816)   25  Visit for screening mammogram (V76 12) (Z12 31)   26  Vitamin D deficiency (268 9) (E55 9)    Past Medical History    · History of Cough (786 2) (R05)   · Flu vaccine need (V04 81) (Z23)   · Flu vaccine need (V04 81) (Z23)   · History of Need for pneumococcal vaccine (V03 82) (Z23)   · History of Puncture wound (879 8) (T14 8)    The active problems and past medical history were reviewed and updated today  Surgical History    · History of Appendectomy   · History of Cholecystectomy   · History of Knee Replacement   · History of Shoulder Surgery   · History of Spinal Stereotaxis Stimulation Of Cord   · History of Total Hip Replacement    The surgical history was reviewed and updated today  Family History  Mother    · Family history of Diabetes Mellitus (V18 0)  Father    · Family history of Cancer  Family History    · Family history of Brain Cancer (V16 8)   · Family history of Breast Cancer (V16 3)   · Family history of Cervical Cancer   · Family history of Diabetes Mellitus (V18 0)   · Family history of Hypertension (V17 49)   · Family history of Ovarian Cancer (V16 41)   · Family history of Stroke Complications    The family history was reviewed and updated today  Social History    · Denied: History of Alcohol Use (History)   · Current Every Day Smoker (305 1)   · Denied: History of Drug Use (305 90)   · Marital History - Currently   The social history was reviewed and updated today  The social history was reviewed and is unchanged  Current Meds   1  Atorvastatin Calcium 10 MG Oral Tablet; TAKE 1 TABLET DAILY; Therapy: 19RFG1103 to (03 17 74 30 53)  Requested for: 10Ubt6189; Last   Rx:31Zsb8648 Ordered   2  [employer] Vitamin D3 1000 UNIT CAPS; TAKE 1 CAPSULE BY MOUTH TWICE DAILY;    Therapy: 10DTY9541 to (Evaluate:92Box9686)  Requested for: 21EKC7480; Last   Rx:00Uzz7555 Ordered   3  Effexor XR 75 MG Oral Capsule Extended Release 24 Hour; TAKE 1 CAPSULE DAILY; Therapy: 31EMN4488 to (Jaison Phillips)  Requested for: 69PSC1865; Last   Rx:67Buh3292 Ordered   4  EpiPen 2-Jimi 0 3 MG/0 3ML Injection Solution Auto-injector; INJECT 0 3ML   INTRAMUSCULARLY AS DIRECTED; Therapy: 11IAZ9732 to (Evaluate:15Jun2016)  Requested for: 05KMU0983; Last   Rx:09Iyg7660 Ordered   5  Glimepiride 1 MG Oral Tablet; take 1 tablet by mouth every day; Therapy: 25Ybu7408 to (Evaluate:15Apr2017)  Requested for: 40NLE4973; Last   Rx:32Qif3782 Ordered   6  Gralise 600 MG Oral Tablet; take 2 tablets at bedtime; Therapy: 89VHI8971 to (Evaluate:21Ngg7271)  Requested for: 36RQV4699; Last   Rx:16Zsp1394 Ordered   7  Invokana 300 MG Oral Tablet; Take 1 tablet daily; Therapy: 51Atu6509 to (Evaluate:65Jpa9031)  Requested for: 89Tyr2434; Last   Rx:06Qhj4289 Ordered   8  Lisinopril-Hydrochlorothiazide 20-25 MG Oral Tablet; TAKE 1 TABLET DAILY; Therapy: 68Brc2898 to (Evaluate:13Mar2017)  Requested for: 02SSA0676; Last   Rx:79Emk8745 Ordered   9  Meloxicam 15 MG Oral Tablet; take 1 tablet once daily with a meal;   Therapy: 42Yww0147 to (Evaluate:10Mar2017)  Requested for: 86Cry1121; Last   Rx:51Xaz8798 Ordered   10  Methocarbamol 750 MG Oral Tablet; TAKE 1/2 -1 TABLET 3 TIMES DAILY; Therapy: 08MZB5559 to (Gamicki Armando)  Requested for: 22JAT0079; Last    Rx:08Nov2016 Ordered   11  Omeprazole 20 MG Oral Capsule Delayed Release; Therapy: 15PPI4648 to (Last Colon Bone)  Requested for: 25WYH1217 Ordered   12  OneTouch Ultra Blue In Vitro Strip; TEST TWICE DAILY; Therapy: 91UPF9970 to (Evaluate:11Nov2016)  Requested for: 32Gap6148; Last    Rx:02Xpe5770 Ordered   13  OneTouch Ultra Mini w/Device Kit; TEST ONCE DAILY    USE AS DIRECTED; Therapy: 98KMR2973 to (Last Rx:27Ani1621)  Requested for: 05XUJ3420 Ordered   14   Oxycodone-Acetaminophen 5-325 MG Oral Tablet; TAKE 1 TABLET 4 times daily PRN    pain; Therapy: 86DAY8098 to (Evaluate:11Jan2017); Last Rx:08Nov2016 Ordered   15  Vitamin D 1000 UNIT CAPS; Take 1 capsule twice daily; Therapy: 18DWH0741 to (Last Rx:12Nov2014)  Requested for: 97RSA9519 Ordered    The medication list was reviewed and updated today  Allergies    1  Motrin TABS   2  Penicillins   3  MetFORMIN HCl TABS   4  Nortriptyline HCl CAPS   5  Tradjenta TABS   6  Aleve TABS   7  Augmentin TABS   8  Sulfa Drugs    9  Bee sting    Immunizations   1 2 3    Influenza  24-Oct-2014 01-Dec-2015 26-Sep-2016    PPSV  02-Dec-2015      Tdap  22-Apr-2015       Vitals  Signs    Systolic: 609  Diastolic: 78  Heart Rate: 84  Temperature: 98 3 F, Tympanic  Height: 5 ft 6 in  Weight: 321 lb   BMI Calculated: 51 81  BSA Calculated: 2 45    Physical Exam    Constitutional   General appearance: Abnormal   morbidly obese  Head and Face   Head and face: Normal     Palpation of the face and sinuses: No sinus tenderness  Eyes   Conjunctiva and lids: No swelling, erythema or discharge  Pupils and irises: Equal, round, reactive to light  Ears, Nose, Mouth, and Throat   External inspection of ears and nose: Normal     Hearing: Normal     Nasal mucosa, septum, and turbinates: Normal without edema or erythema  Lips, teeth, and gums: Abnormal   edentulous  Neck   Neck: Supple, symmetric, trachea midline, no masses  Thyroid: Normal, no thyromegaly  Pulmonary   Respiratory effort: No increased work of breathing or signs of respiratory distress  Auscultation of lungs: Clear to auscultation  Cardiovascular   Auscultation of heart: Normal rate and rhythm, normal S1 and S2, no murmurs  Pedal pulses: 2+ bilaterally  Examination of extremities for edema and/or varicosities: Normal     Chest   Breasts: Normal, no dimpling or skin changes appreciated  Abdomen   Abdomen: Non-tender, no masses  Liver and spleen: No hepatomegaly or splenomegaly     Genitourinary External genitalia and vagina: Normal, no lesions appreciated  refused  Lymphatic   Palpation of lymph nodes in neck: No lymphadenopathy  Musculoskeletal   Gait and station: Abnormal   wide based and slow  Skin   Skin and subcutaneous tissue: Normal without rashes or lesions  Neurologic   Cranial nerves: Cranial nerves II-XII intact  Cortical function: Normal mental status  Coordination: Normal finger to nose and heel to shin  Psychiatric   Judgment and insight: Normal     Orientation to person, place, and time: Normal     Recent and remote memory: Intact  Results/Data  Falls Risk Assessment (Dx Z13 89 Screen for Neurologic Disorder) 65QEI9288 01:08PM Mor Brandt     Test Name Result Flag Reference   Falls Risk      No falls in the past year     Prime MD Depression Screening 36CEA0496 01:08PM Mor Brandt     Test Name Result Flag Reference   PRIME-MD Depression Screening 0/9 - Likely not MD     Depressed mood: No  Loss of interest: No       Procedure    Procedure:   Results: 20/20 in both eyes with corrective device, 20/20 in the right eye with corrective device, 20/20 in the left eye with corrective device      Health Management  DM2 (diabetes mellitus, type 2)   *VB - Eye Exam; every 1 year; Next Due: 98BZN8522; Overdue    Future Appointments    Date/Time Provider Specialty Site   01/27/2017 11:00 AM ANASTASIYA Braun  Internal Medicine [facility] [place]   01/03/2017 10:45 AM Via ANASTASIYA Hartmann   Pain Management [facility] SPINE     Signatures   Electronically signed by : ANASTASIYA Chapa ; Nov 14 2016  4:54PM EST                       (Author)

## 2018-01-24 NOTE — TELEPHONE ENCOUNTER
S/W pt  Pt states that she has been taking the Lorzone for 2 days at 3 times daily and it is not helping  Pt states, she is having a lot of spasms and is there anything else AS would recommend  Pt states that the Tizanidine did not help with the spasms either  Advised will make As aware and will c/b with recommendations

## 2018-01-24 NOTE — MISCELLANEOUS
Message   Recorded as Task   Date: 01/22/2018 01:39 PM, Created By: Taina Sanders   Task Name: Miscellaneous   Assigned To: [facility] [place] [facility],Team   Regarding Patient: Keny Rodriguez, Status: Active   Comment:    Dilma Gutierrez - 22 Jan 2018 1:39 PM     TASK CREATED  pt came in to  samples and is wondering if she should be stopping the other muscle relaxer? Please call patient to confirm  Thank you  Swapna Redding - 22 Jan 2018 2:11 PM     TASK IN PROGRESS   Swapna Redding - 22 Jan 2018 2:13 PM     TASK EDITED  CLARK    S/w pt who picked up lorzone samples today  Advised to stop tizanidine at this time  If no relief with lorzone, pt to cb for further recommendations  Checo Bo - 22 Jan 2018 3:09 PM     TASK REPLIED TO: Previously Assigned To [place]  Thanks  Active Problems    1  Acute lumbar radiculopathy (724 4) (M54 16)   2  Analgesic use (V58 69) (Z79 899)   3  Asthma, mild intermittent (493 90) (J45 20)   4  Cervical radiculopathy (723 4) (M54 12)   5  Depression (311) (F32 9)   6  DM2 (diabetes mellitus, type 2) (250 00) (E11 9)   7  Encounter for screening for cardiovascular disorders (V81 2) (Z13 6)   8  Encounter for screening mammogram for breast cancer (V76 12) (Z12 31)   9  Esophageal reflux (530 81) (K21 9)   10  Hyperlipidemia (272 4) (E78 5)   11  Hypertension (401 9) (I10)   12  Low back pain (724 2) (M54 5)   13  Lumbar canal stenosis (724 02) (M48 061)   14  Lumbar radiculopathy (724 4) (M54 16)   15  Morbid or severe obesity due to excess calories (278 01) (E66 01)   16  Myalgia and myositis (729 1)   17  Myofascial pain syndrome (729 1) (M79 1)   18  Neck pain (723 1) (M54 2)   19  Opioid dependence (304 00) (F11 20)   20  Osteoporosis screening (V82 81) (Z13 820)   21  Other acute sinusitis (461 8) (J01 80)   22  Pain of right heel (729 5) (M79 671)   23  Pain syndrome, chronic (338 4) (G89 4)   24  Sacroiliitis (720 2) (M46 1)   25   Spondylosis of lumbar region without myelopathy or radiculopathy (721 3) (M47 816)   26  Urine, incontinence, stress female (625 6) (N39 3)   27  Visit for screening mammogram (V76 12) (Z12 31)    Current Meds   1  Atorvastatin Calcium 10 MG Oral Tablet (Lipitor); TAKE 1 TABLET DAILY; Therapy: 86EYN4945 to (Evaluate:10Jan2019)  Requested for: 63ROC3381; Last   Rx:15Jan2018 Ordered   2  [employer]S Vitamin D3 1000 UNIT CAPS; TAKE 1 CAPSULE BY MOUTH TWICE DAILY; Therapy: 77OMD0378 to (Evaluate:17Fzw1300)  Requested for: 13YDS8013; Last   Rx:54Vyv2859 Ordered   3  Diclofenac Sodium 75 MG Oral Tablet Delayed Release; TAKE 1 TABLET Every twelve   hours PRN pain (with food); Therapy: 41DMA1471 to ([employer])  Requested for: 25CEK0581; Last   Rx:18Jan2018 Ordered   4  EPINEPHrine 0 3 MG/0 3ML Injection Solution Auto-injector; INJECT 0 3ML   INTRAMUSCULARLY AS DIRECTED; Therapy: 23VTQ8213 to (Last Rx:03Apr2017)  Requested for: 03Apr2017 Ordered   5  Glimepiride 1 MG Oral Tablet; Take 1 tablet twice daily; Therapy: 27EEF8587 to (Evaluate:12Oct2018)  Requested for: 86HZN6944; Last   Rx:15Jan2018 Ordered   6  Lisinopril-Hydrochlorothiazide 20-25 MG Oral Tablet; TAKE 1 TABLET DAILY; Therapy: 38AOG0170 to (Evaluate:12Oct2018)  Requested for: 18OSN8886; Last   Rx:15Jan2018 Ordered   7  Omeprazole 20 MG Oral Capsule Delayed Release; Therapy: 95TBG5124 to (Last Virg Hane)  Requested for: 14PHE4093 Ordered   8  OneTouch Ultra Blue In [employer]; test twice daily; Therapy: 14BVV5355 to (Tiajuana Kidney)  Requested for: 12TRJ6814; Last   Rx:15Jan2018 Ordered   9  TiZANidine HCl - 4 MG Oral Capsule; TAKE 1 CAPSULE 3 times daily PRN MUSCLE   SPASMS; Therapy: 44HFP1200 to (Evaluate:18Apr2018)  Requested for: 91IGM5877; Last   Rx:18Jan2018 Ordered   10  Topiramate 100 MG Oral Tablet; TAKE 1 TABLET TWICE DAILY; Therapy: 73WWW4818 to (Evaluate:18Apr2018)  Requested for: 78ZYD1339; Last    Rx:18Jan2018 Ordered   11   Vitamin D 1000 UNIT CAPS; Take 1 capsule twice daily; Therapy: 45Ihl4827 to (Last Rx:12Nov2014)  Requested for: 34JSK9173 Ordered    Allergies    1  Motrin TABS   2  Penicillins   3  MetFORMIN HCl TABS   4  Nortriptyline HCl CAPS   5  Tradjenta TABS   6  Aleve TABS   7  Augmentin TABS   8  Sulfa Drugs    9   Bee sting    Signatures   Electronically signed by : Lit Ireland, ; Jan 22 2018  3:20PM EST                       (Author)

## 2018-01-24 NOTE — TELEPHONE ENCOUNTER
S/W patient, advised  AS called in RX for Methocarbamol  Advised pt to continue to use ice/heat to the area and use OTC pain meds that she usually takes for pain  Clarified that pt understands it is too soon to get another trigger point injection at this time  Also, clarified that the patient knows her referral is for Dr Low Govern  Offer emotional support  Advised will make AS aware

## 2018-01-24 NOTE — MISCELLANEOUS
Message   Recorded as Task   Date: 01/22/2018 08:38 AM, Created By: Dede Joseph   Task Name: Miscellaneous   Assigned To: [address] [facility], [address] [facility] Clinical,Team   Regarding Patient: Jonathan Sloan, Status: Active   Comment:    CelesteJuan - 22 Jan 2018 8:38 AM     TASK CREATED  Pt is calling requesting another TPI for her low back pain  Pt states that yesterday her pain started to return in the same exact spot as prior to last TPI  The TPI on 12/20/17 provided excellent relief until yesterday  Is this pt Ok for a second TPI, not scheduled for a f/u until April  Juan Alonso - 22 Jan 2018 8:38 AM     TASK REASSIGNED: Previously Assigned To [facility] sched,Team   Benny Boston - 22 Jan 2018 9:59 AM     TASK REPLIED TO: Previously Assigned To [address] [facility]  The trigger point injections are only lasting her about one month  I am not going to repeat at this time  I think she would benefit from seeing a muscle doctory (physiatrist) to see why her muscles are not responding to the trigger point injections  Please have her make an appointment with Dr Gabrielle Ho to see if there is anything wrong from a muscle perspective before we repeat trigger point injections  Juan Alonso - 22 Jan 2018 10:21 AM     TASK REASSIGNED: Previously Assigned To [facility] sched,India Russo - 22 Jan 2018 10:57 AM     TASK EDITED  S/w pt  to inform her of above  Pt  verbalized understanding  Pt  stated that she is going to [place] on Monday and wondering what she can do for the back pain in the mean time till she sees Dr Nga Godinez  Pt  states that she is taking Diclofenac, Tylenol , and her Tizanidine with minimial relief  Please advise  **can you place the referral order for Dr Nga Godinez? Thanks  [address] - 22 Jan 2018 11:53 AM     TASK REPLIED TO: Previously Assigned To [address]  I believe she lives close to the [place] office   Can you give her some samples of Lorzone and Voltaren gel to apply to her back region  If those work for her, I can them in to her pharmacy before she leaves  India Osuna - 22 Jan 2018 1:15 PM     TASK EDITED  **FYI**    [address] [place] [address] office only has Lorzone samples  Will give pt  one box of Lorzone 750 mg samples, 20 tablets, take three times PRN  Lot # [id number]  Expiration date 10/22/19  Called pt  to explain, pt  is willing to try and will be in to p/u the samples today at the [address] [place] [address] office  Pt  to c/b if they are working before he trip so AS can order  Samples placed at med folder in [address] [place] [address] office   [facility] [employer] [address] - 22 Jan 2018 1:39 PM     TASK REPLIED TO: Previously Assigned To [place]  Thanks  Active Problems    1  Acute lumbar radiculopathy (724 4) (M54 16)   2  Analgesic use (V58 69) (Z79 899)   3  Asthma, mild intermittent (493 90) (J45 20)   4  Cervical radiculopathy (723 4) (M54 12)   5  Depression (311) (F32 9)   6  DM2 (diabetes mellitus, type 2) (250 00) (E11 9)   7  Encounter for screening for cardiovascular disorders (V81 2) (Z13 6)   8  Encounter for screening mammogram for breast cancer (V76 12) (Z12 31)   9  Esophageal reflux (530 81) (K21 9)   10  Hyperlipidemia (272 4) (E78 5)   11  Hypertension (401 9) (I10)   12  Low back pain (724 2) (M54 5)   13  Lumbar canal stenosis (724 02) (M48 061)   14  Lumbar radiculopathy (724 4) (M54 16)   15  Morbid or severe obesity due to excess calories (278 01) (E66 01)   16  Myalgia and myositis (729 1)   17  Myofascial pain syndrome (729 1) (M79 1)   18  Neck pain (723 1) (M54 2)   19  Opioid dependence (304 00) (F11 20)   20  Osteoporosis screening (V82 81) (Z13 820)   21  Other acute sinusitis (461 8) (J01 80)   22  Pain of right heel (729 5) (M79 671)   23  Pain syndrome, chronic (338 4) (G89 4)   24  Sacroiliitis (720 2) (M46 1)   25  Spondylosis of lumbar region without myelopathy or radiculopathy (721 3) (M47 816)   26  Urine, incontinence, stress female (625 6) (N39 3)   27   Visit for screening mammogram (V76 12) (Z12 31)    Current Meds   1  Atorvastatin Calcium 10 MG Oral Tablet (Lipitor); TAKE 1 TABLET DAILY; Therapy: 06WIJ6060 to (Evaluate:10Jan2019)  Requested for: 21NJP2752; Last   Rx:15Jan2018 Ordered   2  [facility] Vitamin D3 1000 UNIT CAPS; TAKE 1 CAPSULE BY MOUTH TWICE DAILY; Therapy: 25MYE1831 to (Evaluate:68Yzt3328)  Requested for: 10NBU2094; Last   Rx:88Qhx3154 Ordered   3  Diclofenac Sodium 75 MG Oral Tablet Delayed Release; TAKE 1 TABLET Every twelve   hours PRN pain (with food); Therapy: 78HEA3183 to (Adrienne Odonnell)  Requested for: 80QIG8708; Last   Rx:18Jan2018 Ordered   4  EPINEPHrine 0 3 MG/0 3ML Injection Solution Auto-injector; INJECT 0 3ML   INTRAMUSCULARLY AS DIRECTED; Therapy: 14EGN7112 to (Last Rx:03Apr2017)  Requested for: 03Apr2017 Ordered   5  Glimepiride 1 MG Oral Tablet; Take 1 tablet twice daily; Therapy: 93TDE0953 to (Evaluate:12Oct2018)  Requested for: 37PCB9553; Last   Rx:15Jan2018 Ordered   6  Lisinopril-Hydrochlorothiazide 20-25 MG Oral Tablet; TAKE 1 TABLET DAILY; Therapy: 92VCE0660 to (Evaluate:12Oct2018)  Requested for: 44KWB1878; Last   Rx:15Jan2018 Ordered   7  Omeprazole 20 MG Oral Capsule Delayed Release; Therapy: 83HWK9982 to (Trent Manzo)  Requested for: 74WCN7728 Ordered   8  OneTouch Ultra Blue In [employer]; test twice daily; Therapy: 22EYB0298 to (Samra Fish)  Requested for: 95AWO6309; Last   Rx:15Jan2018 Ordered   9  TiZANidine HCl - 4 MG Oral Capsule; TAKE 1 CAPSULE 3 times daily PRN MUSCLE   SPASMS; Therapy: 98UAJ8332 to (Evaluate:18Apr2018)  Requested for: 21KTY7273; Last   Rx:18Jan2018 Ordered   10  Topiramate 100 MG Oral Tablet; TAKE 1 TABLET TWICE DAILY; Therapy: 45NPX6776 to (Evaluate:18Apr2018)  Requested for: 94HQI0398; Last    Rx:18Jan2018 Ordered   11  Vitamin D 1000 UNIT CAPS; Take 1 capsule twice daily; Therapy: 73Asg2988 to (Last Rx:12Nov2014)  Requested for: 62LSB5766 Ordered    Allergies    1   Motrin TABS   2  Penicillins   3  MetFORMIN HCl TABS   4  Nortriptyline HCl CAPS   5  Tradjenta TABS   6  Aleve TABS   7  Augmentin TABS   8  Sulfa Drugs    9   Bee sting    Signatures   Electronically signed by : Katherine Gustafson, ; Jan 22 2018  2:15PM EST                       (Author)

## 2018-01-24 NOTE — TELEPHONE ENCOUNTER
S/W pt  Pt states that she has taken Methocarbomol for back pain but not for muscle spasms  Pt states that it did help the back pain  Pt also wants to know why she can't just have a trigger poin injection  Pt states she is leaving for [place] on Monday  Pt states that she does have an appointment with Dr Santos Yarbrough on February 16th   Advised will make As aware and call back with recommendations

## 2018-01-25 NOTE — TELEPHONE ENCOUNTER
Pt called and stated she is still having a back issue  Wouldn't give anymore information   Please call [phone]

## 2018-01-25 NOTE — TELEPHONE ENCOUNTER
Aware  Thanks  Please have patient make appointment with Dr Antonella Carpenter as soon as possible  Other than muscle relaxants, I do not want to change her medications  If she is having such bad spasms, she needs to see a muscle doctor  Methocarbamol was sent yesterday to pharmacy

## 2018-01-25 NOTE — ED PROVIDER NOTES
History  Chief Complaint   Patient presents with    Flank Pain     pt with left side flank pain/ back pain that wraps around to the front has been getting worse since Monday does also have back problems with stenosis      HPI    31-year-old female with history of hypertension, diabetes,  Chronic pain and chronic narcotic use presenting for evaluation of left-sided flank/back pain  Pain started on Monday, patient was doing nothing at the time  Pain started suddenly, no trauma history, was not bending over  States the pain is constant, starts in the left lower back and wraps to the left side  Feels like a tight spasm in the area  Nothing alleviates the pain, movement and walking worsens the pain  Patient complains of difficulty with ambulation  She denies any urinary retneion or incontinence, no bowel issues, no numbness or tingling  No recent illness, no fevers  Patient denies any history of cancer  Patient does have history of spinal stenosis, has nerve stimulator in place  Patient was in the hospital for the same pain this past November, and had a trigger point injection by [facility] which resolved the pain  Patient tried to call their office but cannot get the shot for another two months  Prior to Admission Medications   Prescriptions Last Dose Informant Patient Reported?  Taking?   acetaminophen (TYLENOL) 325 mg tablet   No Yes   Sig: Take 3 tablets by mouth 3 (three) times a day Please beware of high dose of tylenol which can cause liver dysfunction   atorvastatin (LIPITOR) 10 mg tablet   Yes Yes   Sig: Take by mouth daily taks in am    diclofenac (VOLTAREN) 75 mg EC tablet   Yes Yes   Sig: Take 50 mg by mouth 2 (two) times a day   glimepiride (AMARYL) 1 mg tablet   Yes Yes   Sig: Take by mouth 2 (two) times a day     lisinopril-hydrochlorothiazide (PRINZIDE,ZESTORETIC) 20-25 MG per tablet   Yes Yes   Sig: Take by mouth   topiramate (TOPAMAX) 100 mg tablet   Yes Yes   Sig: Take 100 mg by mouth daily at bedtime Takes 50mg in am and 100mg at hs   topiramate (TOPAMAX) 50 MG tablet   Yes Yes   Sig: Take 50 mg by mouth daily        Facility-Administered Medications: None       Past Medical History:   Diagnosis Date    Asthma     Chronic narcotic dependence (HCC)     Chronic pain syndrome     Depression     Diabetes mellitus ([facility] [address] )     Esophageal reflux     Hyperlipidemia     Hypertension     Lumbar radiculopathy     Lyme disease     Obesity     Opioid dependence ([facility] [address] [facility]     PPD positive     had treatment w/ INH 15 years ago    Vitamin D deficiency        Past Surgical History:   Procedure Laterality Date    APPENDECTOMY      CHOLECYSTECTOMY      JOINT REPLACEMENT      R knee, B/l total hip    OK REVISE/REMOVE SPINAL NEUROSTIM/ Left 12/13/2016    Procedure: REPLACEMENT BUTTOCK SPINAL CORD STIMULATOR IPG;  Surgeon: Gareth Ash MD;  Location: [facility];  Service: Neurosurgery    SHOULDER SURGERY      SPINAL CORD STIMULATOR IMPLANT         Family History   Problem Relation Age of Onset    Diabetes Mother     Cancer Mother     Cancer Father     Diabetes Maternal Grandmother     Cancer Paternal Uncle      I have reviewed and agree with the history as documented  Social History   Substance Use Topics    Smoking status: Current Every Day Smoker     Packs/day: 1 00     Years: 5 00    Smokeless tobacco: Never Used      Comment: pt desires to quit in January    Alcohol use No        Review of Systems    Constitutional: Negative for appetite change, chills and fever  HENT: Negative for congestion, rhinorrhea and sore throat  Eyes: Negative for photophobia, pain and visual disturbance  Respiratory: Negative for cough, chest tightness and shortness of breath  Cardiovascular: Negative for chest pain, palpitations and leg swelling  Gastrointestinal: Negative for abdominal pain, diarrhea, nausea and vomiting  Genitourinary: Negative for dysuria, flank pain and hematuria  Musculoskeletal: Positive for back pain, negative for neck pain and neck stiffness  Skin: Negative for color change, rash and wound  Neurological: Negative for dizziness, numbness and headaches  All other systems reviewed and are negative      Physical Exam  ED Triage Vitals [01/24/18 1609]   Temperature Pulse Respirations Blood Pressure SpO2   98 3 °F (36 8 °C) 95 20 132/70 97 %      Temp Source Heart Rate Source Patient Position - Orthostatic VS BP Location FiO2 (%)   Oral Monitor Sitting Left arm --      Pain Score       Worst Possible Pain           Orthostatic Vital Signs  Vitals:    01/24/18 1609 01/24/18 2005   BP: 132/70 141/76   Pulse: 95 84   Patient Position - Orthostatic VS: Sitting Sitting       Physical Exam  /76 (BP Location: Left arm)   Pulse 84   Temp 98 3 °F (36 8 °C) (Oral)   Resp 22   Wt 132 kg (292 lb)   LMP  (LMP Unknown)   SpO2 96%   BMI 47 13 kg/m²     General Appearance:  Alert, cooperative, no distress, appears stated age   Head:  Normocephalic, without obvious abnormality, atraumatic   Eyes:  PERRL, conjunctiva/corneas clear, EOM's intact       Nose: Nares normal, septum midline,mucosa normal, no drainage or sinus tenderness   Throat: Lips, mucosa, and tongue normal; teeth and gums normal   Neck: Supple, symmetrical, trachea midline, no adenopathy   Back:   Symmetric, no curvature, ROM normal, no CVA tenderness,   No signs of trauma, no midline tenderness, left lower paraspinal tenderness and tightness,  Nerve stimulator in place in left upper buttock   Lungs:   Clear to auscultation bilaterally, respirations unlabored   Heart:  Regular rate and rhythm, S1 and S2 normal, no murmur, rub, or gallop   Abdomen:   Soft, non-tender, bowel sounds active all four quadrants   Pelvic: Deferred   Extremities: Extremities normal, atraumatic, no cyanosis or edema   Pulses: 2+ and symmetric   Skin: Skin color, texture, turgor normal, no rashes or lesions Neurologic:          Psychiatric: Moves all extremities, sensation and strength in tact in all extremities  5/5 strength in all extremities  On to elicit DTRs at either patella  Patient able to ambulate  Normal mood and affect       ED Medications  Medications   lidocaine (LIDODERM) 5 % patch 1 patch (1 patch Transdermal Medication Applied 1/24/18 2055)   ketorolac (TORADOL) injection 15 mg (15 mg Intramuscular Given 1/24/18 2055)       Diagnostic Studies  Results Reviewed     Procedure Component Value Units Date/Time    Urine Microscopic [[id number]]  (Normal) Collected:  01/24/18 2000    Lab Status:  Final result Specimen:  Urine from Urine, Clean Catch Updated:  01/24/18 2022     RBC, UA None Seen /hpf      WBC, UA None Seen /hpf      Epithelial Cells None Seen /hpf      Bacteria, UA Occasional /hpf      Hyaline Casts, UA None Seen /lpf     ED Urine Macroscopic [[id number]]  (Abnormal) Collected:  01/24/18 2000    Lab Status:  Final result Specimen:  Urine Updated:  01/24/18 1956     Color, UA Yellow     Clarity, UA Clear     pH, UA 5 5     Leukocytes, UA Trace (A)     Nitrite, UA Negative     Protein, UA Negative mg/dl      Glucose, UA Negative mg/dl      Ketones, UA Negative mg/dl      Urobilinogen, UA 0 2 E U /dl      Bilirubin, UA Negative     Blood, UA Negative     Specific Gravity, UA <=1 005    Narrative:       CLINITEK RESULT                 No orders to display         Procedures  Procedures      Phone Consults  ED Phone Contact    ED Course  ED Course        MDM   Patient with acute on chronic back pain  Patient with likely muscle spasm /tightness  Will treat with Toradol and lidocaine patch  Patient to follow up with pain specialist tomorrow      CritCare Time    Disposition  Final diagnoses:   Back pain   Muscle spasm of back   Intractable low back pain     Time reflects when diagnosis was documented in both MDM as applicable and the Disposition within this note     Time User Action Codes Description Comment    1/24/2018  8:39 PM Joshua Poster Add [M54 9] Back pain     1/24/2018  8:39 PM Joshua Poster Add [W62 526] Muscle spasm of back     1/24/2018  9:02 PM Joshua Poster Add [M54 5] Intractable low back pain       ED Disposition     ED Disposition Condition Comment    Discharge  Leonard Marr discharge to home/self care  Condition at discharge: good          Follow-up Information     Follow up With Specialties Details Why Contact Info     your pain specialist  Go in 1 day          Discharge Medication List as of 1/24/2018  8:40 PM      CONTINUE these medications which have NOT CHANGED    Details   acetaminophen (TYLENOL) 325 mg tablet Take 3 tablets by mouth 3 (three) times a day Please beware of high dose of tylenol which can cause liver dysfunction, Starting Mon 11/27/2017, Print      atorvastatin (LIPITOR) 10 mg tablet Take by mouth daily taks in am , Starting Tue 10/25/2016, Historical Med      glimepiride (AMARYL) 1 mg tablet Take by mouth 2 (two) times a day  , Starting Tue 7/19/2016, Historical Med      lisinopril-hydrochlorothiazide (PRINZIDE,ZESTORETIC) 20-25 MG per tablet Take by mouth, Starting 2/23/2012, Until Discontinued, Historical Med      !! topiramate (TOPAMAX) 100 mg tablet Take 100 mg by mouth daily at bedtime Takes 50mg in am and 100mg at hs, Historical Med      !! topiramate (TOPAMAX) 50 MG tablet Take 50 mg by mouth daily  , Historical Med      cyclobenzaprine (FLEXERIL) 10 mg tablet Take 1 tablet by mouth 3 (three) times a day as needed for muscle spasms for up to 12 days, Starting Mon 11/27/2017, Until Sat 12/9/2017, Print      methocarbamol (ROBAXIN) 750 mg tablet Take 1 tablet by mouth 3 (three) times a day as needed for muscle spasms, Starting Wed 1/24/2018, Normal      venlafaxine (EFFEXOR XR) 75 mg 24 hr capsule Take 75 mg by mouth daily at bedtime  , Starting Mon 5/16/2011, Historical Med       !! - Potential duplicate medications found  Please discuss with provider  No discharge procedures on file  ED Provider  Attending physically available and evaluated Lynseyyamini Floresliss LEE managed the patient along with the ED Attending      Electronically Signed by         Que Mata MD  01/24/18 9871

## 2018-01-25 NOTE — TELEPHONE ENCOUNTER
S/W pt  Advised pt of the same  Pt stated she was on baclofen before and it didn't help  Pt stated she got the [address] sample on Tuesday which is helping  Pt stated Flexeril didn't help her  [facility]   Please advise

## 2018-01-25 NOTE — TELEPHONE ENCOUNTER
Would the patient prefer to try baclofen for her pain? The pain is primarily muscular so I think it would be beneficial for her to take a muscle relaxant

## 2018-01-25 NOTE — TELEPHONE ENCOUNTER
S/W pt  Pt stated she was in the ER yesterday and they gave her Toradol and a script for oxycodone  Pt stated she only took 1 pill of oxycodone at bedtime last night so she could sleep  Pt stated she can't get dressed  Pt has left, mid to lower back pain that wraps to left abdomen and pain was 10/10 when she woke up then pt took her scheduled prescribed medications  Pt stated heat helps alittle to she has to move  Advised pt to take her prescribed oxycodone as ordered  Pt wanted to see AS today  First available appt is Monday  Scheduled pt for [facility] on 1/29/18 at 8:15 w/ AS        AS- please document the methocarbamol that was called in as per previous task under the medication list

## 2018-01-25 NOTE — DISCHARGE INSTRUCTIONS
Please take off lidocaine patch tomorrow  By 12 o'clock noon  Go to your pain specialist tomorrow for follow-up  Chronic Back Pain   AMBULATORY CARE:   Chronic back pain  is back pain that lasts 3 months or longer  This may include pain that has not been controlled or does not improve with treatment  Your back pain may cause weakness or pain that spreads to your arms or legs  Seek immediate care for the following symptoms:   · Severe pain    · New signs of numbness or weakness, especially in your lower back, legs, arms, or genital area    · Unable to control of your bladder or bowel movements    · A fever or sudden weight loss  Contact your healthcare provider if:   · You have new or worsened pain  · You have questions or concerns about your condition or care  Treatment for chronic back pain  may include any of the following:  · NSAIDs , such as ibuprofen, help decrease swelling, pain, and fever  This medicine is available with or without a doctor's order  NSAIDs can cause stomach bleeding or kidney problems in certain people  If you take blood thinner medicine, always ask if NSAIDs are safe for you  Always read the medicine label and follow directions  Do not give these medicines to children under 10months of age without direction from your child's healthcare provider  · Acetaminophen  decreases pain  It is available without a doctor's order  Ask how much to take and how often to take it  Follow directions  Acetaminophen can cause liver damage if not taken correctly  · Prescription pain medicine  may be given  Ask how to take this medicine safely  · Muscle relaxers  help decrease muscle spasms and back pain  · Take your medicine as directed  Contact your healthcare provider if you think your medicine is not helping or if you have side effects  Tell him if you are allergic to any medicine  Keep a list of the medicines, vitamins, and herbs you take   Include the amounts, and when and why you take them  Bring the list or the pill bottles to follow-up visits  Carry your medicine list with you in case of an emergency  Manage your chronic back pain:   · Apply heat  on your back for 20 to 30 minutes every 2 hours for as many days as directed  Heat helps decrease pain and muscle spasms  · Stay active  as much as you can without causing more pain  Ask your healthcare provider what exercises are right for you  Do not sit or lie down for long periods  This could make your back pain worse  Avoid heavy lifting until your pain is gone  · Go to physical therapy as directed  A physical therapist teaches you exercises to help improve movement and strength, and to decrease pain  Follow up with your healthcare provider as directed: You may be referred to a sports medicine or spine specialist  Write down your questions so you remember to ask them during your visits  © 2017 [employer] Information is for End User's use only and may not be sold, redistributed or otherwise used for commercial purposes  All illustrations and images included in CareNotes® are the copyrighted property of A [employer] M [employer]  or [employer]  The above information is an  only  It is not intended as medical advice for individual conditions or treatments  Talk to your doctor, nurse or pharmacist before following any medical regimen to see if it is safe and effective for you  Back Pain   WHAT YOU NEED TO KNOW:   What should I know about back pain? Back pain is common  You may feel sore or stiff on one or both sides of your back  The pain may spread to your buttocks or thighs  What causes or increases my risk for back pain? Conditions that affect the spine, joints, or muscles can cause back pain  These may include arthritis, spinal stenosis (narrowing of the spinal column), muscle tension, or breakdown of the spinal discs   The following increase your risk of back pain:  · Repeated bending, lifting, or twisting, or lifting heavy items    · Injury from a fall or accident    · Lack of regular physical activity     · Obesity, pregnancy     · Smoking    · Aging    · Driving, sitting, or standing for long periods    · Bad posture while sitting or standing  How is back pain diagnosed? Your healthcare provider will ask if you have any medical conditions  He may ask if you have a history of back pain and how it started  He may watch you stand and walk, and check your range of motion  Show him where you feel pain and what makes it better or worse  Describe the pain, how bad it is, and how long it lasts  Tell him if your pain worsens at night or when you lie on your back  How is back pain treated? · NSAIDs  help decrease swelling and pain  This medicine is available with or without a doctor's order  NSAIDs can cause stomach bleeding or kidney problems in certain people  If you take blood thinner medicine, always ask your healthcare provider if NSAIDs are safe for you  Always read the medicine label and follow directions  · Acetaminophen  decreases pain  It is available without a doctor's order  Ask how much to take and how often to take it  Follow directions  Acetaminophen can cause liver damage if not taken correctly  · Prescription pain medicine  may be given  Ask your healthcare provider how to take this medicine safely  How do I manage my back pain? · Apply ice  on your back or affected area for 15 to 20 minutes every hour or as directed  Use an ice pack, or put crushed ice in a plastic bag  Cover it with a towel  Ice helps prevent tissue damage and decreases pain  · Apply heat  on your back or affected area for 20 to 30 minutes every 2 hours for as many days as directed  Heat helps decrease pain and muscle spasms  · Stay active  as much as you can without causing more pain  Bed rest could make your back pain worse  Avoid heavy lifting until your pain is gone    When should I contact my healthcare provider? · You have back pain that does not get better with rest and pain medicine  · You have a fever  · You have pain that worsens when you are on your back or when you rest     · You have pain that worsens when you cough or sneeze  · You lose weight without trying  · You have questions or concerns about your condition or care  When should I seek immediate care or call 911? · You have pain, numbness, or weakness in one or both legs  · Your pain becomes so severe that you cannot walk  · You cannot control your urine or bowel movements  · You have severe back pain with chest pain  · You have severe back pain, nausea, and vomiting  · You have severe back pain that spreads to your side or genital area  CARE AGREEMENT:   You have the right to help plan your care  Learn about your health condition and how it may be treated  Discuss treatment options with your caregivers to decide what care you want to receive  You always have the right to refuse treatment  The above information is an  only  It is not intended as medical advice for individual conditions or treatments  Talk to your doctor, nurse or pharmacist before following any medical regimen to see if it is safe and effective for you  © 2017 [employer]  Information is for End User's use only and may not be sold, redistributed or otherwise used for commercial purposes  All illustrations and images included in CareNotes® are the copyrighted property of [employer]  or [employer]  Muscle Spasm   WHAT YOU NEED TO KNOW:   A muscle spasm is a sudden contraction of any muscle or group of muscles  A muscle cramp is a painful muscle spasm  Muscle cramps commonly occur after intense exercise or during pregnancy  They may also be caused by certain medications, dehydration, low calcium or magnesium levels, or another medical condition     DISCHARGE INSTRUCTIONS:   Medicines: You may need the following:  · NSAIDs  help decrease swelling and pain or fever  This medicine is available with or without a doctor's order  NSAIDs can cause stomach bleeding or kidney problems in certain people  If you take blood thinner medicine, always ask your healthcare provider if NSAIDs are safe for you  Always read the medicine label and follow directions  · Take your medicine as directed  Contact your healthcare provider if you think your medicine is not helping or if you have side effects  Tell him of her if you are allergic to any medicine  Keep a list of the medicines, vitamins, and herbs you take  Include the amounts, and when and why you take them  Bring the list or the pill bottles to follow-up visits  Carry your medicine list with you in case of an emergency  Follow up with your healthcare provider as directed: You may need other tests or treatment  You may also be referred to a physical therapist or other specialist  Write down your questions so you remember to ask them during your visits  Self-care:   · Stretch  your muscle to help relieve the cramp  It may be helpful to keep your muscle in the stretched position until the cramp is gone  · Apply heat  to help decrease pain and muscle spasms  Apply heat on the area for 20 to 30 minutes every 2 hours for as many days as directed  · Apply ice  to help decrease swelling and pain  Ice may also help prevent tissue damage  Use an ice pack, or put crushed ice in a plastic bag  Cover it with a towel and place it on your muscle for 15 to 20 minutes every hour or as directed  · Drink more liquids  to help prevent muscle cramps caused by dehydration  Sports drinks may help replace electrolytes you lose through sweat during exercise  Ask your healthcare provider how much liquid to drink each day and which liquids are best for you       · Eat healthy foods , such as fruits, vegetables, whole grains, low-fat dairy products, and lean proteins (meat, beans, and fish)  If you are pregnant, ask your healthcare provider about foods that are high in magnesium and sodium  They may help to relieve cramps during pregnancy  · Massage your muscle  to help relieve the cramp  · Take frequent deep breaths  until the cramp feels better  Lie down while you take the deep breaths so you do not get dizzy or lightheaded  Contact your healthcare provider if:   · You have signs of dehydration, such as a headache, dark yellow urine, dry eyes or mouth, or a fast heartbeat  · You have questions or concerns about your condition or care  Return to the emergency department if:   · You have warmth, swelling, or redness in the cramping muscle  · You have frequent or unrelieved muscle cramps in several different muscles  · You have muscle cramps with numbness, tingling, and burning in your hands and feet  © 2017 [facility] [place][note 2] Information is for End User's use only and may not be sold, redistributed or otherwise used for commercial purposes  All illustrations and images included in CareNotes® are the copyrighted property of [employer]  or [address]  The above information is an  only  It is not intended as medical advice for individual conditions or treatments  Talk to your doctor, nurse or pharmacist before following any medical regimen to see if it is safe and effective for you

## 2018-01-25 NOTE — TELEPHONE ENCOUNTER
S/W pt  Advised pt of the same  Pt stated has an appt on 2/16/18 at 10am w/ Dr Leon Stallings and that was the earliest appt  Pt stated she was on methocarbamol before and it didn't help  Pt stated she didn't  the methocarbamol prescription yesterday and is not going to pick it up plus she has some left from before  Pt stated she is taking Lorazone samples 750mg, takes 1 in am and 1 in pm which is not helping  Pt stated the "ER doctor said that is like taking aspirin" referring to the diclofenac  Pt stated "the ER said the pain needs to be aggressive attacked "  Please advise  Favio Boston

## 2018-01-25 NOTE — ED ATTENDING ATTESTATION
I, Heide Epperson DO, saw and evaluated the patient  I have discussed the patient with the resident/non-physician practitioner and agree with the resident's/non-physician practitioner's findings, Plan of Care, and MDM as documented in the resident's/non-physician practitioner's note, except where noted  All available labs and Radiology studies were reviewed  At this point I agree with the current assessment done in the Emergency Department  I have conducted an independent evaluation of this patient a history and physical is as follows:      Critical Care Time  CritCare Time    Procedures     63 yr old fem to the ED with back pain since Monday  Hx of disk prob and spinal stenosis  Better with injections last time  Seeing pain specialist and has called several times  Meds adjusted but no exam   No cauda sx  Started on diclofenac and muscle relaxant wo improv  Exm; lower back pain in lumar area into sacral wo sensory changes or weakness  Neg straight leg  Pln: Pt with wt loss of 50# the past year  Encouraged to see primary  Will give short course narcotic to break painl

## 2018-01-25 NOTE — TELEPHONE ENCOUNTER
Please confirm about the Lorzone -- your last task to me stated that it was helping, but multiple previous tasks said it was not helping (in the same stream)

## 2018-01-26 NOTE — TELEPHONE ENCOUNTER
**FYI**    S/w pt she states that the [address] [place] [address] does not help either, she would just like to wait till Monday for her appt with [facility] and then decide the plan from there

## 2018-01-29 ENCOUNTER — TELEPHONE (OUTPATIENT)
Dept: OBGYN CLINIC | Facility: HOSPITAL | Age: 63
End: 2018-01-29

## 2018-01-29 ENCOUNTER — OFFICE VISIT (OUTPATIENT)
Dept: PAIN MEDICINE | Facility: CLINIC | Age: 63
End: 2018-01-29
Payer: MEDICARE

## 2018-01-29 VITALS
HEIGHT: 65 IN | SYSTOLIC BLOOD PRESSURE: 132 MMHG | TEMPERATURE: 97.5 F | HEART RATE: 108 BPM | BODY MASS INDEX: 48.82 KG/M2 | RESPIRATION RATE: 18 BRPM | DIASTOLIC BLOOD PRESSURE: 60 MMHG | WEIGHT: 293 LBS

## 2018-01-29 DIAGNOSIS — G89.29 CHRONIC LEFT-SIDED LOW BACK PAIN WITHOUT SCIATICA: ICD-10-CM

## 2018-01-29 DIAGNOSIS — G89.4 CHRONIC PAIN SYNDROME: Primary | ICD-10-CM

## 2018-01-29 DIAGNOSIS — M54.50 CHRONIC LEFT-SIDED LOW BACK PAIN WITHOUT SCIATICA: ICD-10-CM

## 2018-01-29 DIAGNOSIS — M79.18 MYOFASCIAL PAIN SYNDROME: ICD-10-CM

## 2018-01-29 PROCEDURE — 99214 OFFICE O/P EST MOD 30 MIN: CPT | Performed by: ANESTHESIOLOGY

## 2018-01-29 RX ORDER — CHLORZOXAZONE 750 MG/1
750 TABLET ORAL 3 TIMES DAILY PRN
Qty: 90 TABLET | Refills: 2 | Status: SHIPPED | OUTPATIENT
Start: 2018-01-29 | End: 2018-03-06

## 2018-01-29 NOTE — LETTER
January 29, 2018     Rilla Oppenheim, MD  [facility]    Patient: Dilshad Ramesh   YOB: 1955   Date of Visit: 1/29/2018       Dear Dr Enma Quiroz: Thank you for referring Easton Bautista to me for evaluation  Below are my notes for this consultation  If you have questions, please do not hesitate to call me  I look forward to following your patient along with you  Sincerely,        Aguila Urena MD        CC: No Recipients  Aguila Urena MD  1/29/2018  8:55 AM  Sign at close encounter  Pain Medicine Follow-Up Note    Assessment:  1  Chronic pain syndrome    2  Chronic left-sided low back pain without sciatica    3  Myofascial pain syndrome        Plan:  My impressions and treatment recommendations were discussed in detail with the patient who verbalized understanding and had no further questions  The patient reports that her pain has returned in her low back region  She did have excellent relief following a December 20, 2017 trigger point injection to that region  She states that her pain returned and that the trigger point injections I had performed on her only last about 4 weeks  As such, I had asked her to follow up with Dr Soni Low and she has made an appointment  In the interim, I will trial the patient on a TENS unit and continue her on lower zone 750 mg 1/3 to 1 tablet up to 3 times daily as needed for muscle spasms  Side effects were reviewed with the patient  Follow-up is planned in 4 weeks  Discharge instructions were provided  I personally saw and examined the patient and I agree with the above discussed plan of care  History of Present Illness:    Dilshad Ramesh is a 63 y o  female who presents to [facility] for interval re-evaluation of the above stated pain complaints  The patient has a past medical and chronic pain history as outlined in the assessment section  She was last seen on January 18, 2018      At today's office visit, the patient's pain score is 10/10 on the verbal numerical pain rating scale  Patient states that her pain is worse in the morning, evening, and night and constant in nature  She describes the quality of her pain as sharp    She reports that her muscular pain has returned following the trigger point injections and does state that the trigger point injections are only lasting her about 1 months time  She is wondering if something else can be done in regards to her pain  Other than as stated above, the patient denies any interval changes in medications, medical condition, mental condition, symptoms, or allergies since the last office visit  Review of Systems:    Review of Systems   Respiratory: Negative for shortness of breath  Cardiovascular: Negative for chest pain  Gastrointestinal: Negative for constipation, diarrhea, nausea and vomiting  Musculoskeletal: Positive for gait problem (difficulty walking, decreased range of motion)  Negative for arthralgias, joint swelling and myalgias  Skin: Negative for rash  Neurological: Negative for dizziness, seizures and weakness  All other systems reviewed and are negative          Past Medical History:   Diagnosis Date    Asthma     Chronic narcotic dependence ([facility] [address] )     Chronic pain syndrome     Depression     Diabetes mellitus (HCC)     Esophageal reflux     Hyperlipidemia     Hypertension     Lumbar radiculopathy     Lyme disease     Obesity     Opioid dependence ([facility] [address] )     PPD positive     had treatment w/ INH 15 years ago    Vitamin D deficiency        Past Surgical History:   Procedure Laterality Date    APPENDECTOMY      CHOLECYSTECTOMY      JOINT REPLACEMENT      R knee, B/l total hip    OR REVISE/REMOVE SPINAL NEUROSTIM/ Left 12/13/2016    Procedure: REPLACEMENT BUTTOCK SPINAL CORD STIMULATOR IPG;  Surgeon: Desmond Collier MD;  Location:  MAIN OR;  Service: Neurosurgery    SHOULDER SURGERY  SPINAL CORD STIMULATOR IMPLANT         Family History   Problem Relation Age of Onset    Diabetes Mother     Cancer Mother     Cancer Father     Diabetes Maternal Grandmother     Cancer Paternal Uncle        Social History     Occupational History    Not on file       Social History Main Topics    Smoking status: Current Every Day Smoker     Packs/day: 1 00     Years: 5 00    Smokeless tobacco: Never Used      Comment: pt desires to quit in January    Alcohol use No    Drug use: No    Sexual activity: Not on file         Current Outpatient Prescriptions:     acetaminophen (TYLENOL) 325 mg tablet, Take 3 tablets by mouth 3 (three) times a day Please beware of high dose of tylenol which can cause liver dysfunction, Disp: 30 tablet, Rfl: 0    atorvastatin (LIPITOR) 10 mg tablet, Take by mouth daily taks in am , Disp: , Rfl:     Chlorzoxazone (LORZONE) 750 MG TABS, Take 1 tablet by mouth 3 (three) times a day as needed (MUSCLE SPASMS) 1/3 TO 1 TABLET PRN MUSCLE SPASMS, Disp: 90 tablet, Rfl: 2    diclofenac (VOLTAREN) 75 mg EC tablet, Take 50 mg by mouth 2 (two) times a day, Disp: , Rfl:     glimepiride (AMARYL) 1 mg tablet, Take by mouth 2 (two) times a day  , Disp: , Rfl:     lisinopril-hydrochlorothiazide (PRINZIDE,ZESTORETIC) 20-25 MG per tablet, Take by mouth, Disp: , Rfl:     Nerve Stimulator (STANDARD TENS) ISAEL, by Does not apply route 4 (four) times a day as needed (MUSCLE SPASMS), Disp: 1 Device, Rfl: 0    topiramate (TOPAMAX) 100 mg tablet, Take 100 mg by mouth daily at bedtime Takes 50mg in am and 100mg at hs, Disp: , Rfl:     topiramate (TOPAMAX) 50 MG tablet, Take 50 mg by mouth daily  , Disp: , Rfl:     Allergies   Allergen Reactions    Bee Venom Anaphylaxis    Other Other (See Comments)     Stainless steel and surgical steel  *Severe blistering*    Aleve [Naproxen Sodium] Hives    Augmentin [Amoxicillin-Pot Clavulanate] Hives    Metformin And Related Hives    Morphine And Related Hives    Motrin [Ibuprofen] Hives    Nortriptyline Hives    Penicillins Hives    Soy Lecithin [Lecithin] Hives    Sulfa Antibiotics Hives    Tradjenta [Linagliptin] Hives       Physical Exam:    /60 (BP Location: Left arm, Patient Position: Sitting, Cuff Size: Large)   Pulse (!) 108   Temp 97 5 °F (36 4 °C) (Oral)   Resp 18   Ht 5' 5" (1 651 m)   Wt 133 kg (294 lb 3 2 oz)   LMP  (LMP Unknown)   BMI 48 96 kg/m²      Constitutional:normal, well developed, well nourished, alert, in no distress and non-toxic and no overt pain behavior  Eyes:anicteric  HEENT:grossly intact  Neck:supple, symmetric, trachea midline and no masses   Pulmonary:even and unlabored  Cardiovascular:No edema or pitting edema present  Skin:Normal without rashes or lesions and well hydrated  Psychiatric:Mood and affect appropriate  Neurologic:Cranial Nerves II-XII grossly intact  Musculoskeletal:normal     Lumbar Spine Exam    Appearance:   Normal lordosis  Palpation/Tenderness:  left lumbar paraspinal tenderness  Sensory:  no sensory deficits noted  Range of Motion:  Full range of motion with no pain or limitations in flexion, extension, lateral flexion and rotation      Imaging  No orders to display         No orders of the defined types were placed in this encounter    [address] [facility]

## 2018-01-29 NOTE — TELEPHONE ENCOUNTER
Please let patient know that chlorzoxazone is not being covered  I can trial her on any of baclofen, cyclobenzaprine, methocarbamol, tizanidine if she wishes to trial them  She can also trial the TENS unit that I have given her the prescription for at today's office visit

## 2018-01-29 NOTE — PROGRESS NOTES
Pain Medicine Follow-Up Note    Assessment:  1  Chronic pain syndrome    2  Chronic left-sided low back pain without sciatica    3  Myofascial pain syndrome        Plan:  My impressions and treatment recommendations were discussed in detail with the patient who verbalized understanding and had no further questions  The patient reports that her pain has returned in her low back region  She did have excellent relief following a December 20, 2017 trigger point injection to that region  She states that her pain returned and that the trigger point injections I had performed on her only last about 4 weeks  As such, I had asked her to follow up with Dr Paramjit Sprague and she has made an appointment  In the interim, I will trial the patient on a TENS unit and continue her on lower zone 750 mg 1/3 to 1 tablet up to 3 times daily as needed for muscle spasms  Side effects were reviewed with the patient  Follow-up is planned in 4 weeks  Discharge instructions were provided  I personally saw and examined the patient and I agree with the above discussed plan of care  History of Present Illness:    Chandu Becker is a 63 y o  female who presents to [facility] for interval re-evaluation of the above stated pain complaints  The patient has a past medical and chronic pain history as outlined in the assessment section  She was last seen on January 18, 2018  At today's office visit, the patient's pain score is 10/10 on the verbal numerical pain rating scale  Patient states that her pain is worse in the morning, evening, and night and constant in nature  She describes the quality of her pain as sharp    She reports that her muscular pain has returned following the trigger point injections and does state that the trigger point injections are only lasting her about 1 months time  She is wondering if something else can be done in regards to her pain      Other than as stated above, the patient denies any interval changes in medications, medical condition, mental condition, symptoms, or allergies since the last office visit  Review of Systems:    Review of Systems   Respiratory: Negative for shortness of breath  Cardiovascular: Negative for chest pain  Gastrointestinal: Negative for constipation, diarrhea, nausea and vomiting  Musculoskeletal: Positive for gait problem (difficulty walking, decreased range of motion)  Negative for arthralgias, joint swelling and myalgias  Skin: Negative for rash  Neurological: Negative for dizziness, seizures and weakness  All other systems reviewed and are negative  Past Medical History:   Diagnosis Date    Asthma     Chronic narcotic dependence ([facility] [address] )     Chronic pain syndrome     Depression     Diabetes mellitus (HCC)     Esophageal reflux     Hyperlipidemia     Hypertension     Lumbar radiculopathy     Lyme disease     Obesity     [facility]     PPD positive     had treatment w/ INH 15 years ago    Vitamin D deficiency        Past Surgical History:   Procedure Laterality Date    APPENDECTOMY      CHOLECYSTECTOMY      JOINT REPLACEMENT      R knee, B/l total hip    CA REVISE/REMOVE SPINAL NEUROSTIM/ Left 12/13/2016    Procedure: REPLACEMENT BUTTOCK SPINAL CORD STIMULATOR IPG;  Surgeon: Sherry Muñoz MD;  Location: [facility];  Service: Neurosurgery    SHOULDER SURGERY      SPINAL CORD STIMULATOR IMPLANT         Family History   Problem Relation Age of Onset    Diabetes Mother     Cancer Mother     Cancer Father     Diabetes Maternal Grandmother     Cancer Paternal Uncle        Social History     Occupational History    Not on file       Social History Main Topics    Smoking status: Current Every Day Smoker     Packs/day: 1 00     Years: 5 00    Smokeless tobacco: Never Used      Comment: pt desires to quit in January    Alcohol use No    Drug use: No    Sexual activity: Not on file Current Outpatient Prescriptions:     acetaminophen (TYLENOL) 325 mg tablet, Take 3 tablets by mouth 3 (three) times a day Please beware of high dose of tylenol which can cause liver dysfunction, Disp: 30 tablet, Rfl: 0    atorvastatin (LIPITOR) 10 mg tablet, Take by mouth daily taks in am , Disp: , Rfl:     Chlorzoxazone (LORZONE) 750 MG TABS, Take 1 tablet by mouth 3 (three) times a day as needed (MUSCLE SPASMS) 1/3 TO 1 TABLET PRN MUSCLE SPASMS, Disp: 90 tablet, Rfl: 2    diclofenac (VOLTAREN) 75 mg EC tablet, Take 50 mg by mouth 2 (two) times a day, Disp: , Rfl:     glimepiride (AMARYL) 1 mg tablet, Take by mouth 2 (two) times a day  , Disp: , Rfl:     lisinopril-hydrochlorothiazide (PRINZIDE,ZESTORETIC) 20-25 MG per tablet, Take by mouth, Disp: , Rfl:     Nerve Stimulator (STANDARD TENS) ISAEL, by Does not apply route 4 (four) times a day as needed (MUSCLE SPASMS), Disp: 1 Device, Rfl: 0    topiramate (TOPAMAX) 100 mg tablet, Take 100 mg by mouth daily at bedtime Takes 50mg in am and 100mg at hs, Disp: , Rfl:     topiramate (TOPAMAX) 50 MG tablet, Take 50 mg by mouth daily  , Disp: , Rfl:     Allergies   Allergen Reactions    Bee Venom Anaphylaxis    Other Other (See Comments)     Stainless steel and surgical steel  *Severe blistering*    Aleve [Naproxen Sodium] Hives    Augmentin [Amoxicillin-Pot Clavulanate] Hives    Metformin And Related Hives    Morphine And Related Hives    Motrin [Ibuprofen] Hives    Nortriptyline Hives    Penicillins Hives    Soy Lecithin [Lecithin] Hives    Sulfa Antibiotics Hives    Tradjenta [Linagliptin] Hives       Physical Exam:    /60 (BP Location: Left arm, Patient Position: Sitting, Cuff Size: Large)   Pulse (!) 108   Temp 97 5 °F (36 4 °C) (Oral)   Resp 18   Ht 5' 5" (1 651 m)   Wt 133 kg (294 lb 3 2 oz)   LMP  (LMP Unknown)   BMI 48 96 kg/m²     Constitutional:normal, well developed, well nourished, alert, in no distress and non-toxic and no overt pain behavior  Eyes:anicteric  HEENT:grossly intact  Neck:supple, symmetric, trachea midline and no masses   Pulmonary:even and unlabored  Cardiovascular:No edema or pitting edema present  Skin:Normal without rashes or lesions and well hydrated  Psychiatric:Mood and affect appropriate  Neurologic:Cranial Nerves II-XII grossly intact  Musculoskeletal:normal     Lumbar Spine Exam    Appearance:   Normal lordosis  Palpation/Tenderness:  left lumbar paraspinal tenderness  Sensory:  no sensory deficits noted  Range of Motion:  Full range of motion with no pain or limitations in flexion, extension, lateral flexion and rotation      Imaging  No orders to display         No orders of the defined types were placed in this encounter    [facility]

## 2018-01-29 NOTE — TELEPHONE ENCOUNTER
Pt 's Rubi Show was denied by her insurance, pt was in today for OV  Looks like pt has tried and failed other muscle relaxers  Any suggestions?

## 2018-01-29 NOTE — TELEPHONE ENCOUNTER
S/w [facility] pharmacy on [address], they said the Chlorzoxazone is the generic version  He said that she has tried many muscle relaxer's with no relief, which we are aware  Please advise

## 2018-01-30 NOTE — PROGRESS NOTES
Assessment    1  Encounter for preventive health examination (V70 0) (Z00 00)    Plan  DM2 (diabetes mellitus, type 2)    · Glimepiride 1 MG Oral Tablet; Take 1 tablet twice daily   · OneTouch Ultra Blue In Vitro Strip; test twice daily  Hyperlipidemia    · Atorvastatin Calcium 10 MG Oral Tablet (Lipitor); TAKE 1 TABLET DAILY  Hypertension    · Lisinopril-Hydrochlorothiazide 20-25 MG Oral Tablet; TAKE 1 TABLET DAILY    Discussion/Summary  Impression: Subsequent Annual Wellness Visit  Cardiovascular screening and counseling: screening is current  Diabetes screening and counseling: screening is current  Colorectal cancer screening and counseling: screening is current  Breast cancer screening and counseling: screening is current  Cervical cancer screening and counseling: the risks and benefits of screening were discussed  Osteoporosis screening and counseling: screening is current  Abdominal aortic aneurysm screening and counseling: screening is current  Glaucoma screening and counseling: ophthalmologist referral    Hepatitis C Screening: the patient was counseled on Hepatitis C screening  Immunizations: influenza vaccine is up to date this year, influenza vaccination is recommended annually, the lifetime pneumococcal vaccine has been completed, hepatitis B prevention counseling was provided, the risks and benefits of the Zostavax vaccine were discussed with the patient and Tdap vaccination up to date  Advance Directive Planning: complete and up to date, paperwork and instructions were given to the patient  Advice and education were given regarding fall risk reduction, increasing physical activity, nutrition (non-diabetic) and weight loss  Chief Complaint  Pt here for Medicare Wellness Exam      History of Present Illness  The patient is being seen for the subsequent annual wellness visit  Medicare Screening and Risk Factors   Hospitalizations: she has been previously hospitalizied     Once per lifetime medicare screening tests: ECG  Medicare Screening Tests Risk Questions   Abdominal aortic aneurysm risk assessment: none indicated  Osteoporosis risk assessment: , female gender and over 48years of age  HIV risk assessment: none indicated  Drug and Alcohol Use: The patient is a current cigarette smoker  The patient reports occasional alcohol use  Diet and Physical Activity: Current diet includes low carbohydrate food choices  She is sedentary  Mood Disorder and Cognitive Impairment Screening: She denies feeling down, depressed, or hopeless over the past two weeks  She denies feeling little interest or pleasure in doing things over the past two weeks  Functional Ability/Level of Safety: Hearing is normal bilaterally  The patient is currently able to do activities of daily living without limitations, able to do instrumental activities of daily living without limitations, able to participate in social activities without limitations and able to drive without limitations  Activities of daily living details: does not need help using the phone, no transportation help needed, does not need help shopping, no meal preparation help needed, does not need help doing housework, does not need help doing laundry, does not need help managing medications and does not need help managing money  Fall risk factors: The patient fell 0 times in the past 12 months  Advance Directives: Advance directives: no living will, no durable power of  for health care directives and no advance directives  I disagree with the patient's decisions  Co-Managers and Medical Equipment/Suppliers: See Patient Care Team     Last Medicare Wellness Visit Information was reviewed, patient interviewed and updates made to the record today  Falls Risk: The patient fell 0 times in the past 12 months   Urinary Incontinence Symptoms includes: urinary urgency    Date of last glaucoma screen was 2016      Patient Care Team    Care Team Member Role Specialty Office Number   Katherine Raosteven DA SILVA  Specialist Neurosurgery [phone]   Garett DA SILVA  Pain Management [phone]   Linda WONG  Pain Management [phone]     Review of Systems    Constitutional: negative  Head and Face: negative  Eyes: negative  ENT: negative  Cardiovascular: negative  Respiratory: negative  Gastrointestinal: negative  Genitourinary: urinary urgency  Musculoskeletal: Increased back pain  Integumentary and Breasts: negative  Neurological: negative  Psychiatric: negative  Endocrine: negative  Hematologic and Lymphatic: negative  Over the past 2 weeks, how often have you been bothered by the following problems? 1 ) Little interest or pleasure in doing things? Not at all    2 ) Feeling down, depressed or hopeless? Not at all    3 ) Trouble falling asleep or sleeping too much? Not at all    4 ) Feeling tired or having little energy? Not at all    5 ) Poor appetite or overeating? Not at all    6 ) Feeling bad about yourself, or that you are a failure, or have let yourself or your family down? Not at all    7 ) Trouble concentrating on things, such as reading a newspaper or watching television? Not at all    8 ) Moving or speaking so slowly that other people could have noticed, or the opposite, moving or speaking faster than usual? Not at all  How difficult have these problems made it for you to do your work, take care of things at home, or get along with people? Not at all  Score 0      Active Problems    1  Acute lumbar radiculopathy (724 4) (M54 16)   2  Analgesic use (V58 69) (Z79 899)   3  Asthma (493 90) (J45 909)   4  Cervical radiculopathy (723 4) (M54 12)   5  Depression (311) (F32 9)   6  DM2 (diabetes mellitus, type 2) (250 00) (E11 9)   7  Encounter for screening for cardiovascular disorders (V81 2) (Z13 6)   8  Encounter for screening mammogram for breast cancer (V76 12) (Z12 31)   9  Esophageal reflux (530 81) (K21 9)   10  Hyperlipidemia (272 4) (E78 5)   11  Hypertension (401 9) (I10)   12  Low back pain (724 2) (M54 5)   13  Lumbar canal stenosis (724 02) (M48 061)   14  Lumbar radiculopathy (724 4) (M54 16)   15  Morbid or severe obesity due to excess calories (278 01) (E66 01)   16  Myalgia and myositis (729 1)   17  Myofascial pain syndrome (729 1) (M79 1)   18  Neck pain (723 1) (M54 2)   19  Opioid dependence (304 00) (F11 20)   20  Osteoporosis screening (V82 81) (Z13 820)   21  Other acute sinusitis (461 8) (J01 80)   22  Pain of right heel (729 5) (M79 671)   23  Pain syndrome, chronic (338 4) (G89 4)   24  Sacroiliitis (720 2) (M46 1)   25  Spondylosis of lumbar region without myelopathy or radiculopathy (721 3) (M47 816)   26  Urine, incontinence, stress female (625 6) (N39 3)   27  Visit for screening mammogram (V76 12) (Z12 31)    Past Medical History    1  History of Allergic reaction to bee sting (989 5,E905 3) (T63 441A)   2  History of Lyme disease (V12 09) (Z86 19)    The active problems and past medical history were reviewed and updated today  Surgical History    1  History of Appendectomy   2  History of Cholecystectomy   3  History of Knee Replacement   4  History of Shoulder Surgery   5  History of Spinal Stereotaxis Stimulation Of Cord   6  History of Total Hip Replacement    Family History  Mother    1  Family history of Diabetes Mellitus (V18 0)  Father    2  Family history of Cancer  Family History    3  Family history of Brain Cancer (V16 8)   4  Family history of Breast Cancer (V16 3)   5  Family history of Cervical Cancer   6  Family history of Diabetes Mellitus (V18 0)   7  Family history of Hypertension (V17 49)   8  Family history of Ovarian Cancer (V16 41)   9   Family history of Stroke Complications    Social History    · Denied: History of Alcohol Use (History)   · Current every day smoker (305 1) (F17 200)   · Denied: History of Drug Use (305 90)  The social history was reviewed and updated today  The social history was reviewed and is unchanged  Current Meds   1  Atorvastatin Calcium 10 MG Oral Tablet; TAKE 1 TABLET DAILY; Therapy: 48HBH3211 to (Soco Brilliant)  Requested for: 09VUY5518; Last   Rx:06Nov2017 Ordered   2  [facility] Vitamin D3 1000 UNIT CAPS; TAKE 1 CAPSULE BY MOUTH TWICE DAILY; Therapy: 86NAJ4621 to (Evaluate:09Aug2015)  Requested for: 24SKX2144; Last   Rx:22Nqn4071 Ordered   3  EPINEPHrine 0 3 MG/0 3ML Injection Solution Auto-injector; INJECT 0 3ML   INTRAMUSCULARLY AS DIRECTED; Therapy: 56SJS2862 to (Last Rx:03Apr2017)  Requested for: 03Apr2017 Ordered   4  Glimepiride 1 MG Oral Tablet; Take 1 tablet twice daily; Therapy: 12Xif2630 to (Evaluate:14Nov2017)  Requested for: 70KVW7067; Last   Rx:88Ngg0662 Ordered   5  Lisinopril-Hydrochlorothiazide 20-25 MG Oral Tablet; TAKE 1 TABLET DAILY; Therapy: 60Cqh3490 to (Evaluate:03Sep2018)  Requested for: 57TAN7241; Last   Rx:62Wlo3205 Ordered   6  Omeprazole 20 MG Oral Capsule Delayed Release; Therapy: 94IJG1621 to (Last Heidy Cull)  Requested for: 31TNW9331 Ordered   7  OneTouch Ultra Blue In [employer]; test twice daily; Therapy: 50FMS9062 to (Evaluate:12Nov2017)  Requested for: 26Apr2017; Last   Rx:26Apr2017 Ordered   8  TiZANidine HCl - 4 MG Oral Capsule; TAKE 1 CAPSULE 3 times daily PRN MUSCLE   SPASMS; Therapy: 92NDV3215 to (Evaluate:07Jan2018)  Requested for: 79GSD6824; Last   Rx:23Pwi1012 Ordered   9  TiZANidine HCl - 4 MG Oral Tablet; Therapy: 89LFC2149 to  Requested for: 74OIN7715; Status: ACTIVE - Renewal Denied   Recorded   10  Topiramate 50 MG Oral Tablet; TAKE 2 TABLETS TWICE DAILY; Therapy: 95SZC5369 to (Evaluate:05Jan2018)  Requested for: 29UKN6801; Last    Rx:06Nov2017 Ordered   11  Venlafaxine HCl ER 75 MG Oral Capsule Extended Release 24 Hour; TAKE 1 CAPSULE    DAILY;     Therapy: 43GFJ0719 to 61 23 68)  Requested for: 70RRH7404; Last    BP:60AIS9237 Ordered   12  Vitamin D 1000 UNIT CAPS; Take 1 capsule twice daily; Therapy: 42RTA0178 to (Last Rx:2014)  Requested for: 79LIU5451 Ordered    The medication list was reviewed and updated today  Allergies    1  Motrin TABS   2  Penicillins   3  MetFORMIN HCl TABS   4  Nortriptyline HCl CAPS   5  Tradjenta TABS   6  Aleve TABS   7  Augmentin TABS   8  Sulfa Drugs    9  Bee sting    Immunizations  Influenza --- Valentín Nik: [id number] Kin-Dec-2015; Aguila Reis: 26-Sep-2016; Series4:  2017   PCV --- Series1: 2017   PPSV --- Valentín Nik: 02-Dec-2015   Tdap --- Series1: 2015     Vitals  Signs   Recorded: 62EMZ3325 10:08AM   Temperature: 98 2 F, Tympanic  Heart Rate: 92  Systolic: 228, LUE, Sitting  Diastolic: 66, LUE, Sitting  Height: 5 ft 6 in  Weight: 292 lb   BMI Calculated: 47 13  BSA Calculated: 2 35    Procedure    Procedure: Visual Acuity Test    Indication: routine screening  Inforrmation supplied by  a Snellen chart  Results: 20/20 in both eyes with corrective device, 20/20 in the right eye with corrective device, 20/20 in the left eye with corrective device      Health Management  DM2 (diabetes mellitus, type 2)   *VB - Eye Exam; every 1 year; Next Due: 61CFG1712; Overdue    Future Appointments    Date/Time Provider Specialty Site   2018 11:00 AM ANASTASIYA Jarrett  Internal Medicine [address] INTERNAL MEDICINE [place]   2018 11:00 AM ANASTASIYA Lilly   Pain Management [facility] SPINE     Signatures   Electronically signed by : ANASTASIYA Sandhu ; Shorty 15 2018  1:11PM EST                       (Author)

## 2018-02-06 NOTE — TELEPHONE ENCOUNTER
Unable to reach pt  Pt has SOVS scheduled on 4/19/18 w/ [facility]? Does [facility] want to send a can not reach you letter?

## 2018-02-13 RX ORDER — DICLOFENAC SODIUM 75 MG/1
TABLET, DELAYED RELEASE ORAL
Qty: 60 TABLET | Refills: 0 | OUTPATIENT
Start: 2018-02-13

## 2018-02-13 NOTE — TELEPHONE ENCOUNTER
S/W Pierre Zavala at [phone] [facility] her of the same  She stated they got back it was denied and they already contact the pt  Advised her [facility] policy requires the pt calls to request a medication refill

## 2018-02-16 ENCOUNTER — OFFICE VISIT (OUTPATIENT)
Dept: PAIN MEDICINE | Facility: CLINIC | Age: 63
End: 2018-02-16
Payer: MEDICARE

## 2018-02-16 VITALS
SYSTOLIC BLOOD PRESSURE: 120 MMHG | WEIGHT: 289 LBS | HEART RATE: 108 BPM | DIASTOLIC BLOOD PRESSURE: 70 MMHG | HEIGHT: 65 IN | BODY MASS INDEX: 48.15 KG/M2

## 2018-02-16 DIAGNOSIS — M46.1 SACROILIITIS (HCC): ICD-10-CM

## 2018-02-16 DIAGNOSIS — M79.18 MYOFASCIAL PAIN SYNDROME: Primary | ICD-10-CM

## 2018-02-16 PROBLEM — M24.552 HIP FLEXOR TIGHTNESS, LEFT: Status: ACTIVE | Noted: 2018-02-16

## 2018-02-16 PROBLEM — M48.061 LUMBAR SPINAL STENOSIS: Status: ACTIVE | Noted: 2018-02-16

## 2018-02-16 PROBLEM — M24.551: Status: ACTIVE | Noted: 2018-02-16

## 2018-02-16 PROCEDURE — 99214 OFFICE O/P EST MOD 30 MIN: CPT | Performed by: PHYSICAL MEDICINE & REHABILITATION

## 2018-02-16 RX ORDER — VENLAFAXINE 75 MG/1
75 TABLET ORAL 2 TIMES DAILY
COMMUNITY
End: 2018-03-06

## 2018-02-16 RX ORDER — BACLOFEN 10 MG/1
10 TABLET ORAL 3 TIMES DAILY
Qty: 90 TABLET | Refills: 0 | Status: SHIPPED | OUTPATIENT
Start: 2018-02-16 | End: 2018-10-11

## 2018-02-16 NOTE — PROGRESS NOTES
Assessment/Plan:      Diagnoses and all orders for this visit:    Myofascial pain syndrome          Subjective:     Patient ID: Smita Cross is a 63 y o  female  HPI  2/16 consult from Pain Medicine  64 yo female followed by PM for two years, had SCS implanted with good pain control documanted by PM in 2016, has been getting Trpoint Inject  From Pm  Reports onset of axial left sided flank third week of Nov 2017 prompting ED visit, has had PT w/o benefit, has had several weeks of benefit from trigger point injections, aggravated by any movement, usually standing, on AEDs , Lorzone, NDAIDS  Does awaken her at night "all the time"  Associated with achiness in the campus pain in the thighs and numbness and tingling in the right foot the in the 1st 2 digits  Has sibling with ankylosing spondylitis but no prominent neck or shoulder symptoms  Does have about 2 months of right-sided neck pain with radicular symptoms and numbness in the hand, as Rheumatology evaluation pending  Does have positive "shopping cart sign"  Lower extremity sensory disturbance of prolonged standing  Review of Systems   Constitutional: Negative for unexpected weight change  Respiratory: Negative  Cardiovascular: Negative  Gastrointestinal: Negative  Genitourinary: Positive for urgency  Neurological: Positive for weakness and numbness  Objective:  Lasty PM Note:The patient reports that her pain has returned in her low back region  She did have excellent relief following a December 20, 2017 trigger point injection to that region  She states that her pain returned and that the trigger point injections I had performed on her only last about 4 weeks  As such, I had asked her to follow up with Dr Jose Galvan and she has made an appointment  In the interim, I will trial the patient on a TENS unit and continue her on lower zone 750 mg 1/3 to 1 tablet up to 3 times daily as needed for muscle spasms    Side effects were reviewed with the patient      Follow-up is planned in 4 weeks  Discharge instructions were provided  I personally saw and examined the patient and I agree with the above discussed plan of care       Physical Exam   Constitutional: She appears well-developed  Morbidly obese  Musculoskeletal:     Markedly positive Juan M's maneuver creating SI region pain or palpatory tenderness to both SI joints as well as the left lower lumbar paraspinals in the lumbar triangle  Markedly difficult transfers with rolling and supine to sit and sit to stand positive Juan M's maneuver positive Howard sign marked reproduction of lumbar pain with passive extension of the hip joints  Station and gait normal, muscle stretch reflexes globally hypoactive  Straight leg raising negative for root tension signs  Neurological:   Reflex Scores:       Tricep reflexes are 0 on the right side and 0 on the left side  Bicep reflexes are 0 on the right side and 0 on the left side  Brachioradialis reflexes are 0 on the right side and 0 on the left side  Patellar reflexes are 0 on the right side and 0 on the left side  Achilles reflexes are 0 on the right side and 0 on the left side

## 2018-02-16 NOTE — LETTER
February 16, 2018     Aixa Harding MD  [facility]    Patient: Marcela Dorman   YOB: 1955   Date of Visit: 2/16/2018       Dear Dr Mali Salvador: Thank you for referring Samara Santiago to me for evaluation  Below are the relevant portions of my assessment and plan of care  Diagnoses and all orders for this visit:    Myofascial pain syndrome        If you have questions, please do not hesitate to call me  I look forward to following Vickie Laughlin along with you           Sincerely,        Stone Ames DO        CC: No Recipients

## 2018-02-16 NOTE — LETTER
February 16, 2018     Elham Huerta MD  [facility]    Patient: Bev Real   YOB: 1955   Date of Visit: 2/16/2018       Dear Dr Mike Barksdale: Thank you for referring Francisco Price to me for evaluation  Below are the relevant portions of my assessment and plan of care  Diagnoses and all orders for this visit:    Myofascial pain syndrome        If you have questions, please do not hesitate to call me  I look forward to following Marlen Alexander along with you           Sincerely,        Akhil Pedersen,         CC: No Recipients

## 2018-02-16 NOTE — PATIENT INSTRUCTIONS
1  Blood tests and x-rays  2   Follow up after the above  3 trial of new muscle relaxer    Stop his own before trying baclofen

## 2018-02-19 ENCOUNTER — APPOINTMENT (OUTPATIENT)
Dept: LAB | Age: 63
End: 2018-02-19
Payer: MEDICARE

## 2018-02-19 ENCOUNTER — APPOINTMENT (OUTPATIENT)
Dept: RADIOLOGY | Age: 63
End: 2018-02-19
Payer: MEDICARE

## 2018-02-19 DIAGNOSIS — M46.1 SACROILIITIS (HCC): ICD-10-CM

## 2018-02-19 DIAGNOSIS — M79.18 MYOFASCIAL PAIN SYNDROME: ICD-10-CM

## 2018-02-19 LAB
25(OH)D3 SERPL-MCNC: 15.7 NG/ML (ref 30–100)
CK MB SERPL-MCNC: 1.6 % (ref 0–2.5)
CK MB SERPL-MCNC: 3 NG/ML (ref 0–5)
CK SERPL-CCNC: 193 U/L (ref 26–192)
ERYTHROCYTE [SEDIMENTATION RATE] IN BLOOD: 44 MM/HOUR (ref 0–20)
MAGNESIUM SERPL-MCNC: 2.2 MG/DL (ref 1.6–2.6)

## 2018-02-19 PROCEDURE — 72200 X-RAY EXAM SI JOINTS: CPT

## 2018-02-19 PROCEDURE — 82085 ASSAY OF ALDOLASE: CPT

## 2018-02-19 PROCEDURE — 86430 RHEUMATOID FACTOR TEST QUAL: CPT

## 2018-02-19 PROCEDURE — 36415 COLL VENOUS BLD VENIPUNCTURE: CPT

## 2018-02-19 PROCEDURE — 85652 RBC SED RATE AUTOMATED: CPT

## 2018-02-19 PROCEDURE — 86200 CCP ANTIBODY: CPT

## 2018-02-19 PROCEDURE — 86038 ANTINUCLEAR ANTIBODIES: CPT

## 2018-02-19 PROCEDURE — 86618 LYME DISEASE ANTIBODY: CPT

## 2018-02-19 PROCEDURE — 83735 ASSAY OF MAGNESIUM: CPT

## 2018-02-19 PROCEDURE — 82550 ASSAY OF CK (CPK): CPT

## 2018-02-19 PROCEDURE — 81374 HLA I TYPING 1 ANTIGEN LR: CPT

## 2018-02-19 PROCEDURE — 82306 VITAMIN D 25 HYDROXY: CPT

## 2018-02-19 PROCEDURE — 82553 CREATINE MB FRACTION: CPT

## 2018-02-20 ENCOUNTER — TELEPHONE (OUTPATIENT)
Dept: PAIN MEDICINE | Facility: MEDICAL CENTER | Age: 63
End: 2018-02-20

## 2018-02-20 ENCOUNTER — TELEPHONE (OUTPATIENT)
Dept: PAIN MEDICINE | Facility: CLINIC | Age: 63
End: 2018-02-20

## 2018-02-20 DIAGNOSIS — E55.9 VITAMIN D DEFICIENCY: Primary | ICD-10-CM

## 2018-02-20 LAB
ALDOLASE SERPL-CCNC: 5.9 U/L (ref 3.3–10.3)
B BURGDOR IGG SER IA-ACNC: 0.16
B BURGDOR IGM SER IA-ACNC: 0.25
RHEUMATOID FACT SER QL LA: NEGATIVE

## 2018-02-20 RX ORDER — CHOLECALCIFEROL (VITAMIN D3) 1250 MCG
50000 CAPSULE ORAL WEEKLY
Qty: 8 CAPSULE | Refills: 0 | Status: SHIPPED | OUTPATIENT
Start: 2018-02-20 | End: 2018-10-11

## 2018-02-20 NOTE — TELEPHONE ENCOUNTER
[facility]- patient called stating she needs a refill of Dicofenac 75 mg  Would like refill sent to [facility] [phone]    CLARK: Stated Dr Ade Cade took her off her of her other meds and prescribed Baclofen 10 mg 3x a day   any questions c/b [phone]

## 2018-02-21 LAB
CCP IGA+IGG SERPL IA-ACNC: 7 UNITS (ref 0–19)
RYE IGE QN: NEGATIVE

## 2018-02-23 LAB — HLA-B27 QL NAA+PROBE: NEGATIVE

## 2018-02-28 ENCOUNTER — HOSPITAL ENCOUNTER (OUTPATIENT)
Dept: CT IMAGING | Facility: HOSPITAL | Age: 63
Discharge: HOME | End: 2018-02-28
Attending: ANESTHESIOLOGY
Payer: MEDICARE

## 2018-02-28 DIAGNOSIS — M54.12 RADICULOPATHY OF CERVICAL REGION: ICD-10-CM

## 2018-02-28 DIAGNOSIS — M54.2 CERVICALGIA: ICD-10-CM

## 2018-02-28 PROCEDURE — 72125 CT NECK SPINE W/O DYE: CPT

## 2018-03-05 NOTE — PROGRESS NOTES
Assessment/Plan:      Diagnoses and all orders for this visit:    Hip flexor tightness, left    Hip flexor tendon tightness, right    Spinal stenosis of lumbar region without neurogenic claudication    Chronic bilateral low back pain without sciatica          Subjective:     Patient ID: Tricia Waldron is a 63 y o  female  HPI patient last seen 02/16/2018 she was found to have a markedly elevated ESR at 44 and these labs were forwarded to her rheumatologist Dr Concepcion Mcgregor, she was also found to have a low vitamin D level was started on prescription vitamin-D  He is now being seen 3 6 18 for increasing pain has not seen Rheumatology yet, 3/23rd pending  Pattern is left flank radiating around anterior along the ribs  CT scan ordered by pain medicine for sensory disturbance in the upper extremities  Taking baclofen 10 milligrams without sedation or affect has run out of diclofenac in the last 2 weeks  Review of Systems   Gastrointestinal: Negative  Genitourinary: Negative  Musculoskeletal: Positive for back pain and myalgias  Neurological: Positive for numbness  Objective:  Data:  ESR 44, vitamin-D is 15 7, CK, aldolase, line, HLA B27 negative/normal borderline CK elevation not significant   X rays:  SACROILIAC JOINTS     INDICATION: SI joint pain      COMPARISON: CT lumbar spine 11/26/2017     VIEWS:  2     IMAGES:  2     FINDINGS:     The SI joints appear symmetric without evidence of focal erosions or joint space widening  Mild degenerative changes of the right SI joint with periarticular sclerosis  There are degenerative changes of the lumbar spine  Bilateral hip arthroplasties  There is a spinal stimulator device      Sacral arcuate lines appear intact      No fracture or pathologic bone lesions seen      Included portions of the pelvis and lumbar spine are unremarkable      IMPRESSION:     Mild degenerative changes of the right sacroiliac joint with periarticular sclerosis    No erosive change       Physical Exam   Neurological:   Reflex Scores:       Tricep reflexes are 1+ on the right side and 1+ on the left side  Bicep reflexes are 1+ on the right side and 1+ on the left side  Brachioradialis reflexes are 0 on the right side and 0 on the left side  Patellar reflexes are 0 on the right side and 0 on the left side  Achilles reflexes are 0 on the right side and 0 on the left side  Marked palpatory tenderness of the lumbar paravertebral muscles

## 2018-03-06 ENCOUNTER — OFFICE VISIT (OUTPATIENT)
Dept: PAIN MEDICINE | Facility: CLINIC | Age: 63
End: 2018-03-06
Payer: MEDICARE

## 2018-03-06 VITALS
BODY MASS INDEX: 48.15 KG/M2 | SYSTOLIC BLOOD PRESSURE: 140 MMHG | HEART RATE: 96 BPM | WEIGHT: 289 LBS | DIASTOLIC BLOOD PRESSURE: 90 MMHG | HEIGHT: 65 IN

## 2018-03-06 DIAGNOSIS — M48.061 SPINAL STENOSIS OF LUMBAR REGION WITHOUT NEUROGENIC CLAUDICATION: ICD-10-CM

## 2018-03-06 DIAGNOSIS — M24.551 HIP FLEXOR TENDON TIGHTNESS, RIGHT: ICD-10-CM

## 2018-03-06 DIAGNOSIS — M79.18 MYOFASCIAL PAIN SYNDROME: ICD-10-CM

## 2018-03-06 DIAGNOSIS — G89.29 CHRONIC BILATERAL LOW BACK PAIN WITHOUT SCIATICA: ICD-10-CM

## 2018-03-06 DIAGNOSIS — M46.1 SACROILIITIS (HCC): ICD-10-CM

## 2018-03-06 DIAGNOSIS — M54.50 CHRONIC BILATERAL LOW BACK PAIN WITHOUT SCIATICA: ICD-10-CM

## 2018-03-06 DIAGNOSIS — M24.552 HIP FLEXOR TIGHTNESS, LEFT: Primary | ICD-10-CM

## 2018-03-06 PROCEDURE — 99213 OFFICE O/P EST LOW 20 MIN: CPT | Performed by: PHYSICAL MEDICINE & REHABILITATION

## 2018-03-06 RX ORDER — VENLAFAXINE HYDROCHLORIDE 75 MG/1
75 CAPSULE, EXTENDED RELEASE ORAL DAILY
Refills: 11 | COMMUNITY
Start: 2018-02-09 | End: 2018-05-13 | Stop reason: SDUPTHER

## 2018-03-06 RX ORDER — TIZANIDINE HYDROCHLORIDE 2 MG/1
2 CAPSULE, GELATIN COATED ORAL 3 TIMES DAILY
Qty: 90 CAPSULE | Refills: 0 | Status: SHIPPED | OUTPATIENT
Start: 2018-03-06 | End: 2018-04-12 | Stop reason: SDUPTHER

## 2018-03-06 NOTE — PATIENT INSTRUCTIONS
1  Trial of tizanidine pending insurance approval continue baclofen in the meantime  2  Restart diclofenac twice a day with meals      3 Re eval with Dr Fauzia Barrios for trigger point injections

## 2018-03-07 NOTE — PROCEDURES
Procedure    ATTENDING PHYSICIAN: Roxana Lentz MD     PROCEDURE:  1  Right L5 transforaminal epidural steroid injection under fluoroscopic guidance  2  Right S1 transforaminal epidural steroid injection under fluoroscopic guidance  PRE-PROCEDURE DIAGNOSIS: Low back pain and lower extremity radicular symptoms  POST-PROCEDURE DIAGNOSIS: Low back pain and lower extremity radicular symptoms  ANESTHESIA: Local     ESTIMATED BLOOD LOSS: Minimal     COMPLICATIONS: None  LOCATION: [facility]  CONSENT: Today's procedure, its potential benefits as well as its risks and potential side effects were reviewed  Discussed risks of the procedure including bleeding, infection, nerve irritation or damage, reactions to the medications, weakness, headache, failure of the pain to improve, and potential worsening of the pain were explained to the patient who verbalized understanding and who wished to proceed  Written informed consent was thereby obtained  DESCRIPTION OF THE PROCEDURE: After written informed consent was obtained, the patient was taken to the fluoroscopy suite and placed in the prone position  Anatomical landmarks were identified by way of fluoroscopy in multiple views  The skin of the lumbar region was prepped using antiseptic and draped in the usual sterile fashion  Strict aseptic technique was utilized  The skin and subcutaneous tissues at the needle entry site were infiltrated with 1% preservative-free lidocaine mixed with sodium bicarbonate using a 25-gauge 1-1/2-inch needle  22-gauge needles were then incrementally advanced under fluoroscopic guidance in the oblique view into the neural foramina as mentioned above  Proper placement into each of the neural foramen was confirmed with fluoroscopy in both the lateral and AP views   After negative aspiration for CSF or heme, contrast was injected, which delineated the nerve roots and the epidural space under fluoroscopy in the AP view  There was only a transient pressure paresthesia that resolved immediately upon injection  After negative aspiration, a 2 mL of a 4 mL injectate consisting of 3 mL of preservative-free 0 25% bupivacaine and 1 mL of Depo-Medrol 80 mg/mL was slowly injected into each of the needles as delineated above  The patient tolerated the procedure well and all needles were removed intact  Hemostasis was maintained  There were no apparent paresthesias or complications  The skin was wiped clean and a Band-Aid was placed as appropriate  The patient was monitored for an appropriate period of time and remained hemodynamically stable following the procedure  The patient was ultimately discharged to home with supervision in good condition and instructed to call the office in approximately 7-10 days with an update or sooner as warranted  Discharge instructions were provided  I was present for and participated in all key and critical portions of this procedure        Signatures   Electronically signed by : ANASTASIYA Norton ; Oct 24 2017  8:28AM EST                       (Author)

## 2018-03-13 ENCOUNTER — TELEPHONE (OUTPATIENT)
Dept: PAIN MEDICINE | Facility: CLINIC | Age: 63
End: 2018-03-13

## 2018-03-13 NOTE — TELEPHONE ENCOUNTER
----- Message from Berta Mariee MD sent at 3/13/2018  7:08 AM EDT -----  Regarding: CT results  CT of the cervical spine without contrast dated March 1, 2018 shows arthritis with degenerative disc changes in the mid cervical spine and mild central stenosis at C5-C6  Please inform the patient

## 2018-03-13 NOTE — TELEPHONE ENCOUNTER
S/w pt to inform her of results, pt verbalized understanding and will f/u with AS as scheduled on 4/19

## 2018-03-28 ENCOUNTER — TELEPHONE (OUTPATIENT)
Dept: PAIN MEDICINE | Facility: CLINIC | Age: 63
End: 2018-03-28

## 2018-03-28 NOTE — TELEPHONE ENCOUNTER
Pt called stating that she saw Dr Milan Mcdaniel and Dr Milan Mcdaniel advised her to call and set up an appt with Dr Kerry Velasquez to schedule a repeat trigger point injection  Please advise what you would like me to do   Thanks

## 2018-03-28 NOTE — TELEPHONE ENCOUNTER
Aware  Thanks  Ok to schedule TPI with myself  Please make patient aware that if it only lasts her a short time, she needs to return to Dr Jeanella Hodgkins and advise him of the time period that she got relief

## 2018-03-28 NOTE — TELEPHONE ENCOUNTER
Called pt to schedule repeat TPI with Dr Akilah Bateman for 03/29/18  I advised pt that if she is only getting short term relief that she will need to return to Dr Basilio and advise him of the time period in which she got relief

## 2018-03-29 ENCOUNTER — OFFICE VISIT (OUTPATIENT)
Dept: PAIN MEDICINE | Facility: CLINIC | Age: 63
End: 2018-03-29
Payer: MEDICARE

## 2018-03-29 VITALS
WEIGHT: 287.2 LBS | HEIGHT: 65 IN | BODY MASS INDEX: 47.85 KG/M2 | HEART RATE: 86 BPM | TEMPERATURE: 98.8 F | DIASTOLIC BLOOD PRESSURE: 60 MMHG | RESPIRATION RATE: 20 BRPM | SYSTOLIC BLOOD PRESSURE: 132 MMHG

## 2018-03-29 DIAGNOSIS — M79.18 MYOFASCIAL PAIN SYNDROME: Primary | ICD-10-CM

## 2018-03-29 PROCEDURE — 20553 NJX 1/MLT TRIGGER POINTS 3/>: CPT | Performed by: ANESTHESIOLOGY

## 2018-03-29 NOTE — PROGRESS NOTES
Inj Trigger Point Single/Multiple     Date/Time 3/29/2018 7:50 AM     Performed by  Zander Wolff by Kim Cruz       Consent: Verbal consent obtained  Written consent obtained  Risks and benefits: risks, benefits and alternatives were discussed  Consent given by: patient  Patient understanding: patient states understanding of the procedure being performed  Patient consent: the patient's understanding of the procedure matches consent given  Procedure consent: procedure consent matches procedure scheduled  Relevant documents: relevant documents present and verified  Test results: test results available and properly labeled  Site marked: the operative site was marked  Imaging studies: imaging studies available  Patient identity confirmed: verbally with patient  Time out: Immediately prior to procedure a "time out" was called to verify the correct patient, procedure, equipment, support staff and site/side marked as required  Preparation: Patient was prepped and draped in the usual sterile fashion  Site preparation: Betadine    Local anesthesia used: yes      Anesthesia: local infiltration     Anesthesia   Local anesthesia used: yes  Local Anesthetic: lidocaine 1% without epinephrine     Sedation   Patient sedated: no      Specimen: no    Culture: no   Procedure Details   Procedure Notes: ATTENDING PHYSICIAN:  Chidi Duffy MD     PROCEDURE:  Trigger point injections x1 to the left lower trapezius, x6 to the left lumbar paraspinal, and x2 to the left latissimus dorsi musculature with local anesthetic and steroid  PRE-PROCEDURE DIAGNOSIS:  Myofascial pain syndrome  POST-PROCEDURE DIAGNOSIS:  Myofascial pain syndrome  ESTIMATED BLOOD LOSS:  Minimal     ANESTHESIA:  None  COMPLICATIONS:  None  CONSENT:  Today's procedure, its potential benefits as well as its risks and side effects were reviewed   Discussed risks of the procedure including bleeding, infection, reactions to the medications, failure the pain to improve, and potential worsening of the pain as well as pneumothorax were explained in detail to the patient, who verbalized understanding and wished to proceed  Written informed consent was thereby obtained  DESCRIPTION OF THE PROCEDURE:  After written informed consent was obtained, the patient was placed in the sitting position on the examination table  Anatomical landmarks were identified via palpation  The trigger points were identified and marked after palpation  The skin overlying these 8 marked trigger points was prepared using Betadine swabs x3 in the usual sterile fashion  Strict aseptic technique was utilized throughout the procedure  A 8 mL injectate consisting of 7 mL of 1% lidocaine mixed with 1 mL of Depo-Medrol 40 mg/mL was drawn up sterilely  Using a 25-gauge 1 25 inch needle, 1 mL of the above injectate was administered to each of the marked trigger points following negative aspiration  The patient tolerated the procedure well and all needles were removed with the tips intact  Hemostasis was maintained  There were no apparent complications  The skin was wiped clean and Band-Aids were placed as appropriate  The patient was monitored for an appropriate period of time following the procedure and remained hemodynamically stable and neurovascularly intact following the procedure as she was prior to the procedure  The patient was ultimately discharged to home with supervision in good condition  I was present for and participated in all key and critical portions of this procedure    Patient tolerance: Patient tolerated the procedure well with no immediate complications

## 2018-03-29 NOTE — LETTER
March 29, 2018     Rebecca Hernández MD  [facility]    Patient: Diann Park   YOB: 1955   Date of Visit: 3/29/2018       Dear Dr Korina Ghotar: Thank you for referring Grover Medellin to me for evaluation  Below are my notes for this consultation  If you have questions, please do not hesitate to call me  I look forward to following your patient along with you  Sincerely,        Gabrielle Guzman MD        CC: No Recipients  Gabrielle Guzman MD  3/29/2018  7:52 AM  Sign at close encounter       [address] Single/Multiple     Date/Time 3/29/2018 7:50 AM     Performed by  Channing Gallagher by Nahed Torres       Consent: Verbal consent obtained  Written consent obtained  Risks and benefits: risks, benefits and alternatives were discussed  Consent given by: patient  Patient understanding: patient states understanding of the procedure being performed  Patient consent: the patient's understanding of the procedure matches consent given  Procedure consent: procedure consent matches procedure scheduled  Relevant documents: relevant documents present and verified  Test results: test results available and properly labeled  Site marked: the operative site was marked  Imaging studies: imaging studies available  Patient identity confirmed: verbally with patient  Time out: Immediately prior to procedure a "time out" was called to verify the correct patient, procedure, equipment, support staff and site/side marked as required  Preparation: Patient was prepped and draped in the usual sterile fashion        Site preparation: Betadine    Local anesthesia used: yes      Anesthesia: local infiltration     Anesthesia   Local anesthesia used: yes  Local Anesthetic: lidocaine 1% without epinephrine     Sedation   Patient sedated: no      Specimen: no    Culture: no   Procedure Details   Procedure Notes: ATTENDING PHYSICIAN:  Gabrielle Guzman MD     PROCEDURE:  Trigger point injections x1 to the left lower trapezius, x6 to the left lumbar paraspinal, and x2 to the left latissimus dorsi musculature with local anesthetic and steroid  PRE-PROCEDURE DIAGNOSIS:  Myofascial pain syndrome  POST-PROCEDURE DIAGNOSIS:  Myofascial pain syndrome  ESTIMATED BLOOD LOSS:  Minimal     ANESTHESIA:  None  COMPLICATIONS:  None  CONSENT:  Today's procedure, its potential benefits as well as its risks and side effects were reviewed  Discussed risks of the procedure including bleeding, infection, reactions to the medications, failure the pain to improve, and potential worsening of the pain as well as pneumothorax were explained in detail to the patient, who verbalized understanding and wished to proceed  Written informed consent was thereby obtained  DESCRIPTION OF THE PROCEDURE:  After written informed consent was obtained, the patient was placed in the sitting position on the examination table  Anatomical landmarks were identified via palpation  The trigger points were identified and marked after palpation  The skin overlying these 8 marked trigger points was prepared using Betadine swabs x3 in the usual sterile fashion  Strict aseptic technique was utilized throughout the procedure  A 8 mL injectate consisting of 7 mL of 1% lidocaine mixed with 1 mL of Depo-Medrol 40 mg/mL was drawn up sterilely  Using a 25-gauge 1 25 inch needle, 1 mL of the above injectate was administered to each of the marked trigger points following negative aspiration  The patient tolerated the procedure well and all needles were removed with the tips intact  Hemostasis was maintained  There were no apparent complications  The skin was wiped clean and Band-Aids were placed as appropriate  The patient was monitored for an appropriate period of time following the procedure and remained hemodynamically stable and neurovascularly intact following the procedure as she was prior to the procedure   The patient was ultimately discharged to home with supervision in good condition  I was present for and participated in all key and critical portions of this procedure    Patient tolerance: Patient tolerated the procedure well with no immediate complications

## 2018-03-29 NOTE — PROGRESS NOTES
Pain Medicine Follow-Up Note    Assessment:  No diagnosis found  Plan:  No orders of the defined types were placed in this encounter  No orders of the defined types were placed in this encounter  My impressions and treatment recommendations were discussed in detail with the patient who verbalized understanding and had no further questions  ***    {PDMP Statement:72444}    {UDS Statement:90796}    {Opioid Statement:07416}    {Pain Management Procedure Risk Statement:93329}    {Oral Swab Statement:22942}    Follow-up is planned in *** time or sooner as warranted  Discharge instructions were provided  I personally saw and examined the patient and I agree with the above discussed plan of care  History of Present Illness:    Lynsey Paulino is a 63 y o  female who presents to [facility] for interval re-evaluation of the above stated pain complaints  The patient has a past medical and chronic pain history as outlined in the assessment section  {He/she (caps):47033} was last seen on ***     ***    Pain Contract Signed:  ***  Last Urine Drug Screen:  ***    Other than as stated above, the patient denies any interval changes in medications, medical condition, mental condition, symptoms, or allergies since the last office visit  Review of Systems:    Review of Systems   Respiratory: Negative for shortness of breath  Cardiovascular: Negative for chest pain  Gastrointestinal: Negative for constipation, diarrhea, nausea and vomiting  Musculoskeletal: Positive for gait problem (difficulty walking/ decreased range of motion)  Negative for arthralgias, joint swelling and myalgias  Skin: Negative for rash  Neurological: Negative for dizziness, seizures and weakness  All other systems reviewed and are negative          Patient Active Problem List   Diagnosis    Chronic low back pain    Chronic pain syndrome    DMII (diabetes mellitus, type 2) ([facility] [address]    Obesity  HTN (hypertension)    Lumbar canal stenosis    Depression    Myofascial pain syndrome    Sacroiliitis (HCC)    Lumbar spinal stenosis    Hip flexor tightness, left    Hip flexor tendon tightness, right       Past Medical History:   Diagnosis Date    Asthma     Chronic narcotic dependence (HCC)     Chronic pain syndrome     Depression     Diabetes mellitus ([facility] [address]     Esophageal reflux     Hyperlipidemia     Hypertension     Lumbar radiculopathy     Lyme disease     Obesity     Opioid dependence ([facility] [address] )     PPD positive     had treatment w/ INH 15 years ago    Vitamin D deficiency        Past Surgical History:   Procedure Laterality Date    APPENDECTOMY      CHOLECYSTECTOMY      JOINT REPLACEMENT      R knee, B/l total hip    TN REVISE/REMOVE SPINAL NEUROSTIM/ Left 12/13/2016    Procedure: REPLACEMENT BUTTOCK SPINAL CORD STIMULATOR IPG;  Surgeon: Catherine Bland MD;  Location:  MAIN OR;  Service: Neurosurgery    SHOULDER SURGERY      SPINAL CORD STIMULATOR IMPLANT         Family History   Problem Relation Age of Onset    Diabetes Mother     Cancer Mother     Cancer Father     Diabetes Maternal Grandmother     Cancer Paternal Uncle        Social History     Occupational History    Not on file       Social History Main Topics    Smoking status: Current Every Day Smoker     Packs/day: 1 00     Years: 5 00    Smokeless tobacco: Never Used      Comment: pt desires to quit in January    Alcohol use No    Drug use: No    Sexual activity: Not on file         Current Outpatient Prescriptions:     acetaminophen (TYLENOL) 325 mg tablet, Take 3 tablets by mouth 3 (three) times a day Please beware of high dose of tylenol which can cause liver dysfunction, Disp: 30 tablet, Rfl: 0    atorvastatin (LIPITOR) 10 mg tablet, Take by mouth daily taks in am , Disp: , Rfl:     baclofen 10 mg tablet, Take 1 tablet (10 mg total) by mouth 3 (three) times a day, Disp: 90 tablet, Rfl: 0   Cholecalciferol (VITAMIN D3) 37841 units CAPS, Take 1 capsule (50,000 Units total) by mouth once a week for 8 doses, Disp: 8 capsule, Rfl: 0    diclofenac (VOLTAREN) 50 mg EC tablet, Take 1 tablet (50 mg total) by mouth 2 (two) times a day, Disp: 60 tablet, Rfl: 1    glimepiride (AMARYL) 1 mg tablet, Take by mouth 2 (two) times a day  , Disp: , Rfl:     lisinopril-hydrochlorothiazide (PRINZIDE,ZESTORETIC) 20-25 MG per tablet, Take by mouth, Disp: , Rfl:     Nerve Stimulator (STANDARD TENS) ISAEL, by Does not apply route 4 (four) times a day as needed (MUSCLE SPASMS), Disp: 1 Device, Rfl: 0    TiZANidine (ZANAFLEX) 2 MG capsule, Take 1 capsule (2 mg total) by mouth 3 (three) times a day, Disp: 90 capsule, Rfl: 0    topiramate (TOPAMAX) 100 mg tablet, Take 100 mg by mouth daily at bedtime Takes 50mg in am and 100mg at hs, Disp: , Rfl:     topiramate (TOPAMAX) 50 MG tablet, Take 50 mg by mouth daily  , Disp: , Rfl:     venlafaxine (EFFEXOR-XR) 75 mg 24 hr capsule, Take 75 mg by mouth daily, Disp: , Rfl: 11    Allergies   Allergen Reactions    Bee Venom Anaphylaxis    Other Other (See Comments)     Stainless steel and surgical steel  *Severe blistering*    Aleve [Naproxen Sodium] Hives    Augmentin [Amoxicillin-Pot Clavulanate] Hives    Metformin And Related Hives    Morphine And Related Hives    Motrin [Ibuprofen] Hives    Nortriptyline Hives    Penicillins Hives    Soy Lecithin [Lecithin] Hives    Sulfa Antibiotics Hives    Tradjenta [Linagliptin] Hives    Tramadol        Physical Exam:    LMP  (LMP Unknown)     Constitutional:{General Appearance:06360::"normal, well developed, well nourished, alert, in no distress and non-toxic and no overt pain behavior  "}  Eyes:{Sclera:07580::"anicteric"}  HEENT:{Hearin::"grossly intact"}  Neck:{Neck:60419::"supple, symmetric, trachea midline and no masses "}  Pulmonary:{Respiratory effort:77551::"even and unlabored"}  Cardiovascular:{Examination of Extremities:14211::"No edema or pitting edema present"}  Skin:{Skin and Subcutaneous tissues:41491::"Normal without rashes or lesions and well hydrated"}  Psychiatric:{Mood and Affect:36907::"Mood and affect appropriate"}  Neurologic:{Cranial Nerves:44728::"Cranial Nerves II-XII grossly intact"}  Musculoskeletal:{Gair and Station:19476::"normal"}      Imaging  No orders to display         No orders of the defined types were placed in this encounter

## 2018-03-30 ENCOUNTER — TELEPHONE (OUTPATIENT)
Dept: PAIN MEDICINE | Facility: CLINIC | Age: 63
End: 2018-03-30

## 2018-03-30 DIAGNOSIS — G89.4 CHRONIC PAIN SYNDROME: Primary | ICD-10-CM

## 2018-03-30 DIAGNOSIS — G89.29 CHRONIC BILATERAL LOW BACK PAIN WITHOUT SCIATICA: ICD-10-CM

## 2018-03-30 DIAGNOSIS — M54.50 CHRONIC BILATERAL LOW BACK PAIN WITHOUT SCIATICA: ICD-10-CM

## 2018-03-30 DIAGNOSIS — M79.18 MYOFASCIAL PAIN SYNDROME: ICD-10-CM

## 2018-03-30 DIAGNOSIS — M48.061 SPINAL STENOSIS OF LUMBAR REGION, UNSPECIFIED WHETHER NEUROGENIC CLAUDICATION PRESENT: ICD-10-CM

## 2018-03-30 NOTE — TELEPHONE ENCOUNTER
Pt called and stated she still has some sore spots regarding the trigger point shots she received yesterday  Would like to speak with a nurse  Please call [phone]

## 2018-03-30 NOTE — TELEPHONE ENCOUNTER
The TPI was only performed yesterday morning so it has only been about 27 hours since she received the injections  I would still give it until 3 days for the full effect  I think she may also benefit from a diclofenac patch to that area  I am sending the prescription for that to the pharmacy

## 2018-03-30 NOTE — TELEPHONE ENCOUNTER
S/W pt  Pt stated she still has some spots on her back that bother her "badly that I don't think we got "  Pt stated heat does not help  Pt stated she is taking diclofenac and tizanidine as listed in Epic  Pt stated she has no pain if she is "absolutely still "  Pain in left lower back is 10-12/10  Pt denies fevers and denies S &S of infection  Advised pt if the pain is too bad or exercruating to seek treatment at the ER and will get this message to AS and will C/B with his recommendations  Pt verbalized understanding  Please advise

## 2018-04-04 ENCOUNTER — TRANSCRIBE ORDERS (OUTPATIENT)
Dept: LAB | Age: 63
End: 2018-04-04

## 2018-04-04 ENCOUNTER — APPOINTMENT (OUTPATIENT)
Dept: LAB | Age: 63
End: 2018-04-04
Payer: MEDICARE

## 2018-04-04 DIAGNOSIS — M54.40 LOW BACK PAIN WITH SCIATICA, SCIATICA LATERALITY UNSPECIFIED, UNSPECIFIED BACK PAIN LATERALITY, UNSPECIFIED CHRONICITY: ICD-10-CM

## 2018-04-04 DIAGNOSIS — G25.81 RESTLESS LEGS: ICD-10-CM

## 2018-04-04 DIAGNOSIS — G89.29 CHRONIC IDIOPATHIC ANAL PAIN: Primary | ICD-10-CM

## 2018-04-04 DIAGNOSIS — K62.89 CHRONIC IDIOPATHIC ANAL PAIN: Primary | ICD-10-CM

## 2018-04-04 DIAGNOSIS — G47.00 PERSISTENT DISORDER OF INITIATING OR MAINTAINING SLEEP: ICD-10-CM

## 2018-04-04 LAB
25(OH)D3 SERPL-MCNC: 35.1 NG/ML (ref 30–100)
ALBUMIN SERPL BCP-MCNC: 3.7 G/DL (ref 3.5–5)
ALP SERPL-CCNC: 106 U/L (ref 46–116)
ALT SERPL W P-5'-P-CCNC: 27 U/L (ref 12–78)
ANION GAP SERPL CALCULATED.3IONS-SCNC: 7 MMOL/L (ref 4–13)
AST SERPL W P-5'-P-CCNC: 17 U/L (ref 5–45)
BILIRUB SERPL-MCNC: 0.36 MG/DL (ref 0.2–1)
BUN SERPL-MCNC: 36 MG/DL (ref 5–25)
CALCIUM SERPL-MCNC: 9.7 MG/DL (ref 8.3–10.1)
CHLORIDE SERPL-SCNC: 105 MMOL/L (ref 100–108)
CK SERPL-CCNC: 116 U/L (ref 26–192)
CO2 SERPL-SCNC: 26 MMOL/L (ref 21–32)
CREAT SERPL-MCNC: 1.68 MG/DL (ref 0.6–1.3)
CRP SERPL QL: 17.7 MG/L
ERYTHROCYTE [DISTWIDTH] IN BLOOD BY AUTOMATED COUNT: 13.7 % (ref 11.6–15.1)
ERYTHROCYTE [SEDIMENTATION RATE] IN BLOOD: 48 MM/HOUR (ref 0–20)
FERRITIN SERPL-MCNC: 202 NG/ML (ref 8–388)
GFR SERPL CREATININE-BSD FRML MDRD: 33 ML/MIN/1.73SQ M
GLUCOSE SERPL-MCNC: 123 MG/DL (ref 65–140)
HCT VFR BLD AUTO: 39.4 % (ref 34.8–46.1)
HGB BLD-MCNC: 12.6 G/DL (ref 11.5–15.4)
MCH RBC QN AUTO: 29.4 PG (ref 26.8–34.3)
MCHC RBC AUTO-ENTMCNC: 32 G/DL (ref 31.4–37.4)
MCV RBC AUTO: 92 FL (ref 82–98)
PLATELET # BLD AUTO: 217 THOUSANDS/UL (ref 149–390)
PMV BLD AUTO: 10.9 FL (ref 8.9–12.7)
POTASSIUM SERPL-SCNC: 3.8 MMOL/L (ref 3.5–5.3)
PROT SERPL-MCNC: 8.1 G/DL (ref 6.4–8.2)
RBC # BLD AUTO: 4.28 MILLION/UL (ref 3.81–5.12)
SODIUM SERPL-SCNC: 138 MMOL/L (ref 136–145)
URATE SERPL-MCNC: 7.4 MG/DL (ref 2–6.8)
WBC # BLD AUTO: 9.39 THOUSAND/UL (ref 4.31–10.16)

## 2018-04-04 PROCEDURE — 82728 ASSAY OF FERRITIN: CPT

## 2018-04-04 PROCEDURE — 80053 COMPREHEN METABOLIC PANEL: CPT

## 2018-04-04 PROCEDURE — 85652 RBC SED RATE AUTOMATED: CPT

## 2018-04-04 PROCEDURE — 86200 CCP ANTIBODY: CPT

## 2018-04-04 PROCEDURE — 82550 ASSAY OF CK (CPK): CPT

## 2018-04-04 PROCEDURE — 85027 COMPLETE CBC AUTOMATED: CPT

## 2018-04-04 PROCEDURE — 36415 COLL VENOUS BLD VENIPUNCTURE: CPT

## 2018-04-04 PROCEDURE — 86038 ANTINUCLEAR ANTIBODIES: CPT

## 2018-04-04 PROCEDURE — 87340 HEPATITIS B SURFACE AG IA: CPT

## 2018-04-04 PROCEDURE — 81374 HLA I TYPING 1 ANTIGEN LR: CPT

## 2018-04-04 PROCEDURE — 82306 VITAMIN D 25 HYDROXY: CPT

## 2018-04-04 PROCEDURE — 86430 RHEUMATOID FACTOR TEST QUAL: CPT

## 2018-04-04 PROCEDURE — 84550 ASSAY OF BLOOD/URIC ACID: CPT

## 2018-04-04 PROCEDURE — 86803 HEPATITIS C AB TEST: CPT

## 2018-04-04 PROCEDURE — 86140 C-REACTIVE PROTEIN: CPT

## 2018-04-05 LAB
HBV SURFACE AG SER QL: NORMAL
HCV AB SER QL: NORMAL
RHEUMATOID FACT SER QL LA: NEGATIVE

## 2018-04-06 LAB
CCP IGA+IGG SERPL IA-ACNC: 9 UNITS (ref 0–19)
RYE IGE QN: NEGATIVE

## 2018-04-10 LAB — HLA-B27 QL NAA+PROBE: NEGATIVE

## 2018-04-10 RX ORDER — TOPIRAMATE 100 MG/1
TABLET, FILM COATED ORAL
Qty: 60 TABLET | Refills: 2 | OUTPATIENT
Start: 2018-04-10

## 2018-04-11 NOTE — TELEPHONE ENCOUNTER
RN s/w pharmacy rep concerning previous  Pharmacy rep stated that the request for refill is gove under the pt's name on their computer so that someone already informed pt that she will need to call her doctors office to request a refill

## 2018-04-12 ENCOUNTER — OFFICE VISIT (OUTPATIENT)
Dept: PAIN MEDICINE | Facility: CLINIC | Age: 63
End: 2018-04-12
Payer: MEDICARE

## 2018-04-12 VITALS
HEART RATE: 120 BPM | SYSTOLIC BLOOD PRESSURE: 120 MMHG | HEIGHT: 65 IN | WEIGHT: 287 LBS | DIASTOLIC BLOOD PRESSURE: 68 MMHG | BODY MASS INDEX: 47.82 KG/M2

## 2018-04-12 DIAGNOSIS — R70.0 ESR RAISED: ICD-10-CM

## 2018-04-12 DIAGNOSIS — E55.9 HYPOVITAMINOSIS D: ICD-10-CM

## 2018-04-12 DIAGNOSIS — M46.1 SACROILIITIS (HCC): ICD-10-CM

## 2018-04-12 DIAGNOSIS — G89.29 CHRONIC BILATERAL LOW BACK PAIN WITHOUT SCIATICA: Primary | ICD-10-CM

## 2018-04-12 DIAGNOSIS — M24.551 HIP FLEXOR TENDON TIGHTNESS, RIGHT: ICD-10-CM

## 2018-04-12 DIAGNOSIS — M54.50 CHRONIC BILATERAL LOW BACK PAIN WITHOUT SCIATICA: Primary | ICD-10-CM

## 2018-04-12 DIAGNOSIS — M35.3 POLYMYALGIA RHEUMATICA (HCC): ICD-10-CM

## 2018-04-12 DIAGNOSIS — M48.061 SPINAL STENOSIS OF LUMBAR REGION, UNSPECIFIED WHETHER NEUROGENIC CLAUDICATION PRESENT: ICD-10-CM

## 2018-04-12 DIAGNOSIS — M24.552 HIP FLEXOR TIGHTNESS, LEFT: ICD-10-CM

## 2018-04-12 PROCEDURE — 99213 OFFICE O/P EST LOW 20 MIN: CPT | Performed by: PHYSICAL MEDICINE & REHABILITATION

## 2018-04-12 RX ORDER — TIZANIDINE HYDROCHLORIDE 2 MG/1
2 CAPSULE, GELATIN COATED ORAL 3 TIMES DAILY
Qty: 90 CAPSULE | Refills: 0 | Status: SHIPPED | OUTPATIENT
Start: 2018-04-12 | End: 2018-04-18

## 2018-04-12 RX ORDER — PREDNISONE 1 MG/1
1 TABLET ORAL DAILY
COMMUNITY
Start: 2018-04-06 | End: 2019-04-08

## 2018-04-12 NOTE — LETTER
April 12, 2018     Milena Laboy MD  [facility]    Patient: Kev Funk   YOB: 1955   Date of Visit: 4/12/2018       Dear Dr Ryder Wilson: Thank you for referring Marylene Cornet to me for evaluation  Below are the relevant portions of my assessment and plan of care  Diagnoses and all orders for this visit:    Chronic bilateral low back pain without sciatica    Sacroiliitis ([address] )    Spinal stenosis of lumbar region, unspecified whether neurogenic claudication present    Hip flexor tightness, left    Hip flexor tendon tightness, right    Hypovitaminosis D        If you have questions, please do not hesitate to call me  I look forward to following Kim Moreira along with you           Sincerely,        Yfn Feldman DO        CC: No Recipients

## 2018-04-12 NOTE — PROGRESS NOTES
Assessment/Plan:      Diagnoses and all orders for this visit:    Chronic bilateral low back pain without sciatica    Sacroiliitis ([address] )    Spinal stenosis of lumbar region, unspecified whether neurogenic claudication present    Hip flexor tightness, left    Hip flexor tendon tightness, right    Hypovitaminosis D      As I suspected this is likely polymyalgia rheumatica (PMR), with dramatic response to low dose prednisine  Low Vit D may contribute to muscle afferant sensitization  Subjective:     Patient ID: Rikki Urbina is a 63 y o  female  HPI04/12/2018 follow-up for hip flexor tightness  She also had elevated sed rate and is following with Rheumatology  Last visit she was referred back to Dr Carrion Ear was following her for chronic pain for possible trigger point injections  She was started by myself on prescription vitamin-D February 20th  She had trigger point injections March 29th  She is on muscle relaxants anti-inflammatories and antiepileptic drugs through pain medicine  She was seen by Rheumatology on March 27th and placed on naltrexone, repeat blood work and x-rays were ordered, she was referred for a sleep study due to untreated sleep apnea, also started on prednisone 5 mg 3/28, had injections March 29th and fees better overall  NO LONGER having muscle spasms, can now sleep at night  Sh has done her eight Vit D tablets  Review of Systems   Gastrointestinal: Positive for constipation  Genitourinary: Negative  Musculoskeletal: Negative for back pain, myalgias, neck pain and neck stiffness  Objective:  Data:    Ref Range & Units 4/4/18 10:32 AM Flag   Sed Rate 0 - 20 mm/hour 48        Range & Units 4/4/18 10:32 AM Flag   CRP <3 0 mg/L 17 7          Physical Exam   Constitutional: She is oriented to person, place, and time  She appears well-developed and well-nourished  No distress  Eyes: Pupils are equal, round, and reactive to light     Musculoskeletal: Normal range of motion  Neurological: She is alert and oriented to person, place, and time  Reflex Scores:       Tricep reflexes are 1+ on the right side and 2+ on the left side  Bicep reflexes are 1+ on the right side and 2+ on the left side  Brachioradialis reflexes are 1+ on the right side and 2+ on the left side  Patellar reflexes are 0 on the right side and 0 on the left side  Achilles reflexes are 0 on the right side and 0 on the left side  Motor intact

## 2018-04-13 ENCOUNTER — TRANSCRIBE ORDERS (OUTPATIENT)
Dept: SLEEP CENTER | Facility: CLINIC | Age: 63
End: 2018-04-13

## 2018-04-13 DIAGNOSIS — G47.33 OSA (OBSTRUCTIVE SLEEP APNEA): Primary | ICD-10-CM

## 2018-04-18 ENCOUNTER — TELEPHONE (OUTPATIENT)
Dept: PAIN MEDICINE | Facility: CLINIC | Age: 63
End: 2018-04-18

## 2018-04-18 ENCOUNTER — APPOINTMENT (OUTPATIENT)
Dept: LAB | Age: 63
End: 2018-04-18
Payer: MEDICARE

## 2018-04-18 DIAGNOSIS — E11.40 CONTROLLED TYPE 2 DIABETES MELLITUS WITH DIABETIC NEUROPATHY, WITHOUT LONG-TERM CURRENT USE OF INSULIN (HCC): Primary | ICD-10-CM

## 2018-04-18 DIAGNOSIS — E11.40 CONTROLLED TYPE 2 DIABETES MELLITUS WITH DIABETIC NEUROPATHY, WITHOUT LONG-TERM CURRENT USE OF INSULIN (HCC): ICD-10-CM

## 2018-04-18 DIAGNOSIS — E55.9 HYPOVITAMINOSIS D: ICD-10-CM

## 2018-04-18 DIAGNOSIS — M62.838 MUSCLE SPASM: Primary | ICD-10-CM

## 2018-04-18 LAB
25(OH)D3 SERPL-MCNC: 29.4 NG/ML (ref 30–100)
ALBUMIN SERPL BCP-MCNC: 3.8 G/DL (ref 3.5–5)
ALP SERPL-CCNC: 103 U/L (ref 46–116)
ALT SERPL W P-5'-P-CCNC: 29 U/L (ref 12–78)
ANION GAP SERPL CALCULATED.3IONS-SCNC: 10 MMOL/L (ref 4–13)
AST SERPL W P-5'-P-CCNC: 18 U/L (ref 5–45)
BILIRUB SERPL-MCNC: 0.41 MG/DL (ref 0.2–1)
BUN SERPL-MCNC: 36 MG/DL (ref 5–25)
CALCIUM SERPL-MCNC: 9.9 MG/DL (ref 8.3–10.1)
CHLORIDE SERPL-SCNC: 102 MMOL/L (ref 100–108)
CHOLEST SERPL-MCNC: 164 MG/DL (ref 50–200)
CO2 SERPL-SCNC: 26 MMOL/L (ref 21–32)
CREAT SERPL-MCNC: 1.61 MG/DL (ref 0.6–1.3)
CREAT UR-MCNC: 265 MG/DL
GFR SERPL CREATININE-BSD FRML MDRD: 35 ML/MIN/1.73SQ M
GLUCOSE P FAST SERPL-MCNC: 129 MG/DL (ref 65–99)
HDLC SERPL-MCNC: 66 MG/DL (ref 40–60)
LDLC SERPL CALC-MCNC: 78 MG/DL (ref 0–100)
MICROALBUMIN UR-MCNC: 86 MG/L (ref 0–20)
MICROALBUMIN/CREAT 24H UR: 32 MG/G CREATININE (ref 0–30)
NONHDLC SERPL-MCNC: 98 MG/DL
POTASSIUM SERPL-SCNC: 3.8 MMOL/L (ref 3.5–5.3)
PROT SERPL-MCNC: 8.4 G/DL (ref 6.4–8.2)
SODIUM SERPL-SCNC: 138 MMOL/L (ref 136–145)
TRIGL SERPL-MCNC: 102 MG/DL

## 2018-04-18 PROCEDURE — 82570 ASSAY OF URINE CREATININE: CPT | Performed by: INTERNAL MEDICINE

## 2018-04-18 PROCEDURE — 82043 UR ALBUMIN QUANTITATIVE: CPT | Performed by: INTERNAL MEDICINE

## 2018-04-18 PROCEDURE — 36415 COLL VENOUS BLD VENIPUNCTURE: CPT

## 2018-04-18 PROCEDURE — 80061 LIPID PANEL: CPT

## 2018-04-18 PROCEDURE — 82306 VITAMIN D 25 HYDROXY: CPT

## 2018-04-18 PROCEDURE — 83036 HEMOGLOBIN GLYCOSYLATED A1C: CPT

## 2018-04-18 PROCEDURE — 80053 COMPREHEN METABOLIC PANEL: CPT

## 2018-04-18 RX ORDER — TIZANIDINE 2 MG/1
2 TABLET ORAL EVERY 6 HOURS
Qty: 90 TABLET | Refills: 2 | Status: SHIPPED | OUTPATIENT
Start: 2018-04-18 | End: 2018-07-10 | Stop reason: SDUPTHER

## 2018-04-18 NOTE — TELEPHONE ENCOUNTER
Pts insurance will not cover the capsule form of tizanidine  They will only cover the tablet form so she was wondering if you could reorder it?

## 2018-04-19 ENCOUNTER — OFFICE VISIT (OUTPATIENT)
Dept: PAIN MEDICINE | Facility: CLINIC | Age: 63
End: 2018-04-19
Payer: MEDICARE

## 2018-04-19 VITALS
HEIGHT: 65 IN | SYSTOLIC BLOOD PRESSURE: 100 MMHG | WEIGHT: 287 LBS | DIASTOLIC BLOOD PRESSURE: 60 MMHG | HEART RATE: 90 BPM | TEMPERATURE: 97.6 F | BODY MASS INDEX: 47.82 KG/M2 | RESPIRATION RATE: 18 BRPM

## 2018-04-19 DIAGNOSIS — M54.50 CHRONIC BILATERAL LOW BACK PAIN WITHOUT SCIATICA: ICD-10-CM

## 2018-04-19 DIAGNOSIS — G89.4 CHRONIC PAIN SYNDROME: Primary | ICD-10-CM

## 2018-04-19 DIAGNOSIS — G89.29 CHRONIC BILATERAL LOW BACK PAIN WITHOUT SCIATICA: ICD-10-CM

## 2018-04-19 DIAGNOSIS — M79.18 MYOFASCIAL PAIN SYNDROME: ICD-10-CM

## 2018-04-19 LAB
EST. AVERAGE GLUCOSE BLD GHB EST-MCNC: 148 MG/DL
HBA1C MFR BLD: 6.8 % (ref 4.2–6.3)

## 2018-04-19 PROCEDURE — 99214 OFFICE O/P EST MOD 30 MIN: CPT | Performed by: ANESTHESIOLOGY

## 2018-04-19 RX ORDER — TOPIRAMATE 100 MG/1
100 TABLET, FILM COATED ORAL 2 TIMES DAILY
Qty: 60 TABLET | Refills: 1 | Status: SHIPPED | OUTPATIENT
Start: 2018-04-19 | End: 2018-07-30 | Stop reason: SDUPTHER

## 2018-04-19 NOTE — LETTER
April 19, 2018     Margie Abrams MD  [facility]    Patient: Dionte Ordoñez   YOB: 1955   Date of Visit: 4/19/2018       Dear Dr Elsie Cardoza: Thank you for referring Paola Desouza to me for evaluation  Below are my notes for this consultation  If you have questions, please do not hesitate to call me  I look forward to following your patient along with you  Sincerely,        Naomi Fung MD        CC: No Recipients  Naomi Fung MD  4/19/2018 10:50 AM  Sign at close encounter  Pain Medicine Follow-Up Note    Assessment:  1  Chronic pain syndrome    2  Chronic bilateral low back pain without sciatica    3  Myofascial pain syndrome        Plan:  New Medications Ordered This Visit   Medications    topiramate (TOPAMAX) 100 mg tablet     Sig: Take 1 tablet (100 mg total) by mouth 2 (two) times a day Takes 50mg in am and 100mg at hs     Dispense:  60 tablet     Refill:  1     My impressions and treatment recommendations were discussed in detail with the patient who verbalized understanding and had no further questions  The patient reports significant pain relief since being started on chronic prednisone therapy by her rheumatologist as well as the vitamin-D replacement therapy and tizanidine by Dr Maria Luisa Ortiz  She states that her pain is much better controlled at this point in time  She would like a refill on the topiramate 100 mg twice daily, so I have provided her a refill at today's visit  Side effects were reviewed with the patient  I did also review the patient's CT of the cervical spine at today's visit  She has signs and symptoms of cervical spondylosis  Follow-up is planned in 3 months time or sooner as warranted  Discharge instructions were provided  I personally saw and examined the patient and I agree with the above discussed plan of care      History of Present Illness:    Dionte Ordoñez is a 63 y o  female who presents to [facility] [id number] [facility] for interval re-evaluation of the above stated pain complaints  The patient has a past medical and chronic pain history as outlined in the assessment section  She was last seen on March 29, 2018 at which time she underwent trigger point injections  At today's office visit, the patient's pain score is 3/10 on the verbal numerical pain rating scale  The patient states that her pain is worse at night and constant in nature  She describes the quality of her pain as burning and sharp    The patient has reported better pain relief since being maintained on vitamin-D, starting tizanidine, and chronic prednisone therapy, both by Dr Nancy Posada and by her rheumatologist   She also continues to use topiramate 100 mg twice daily and states that this medication is helping her significantly  Other than as stated above, the patient denies any interval changes in medications, medical condition, mental condition, symptoms, or allergies since the last office visit  Review of Systems:    Review of Systems   Respiratory: Negative for shortness of breath  Cardiovascular: Negative for chest pain  Gastrointestinal: Negative for constipation, diarrhea, nausea and vomiting  Musculoskeletal: Positive for gait problem (difficulty walking)  Negative for arthralgias, joint swelling and myalgias  Skin: Negative for rash  Neurological: Negative for dizziness, seizures and weakness  All other systems reviewed and are negative          Patient Active Problem List   Diagnosis    Chronic low back pain    Chronic pain syndrome    DMII [facility]diabetes mellitus, type 2) ([facility]    Obesity    HTN (hypertension)    Lumbar canal stenosis    Depression    Myofascial pain syndrome    Sacroiliitis ([facility])    Lumbar spinal stenosis    Hip flexor tightness, left    Hip flexor tendon tightness, right    Hypovitaminosis D    ESR raised    Polymyalgia rheumatica ([facility])       Past Medical History: Diagnosis Date    Asthma     Chronic narcotic dependence (HCC)     Chronic pain syndrome     Depression     Diabetes mellitus (HCC)     Esophageal reflux     Hyperlipidemia     Hypertension     Lumbar radiculopathy     Lyme disease     Obesity     Opioid dependence [facility] [address]     PPD positive     had treatment w/ INH 15 years ago    Vitamin D deficiency        Past Surgical History:   Procedure Laterality Date    APPENDECTOMY      CHOLECYSTECTOMY      JOINT REPLACEMENT      R knee, B/l total hip    ID REVISE/REMOVE SPINAL NEUROSTIM/ Left 12/13/2016    Procedure: REPLACEMENT BUTTOCK SPINAL CORD STIMULATOR IPG;  Surgeon: Tho Roper MD;  Location:  MAIN OR;  Service: Neurosurgery    SHOULDER SURGERY      SPINAL CORD STIMULATOR IMPLANT         Family History   Problem Relation Age of Onset    Diabetes Mother     Cancer Mother     Cancer Father     Diabetes Maternal Grandmother     Cancer Paternal Uncle        Social History     Occupational History    Not on file       Social History Main Topics    Smoking status: Current Every Day Smoker     Packs/day: 1 00     Years: 5 00    Smokeless tobacco: Never Used      Comment: pt desires to quit in January    Alcohol use No    Drug use: No    Sexual activity: Not on file         Current Outpatient Prescriptions:     acetaminophen (TYLENOL) 325 mg tablet, Take 3 tablets by mouth 3 (three) times a day Please beware of high dose of tylenol which can cause liver dysfunction, Disp: 30 tablet, Rfl: 0    atorvastatin (LIPITOR) 10 mg tablet, Take by mouth daily taks in am , Disp: , Rfl:     baclofen 10 mg tablet, Take 1 tablet (10 mg total) by mouth 3 (three) times a day, Disp: 90 tablet, Rfl: 0    diclofenac (VOLTAREN) 50 mg EC tablet, Take 1 tablet (50 mg total) by mouth 2 (two) times a day, Disp: 60 tablet, Rfl: 1    Diclofenac Epolamine 1 3 % PTCH, Place 1 patch on the skin 2 (two) times a day as needed (PAIN) for up to 30 days, Disp: 60 patch, Rfl: 0    glimepiride (AMARYL) 1 mg tablet, Take by mouth 2 (two) times a day  , Disp: , Rfl:     lisinopril-hydrochlorothiazide (PRINZIDE,ZESTORETIC) 20-25 MG per tablet, Take by mouth, Disp: , Rfl:     NALTREXONE HCL PO, Take by mouth, Disp: , Rfl:     Nerve Stimulator (STANDARD TENS) ISAEL, by Does not apply route 4 (four) times a day as needed (MUSCLE SPASMS), Disp: 1 Device, Rfl: 0    predniSONE 5 mg tablet, , Disp: , Rfl:     tiZANidine (ZANAFLEX) 2 mg tablet, Take 1 tablet (2 mg total) by mouth every 6 (six) hours, Disp: 90 tablet, Rfl: 2    topiramate (TOPAMAX) 100 mg tablet, Take 1 tablet (100 mg total) by mouth 2 (two) times a day Takes 50mg in am and 100mg at hs, Disp: 60 tablet, Rfl: 1    venlafaxine (EFFEXOR-XR) 75 mg 24 hr capsule, Take 75 mg by mouth daily, Disp: , Rfl: 11    Cholecalciferol (VITAMIN D3) 89254 units CAPS, Take 1 capsule (50,000 Units total) by mouth once a week for 8 doses, Disp: 8 capsule, Rfl: 0    Allergies   Allergen Reactions    Bee Venom Anaphylaxis    Other Other (See Comments)     Stainless steel and surgical steel  *Severe blistering*    Aleve [Naproxen Sodium] Hives    Augmentin [Amoxicillin-Pot Clavulanate] Hives    Metformin And Related Hives    Morphine And Related Hives    Motrin [Ibuprofen] Hives    Nortriptyline Hives    Penicillins Hives    Soy Lecithin [Lecithin] Hives    Sulfa Antibiotics Hives    Tradjenta [Linagliptin] Hives    Tramadol        Physical Exam:    /60 (BP Location: Right arm, Patient Position: Sitting, Cuff Size: Standard)   Pulse 90   Temp 97 6 °F (36 4 °C) (Oral)   Resp 18   Ht 5' 5" (1 651 m)   Wt 130 kg (287 lb)   LMP  (LMP Unknown)   BMI 47 76 kg/m²      Constitutional:obese  Eyes:anicteric  HEENT:grossly intact  Neck:supple, symmetric, trachea midline and no masses   Pulmonary:even and unlabored  Cardiovascular:No edema or pitting edema present  Skin:Normal without rashes or lesions and well hydrated  Psychiatric:Mood and affect appropriate  Neurologic:Cranial Nerves II-XII grossly intact  Musculoskeletal:normal

## 2018-04-19 NOTE — PROGRESS NOTES
Pain Medicine Follow-Up Note    Assessment:  1  Chronic pain syndrome    2  Chronic bilateral low back pain without sciatica    3  Myofascial pain syndrome        Plan:  New Medications Ordered This Visit   Medications    topiramate (TOPAMAX) 100 mg tablet     Sig: Take 1 tablet (100 mg total) by mouth 2 (two) times a day Takes 50mg in am and 100mg at hs     Dispense:  60 tablet     Refill:  1     My impressions and treatment recommendations were discussed in detail with the patient who verbalized understanding and had no further questions  The patient reports significant pain relief since being started on chronic prednisone therapy by her rheumatologist as well as the vitamin-D replacement therapy and tizanidine by Dr Mari Carlson  She states that her pain is much better controlled at this point in time  She would like a refill on the topiramate 100 mg twice daily, so I have provided her a refill at today's visit  Side effects were reviewed with the patient  I did also review the patient's CT of the cervical spine at today's visit  She has signs and symptoms of cervical spondylosis  Follow-up is planned in 3 months time or sooner as warranted  Discharge instructions were provided  I personally saw and examined the patient and I agree with the above discussed plan of care  History of Present Illness:    Prem Ramos is a 63 y o  female who presents to [facility] for interval re-evaluation of the above stated pain complaints  The patient has a past medical and chronic pain history as outlined in the assessment section  She was last seen on March 29, 2018 at which time she underwent trigger point injections  At today's office visit, the patient's pain score is 3/10 on the verbal numerical pain rating scale  The patient states that her pain is worse at night and constant in nature  She describes the quality of her pain as burning and sharp    The patient has reported better pain relief since being maintained on vitamin-D, starting tizanidine, and chronic prednisone therapy, both by Dr Maria Luisa Ortiz and by her rheumatologist   She also continues to use topiramate 100 mg twice daily and states that this medication is helping her significantly  Other than as stated above, the patient denies any interval changes in medications, medical condition, mental condition, symptoms, or allergies since the last office visit  Review of Systems:    Review of Systems   Respiratory: Negative for shortness of breath  Cardiovascular: Negative for chest pain  Gastrointestinal: Negative for constipation, diarrhea, nausea and vomiting  Musculoskeletal: Positive for gait problem (difficulty walking)  Negative for arthralgias, joint swelling and myalgias  Skin: Negative for rash  Neurological: Negative for dizziness, seizures and weakness  All other systems reviewed and are negative          Patient Active Problem List   Diagnosis    Chronic low back pain    Chronic pain syndrome    DMII (diabetes mellitus, type 2) ([facility]    Obesity    HTN (hypertension)    Lumbar canal stenosis    Depression    Myofascial pain syndrome    Sacroiliitis ([facility]    Lumbar spinal stenosis    Hip flexor tightness, left    Hip flexor tendon tightness, right    Hypovitaminosis D    ESR raised    Polymyalgia rheumatica ([facility])       Past Medical History:   Diagnosis Date    Asthma     Chronic narcotic dependence ([facility])     Chronic pain syndrome     Depression     Diabetes mellitus ([address] )     Esophageal reflux     Hyperlipidemia     Hypertension     Lumbar radiculopathy     Lyme disease     Obesity     [facility]io[facility] [address]     PPD positive     had treatment w/ INH 15 years ago    Vitamin D deficiency        Past Surgical History:   Procedure Laterality Date    APPENDECTOMY      CHOLECYSTECTOMY      JOINT REPLACEMENT      R knee, B/l total hip    AR REVISE/REMOVE SPINAL NEUROSTIM/ Left 12/13/2016    Procedure: REPLACEMENT BUTTOCK SPINAL CORD STIMULATOR IPG;  Surgeon: Eric Campos MD;  Location: [facility];  Service: Neurosurgery    SHOULDER SURGERY      SPINAL CORD STIMULATOR IMPLANT         Family History   Problem Relation Age of Onset    Diabetes Mother     Cancer Mother     Cancer Father     Diabetes Maternal Grandmother     Cancer Paternal Uncle        Social History     Occupational History    Not on file       Social History Main Topics    Smoking status: Current Every Day Smoker     Packs/day: 1 00     Years: 5 00    Smokeless tobacco: Never Used      Comment: pt desires to quit in January    Alcohol use No    Drug use: No    Sexual activity: Not on file         Current Outpatient Prescriptions:     acetaminophen (TYLENOL) 325 mg tablet, Take 3 tablets by mouth 3 (three) times a day Please beware of high dose of tylenol which can cause liver dysfunction, Disp: 30 tablet, Rfl: 0    atorvastatin (LIPITOR) 10 mg tablet, Take by mouth daily taks in am , Disp: , Rfl:     baclofen 10 mg tablet, Take 1 tablet (10 mg total) by mouth 3 (three) times a day, Disp: 90 tablet, Rfl: 0    diclofenac (VOLTAREN) 50 mg EC tablet, Take 1 tablet (50 mg total) by mouth 2 (two) times a day, Disp: 60 tablet, Rfl: 1    Diclofenac Epolamine 1 3 % PTCH, Place 1 patch on the skin 2 (two) times a day as needed (PAIN) for up to 30 days, Disp: 60 patch, Rfl: 0    glimepiride (AMARYL) 1 mg tablet, Take by mouth 2 (two) times a day  , Disp: , Rfl:     lisinopril-hydrochlorothiazide (PRINZIDE,ZESTORETIC) 20-25 MG per tablet, Take by mouth, Disp: , Rfl:     NALTREXONE HCL PO, Take by mouth, Disp: , Rfl:     Nerve Stimulator (STANDARD TENS) ISAEL, by Does not apply route 4 (four) times a day as needed (MUSCLE SPASMS), Disp: 1 Device, Rfl: 0    predniSONE 5 mg tablet, , Disp: , Rfl:     tiZANidine (ZANAFLEX) 2 mg tablet, Take 1 tablet (2 mg total) by mouth every 6 (six) hours, Disp: 90 tablet, Rfl: 2    topiramate (TOPAMAX) 100 mg tablet, Take 1 tablet (100 mg total) by mouth 2 (two) times a day Takes 50mg in am and 100mg at hs, Disp: 60 tablet, Rfl: 1    venlafaxine (EFFEXOR-XR) 75 mg 24 hr capsule, Take 75 mg by mouth daily, Disp: , Rfl: 11    Cholecalciferol (VITAMIN D3) 76270 units CAPS, Take 1 capsule (50,000 Units total) by mouth once a week for 8 doses, Disp: 8 capsule, Rfl: 0    Allergies   Allergen Reactions    Bee Venom Anaphylaxis    Other Other (See Comments)     Stainless steel and surgical steel  *Severe blistering*    Aleve [Naproxen Sodium] Hives    Augmentin [Amoxicillin-Pot Clavulanate] Hives    Metformin And Related Hives    Morphine And Related Hives    Motrin [Ibuprofen] Hives    Nortriptyline Hives    Penicillins Hives    Soy Lecithin [Lecithin] Hives    Sulfa Antibiotics Hives    Tradjenta [Linagliptin] Hives    Tramadol        Physical Exam:    /60 (BP Location: Right arm, Patient Position: Sitting, Cuff Size: Standard)   Pulse 90   Temp 97 6 °F (36 4 °C) (Oral)   Resp 18   Ht 5' 5" (1 651 m)   Wt 130 kg (287 lb)   LMP  (LMP Unknown)   BMI 47 76 kg/m²     Constitutional:obese  Eyes:anicteric  HEENT:grossly intact  Neck:supple, symmetric, trachea midline and no masses   Pulmonary:even and unlabored  Cardiovascular:No edema or pitting edema present  Skin:Normal without rashes or lesions and well hydrated  Psychiatric:Mood and affect appropriate  Neurologic:Cranial Nerves II-XII grossly intact  Musculoskeletal:normal

## 2018-04-20 DIAGNOSIS — M54.50 CHRONIC BILATERAL LOW BACK PAIN WITHOUT SCIATICA: ICD-10-CM

## 2018-04-20 DIAGNOSIS — M79.18 MYOFASCIAL PAIN SYNDROME: ICD-10-CM

## 2018-04-20 DIAGNOSIS — G89.4 CHRONIC PAIN SYNDROME: ICD-10-CM

## 2018-04-20 DIAGNOSIS — G89.29 CHRONIC BILATERAL LOW BACK PAIN WITHOUT SCIATICA: ICD-10-CM

## 2018-04-20 RX ORDER — TOPIRAMATE 100 MG/1
TABLET, FILM COATED ORAL
Qty: 60 TABLET | Refills: 2 | OUTPATIENT
Start: 2018-04-20

## 2018-04-20 NOTE — TELEPHONE ENCOUNTER
S/W pt to verify that [facility] filled the prescription at her appt on 4/19/18  Pt verbalized understanding

## 2018-04-24 ENCOUNTER — TELEPHONE (OUTPATIENT)
Dept: PAIN MEDICINE | Facility: MEDICAL CENTER | Age: 63
End: 2018-04-24

## 2018-04-24 NOTE — TELEPHONE ENCOUNTER
Please clarify topiramate  Script states Take 1 tablet (100 mg total) by mouth 2 (two) times a day Takes 50mg in am and 100mg at hs  Please advise

## 2018-05-09 ENCOUNTER — TELEPHONE (OUTPATIENT)
Dept: PAIN MEDICINE | Facility: CLINIC | Age: 63
End: 2018-05-09

## 2018-05-09 NOTE — TELEPHONE ENCOUNTER
S/W patient, "Ask what can I help you with", pt states I wanted to ask Dr Jojo Marte nurse a question  Advised will re-route to [employer]

## 2018-05-09 NOTE — TELEPHONE ENCOUNTER
Pt is getting leg cramps again and wants to know if she can restart the vit  D or what should she do about this? She was wondering if restarting the vit  d would take the leg cramps away?

## 2018-05-13 DIAGNOSIS — F33.41 RECURRENT MAJOR DEPRESSIVE DISORDER, IN PARTIAL REMISSION (HCC): Primary | ICD-10-CM

## 2018-05-14 RX ORDER — VENLAFAXINE HYDROCHLORIDE 75 MG/1
CAPSULE, EXTENDED RELEASE ORAL
Qty: 30 CAPSULE | Refills: 1 | Status: SHIPPED | OUTPATIENT
Start: 2018-05-14 | End: 2018-05-16 | Stop reason: SDUPTHER

## 2018-05-16 ENCOUNTER — OFFICE VISIT (OUTPATIENT)
Dept: INTERNAL MEDICINE CLINIC | Age: 63
End: 2018-05-16
Payer: MEDICARE

## 2018-05-16 VITALS
HEART RATE: 80 BPM | WEIGHT: 289.4 LBS | DIASTOLIC BLOOD PRESSURE: 80 MMHG | HEIGHT: 65 IN | BODY MASS INDEX: 48.22 KG/M2 | SYSTOLIC BLOOD PRESSURE: 126 MMHG | OXYGEN SATURATION: 96 % | TEMPERATURE: 98.4 F

## 2018-05-16 DIAGNOSIS — E78.5 HYPERLIPIDEMIA, UNSPECIFIED HYPERLIPIDEMIA TYPE: ICD-10-CM

## 2018-05-16 DIAGNOSIS — J45.20 MILD INTERMITTENT ASTHMA WITHOUT COMPLICATION: ICD-10-CM

## 2018-05-16 DIAGNOSIS — E11.21 TYPE 2 DIABETES MELLITUS WITH DIABETIC NEPHROPATHY, WITHOUT LONG-TERM CURRENT USE OF INSULIN (HCC): Primary | ICD-10-CM

## 2018-05-16 DIAGNOSIS — F17.200 CURRENT EVERY DAY SMOKER: ICD-10-CM

## 2018-05-16 DIAGNOSIS — I10 ESSENTIAL HYPERTENSION: ICD-10-CM

## 2018-05-16 DIAGNOSIS — F33.41 RECURRENT MAJOR DEPRESSIVE DISORDER, IN PARTIAL REMISSION (HCC): ICD-10-CM

## 2018-05-16 PROBLEM — M54.16 ACUTE LUMBAR RADICULOPATHY: Status: ACTIVE | Noted: 2017-09-22

## 2018-05-16 PROBLEM — E66.01 MORBID OBESITY (HCC): Status: ACTIVE | Noted: 2017-11-26

## 2018-05-16 PROCEDURE — 99214 OFFICE O/P EST MOD 30 MIN: CPT | Performed by: INTERNAL MEDICINE

## 2018-05-16 RX ORDER — ATORVASTATIN CALCIUM 10 MG/1
10 TABLET, FILM COATED ORAL DAILY
Qty: 30 TABLET | Refills: 5 | Status: SHIPPED | OUTPATIENT
Start: 2018-05-16 | End: 2019-04-08 | Stop reason: SDUPTHER

## 2018-05-16 RX ORDER — VENLAFAXINE HYDROCHLORIDE 75 MG/1
75 CAPSULE, EXTENDED RELEASE ORAL DAILY
Qty: 30 CAPSULE | Refills: 5 | Status: SHIPPED | OUTPATIENT
Start: 2018-05-16 | End: 2018-12-04 | Stop reason: SDUPTHER

## 2018-05-16 RX ORDER — LISINOPRIL 20 MG/1
20 TABLET ORAL DAILY
Qty: 90 TABLET | Refills: 3 | Status: SHIPPED | OUTPATIENT
Start: 2018-05-16 | End: 2018-08-27 | Stop reason: SDUPTHER

## 2018-05-16 RX ORDER — GLIMEPIRIDE 1 MG/1
1 TABLET ORAL 2 TIMES DAILY
Qty: 60 TABLET | Refills: 5 | Status: SHIPPED | OUTPATIENT
Start: 2018-05-16 | End: 2019-03-04 | Stop reason: SDUPTHER

## 2018-05-16 NOTE — PATIENT INSTRUCTIONS
How to Stop Smoking   AMBULATORY CARE:   You will improve your health and the health of others around you  if you stop smoking  Your risk for heart and lung disease, cancer, stroke, heart attack, and vision problems will also decrease  You can benefit from quitting no matter how long you have smoked  Prepare to stop smoking:  Nicotine is a highly addictive drug found in cigarettes  Withdrawal symptoms can happen when you stop smoking and make it hard to quit  These include anxiety, depression, irritability, trouble sleeping, and increased appetite  You increase your chances of success if you prepare to quit  · Set a quit date  Sandra Ceuvas a date that is within the next 2 weeks  Do not pick a day that you think may be stressful or busy  Write down the day or [place] it on your calender  · Tell friends and family that you plan to quit  Explain that you may have withdrawal symptoms when you try to quit  Ask them to support you  They may be able to encourage you and help reduce your stress to make it easier for you to quit  · Make a list of your reasons for quitting  Put the list somewhere you will see it every day, such as your refrigerator  You can look at the list when you have a craving  · Remove all tobacco and nicotine products from your home, car, and workplace  Also, remove anything else that will tempt you to smoke, such as lighters, matches, or ashtrays  Clean your car, home, and places at work that smell like smoke  The smell of smoke can trigger a craving  · Identify triggers that make you want to smoke  This may include activities, feelings, or people  Also write down 1 way you can deal with each of your triggers  For example, if you want to smoke as soon as you wake up, plan another activity during this time, such as exercise  · Make a plan for how you will quit  Learn about the tools that can help you quit, such as medicine, counseling, or nicotine replacement therapy   Choose at least 2 options to help you quit  Tools to help you stop smoking:   · Counseling  from a trained healthcare provider can provide you with support and skills to quit smoking  The provider will also teach you to manage your withdrawal symptoms and cravings  You may receive counseling from one counselor, in group therapy, or through phone therapy called a quit line  · Nicotine replacement therapy (NRT)  such as nicotine patches, gum, or lozenges may help reduce your nicotine cravings  You may get these without a doctor's order  Do not use e-cigarettes or smokeless tobacco in place of cigarettes or to help you quit  They still contain nicotine  · Prescription medicines  such as nasal sprays or nicotine inhalers may help reduce your withdrawal symptoms  Other medicines may also be used to reduce your urge to smoke  Ask your healthcare provider about these medicines  You may need to start certain medicines 2 weeks before your quit date for them to work well  · Hypnosis  is a practice that helps guide you through thoughts and feelings  Hypnosis may help decrease your cravings and make you more willing to quit  · Acupuncture therapy  uses very thin needles to balance energy channels in the body  This is thought to help decrease cravings and symptoms of nicotine withdrawal      · Support groups  let you talk to others who are trying to quit or have already quit  It may be helpful to speak with others about how they quit  Manage your cravings:   · Avoid situations, people, and places that tempt you to smoke  Go to nonsmoking places, such as libraries or restaurants  Understand what tempts you and try to avoid these things  · Keep your hands busy  Hold things such as a stress ball or pen  · Put candy or toothpicks in your mouth  Keep lollipops, sugarless gum, or toothpicks with you at all times  · Do not have alcohol or caffeine  These drinks may tempt you to smoke   Drink healthy liquids such as water or juice instead  · Reward yourself when you resist your cravings  Rewards will motivate you and help you stay positive  · Do an activity that distracts you from your craving  Examples include going for a walk, exercising, or cleaning  Prevent weight gain after you quit:  You may gain a few pounds after you quit smoking  It is healthier for you to gain a few pounds than to continue to smoke  The following can help you prevent weight gain:  · Eat healthy foods  These include fruits, vegetables, whole-grain breads, low-fat dairy products, beans, lean meats, and fish  Eat healthy snacks, such as low-fat yogurt, if you get hungry between meals  · Drink water before, during, and between meals  This will make your stomach feel full and help prevent you from overeating  Ask your healthcare provider how much liquid to drink each day and which liquids are best for you  · Exercise  Take a walk or do some kind of exercise every day  Ask your healthcare provider what exercise is right for you  This may help reduce your cravings and reduce stress  For more support and information:   · [employer]  [place]  Phone: 0- 093 - 480-6444  Web Address: www [employer]  © 2017 [address] Information is for End User's use only and may not be sold, redistributed or otherwise used for commercial purposes  All illustrations and images included in CareNotes® are the copyrighted property of A [employer] A M , Inc  or Prosper Del Real  The above information is an  only  It is not intended as medical advice for individual conditions or treatments  Talk to your doctor, nurse or pharmacist before following any medical regimen to see if it is safe and effective for you

## 2018-05-16 NOTE — ASSESSMENT & PLAN NOTE
Her diabetes remained stable despite inability to lose weight    And the fact that Rheumatology put her on low-dose steroids her recent A1cs have all been 6 9

## 2018-05-16 NOTE — ASSESSMENT & PLAN NOTE
Her hypertension is stable but they did change her prinizide to plain Prinivil due to a mild elevation of her creatinine

## 2018-05-16 NOTE — PROGRESS NOTES
Assessment/Plan:    DMII (diabetes mellitus, type 2) ([facility])  Her diabetes remained stable despite inability to lose weight  And the fact that Rheumatology put her on low-dose steroids her recent A1cs have all been 6 9    Asthma, mild intermittent  She rarely uses her inhaler for her asthma but she continues to smoke    HTN (hypertension)  Her hypertension is stable but they did change her prinizide to plain Prinivil due to a mild elevation of her creatinine    Current every day smoker  She understands the risks of smoking but still continues to smoke and shows no interest to quit    Depression  She was started on Effexor by her gynecologist for depression and likes it  I will refill it       Diagnoses and all orders for this visit:    Essential hypertension  -     lisinopril (ZESTRIL) 20 mg tablet; Take 1 tablet (20 mg total) by mouth daily    Recurrent major depressive disorder, in partial remission ([facility])  -     venlafaxine (EFFEXOR-XR) 75 mg 24 hr capsule; Take 1 capsule (75 mg total) by mouth daily    Type 2 diabetes mellitus with diabetic nephropathy, without long-term current use of insulin ([facility])  -     glimepiride (AMARYL) 1 mg tablet; Take 1 tablet (1 mg total) by mouth 2 (two) times a day  -     atorvastatin (LIPITOR) 10 mg tablet; Take 1 tablet (10 mg total) by mouth daily taks in am    Hyperlipidemia, unspecified hyperlipidemia type  -     atorvastatin (LIPITOR) 10 mg tablet; Take 1 tablet (10 mg total) by mouth daily taks in am    Mild intermittent asthma without complication    Current every day smoker          Subjective:      Patient ID: Budd Leyden is a 66 y o  female  Patient feels well except for her arthritic complaints she was sent by her back doctor to her rheumatologist who supposedly feels like she has some sort of RA and started her on prednisone 5 milligrams  She subsequently has been trying extra hard to keep her blood sugars under control        Diabetes   She presents for her follow-up diabetic visit  She has type 2 diabetes mellitus  Her disease course has been stable  There are no hypoglycemic associated symptoms  Pertinent negatives for hypoglycemia include no confusion  Associated symptoms include fatigue  Pertinent negatives for diabetes include no chest pain, no polydipsia, no polyphagia and no polyuria  Symptoms are stable  There are no diabetic complications  Risk factors for coronary artery disease include diabetes mellitus, dyslipidemia, hypertension, obesity, post-menopausal, sedentary lifestyle, stress and tobacco exposure  Current diabetic treatment includes diet and oral agent (monotherapy)  She is compliant with treatment most of the time  Her weight is stable  She is following a diabetic and low salt diet  Meal planning includes avoidance of concentrated sweets  She has had a previous visit with a dietitian  She never participates in exercise  There is no change in her home blood glucose trend  An ACE inhibitor/angiotensin II receptor blocker is being taken  She sees a podiatrist Eye exam is current  Asthma   She complains of chest tightness, cough, shortness of breath and wheezing  This is a recurrent problem  The current episode started more than 1 year ago  The problem occurs rarely  The problem has been unchanged  The cough is non-productive  Associated symptoms include dyspnea on exertion, myalgias and postnasal drip (Chronic nasal congestion)  Pertinent negatives include no chest pain, ear pain, fever, sore throat or trouble swallowing  Her symptoms are aggravated by change in weather and URI  Her symptoms are alleviated by beta-agonist  She reports significant improvement on treatment  Risk factors for lung disease include smoking/tobacco exposure  Her past medical history is significant for asthma  Depression   This is a recurrent problem  The current episode started more than 1 year ago  The problem occurs intermittently   The problem has been waxing and waning  Associated symptoms include arthralgias, coughing, fatigue, myalgias and neck pain  Pertinent negatives include no chest pain, chills, congestion, fever, joint swelling, rash or sore throat  Treatments tried: Prior SSRI  The treatment provided moderate relief  Nicotine Dependence   Presents for follow-up visit  Symptoms include fatigue  Symptoms are negative for sore throat  Her urge triggers include company of smokers  The symptoms have been stable  Compliance with prior treatments has been poor  Review of Systems   Constitutional: Positive for fatigue  Negative for chills, fever and unexpected weight change  HENT: Positive for postnasal drip (Chronic nasal congestion)  Negative for congestion, ear pain, hearing loss, sinus pressure, sore throat, trouble swallowing and voice change  Eyes: Negative  Respiratory: Positive for cough, shortness of breath and wheezing  Negative for chest tightness  Cardiovascular: Positive for dyspnea on exertion and leg swelling ( trace and chronic)  Negative for chest pain and palpitations  Gastrointestinal: Negative  Endocrine: Negative for cold intolerance, polydipsia, polyphagia and polyuria  Genitourinary: Negative for dysuria, flank pain, frequency, hematuria and urgency  Musculoskeletal: Positive for arthralgias, back pain, myalgias and neck pain  Negative for joint swelling  Skin: Negative for rash  Allergic/Immunologic: Positive for environmental allergies  Negative for immunocompromised state  Neurological: Negative  Hematological: Negative for adenopathy  Psychiatric/Behavioral: Positive for depression  Negative for confusion, sleep disturbance and suicidal ideas           Objective:      /80 (BP Location: Left arm, Patient Position: Sitting)   Pulse 80   Temp 98 4 °F (36 9 °C) (Tympanic)   Ht 5' 5" (1 651 m)   Wt 131 kg (289 lb 6 4 oz)   LMP  (LMP Unknown)   SpO2 96%   BMI 48 16 kg/m²          Physical Exam Constitutional: She is oriented to person, place, and time  She appears well-developed and well-nourished  No distress  Morbidly obese   HENT:   Right Ear: External ear normal    Left Ear: External ear normal    Mouth/Throat: Oropharynx is clear and moist  No oropharyngeal exudate  Chronic nasal congestion, edentulous   Eyes: EOM are normal  Pupils are equal, round, and reactive to light  Neck: Normal range of motion  Neck supple  No JVD present  No thyromegaly present  Cardiovascular: Normal rate, regular rhythm, normal heart sounds and intact distal pulses  Exam reveals no gallop  No murmur heard  Pulmonary/Chest: Effort normal and breath sounds normal  No respiratory distress  She has no wheezes  She has no rales  Abdominal: Soft  Bowel sounds are normal  She exhibits no distension and no mass  There is no tenderness  Musculoskeletal: Normal range of motion  She exhibits edema  She exhibits no tenderness  Wide-based gait   Lymphadenopathy:     She has no cervical adenopathy  Neurological: She is alert and oriented to person, place, and time  No cranial nerve deficit  Coordination normal    Skin: No rash noted  Psychiatric: She has a normal mood and affect  Her behavior is normal  Judgment and thought content normal    Vitals reviewed

## 2018-05-22 ENCOUNTER — TRANSCRIBE ORDERS (OUTPATIENT)
Dept: LAB | Age: 63
End: 2018-05-22

## 2018-05-22 ENCOUNTER — APPOINTMENT (OUTPATIENT)
Dept: LAB | Age: 63
End: 2018-05-22
Payer: MEDICARE

## 2018-05-22 DIAGNOSIS — R70.0 ELEVATED SEDIMENTATION RATE: Primary | ICD-10-CM

## 2018-05-22 LAB
ALBUMIN SERPL BCP-MCNC: 3.4 G/DL (ref 3.5–5)
ALP SERPL-CCNC: 119 U/L (ref 46–116)
ALT SERPL W P-5'-P-CCNC: 30 U/L (ref 12–78)
ANION GAP SERPL CALCULATED.3IONS-SCNC: 9 MMOL/L (ref 4–13)
AST SERPL W P-5'-P-CCNC: 15 U/L (ref 5–45)
BASOPHILS # BLD AUTO: 0.03 THOUSANDS/ΜL (ref 0–0.1)
BASOPHILS NFR BLD AUTO: 0 % (ref 0–1)
BILIRUB SERPL-MCNC: 0.32 MG/DL (ref 0.2–1)
BUN SERPL-MCNC: 39 MG/DL (ref 5–25)
CALCIUM SERPL-MCNC: 9.3 MG/DL (ref 8.3–10.1)
CHLORIDE SERPL-SCNC: 102 MMOL/L (ref 100–108)
CO2 SERPL-SCNC: 27 MMOL/L (ref 21–32)
CREAT SERPL-MCNC: 1.38 MG/DL (ref 0.6–1.3)
CRP SERPL QL: 26.8 MG/L
EOSINOPHIL # BLD AUTO: 0.08 THOUSAND/ΜL (ref 0–0.61)
EOSINOPHIL NFR BLD AUTO: 1 % (ref 0–6)
ERYTHROCYTE [DISTWIDTH] IN BLOOD BY AUTOMATED COUNT: 14.3 % (ref 11.6–15.1)
ERYTHROCYTE [SEDIMENTATION RATE] IN BLOOD: 54 MM/HOUR (ref 0–20)
GFR SERPL CREATININE-BSD FRML MDRD: 41 ML/MIN/1.73SQ M
GLUCOSE SERPL-MCNC: 170 MG/DL (ref 65–140)
HCT VFR BLD AUTO: 36.8 % (ref 34.8–46.1)
HGB BLD-MCNC: 12.2 G/DL (ref 11.5–15.4)
LYMPHOCYTES # BLD AUTO: 1.96 THOUSANDS/ΜL (ref 0.6–4.47)
LYMPHOCYTES NFR BLD AUTO: 18 % (ref 14–44)
MCH RBC QN AUTO: 29.7 PG (ref 26.8–34.3)
MCHC RBC AUTO-ENTMCNC: 33.2 G/DL (ref 31.4–37.4)
MCV RBC AUTO: 90 FL (ref 82–98)
MONOCYTES # BLD AUTO: 0.63 THOUSAND/ΜL (ref 0.17–1.22)
MONOCYTES NFR BLD AUTO: 6 % (ref 4–12)
NEUTROPHILS # BLD AUTO: 8.17 THOUSANDS/ΜL (ref 1.85–7.62)
NEUTS SEG NFR BLD AUTO: 75 % (ref 43–75)
NRBC BLD AUTO-RTO: 0 /100 WBCS
PLATELET # BLD AUTO: 232 THOUSANDS/UL (ref 149–390)
PMV BLD AUTO: 11.2 FL (ref 8.9–12.7)
POTASSIUM SERPL-SCNC: 3.4 MMOL/L (ref 3.5–5.3)
PROT SERPL-MCNC: 7.6 G/DL (ref 6.4–8.2)
RBC # BLD AUTO: 4.11 MILLION/UL (ref 3.81–5.12)
SODIUM SERPL-SCNC: 138 MMOL/L (ref 136–145)
WBC # BLD AUTO: 10.89 THOUSAND/UL (ref 4.31–10.16)

## 2018-05-22 PROCEDURE — 86140 C-REACTIVE PROTEIN: CPT

## 2018-05-22 PROCEDURE — 85025 COMPLETE CBC W/AUTO DIFF WBC: CPT

## 2018-05-22 PROCEDURE — 80053 COMPREHEN METABOLIC PANEL: CPT

## 2018-05-22 PROCEDURE — 85652 RBC SED RATE AUTOMATED: CPT

## 2018-05-22 PROCEDURE — 36415 COLL VENOUS BLD VENIPUNCTURE: CPT

## 2018-05-24 ENCOUNTER — OFFICE VISIT (OUTPATIENT)
Dept: PAIN MEDICINE | Facility: CLINIC | Age: 63
End: 2018-05-24
Payer: MEDICARE

## 2018-05-24 VITALS
WEIGHT: 290 LBS | HEART RATE: 100 BPM | DIASTOLIC BLOOD PRESSURE: 78 MMHG | HEIGHT: 65 IN | BODY MASS INDEX: 48.32 KG/M2 | SYSTOLIC BLOOD PRESSURE: 130 MMHG

## 2018-05-24 DIAGNOSIS — E55.9 HYPOVITAMINOSIS D: ICD-10-CM

## 2018-05-24 DIAGNOSIS — M35.3 POLYMYALGIA RHEUMATICA (HCC): Primary | ICD-10-CM

## 2018-05-24 DIAGNOSIS — M79.18 MYOFASCIAL PAIN SYNDROME: ICD-10-CM

## 2018-05-24 PROBLEM — M48.062 NEUROGENIC CLAUDICATION DUE TO LUMBAR SPINAL STENOSIS: Status: ACTIVE | Noted: 2018-05-24

## 2018-05-24 PROCEDURE — 99213 OFFICE O/P EST LOW 20 MIN: CPT | Performed by: PHYSICAL MEDICINE & REHABILITATION

## 2018-05-24 NOTE — PROGRESS NOTES
Assessment/Plan:      Diagnoses and all orders for this visit:    Polymyalgia rheumatica ([address] )    Myofascial pain syndrome    Hypovitaminosis D       Discussion: Her sed rate is trending up, she needs to keep in close follow-up Rheumatology  There is little more due for her from a physiatric perspective  She does have neurogenic claudication from lumbar spinal stenosis but is well compensated with a middle me invasive spinal cord stimulator  Having lumbar surgery and face or diabetes and connective tissue disorder probably make her high risk  I will re-evaluate her after she has been seen in follow-up by Rheumatology in about 8 weeks  Subjective:     Patient ID: Isabel Werner is a 66 y o  female  HPI  Follow-up for myofascial pain vitamin-D deficiency  She continues to follow with rheumatology in is on prednisone and   Naltrexone, vitamin-D level ordered April 12 she maintains on Topamax from pain medicine, she has Rheumatology OV next week  Dr Yakov Burton restarted Rx VitD, and cramps have lessened now  (see prior phone call)  Review of Systems   Respiratory: Negative  Cardiovascular: Negative  Gastrointestinal: Negative  Genitourinary: Negative  Musculoskeletal: Positive for back pain and gait problem (Increased back pain withNumbness of both legs with ambulation  )  Neurological: Positive for numbness  Objective:  Data:   Ref Range & Units 5/22/18  1:01 PM Flag   Sed Rate 0 - 20 mm/hour 54         Ref Range & Units 5/22/18  1:01 PM Flag   CRP <3 0 mg/L 26 8        Ref Range & Units 4/18/18 11:02 AM Flag   Vit D, 25-Hydroxy 30 0 - 100 0 ng/mL 29 4          Physical Exam   Constitutional: She appears well-developed and well-nourished  No distress  HENT:   Head: Normocephalic  Neurological: She is alert  She displays normal reflexes  No cranial nerve deficit  Coordination normal    Reflex Scores:       Tricep reflexes are 1+ on the right side and 1+ on the left side  Bicep reflexes are 1+ on the right side and 1+ on the left side  Brachioradialis reflexes are 1+ on the right side and 1+ on the left side  Patellar reflexes are 0 on the right side and 0 on the left side  Achilles reflexes are 0 on the right side and 0 on the left side  No focal deficits

## 2018-05-24 NOTE — LETTER
May 24, 2018     Sagar Palacios MD  [facility]    Patient: Celso Aguirre   YOB: 1955   Date of Visit: 5/24/2018       Dear Dr Kaila Covarrubias: Thank you for referring Jermaine Castellanos to me for evaluation  Below are the relevant portions of my assessment and plan of care  Diagnoses and all orders for this visit:    Polymyalgia rheumatica ([address] )    Myofascial pain syndrome    Hypovitaminosis D        If you have questions, please do not hesitate to call me  I look forward to following [place] [facility] along with you           Sincerely,        Scarlet Maddox DO        CC: No Recipients

## 2018-06-25 ENCOUNTER — TELEPHONE (OUTPATIENT)
Dept: PAIN MEDICINE | Facility: MEDICAL CENTER | Age: 63
End: 2018-06-25

## 2018-06-25 NOTE — TELEPHONE ENCOUNTER
Pt left message in voicemail at 8:22 this morning, stating that the pain in her L side is back and she wants to schedule a TPI  She currently has a 15 WK follow up scheduled on 7/30  Pt left a call back number of [phone]

## 2018-06-27 ENCOUNTER — PROCEDURE VISIT (OUTPATIENT)
Dept: PAIN MEDICINE | Facility: CLINIC | Age: 63
End: 2018-06-27
Payer: MEDICARE

## 2018-06-27 ENCOUNTER — TELEPHONE (OUTPATIENT)
Dept: PAIN MEDICINE | Facility: CLINIC | Age: 63
End: 2018-06-27

## 2018-06-27 VITALS
HEART RATE: 78 BPM | RESPIRATION RATE: 18 BRPM | SYSTOLIC BLOOD PRESSURE: 120 MMHG | HEIGHT: 65 IN | TEMPERATURE: 98.2 F | WEIGHT: 289 LBS | BODY MASS INDEX: 48.15 KG/M2 | DIASTOLIC BLOOD PRESSURE: 54 MMHG

## 2018-06-27 DIAGNOSIS — M79.18 MYOFASCIAL PAIN SYNDROME: Primary | ICD-10-CM

## 2018-06-27 PROCEDURE — 20552 NJX 1/MLT TRIGGER POINT 1/2: CPT | Performed by: ANESTHESIOLOGY

## 2018-06-27 RX ORDER — METHYLPREDNISOLONE ACETATE 40 MG/ML
40 INJECTION, SUSPENSION INTRA-ARTICULAR; INTRALESIONAL; INTRAMUSCULAR; SOFT TISSUE ONCE
Status: COMPLETED | OUTPATIENT
Start: 2018-06-27 | End: 2018-06-27

## 2018-06-27 RX ORDER — LIDOCAINE HYDROCHLORIDE 10 MG/ML
5 INJECTION, SOLUTION INFILTRATION; PERINEURAL ONCE
Status: COMPLETED | OUTPATIENT
Start: 2018-06-27 | End: 2018-06-27

## 2018-06-27 RX ADMIN — LIDOCAINE HYDROCHLORIDE 5 ML: 10 INJECTION, SOLUTION INFILTRATION; PERINEURAL at 08:51

## 2018-06-27 RX ADMIN — METHYLPREDNISOLONE ACETATE 40 MG: 40 INJECTION, SUSPENSION INTRA-ARTICULAR; INTRALESIONAL; INTRAMUSCULAR; SOFT TISSUE at 08:53

## 2018-06-27 NOTE — LETTER
June 27, 2018     Xavier Arauz MD  [facility]    Patient: Yeison Cottrell   YOB: 1955   Date of Visit: 6/27/2018       Dear Dr Real Angel: Thank you for referring Harshal Gregg to me for evaluation  Below are my notes for this consultation  If you have questions, please do not hesitate to call me  I look forward to following your patient along with you  Sincerely,        Dong Dickens MD        CC: No Recipients  Dong Dickens MD  6/27/2018  8:42 AM  Sign at close encounter       [address] Single/Multiple     Date/Time 6/27/2018 8:40 AM     Performed by  Dayana Piper by Will Hong       Consent: Verbal consent obtained  Written consent obtained  Risks and benefits: risks, benefits and alternatives were discussed  Consent given by: patient  Patient understanding: patient states understanding of the procedure being performed  Patient consent: the patient's understanding of the procedure matches consent given  Procedure consent: procedure consent matches procedure scheduled  Relevant documents: relevant documents present and verified  Test results: test results available and properly labeled  Site marked: the operative site was marked  Imaging studies: imaging studies available  Patient identity confirmed: verbally with patient  Time out: Immediately prior to procedure a "time out" was called to verify the correct patient, procedure, equipment, support staff and site/side marked as required  Preparation: Patient was prepped and draped in the usual sterile fashion        Site preparation: Betadine    Local anesthesia used: yes      Anesthesia: local infiltration     Anesthesia   Local anesthesia used: yes  Local Anesthetic: lidocaine 1% without epinephrine  Anesthetic total: 3 mL     Sedation   Patient sedated: no      Specimen: no    Culture: no   Procedure Details   Procedure Notes: ATTENDING PHYSICIAN:  Dong Dickens MD     PROCEDURE:  Trigger point injections x4 to the left lumbar paraspinal musculature with local anesthetic and steroid  PRE-PROCEDURE DIAGNOSIS:  Myofascial pain syndrome  POST-PROCEDURE DIAGNOSIS:  Myofascial pain syndrome  ESTIMATED BLOOD LOSS:  Minimal     ANESTHESIA:  None  COMPLICATIONS:  None  CONSENT:  Today's procedure, its potential benefits as well as its risks and side effects were reviewed  Discussed risks of the procedure including bleeding, infection, reactions to the medications, failure the pain to improve, and potential worsening of the pain as well as pneumothorax were explained in detail to the patient, who verbalized understanding and wished to proceed  Written informed consent was thereby obtained  DESCRIPTION OF THE PROCEDURE:  After written informed consent was obtained, the patient was placed in the sitting position on the examination table  Anatomical landmarks were identified via palpation  The trigger points were identified and marked after palpation  The skin overlying these 4 marked trigger points was prepared using Betadine swabs x3 in the usual sterile fashion  Strict aseptic technique was utilized throughout the procedure  A 4 mL injectate consisting of 3 mL of 1% lidocaine mixed with 1 mL of Depo-Medrol 40 mg/mL was drawn up sterilely  Using a 25-gauge 1 25 inch needle, 1 mL of the above injectate was administered to each of the marked trigger points following negative aspiration  The patient tolerated the procedure well and all needles were removed with the tips intact  Hemostasis was maintained  There were no apparent complications  The skin was wiped clean and Band-Aids were placed as appropriate  The patient was monitored for an appropriate period of time following the procedure and remained hemodynamically stable and neurovascularly intact following the procedure as she was prior to the procedure   The patient was ultimately discharged to home with supervision in good condition  I was present for and participated in all key and critical portions of this procedure    Patient tolerance: Patient tolerated the procedure well with no immediate complications

## 2018-06-27 NOTE — PROGRESS NOTES
Inj Trigger Point Single/Multiple     Date/Time 6/27/2018 8:40 AM     Performed by  Mychal Sharma by Jorge Coleman       Consent: Verbal consent obtained  Written consent obtained  Risks and benefits: risks, benefits and alternatives were discussed  Consent given by: patient  Patient understanding: patient states understanding of the procedure being performed  Patient consent: the patient's understanding of the procedure matches consent given  Procedure consent: procedure consent matches procedure scheduled  Relevant documents: relevant documents present and verified  Test results: test results available and properly labeled  Site marked: the operative site was marked  Imaging studies: imaging studies available  Patient identity confirmed: verbally with patient  Time out: Immediately prior to procedure a "time out" was called to verify the correct patient, procedure, equipment, support staff and site/side marked as required  Preparation: Patient was prepped and draped in the usual sterile fashion  Site preparation: Betadine    Local anesthesia used: yes      Anesthesia: local infiltration     Anesthesia   Local anesthesia used: yes  Local Anesthetic: lidocaine 1% without epinephrine  Anesthetic total: 3 mL     Sedation   Patient sedated: no      Specimen: no    Culture: no   Procedure Details   Procedure Notes: ATTENDING PHYSICIAN:  Jose Acevedo MD     PROCEDURE:  Trigger point injections x4 to the left lumbar paraspinal musculature with local anesthetic and steroid  PRE-PROCEDURE DIAGNOSIS:  Myofascial pain syndrome  POST-PROCEDURE DIAGNOSIS:  Myofascial pain syndrome  ESTIMATED BLOOD LOSS:  Minimal     ANESTHESIA:  None  COMPLICATIONS:  None  CONSENT:  Today's procedure, its potential benefits as well as its risks and side effects were reviewed   Discussed risks of the procedure including bleeding, infection, reactions to the medications, failure the pain to improve, and potential worsening of the pain as well as pneumothorax were explained in detail to the patient, who verbalized understanding and wished to proceed  Written informed consent was thereby obtained  DESCRIPTION OF THE PROCEDURE:  After written informed consent was obtained, the patient was placed in the sitting position on the examination table  Anatomical landmarks were identified via palpation  The trigger points were identified and marked after palpation  The skin overlying these 4 marked trigger points was prepared using Betadine swabs x3 in the usual sterile fashion  Strict aseptic technique was utilized throughout the procedure  A 4 mL injectate consisting of 3 mL of 1% lidocaine mixed with 1 mL of Depo-Medrol 40 mg/mL was drawn up sterilely  Using a 25-gauge 1 25 inch needle, 1 mL of the above injectate was administered to each of the marked trigger points following negative aspiration  The patient tolerated the procedure well and all needles were removed with the tips intact  Hemostasis was maintained  There were no apparent complications  The skin was wiped clean and Band-Aids were placed as appropriate  The patient was monitored for an appropriate period of time following the procedure and remained hemodynamically stable and neurovascularly intact following the procedure as she was prior to the procedure  The patient was ultimately discharged to home with supervision in good condition  I was present for and participated in all key and critical portions of this procedure    Patient tolerance: Patient tolerated the procedure well with no immediate complications

## 2018-06-28 ENCOUNTER — APPOINTMENT (OUTPATIENT)
Dept: LAB | Age: 63
End: 2018-06-28
Payer: MEDICARE

## 2018-06-28 ENCOUNTER — TRANSCRIBE ORDERS (OUTPATIENT)
Dept: LAB | Age: 63
End: 2018-06-28

## 2018-06-28 DIAGNOSIS — M35.3 POLYMYALGIA RHEUMATICA (HCC): Primary | ICD-10-CM

## 2018-06-28 DIAGNOSIS — M35.3 POLYMYALGIA RHEUMATICA (HCC): ICD-10-CM

## 2018-06-28 LAB
ALBUMIN SERPL BCP-MCNC: 3.3 G/DL (ref 3.5–5)
ALP SERPL-CCNC: 90 U/L (ref 46–116)
ALT SERPL W P-5'-P-CCNC: 35 U/L (ref 12–78)
ANION GAP SERPL CALCULATED.3IONS-SCNC: 9 MMOL/L (ref 4–13)
AST SERPL W P-5'-P-CCNC: 18 U/L (ref 5–45)
BILIRUB SERPL-MCNC: 0.45 MG/DL (ref 0.2–1)
BUN SERPL-MCNC: 18 MG/DL (ref 5–25)
CALCIUM SERPL-MCNC: 9.6 MG/DL (ref 8.3–10.1)
CHLORIDE SERPL-SCNC: 101 MMOL/L (ref 100–108)
CO2 SERPL-SCNC: 27 MMOL/L (ref 21–32)
CREAT SERPL-MCNC: 1.09 MG/DL (ref 0.6–1.3)
CRP SERPL QL: 6.6 MG/L
ERYTHROCYTE [SEDIMENTATION RATE] IN BLOOD: 44 MM/HOUR (ref 0–20)
GFR SERPL CREATININE-BSD FRML MDRD: 55 ML/MIN/1.73SQ M
GLUCOSE SERPL-MCNC: 124 MG/DL (ref 65–140)
POTASSIUM SERPL-SCNC: 3.7 MMOL/L (ref 3.5–5.3)
PROT SERPL-MCNC: 7.5 G/DL (ref 6.4–8.2)
SODIUM SERPL-SCNC: 137 MMOL/L (ref 136–145)

## 2018-06-28 PROCEDURE — 86140 C-REACTIVE PROTEIN: CPT

## 2018-06-28 PROCEDURE — 36415 COLL VENOUS BLD VENIPUNCTURE: CPT

## 2018-06-28 PROCEDURE — 85652 RBC SED RATE AUTOMATED: CPT

## 2018-06-28 PROCEDURE — 80053 COMPREHEN METABOLIC PANEL: CPT

## 2018-07-10 ENCOUNTER — OFFICE VISIT (OUTPATIENT)
Dept: PAIN MEDICINE | Facility: CLINIC | Age: 63
End: 2018-07-10
Payer: MEDICARE

## 2018-07-10 VITALS
HEART RATE: 104 BPM | HEIGHT: 65 IN | DIASTOLIC BLOOD PRESSURE: 70 MMHG | BODY MASS INDEX: 48.15 KG/M2 | SYSTOLIC BLOOD PRESSURE: 110 MMHG | WEIGHT: 289 LBS

## 2018-07-10 DIAGNOSIS — M24.552 HIP FLEXOR TIGHTNESS, LEFT: ICD-10-CM

## 2018-07-10 DIAGNOSIS — M48.061 SPINAL STENOSIS OF LUMBAR REGION, UNSPECIFIED WHETHER NEUROGENIC CLAUDICATION PRESENT: ICD-10-CM

## 2018-07-10 DIAGNOSIS — G89.29 CHRONIC BILATERAL LOW BACK PAIN WITHOUT SCIATICA: Primary | ICD-10-CM

## 2018-07-10 DIAGNOSIS — M24.551 HIP FLEXOR TENDON TIGHTNESS, RIGHT: ICD-10-CM

## 2018-07-10 DIAGNOSIS — M35.3 POLYMYALGIA RHEUMATICA (HCC): ICD-10-CM

## 2018-07-10 DIAGNOSIS — M62.838 MUSCLE SPASM: ICD-10-CM

## 2018-07-10 DIAGNOSIS — E55.9 HYPOVITAMINOSIS D: ICD-10-CM

## 2018-07-10 DIAGNOSIS — M54.50 CHRONIC BILATERAL LOW BACK PAIN WITHOUT SCIATICA: Primary | ICD-10-CM

## 2018-07-10 PROCEDURE — 99213 OFFICE O/P EST LOW 20 MIN: CPT | Performed by: PHYSICAL MEDICINE & REHABILITATION

## 2018-07-10 RX ORDER — TIZANIDINE 2 MG/1
2 TABLET ORAL EVERY 6 HOURS
Qty: 30 TABLET | Refills: 0 | Status: SHIPPED | OUTPATIENT
Start: 2018-07-10 | End: 2018-09-05 | Stop reason: SDUPTHER

## 2018-07-10 RX ORDER — PREDNISONE 1 MG/1
4 TABLET ORAL DAILY
Refills: 2 | COMMUNITY
Start: 2018-06-27 | End: 2018-10-11

## 2018-07-10 RX ORDER — TIZANIDINE 2 MG/1
2 TABLET ORAL EVERY 6 HOURS
Qty: 90 TABLET | Refills: 0 | Status: SHIPPED | OUTPATIENT
Start: 2018-07-10 | End: 2018-09-05 | Stop reason: SDUPTHER

## 2018-07-10 NOTE — PROGRESS NOTES
Assessment/Plan:      Diagnoses and all orders for this visit:    Chronic bilateral low back pain without sciatica    Hip flexor tendon tightness, right    Hip flexor tightness, left    Hypovitaminosis D    Spinal stenosis of lumbar region, unspecified whether neurogenic claudication present    Polymyalgia rheumatica ([facility] [address] )    Other orders  -     predniSONE 1 mg tablet; Take 4 mg by mouth daily          Subjective:     Patient ID: Alexandria Cummings is a 66 y o  female  HPI    Follow-up after rheumatologic consultation for elevated sed rate  She had trigger point injections  With pain medicine   July 7th  She reports much quicker response to injections this time  Maintains on RxVit D thru Rheum  Has about 20 minute tolerance for any light yard work  Review of Systems   Respiratory: Negative  Cardiovascular: Negative  Gastrointestinal: Negative  Genitourinary: Negative  Musculoskeletal: Positive for arthralgias (Wear for impending weather changes ), back pain and myalgias  Neurological: Positive for numbness (  Especially right leg with prolonged standing erect)  Objective:   Rheum Notes  Labs:  Units 6/28/18 10:37 AM Flag   Sed Rate 0 - 20 mm/hour 44       Units 6/28/18 10:37 AM Flag    CRP <3 0 mg/L 6 6   H         LFTs are nornmal on tizanidine  Physical Exam   Constitutional: She appears well-developed and well-nourished  No distress  HENT:   Head: Normocephalic  Musculoskeletal:    Station and gait are normal without any assistive device  Neurological:   Reflex Scores:       Tricep reflexes are 1+ on the right side and 2+ on the left side  Bicep reflexes are 1+ on the right side and 2+ on the left side  Brachioradialis reflexes are 1+ on the right side and 2+ on the left side  Patellar reflexes are 0 on the right side  Achilles reflexes are 0 on the right side and 0 on the left side

## 2018-07-10 NOTE — LETTER
July 10, 2018     William Drake MD  [facility]    Patient: Ashtyn Block   YOB: 1955   Date of Visit: 7/10/2018       Dear Dr Bonnie Raya: Thank you for referring Juan Lind to me for evaluation  Below are the relevant portions of my assessment and plan of care  Diagnoses and all orders for this visit:    Chronic bilateral low back pain without sciatica    Hip flexor tendon tightness, right    Hip flexor tightness, left    Hypovitaminosis D    Spinal stenosis of lumbar region, unspecified whether neurogenic claudication present    Polymyalgia rheumatica ([facility] [address] )    Other orders  -     predniSONE 1 mg tablet; Take 4 mg by mouth daily        If you have questions, please do not hesitate to call me  I look forward to following Guerline Tony along with you           Sincerely,        Lisa Guidry,         CC: No Recipients

## 2018-07-11 DIAGNOSIS — E11.9 TYPE 2 DIABETES MELLITUS WITHOUT COMPLICATION, WITHOUT LONG-TERM CURRENT USE OF INSULIN (HCC): Primary | ICD-10-CM

## 2018-07-14 DIAGNOSIS — M54.50 CHRONIC BILATERAL LOW BACK PAIN WITHOUT SCIATICA: ICD-10-CM

## 2018-07-14 DIAGNOSIS — G89.4 CHRONIC PAIN SYNDROME: ICD-10-CM

## 2018-07-14 DIAGNOSIS — M79.18 MYOFASCIAL PAIN SYNDROME: ICD-10-CM

## 2018-07-14 DIAGNOSIS — G89.29 CHRONIC BILATERAL LOW BACK PAIN WITHOUT SCIATICA: ICD-10-CM

## 2018-07-16 RX ORDER — TOPIRAMATE 100 MG/1
TABLET, FILM COATED ORAL
Qty: 60 TABLET | Refills: 1 | OUTPATIENT
Start: 2018-07-16

## 2018-07-18 ENCOUNTER — TELEPHONE (OUTPATIENT)
Dept: PAIN MEDICINE | Facility: MEDICAL CENTER | Age: 63
End: 2018-07-18

## 2018-07-18 NOTE — TELEPHONE ENCOUNTER
Patient returning call to nurse  Tried nurse triage line was told that Rebecca Backer the nurse will call patient back within the next few minutes    Gave pt message

## 2018-07-30 ENCOUNTER — OFFICE VISIT (OUTPATIENT)
Dept: PAIN MEDICINE | Facility: CLINIC | Age: 63
End: 2018-07-30
Payer: MEDICARE

## 2018-07-30 VITALS
BODY MASS INDEX: 48.25 KG/M2 | WEIGHT: 289.6 LBS | RESPIRATION RATE: 17 BRPM | HEIGHT: 65 IN | DIASTOLIC BLOOD PRESSURE: 60 MMHG | SYSTOLIC BLOOD PRESSURE: 100 MMHG | HEART RATE: 88 BPM

## 2018-07-30 DIAGNOSIS — M54.50 CHRONIC BILATERAL LOW BACK PAIN WITHOUT SCIATICA: ICD-10-CM

## 2018-07-30 DIAGNOSIS — G89.4 CHRONIC PAIN SYNDROME: ICD-10-CM

## 2018-07-30 DIAGNOSIS — M79.18 MYOFASCIAL PAIN SYNDROME: ICD-10-CM

## 2018-07-30 DIAGNOSIS — G89.29 CHRONIC BILATERAL LOW BACK PAIN WITHOUT SCIATICA: ICD-10-CM

## 2018-07-30 PROCEDURE — 99214 OFFICE O/P EST MOD 30 MIN: CPT | Performed by: ANESTHESIOLOGY

## 2018-07-30 RX ORDER — CHOLECALCIFEROL (VITAMIN D3) 1250 MCG
1 CAPSULE ORAL WEEKLY
Refills: 11 | COMMUNITY
Start: 2018-07-13

## 2018-07-30 RX ORDER — TOPIRAMATE 100 MG/1
100 TABLET, FILM COATED ORAL 2 TIMES DAILY
Qty: 60 TABLET | Refills: 2 | Status: SHIPPED | OUTPATIENT
Start: 2018-07-30 | End: 2018-11-15 | Stop reason: SDUPTHER

## 2018-07-30 NOTE — LETTER
July 30, 2018     Rafia Atkins MD  [facility]    Patient: Charlesetta Kehr   YOB: 1955   Date of Visit: 7/30/2018       Dear Dr Boyce Shows: Thank you for referring Vidhi Hall to me for evaluation  Below are my notes for this consultation  If you have questions, please do not hesitate to call me  I look forward to following your patient along with you  Sincerely,        Seamus Cash MD        CC: No Recipients  Seamus Cash MD  7/30/2018 10:27 AM  Sign at close encounter  Pain Medicine Follow-Up Note    Assessment:  1  Chronic pain syndrome    2  Chronic bilateral low back pain without sciatica    3  Myofascial pain syndrome        Plan:  New Medications Ordered This Visit   Medications    Cholecalciferol (VITAMIN D3) 10766 units CAPS     Sig: Take 1 capsule by mouth once a week     Refill:  11    topiramate (TOPAMAX) 100 mg tablet     Sig: Take 1 tablet (100 mg total) by mouth 2 (two) times a day Takes 50mg in am and 100mg at hs     Dispense:  60 tablet     Refill:  2       My impressions and treatment recommendations were discussed in detail with the patient who verbalized understanding and had no further questions  Given that the patient reports overall reduced pain and improved level of functioning without significant side effects, I felt a reasonable to continue her on topiramate 100 mg twice daily  Side effects were reviewed with the patient  The patient also reports excellent pain relief following the trigger point injections on June 27, 2018  I did mention to the patient that if her pain returns, I would be happy to perform them again for her in the future  The patient verbalized understanding  In addition, the patient reports that she has been turning the spinal cord stimulator settings up and down based on her level of pain    I did discuss with her if she is not getting adequate relief from the spinal cord stimulator, she should meet with the spinal cord stimulator representatives to help readjust her spinal cord stimulator settings  The patient verbalized understanding  Follow-up is planned in 3 months time or sooner as warranted  Discharge instructions were provided  I personally saw and examined the patient and I agree with the above discussed plan of care  History of Present Illness:    Charlesetta Kehr is a 66 y o  female who presents to [facility] for interval re-evaluation of the above stated pain complaints  The patient has a past medical and chronic pain history as outlined in the assessment section  She was last seen on June 27, 2018 at which time she underwent trigger point injections  She has also been maintained on topiramate 100 mg twice daily  At today's office visit, the patient's pain score is 9/10 on the verbal numerical pain rating scale  The patient states that her pain is primarily in her low back with radiation into the bilateral lower extremities  She describes her pain as worse in the morning and constant in nature  She reports the quality of her pain as dull/aching, numbness, and pins and needles    The patient reports that she is getting good relief of symptoms with the topiramate 100 mg twice daily  She also reports that she has been changing her spinal cord stimulator settings based on her level of pain  She does report significant stiffness at today's visit  Other than as stated above, the patient denies any interval changes in medications, medical condition, mental condition, symptoms, or allergies since the last office visit  Review of Systems:    Review of Systems   Respiratory: Negative for shortness of breath  Cardiovascular: Negative for chest pain  Gastrointestinal: Negative for constipation, diarrhea, nausea and vomiting     Musculoskeletal: Positive for gait problem (difficulty walking/ decreased range of motion) and joint swelling (joint stiffness)  Negative for arthralgias and myalgias  Skin: Negative for rash  Neurological: Negative for dizziness, seizures and weakness  All other systems reviewed and are negative          Patient Active Problem List   Diagnosis    Chronic low back pain    Chronic pain syndrome    DMII [facility], type 2) [facility]    Morbid obesity ([address] )    HTN (hypertension)    Lumbar canal stenosis    Depression    Myofascial pain syndrome    Sacroiliitis ([facility]    Lumbar spinal stenosis    Hip flexor tightness, left    Hip flexor tendon tightness, right    Hypovitaminosis D    ESR raised    Polymyalgia rheumatica ([facility])    Asthma, mild intermittent    Acute lumbar radiculopathy    Esophageal reflux    Opioid dependence ([facility])    Current every day smoker    Neurogenic claudication due to lumbar spinal stenosis       Past Medical History:   Diagnosis Date    Allergic reaction to bee sting     last assessed - [id number]    Asthma     Chronic narcotic dependence ([facility])     Chronic pain syndrome     Depression     Diabetes mellitus ([facility] [address] )     Esophageal reflux     Hyperlipidemia     Hypertension     Lumbar radiculopathy     Lyme disease     last assessed - 28Jun2016    Obesity     Opioid dependence ([facility] [address] )     PPD positive     had treatment w/ INH 15 years ago    Vitamin D deficiency        Past Surgical History:   Procedure Laterality Date    APPENDECTOMY      CHOLECYSTECTOMY      JOINT REPLACEMENT      R knee, B/l total hip    OR REVISE/REMOVE SPINAL NEUROSTIM/ Left 12/13/2016    Procedure: REPLACEMENT BUTTOCK SPINAL CORD STIMULATOR IPG;  Surgeon: Irene Ordaz MD;  Location:  MAIN OR;  Service: Neurosurgery    SHOULDER SURGERY      Resolved - 1992    SPINAL CORD STIMULATOR IMPLANT      spinal stereotaxis stimulation of cord    TOTAL HIP ARTHROPLASTY Bilateral        Family History   Problem Relation Age of Onset    Diabetes Mother         Diabetes mellitus    Cancer Mother     Cancer Father     Diabetes Maternal Grandmother     Cancer Paternal Uncle     Brain cancer Family     Breast cancer Family     Cervical cancer Family     Diabetes Family         Diabetes mellitus    Hypertension Family     Ovarian cancer Family     Stroke Family         Stroke complications       Social History     Occupational History    Not on file       Social History Main Topics    Smoking status: Current Every Day Smoker     Packs/day: 1 00     Years: 5 00    Smokeless tobacco: Never Used      Comment: pt desires to quit in January    Alcohol use No      Comment: Denied history of alcohol use    Drug use: No      Comment: Denied history of drug use    Sexual activity: Not on file         Current Outpatient Prescriptions:     acetaminophen (TYLENOL) 325 mg tablet, Take 3 tablets by mouth 3 (three) times a day Please beware of high dose of tylenol which can cause liver dysfunction, Disp: 30 tablet, Rfl: 0    atorvastatin (LIPITOR) 10 mg tablet, Take 1 tablet (10 mg total) by mouth daily taks in am, Disp: 30 tablet, Rfl: 5    baclofen 10 mg tablet, Take 1 tablet (10 mg total) by mouth 3 (three) times a day, Disp: 90 tablet, Rfl: 0    Cholecalciferol (VITAMIN D3) 76922 units CAPS, Take 1 capsule (50,000 Units total) by mouth once a week for 8 doses, Disp: 8 capsule, Rfl: 0    Cholecalciferol (VITAMIN D3) 33665 units CAPS, Take 1 capsule by mouth once a week, Disp: , Rfl: 11    diclofenac (VOLTAREN) 50 mg EC tablet, Take 1 tablet (50 mg total) by mouth 2 (two) times a day, Disp: 60 tablet, Rfl: 1    Diclofenac Epolamine 1 3 % PTCH, Place 1 patch on the skin 2 (two) times a day as needed (PAIN) for up to 30 days, Disp: 60 patch, Rfl: 0    glimepiride (AMARYL) 1 mg tablet, Take 1 tablet (1 mg total) by mouth 2 (two) times a day, Disp: 60 tablet, Rfl: 5    glucose blood (ONE TOUCH ULTRA TEST) test strip, Test blood sugar once a day, Disp: 100 each, Rfl: 5    lisinopril (ZESTRIL) 20 mg tablet, Take 1 tablet (20 mg total) by mouth daily, Disp: 90 tablet, Rfl: 3    NALTREXONE HCL PO, Take by mouth, Disp: , Rfl:     Nerve Stimulator (STANDARD TENS) ISAEL, by Does not apply route 4 (four) times a day as needed (MUSCLE SPASMS), Disp: 1 Device, Rfl: 0    predniSONE 1 mg tablet, Take 4 mg by mouth daily, Disp: , Rfl: 2    predniSONE 5 mg tablet, , Disp: , Rfl:     tiZANidine (ZANAFLEX) 2 mg tablet, Take 1 tablet (2 mg total) by mouth every 6 (six) hours, Disp: 30 tablet, Rfl: 0    tiZANidine (ZANAFLEX) 2 mg tablet, Take 1 tablet (2 mg total) by mouth every 6 (six) hours, Disp: 90 tablet, Rfl: 0    topiramate (TOPAMAX) 100 mg tablet, Take 1 tablet (100 mg total) by mouth 2 (two) times a day Takes 50mg in am and 100mg at hs, Disp: 60 tablet, Rfl: 2    venlafaxine (EFFEXOR-XR) 75 mg 24 hr capsule, Take 1 capsule (75 mg total) by mouth daily, Disp: 30 capsule, Rfl: 5    Allergies   Allergen Reactions    Bee Venom Anaphylaxis    Other Other (See Comments)     Stainless steel and surgical steel  *Severe blistering*    Aleve [Naproxen Sodium] Hives    Augmentin [Amoxicillin-Pot Clavulanate] Hives    Metformin And Related Hives    Morphine And Related Hives    Motrin [Ibuprofen] Hives    Nortriptyline Hives    Penicillins Hives    Soy Lecithin [Lecithin] Hives    Sulfa Antibiotics Hives    Tradjenta [Linagliptin] Hives    Tramadol        Physical Exam:    /60 (BP Location: Right arm, Patient Position: Sitting, Cuff Size: Standard)   Pulse 88   Resp 17   Ht 5' 5" (1 651 m)   Wt 131 kg (289 lb 9 6 oz)   LMP  (LMP Unknown)   BMI 48 19 kg/m²      Constitutional:normal, well developed, well nourished, alert, in no distress and non-toxic and no overt pain behavior   and obese  Eyes:anicteric  HEENT:grossly intact  Neck:supple, symmetric, trachea midline and no masses   Pulmonary:even and unlabored  Cardiovascular:No edema or pitting edema present  Skin:Normal without rashes or lesions and well hydrated  Psychiatric:Mood and affect appropriate  Neurologic:Cranial Nerves II-XII grossly intact  Musculoskeletal:normal

## 2018-07-30 NOTE — PROGRESS NOTES
Pain Medicine Follow-Up Note    Assessment:  1  Chronic pain syndrome    2  Chronic bilateral low back pain without sciatica    3  Myofascial pain syndrome        Plan:  New Medications Ordered This Visit   Medications    Cholecalciferol (VITAMIN D3) 21436 units CAPS     Sig: Take 1 capsule by mouth once a week     Refill:  11    topiramate (TOPAMAX) 100 mg tablet     Sig: Take 1 tablet (100 mg total) by mouth 2 (two) times a day Takes 50mg in am and 100mg at hs     Dispense:  60 tablet     Refill:  2       My impressions and treatment recommendations were discussed in detail with the patient who verbalized understanding and had no further questions  Given that the patient reports overall reduced pain and improved level of functioning without significant side effects, I felt a reasonable to continue her on topiramate 100 mg twice daily  Side effects were reviewed with the patient  The patient also reports excellent pain relief following the trigger point injections on June 27, 2018  I did mention to the patient that if her pain returns, I would be happy to perform them again for her in the future  The patient verbalized understanding  In addition, the patient reports that she has been turning the spinal cord stimulator settings up and down based on her level of pain  I did discuss with her if she is not getting adequate relief from the spinal cord stimulator, she should meet with the spinal cord stimulator representatives to help readjust her spinal cord stimulator settings  The patient verbalized understanding  Follow-up is planned in 3 months time or sooner as warranted  Discharge instructions were provided  I personally saw and examined the patient and I agree with the above discussed plan of care  History of Present Illness:    Manav Bradshaw is a 66 y o  female who presents to [facility] for interval re-evaluation of the above stated pain complaints   The patient has a past medical and chronic pain history as outlined in the assessment section  She was last seen on June 27, 2018 at which time she underwent trigger point injections  She has also been maintained on topiramate 100 mg twice daily  At today's office visit, the patient's pain score is 9/10 on the verbal numerical pain rating scale  The patient states that her pain is primarily in her low back with radiation into the bilateral lower extremities  She describes her pain as worse in the morning and constant in nature  She reports the quality of her pain as dull/aching, numbness, and pins and needles    The patient reports that she is getting good relief of symptoms with the topiramate 100 mg twice daily  She also reports that she has been changing her spinal cord stimulator settings based on her level of pain  She does report significant stiffness at today's visit  Other than as stated above, the patient denies any interval changes in medications, medical condition, mental condition, symptoms, or allergies since the last office visit  Review of Systems:    Review of Systems   Respiratory: Negative for shortness of breath  Cardiovascular: Negative for chest pain  Gastrointestinal: Negative for constipation, diarrhea, nausea and vomiting  Musculoskeletal: Positive for gait problem (difficulty walking/ decreased range of motion) and joint swelling (joint stiffness)  Negative for arthralgias and myalgias  Skin: Negative for rash  Neurological: Negative for dizziness, seizures and weakness  All other systems reviewed and are negative          Patient Active Problem List   Diagnosis    Chronic low back pain    Chronic pain syndrome    DMII (diabetes mellitus, type 2) ([facility] [address] )    [facility]    HTN (hypertension)    Lumbar canal stenosis    Depression    Myofascial pain syndrome    Sacroiliitis (H[facility])    Lumbar spinal stenosis    Hip flexor tightness, left    Hip flexor tendon tightness, right    Hypovitaminosis D    ESR raised    Polymyalgia rheumatica ([facility])    Asthma, mild intermittent    Acute lumbar radiculopathy    Esophageal reflux    Opioid dependence ([facility] [address] )    Current every day smoker    Neurogenic claudication due to lumbar spinal stenosis       Past Medical History:   Diagnosis Date    Allergic reaction to bee sting     last assessed - 13Jun2016    Asthma     Chronic narcotic dependence ([facility] [address] )     Chronic pain syndrome     Depression     Diabetes mellitus ([facility] [address] )     Esophageal reflux     Hyperlipidemia     Hypertension     Lumbar radiculopathy     Lyme disease     last assessed - 28Jun2016    Obesity     Opioid dependence ([facility] [address] )     PPD positive     had treatment w/ INH 15 years ago    Vitamin D deficiency        Past Surgical History:   Procedure Laterality Date    APPENDECTOMY      CHOLECYSTECTOMY      JOINT REPLACEMENT      R knee, B/l total hip    OK REVISE/REMOVE SPINAL NEUROSTIM/ Left 12/13/2016    Procedure: REPLACEMENT BUTTOCK SPINAL CORD STIMULATOR IPG;  Surgeon: Loretta Rodarte MD;  Location:  MAIN OR;  Service: Neurosurgery    SHOULDER SURGERY      Resolved - 1992    SPINAL CORD STIMULATOR IMPLANT      spinal stereotaxis stimulation of cord    TOTAL HIP ARTHROPLASTY Bilateral        Family History   Problem Relation Age of Onset    Diabetes Mother         Diabetes mellitus    Cancer Mother     Cancer Father     Diabetes Maternal Grandmother     Cancer Paternal Uncle     Brain cancer Family     Breast cancer Family     Cervical cancer Family     Diabetes Family         Diabetes mellitus    Hypertension Family     Ovarian cancer Family     Stroke Family         Stroke complications       Social History     Occupational History    Not on file       Social History Main Topics    Smoking status: Current Every Day Smoker     Packs/day: 1 00     Years: 5 00    Smokeless tobacco: Never Used      Comment: pt desires to quit in January    Alcohol use No      Comment: Denied history of alcohol use    Drug use: No      Comment: Denied history of drug use    Sexual activity: Not on file         Current Outpatient Prescriptions:     acetaminophen (TYLENOL) 325 mg tablet, Take 3 tablets by mouth 3 (three) times a day Please beware of high dose of tylenol which can cause liver dysfunction, Disp: 30 tablet, Rfl: 0    atorvastatin (LIPITOR) 10 mg tablet, Take 1 tablet (10 mg total) by mouth daily taks in am, Disp: 30 tablet, Rfl: 5    baclofen 10 mg tablet, Take 1 tablet (10 mg total) by mouth 3 (three) times a day, Disp: 90 tablet, Rfl: 0    Cholecalciferol (VITAMIN D3) 12568 units CAPS, Take 1 capsule (50,000 Units total) by mouth once a week for 8 doses, Disp: 8 capsule, Rfl: 0    Cholecalciferol (VITAMIN D3) 13419 units CAPS, Take 1 capsule by mouth once a week, Disp: , Rfl: 11    diclofenac (VOLTAREN) 50 mg EC tablet, Take 1 tablet (50 mg total) by mouth 2 (two) times a day, Disp: 60 tablet, Rfl: 1    Diclofenac Epolamine 1 3 % PTCH, Place 1 patch on the skin 2 (two) times a day as needed (PAIN) for up to 30 days, Disp: 60 patch, Rfl: 0    glimepiride (AMARYL) 1 mg tablet, Take 1 tablet (1 mg total) by mouth 2 (two) times a day, Disp: 60 tablet, Rfl: 5    glucose blood (ONE TOUCH ULTRA TEST) test strip, Test blood sugar once a day, Disp: 100 each, Rfl: 5    lisinopril (ZESTRIL) 20 mg tablet, Take 1 tablet (20 mg total) by mouth daily, Disp: 90 tablet, Rfl: 3    NALTREXONE HCL PO, Take by mouth, Disp: , Rfl:     Nerve Stimulator (STANDARD TENS) ISAEL, by Does not apply route 4 (four) times a day as needed (MUSCLE SPASMS), Disp: 1 Device, Rfl: 0    predniSONE 1 mg tablet, Take 4 mg by mouth daily, Disp: , Rfl: 2    predniSONE 5 mg tablet, , Disp: , Rfl:     tiZANidine (ZANAFLEX) 2 mg tablet, Take 1 tablet (2 mg total) by mouth every 6 (six) hours, Disp: 30 tablet, Rfl: 0    tiZANidine (ZANAFLEX) 2 mg tablet, Take 1 tablet (2 mg total) by mouth every 6 (six) hours, Disp: 90 tablet, Rfl: 0    topiramate (TOPAMAX) 100 mg tablet, Take 1 tablet (100 mg total) by mouth 2 (two) times a day Takes 50mg in am and 100mg at hs, Disp: 60 tablet, Rfl: 2    venlafaxine (EFFEXOR-XR) 75 mg 24 hr capsule, Take 1 capsule (75 mg total) by mouth daily, Disp: 30 capsule, Rfl: 5    Allergies   Allergen Reactions    Bee Venom Anaphylaxis    Other Other (See Comments)     Stainless steel and surgical steel  *Severe blistering*    Aleve [Naproxen Sodium] Hives    Augmentin [Amoxicillin-Pot Clavulanate] Hives    Metformin And Related Hives    Morphine And Related Hives    Motrin [Ibuprofen] Hives    Nortriptyline Hives    Penicillins Hives    Soy Lecithin [Lecithin] Hives    Sulfa Antibiotics Hives    Tradjenta [Linagliptin] Hives    Tramadol        Physical Exam:    /60 (BP Location: Right arm, Patient Position: Sitting, Cuff Size: Standard)   Pulse 88   Resp 17   Ht 5' 5" (1 651 m)   Wt 131 kg (289 lb 9 6 oz)   LMP  (LMP Unknown)   BMI 48 19 kg/m²     Constitutional:normal, well developed, well nourished, alert, in no distress and non-toxic and no overt pain behavior   and obese  Eyes:anicteric  HEENT:grossly intact  Neck:supple, symmetric, trachea midline and no masses   Pulmonary:even and unlabored  Cardiovascular:No edema or pitting edema present  Skin:Normal without rashes or lesions and well hydrated  Psychiatric:Mood and affect appropriate  Neurologic:Cranial Nerves II-XII grossly intact  Musculoskeletal:normal

## 2018-08-14 ENCOUNTER — TELEPHONE (OUTPATIENT)
Dept: PAIN MEDICINE | Facility: CLINIC | Age: 63
End: 2018-08-14

## 2018-08-14 NOTE — TELEPHONE ENCOUNTER
Pt called wanting to schedule TPI for lower back  She states her pain has returned since last TPI on 6/27/18  Please advise if ok to schedule and provide dx codes   Thanks

## 2018-08-15 ENCOUNTER — TELEPHONE (OUTPATIENT)
Dept: PAIN MEDICINE | Facility: MEDICAL CENTER | Age: 63
End: 2018-08-15

## 2018-08-15 DIAGNOSIS — M79.18 MYOFASCIAL PAIN SYNDROME: Primary | ICD-10-CM

## 2018-08-15 RX ORDER — METHOCARBAMOL 750 MG/1
750 TABLET, FILM COATED ORAL 3 TIMES DAILY PRN
Qty: 90 TABLET | Refills: 0 | Status: SHIPPED | OUTPATIENT
Start: 2018-08-15 | End: 2018-10-11

## 2018-08-15 NOTE — TELEPHONE ENCOUNTER
Pt informed of the same as stated in Dr James Query previous task  Pt said she thinks she was on methocarbamol in the past but is willing to anything at this point  Pt aware a 1 month script was sent to her [facility]   She will also call and make f/u w/ Dr Glenda Carlton  Pt will give you an update on things when she comes for her TPI on 8/27

## 2018-08-15 NOTE — TELEPHONE ENCOUNTER
S/W pt who was tearful, she said she needs something to control the pain until her TPI on 8/27  AS told her to use Tylenol and it's not helping  She takes Tylenol 325 mg (2) tabs in AM and PM, she also continues on her topiramate BID  Pain starts at the spine at her waistline and it radiates to her posterior ribs on the left side  She said the pain is becoming so intense it's making her nauseous  Pt said she has also tried heat  I advised pt to continue with ice or heat, rest and she may take the Tylenol up to 3000 mg in 24 hrs and I will forward message to AS for further rec

## 2018-08-15 NOTE — TELEPHONE ENCOUNTER
Patient called requesting to s/w a nurse stating she is in severe pain   Would like a c/b [phone]

## 2018-08-16 ENCOUNTER — OFFICE VISIT (OUTPATIENT)
Dept: PAIN MEDICINE | Facility: CLINIC | Age: 63
End: 2018-08-16
Payer: MEDICARE

## 2018-08-16 VITALS
BODY MASS INDEX: 48.65 KG/M2 | HEART RATE: 76 BPM | HEIGHT: 65 IN | WEIGHT: 292 LBS | DIASTOLIC BLOOD PRESSURE: 90 MMHG | SYSTOLIC BLOOD PRESSURE: 122 MMHG

## 2018-08-16 DIAGNOSIS — M24.551 HIP FLEXOR TENDON TIGHTNESS, RIGHT: ICD-10-CM

## 2018-08-16 DIAGNOSIS — E55.9 HYPOVITAMINOSIS D: ICD-10-CM

## 2018-08-16 DIAGNOSIS — M35.3 POLYMYALGIA RHEUMATICA (HCC): ICD-10-CM

## 2018-08-16 DIAGNOSIS — R70.0 ESR RAISED: ICD-10-CM

## 2018-08-16 DIAGNOSIS — M24.552 HIP FLEXOR TIGHTNESS, LEFT: Primary | ICD-10-CM

## 2018-08-16 PROCEDURE — 99214 OFFICE O/P EST MOD 30 MIN: CPT | Performed by: PHYSICAL MEDICINE & REHABILITATION

## 2018-08-16 NOTE — PROGRESS NOTES
Assessment/Plan:      Diagnoses and all orders for this visit:    Hip flexor tightness, left    Hip flexor tendon tightness, right    Hypovitaminosis D    ESR raised  -     Aldolase; Future  -     CK; Future  -     Sedimentation rate, automated; Future  -     LD,Blood; Future  -     C-reactive protein; Future  -     Ambulatory referral to Pain Management; Future    Polymyalgia rheumatica (HCC)  -     Aldolase; Future  -     CK; Future  -     Sedimentation rate, automated; Future  -     LD,Blood; Future  -     C-reactive protein; Future  -     Ambulatory referral to Pain Management; Future          Subjective:     Patient ID: Gianluca Harper is a 66 y o  female  HPI Special appointment for spasms  Has been getting TPIs from PM, and has been tapering prednisone with Rheumatology, last seen July 3 and ESR was 44  [facility] Dr Elaine Peterson  Prednisone was increased yesterday to 10 mg, was at 8 mg  Pattern is left lower lumbar let this was origin  No FX or methocarbamol no sleep last night  Baclofen ineffective  Tizanidine ineffective  Reports meloxicam effective historically, was stopped in [facility] and not restarted  Review of Systems   Gastrointestinal: Negative  Genitourinary: Negative  Musculoskeletal: Positive for back pain  Negative for gait problem  Neurological: Positive for numbness (  Known type 2 diabetes)  Negative for weakness  Psychiatric/Behavioral: Positive for sleep disturbance           Objective:  Labs:   Ref Range & Units 6/28/18 10:37 AM Flag   Sodium 136 - 145 mmol/L 137     Potassium 3 5 - 5 3 mmol/L 3 7     Chloride 100 - 108 mmol/L 101     CO2 21 - 32 mmol/L 27     Anion Gap 4 - 13 mmol/L 9     BUN 5 - 25 mg/dL 18     Creatinine 0 60 - 1 30 mg/dL 1 09          Physical Exam

## 2018-08-16 NOTE — LETTER
August 20, 2018     Los Benoit MD  [facility]    Patient: Budd Leyden   YOB: 1955   Date of Visit: 8/16/2018       Dear Dr Ellen Gamez: Thank you for referring Monet Gustafson to me for evaluation  Below are the relevant portions of my assessment and plan of care  Diagnoses and all orders for this visit:    Hip flexor tightness, left    Hip flexor tendon tightness, right    Hypovitaminosis D    ESR raised  -     Aldolase; Future  -     CK; Future  -     Sedimentation rate, automated; Future  -     LD,Blood; Future  -     C-reactive protein; Future  -     Ambulatory referral to Pain Management; Future    Polymyalgia rheumatica (HCC)  -     Aldolase; Future  -     CK; Future  -     Sedimentation rate, automated; Future  -     LD,Blood; Future  -     C-reactive protein; Future  -     Ambulatory referral to Pain Management; Future        If you have questions, please do not hesitate to call me  I look forward to following Joana Castillo along with you           Sincerely,        Almas Carrasco DO        CC: Bonilla Coyle MD

## 2018-08-16 NOTE — PATIENT INSTRUCTIONS
1   May stop methocarbamol  2   Continue Zanaflex as prescribed  3   Restart meloxicam order sent to pharmacy  4   Trigger point injections     5    Lab work

## 2018-08-17 ENCOUNTER — TELEPHONE (OUTPATIENT)
Dept: PAIN MEDICINE | Facility: CLINIC | Age: 63
End: 2018-08-17

## 2018-08-17 NOTE — TELEPHONE ENCOUNTER
Pt came in to see if she can be seen by Dr Dion Escobedo  Pt states that the travel time is hurting her back to much going to [facility]  Please advise if pt can switch to Dr Dion Escobedo

## 2018-08-20 ENCOUNTER — APPOINTMENT (OUTPATIENT)
Dept: LAB | Age: 63
End: 2018-08-20
Payer: MEDICARE

## 2018-08-20 DIAGNOSIS — R70.0 ESR RAISED: ICD-10-CM

## 2018-08-20 DIAGNOSIS — M35.3 POLYMYALGIA RHEUMATICA (HCC): ICD-10-CM

## 2018-08-20 LAB
ANION GAP SERPL CALCULATED.3IONS-SCNC: 7 MMOL/L (ref 4–13)
BUN SERPL-MCNC: 24 MG/DL (ref 5–25)
CALCIUM SERPL-MCNC: 9.8 MG/DL (ref 8.3–10.1)
CHLORIDE SERPL-SCNC: 101 MMOL/L (ref 100–108)
CK SERPL-CCNC: 105 U/L (ref 26–192)
CO2 SERPL-SCNC: 28 MMOL/L (ref 21–32)
CREAT SERPL-MCNC: 1.22 MG/DL (ref 0.6–1.3)
CRP SERPL QL: 14.8 MG/L
ERYTHROCYTE [SEDIMENTATION RATE] IN BLOOD: 51 MM/HOUR (ref 0–20)
GFR SERPL CREATININE-BSD FRML MDRD: 48 ML/MIN/1.73SQ M
GLUCOSE P FAST SERPL-MCNC: 150 MG/DL (ref 65–99)
LDH SERPL-CCNC: 145 U/L (ref 81–234)
POTASSIUM SERPL-SCNC: 3.8 MMOL/L (ref 3.5–5.3)
SODIUM SERPL-SCNC: 136 MMOL/L (ref 136–145)

## 2018-08-20 PROCEDURE — 82550 ASSAY OF CK (CPK): CPT

## 2018-08-20 PROCEDURE — 85652 RBC SED RATE AUTOMATED: CPT

## 2018-08-20 PROCEDURE — 86140 C-REACTIVE PROTEIN: CPT

## 2018-08-20 PROCEDURE — 83615 LACTATE (LD) (LDH) ENZYME: CPT

## 2018-08-20 PROCEDURE — 36415 COLL VENOUS BLD VENIPUNCTURE: CPT

## 2018-08-20 PROCEDURE — 80048 BASIC METABOLIC PNL TOTAL CA: CPT

## 2018-08-20 PROCEDURE — 82085 ASSAY OF ALDOLASE: CPT

## 2018-08-20 NOTE — TELEPHONE ENCOUNTER
Okay to schedule with me    Please make 30 minute office visit since this is my 1st time seeing the patient

## 2018-08-20 NOTE — TELEPHONE ENCOUNTER
Pt is calling stating that she saw Dr Carolyn Chandler and he recommended that pt move up her tpi that she has on 8/27

## 2018-08-20 NOTE — TELEPHONE ENCOUNTER
Spoke with Dr Meera Nails and then phoned patient explaining to her that there needs to be a specific amount of time in between injections  I explained to pt that it wasn't that we could not see her sooner it was that we needed to wait a specific length of time  Pt will be transferring to Dr Lc Hamm after tpi because she lives closer to [facility]

## 2018-08-21 LAB — ALDOLASE SERPL-CCNC: 8.1 U/L (ref 3.3–10.3)

## 2018-08-23 ENCOUNTER — TELEPHONE (OUTPATIENT)
Dept: PAIN MEDICINE | Facility: CLINIC | Age: 63
End: 2018-08-23

## 2018-08-23 ENCOUNTER — TELEPHONE (OUTPATIENT)
Dept: PAIN MEDICINE | Facility: MEDICAL CENTER | Age: 63
End: 2018-08-23

## 2018-08-23 DIAGNOSIS — G89.4 CHRONIC PAIN SYNDROME: Primary | ICD-10-CM

## 2018-08-23 DIAGNOSIS — M79.18 MYOFASCIAL PAIN SYNDROME: ICD-10-CM

## 2018-08-23 DIAGNOSIS — M54.50 CHRONIC BILATERAL LOW BACK PAIN WITHOUT SCIATICA: ICD-10-CM

## 2018-08-23 DIAGNOSIS — G89.29 CHRONIC BILATERAL LOW BACK PAIN WITHOUT SCIATICA: ICD-10-CM

## 2018-08-23 NOTE — TELEPHONE ENCOUNTER
RN s/w pharmacist regarding previous message  Per pharmacist the pt's insurance will not cover the flector patch, only oral NSAIDS such as diclofenac,meloxicam, naproxyn etc     RN advised that she would send this to [facility] to review for his recs and suggestions for same      --please advise thank you-

## 2018-08-23 NOTE — TELEPHONE ENCOUNTER
Patient called, states she was at pharmacy and they have never rec'd the rcx for meloxicam from Dr Glenda Carlton  She would also like to speak with someone regard her blood test results   Please call pat today

## 2018-08-23 NOTE — TELEPHONE ENCOUNTER
Message left w/ ans svc  8/22/2018 @ 7091  Caller: Mahendra Grief w/ [facility]  Phone: Loras Covert  Dr Leonardo Karimi:  Y  MSG: She has a script for today but what's on it it's not covered & they would like orders to change it    DELIVERIES:  08/22/2018 at 1440 [facility] - Left  for Dr Cecil Steiner to call us back at [address] [place] [address] at 1440 [facility]  08/22/2018 at 3254 - connected Dr Cecil Steiner at [phone]

## 2018-08-24 RX ORDER — MELOXICAM 15 MG/1
15 TABLET ORAL DAILY PRN
Qty: 30 TABLET | Refills: 0 | Status: SHIPPED | OUTPATIENT
Start: 2018-08-24 | End: 2018-12-17

## 2018-08-24 NOTE — TELEPHONE ENCOUNTER
S/w pt, she said she was on Mobic in the past and had relief but was taken off of it but does not remember why  Pt said she would like to go back on it  Pt also wanted to know if Dr Rusty Vidal will prescribe her Percocet  Told pt that Percocet is a narcotic and she does not have an updated UDS and opioid contract with our office but will ask [facility] and get back to her

## 2018-08-24 NOTE — TELEPHONE ENCOUNTER
We have gotten her off of all opioid medications, so I am not willing to return to them  I did send the meloxicam to the pharmacy

## 2018-08-27 ENCOUNTER — PROCEDURE VISIT (OUTPATIENT)
Dept: PAIN MEDICINE | Facility: CLINIC | Age: 63
End: 2018-08-27
Payer: MEDICARE

## 2018-08-27 VITALS
SYSTOLIC BLOOD PRESSURE: 130 MMHG | BODY MASS INDEX: 48.82 KG/M2 | WEIGHT: 293 LBS | HEART RATE: 80 BPM | DIASTOLIC BLOOD PRESSURE: 62 MMHG | HEIGHT: 65 IN | RESPIRATION RATE: 17 BRPM

## 2018-08-27 DIAGNOSIS — M79.18 MYOFASCIAL PAIN SYNDROME: Primary | ICD-10-CM

## 2018-08-27 DIAGNOSIS — I10 ESSENTIAL HYPERTENSION: ICD-10-CM

## 2018-08-27 PROCEDURE — 20553 NJX 1/MLT TRIGGER POINTS 3/>: CPT | Performed by: ANESTHESIOLOGY

## 2018-08-27 RX ORDER — METHYLPREDNISOLONE ACETATE 40 MG/ML
40 INJECTION, SUSPENSION INTRA-ARTICULAR; INTRALESIONAL; INTRAMUSCULAR; SOFT TISSUE ONCE
Status: COMPLETED | OUTPATIENT
Start: 2018-08-27 | End: 2018-08-27

## 2018-08-27 RX ORDER — LISINOPRIL 20 MG/1
20 TABLET ORAL DAILY
Qty: 90 TABLET | Refills: 1 | Status: SHIPPED | OUTPATIENT
Start: 2018-08-27 | End: 2019-08-09 | Stop reason: SDUPTHER

## 2018-08-27 RX ORDER — BUPIVACAINE HYDROCHLORIDE 2.5 MG/ML
10 INJECTION, SOLUTION EPIDURAL; INFILTRATION; INTRACAUDAL ONCE
Status: COMPLETED | OUTPATIENT
Start: 2018-08-27 | End: 2018-08-27

## 2018-08-27 RX ADMIN — METHYLPREDNISOLONE ACETATE 40 MG: 40 INJECTION, SUSPENSION INTRA-ARTICULAR; INTRALESIONAL; INTRAMUSCULAR; SOFT TISSUE at 08:36

## 2018-08-27 RX ADMIN — BUPIVACAINE HYDROCHLORIDE 10 ML: 2.5 INJECTION, SOLUTION EPIDURAL; INFILTRATION; INTRACAUDAL at 08:35

## 2018-08-27 NOTE — PROGRESS NOTES
Inj Trigger Point Single/Multiple     Date/Time 8/27/2018 7:48 AM     Performed by  Paul Grigsby by Nikko Browne       Consent: Verbal consent obtained  Written consent obtained  Risks and benefits: risks, benefits and alternatives were discussed  Consent given by: patient  Patient understanding: patient states understanding of the procedure being performed  Patient consent: the patient's understanding of the procedure matches consent given  Procedure consent: procedure consent matches procedure scheduled  Relevant documents: relevant documents present and verified  Test results: test results available and properly labeled  Site marked: the operative site was marked  Imaging studies: imaging studies available  Patient identity confirmed: verbally with patient  Time out: Immediately prior to procedure a "time out" was called to verify the correct patient, procedure, equipment, support staff and site/side marked as required  Preparation: Patient was prepped and draped in the usual sterile fashion  Site preparation: Betadine    Local anesthesia used: yes      Anesthesia: local infiltration     Anesthesia   Local anesthesia used: yes  Local Anesthetic: bupivacaine 0 25% without epinephrine  Anesthetic total: 5 mL     Sedation   Patient sedated: no      Specimen: no    Culture: no   Procedure Details   Procedure Notes: ATTENDING PHYSICIAN:  Seamus Cash MD     PROCEDURE:  Trigger point injections x1 to the left thoracic paraspinal, x2 to the left lumbar paraspinal, and x3 to the left latissimus dorsi musculature with local anesthetic and steroid  PRE-PROCEDURE DIAGNOSIS:  Myofascial pain syndrome  POST-PROCEDURE DIAGNOSIS:  Myofascial pain syndrome  ESTIMATED BLOOD LOSS:  Minimal     ANESTHESIA:  None  COMPLICATIONS:  None  CONSENT:  Today's procedure, its potential benefits as well as its risks and side effects were reviewed   Discussed risks of the procedure including bleeding, infection, reactions to the medications, failure the pain to improve, and potential worsening of the pain as well as pneumothorax were explained in detail to the patient, who verbalized understanding and wished to proceed  Written informed consent was thereby obtained  DESCRIPTION OF THE PROCEDURE:  After written informed consent was obtained, the patient was placed in the sitting position on the examination table  Anatomical landmarks were identified via palpation  The trigger points were identified and marked after palpation  The skin overlying these 6 marked trigger points was prepared using Betadine swabs x3 in the usual sterile fashion  Strict aseptic technique was utilized throughout the procedure  A 6 mL injectate consisting of 5 mL of 0 25% bupivacaine mixed with 1 mL of Depo-Medrol 40 mg/mL was drawn up sterilely  Using a 25-gauge 1 25 inch needle, 1 mL of the above injectate was administered to each of the marked trigger points following negative aspiration  The patient tolerated the procedure well and all needles were removed with the tips intact  Hemostasis was maintained  There were no apparent complications  The skin was wiped clean and Band-Aids were placed as appropriate  The patient was monitored for an appropriate period of time following the procedure and remained hemodynamically stable and neurovascularly intact following the procedure as she was prior to the procedure  The patient was ultimately discharged to home with supervision in good condition  I was present for and participated in all key and critical portions of this procedure    Patient tolerance: Patient tolerated the procedure well with no immediate complications

## 2018-08-27 NOTE — LETTER
August 27, 2018     Margie Abrams MD  [facility]    Patient: Dionte Ordoñez   YOB: 1955   Date of Visit: 8/27/2018       Dear Dr Elsie Cardoza: Thank you for referring Paola Desouza to me for evaluation  Below are my notes for this consultation  If you have questions, please do not hesitate to call me  I look forward to following your patient along with you  Sincerely,        Naomi Fung MD        CC: No Recipients  Naomi Fung MD  8/27/2018  7:52 AM  Sign at close encounter       [address] Single/Multiple     Date/Time 8/27/2018 7:48 AM     Performed by  Arsh Blackwell by Linus Lewis       Consent: Verbal consent obtained  Written consent obtained  Risks and benefits: risks, benefits and alternatives were discussed  Consent given by: patient  Patient understanding: patient states understanding of the procedure being performed  Patient consent: the patient's understanding of the procedure matches consent given  Procedure consent: procedure consent matches procedure scheduled  Relevant documents: relevant documents present and verified  Test results: test results available and properly labeled  Site marked: the operative site was marked  Imaging studies: imaging studies available  Patient identity confirmed: verbally with patient  Time out: Immediately prior to procedure a "time out" was called to verify the correct patient, procedure, equipment, support staff and site/side marked as required  Preparation: Patient was prepped and draped in the usual sterile fashion        Site preparation: Betadine    Local anesthesia used: yes      Anesthesia: local infiltration     Anesthesia   Local anesthesia used: yes  Local Anesthetic: bupivacaine 0 25% without epinephrine  Anesthetic total: 5 mL     Sedation   Patient sedated: no      Specimen: no    Culture: no   Procedure Details   Procedure Notes: ATTENDING PHYSICIAN:  Naomi Fung MD     PROCEDURE:  Trigger point injections x1 to the left thoracic paraspinal, x2 to the left lumbar paraspinal, and x3 to the left latissimus dorsi musculature with local anesthetic and steroid  PRE-PROCEDURE DIAGNOSIS:  Myofascial pain syndrome  POST-PROCEDURE DIAGNOSIS:  Myofascial pain syndrome  ESTIMATED BLOOD LOSS:  Minimal     ANESTHESIA:  None  COMPLICATIONS:  None  CONSENT:  Today's procedure, its potential benefits as well as its risks and side effects were reviewed  Discussed risks of the procedure including bleeding, infection, reactions to the medications, failure the pain to improve, and potential worsening of the pain as well as pneumothorax were explained in detail to the patient, who verbalized understanding and wished to proceed  Written informed consent was thereby obtained  DESCRIPTION OF THE PROCEDURE:  After written informed consent was obtained, the patient was placed in the sitting position on the examination table  Anatomical landmarks were identified via palpation  The trigger points were identified and marked after palpation  The skin overlying these 6 marked trigger points was prepared using Betadine swabs x3 in the usual sterile fashion  Strict aseptic technique was utilized throughout the procedure  A 6 mL injectate consisting of 5 mL of 0 25% bupivacaine mixed with 1 mL of Depo-Medrol 40 mg/mL was drawn up sterilely  Using a 25-gauge 1 25 inch needle, 1 mL of the above injectate was administered to each of the marked trigger points following negative aspiration  The patient tolerated the procedure well and all needles were removed with the tips intact  Hemostasis was maintained  There were no apparent complications  The skin was wiped clean and Band-Aids were placed as appropriate   The patient was monitored for an appropriate period of time following the procedure and remained hemodynamically stable and neurovascularly intact following the procedure as she was prior to the procedure  The patient was ultimately discharged to home with supervision in good condition  I was present for and participated in all key and critical portions of this procedure    Patient tolerance: Patient tolerated the procedure well with no immediate complications

## 2018-08-27 NOTE — PROGRESS NOTES
Pain Medicine Follow-Up Note    Assessment:  No diagnosis found  Plan:  No orders of the defined types were placed in this encounter  No orders of the defined types were placed in this encounter  My impressions and treatment recommendations were discussed in detail with the patient who verbalized understanding and had no further questions  ***    {PDMP Statement:05800}    {UDS Statement:14289}    {Opioid Statement:62520}    {Pain Management Procedure Risk Statement:55115}    {Oral Swab Statement:78231}    Follow-up is planned in *** time or sooner as warranted  Discharge instructions were provided  I personally saw and examined the patient and I agree with the above discussed plan of care  History of Present Illness:    Rikki Urbina is a 66 y o  female who presents to [facility] for interval re-evaluation of the above stated pain complaints  The patient has a past medical and chronic pain history as outlined in the assessment section  {He/she (caps):61470} was last seen on ***     ***    Pain Contract Signed:  ***  Last Urine Drug Screen:  ***    Other than as stated above, the patient denies any interval changes in medications, medical condition, mental condition, symptoms, or allergies since the last office visit  Review of Systems:    Review of Systems   Respiratory: Negative for shortness of breath  Cardiovascular: Negative for chest pain  Gastrointestinal: Negative for constipation, diarrhea, nausea and vomiting  Musculoskeletal: Positive for gait problem (difficulty walking/ decreased range of motion)  Negative for arthralgias, joint swelling and myalgias  Skin: Negative for rash  Neurological: Negative for dizziness, seizures and weakness  All other systems reviewed and are negative          Patient Active Problem List   Diagnosis    Chronic low back pain    Chronic pain syndrome    [facility], [facility]    [facility]bid obesity [facility]    HTN (hypertension)    Lumbar canal stenosis    Depression    Myofascial pain syndrome    Sacroiliitis (HCC)    Lumbar spinal stenosis    Hip flexor tightness, left    Hip flexor tendon tightness, right    Hypovitaminosis D    ESR raised    Polymyalgia rheumatica (HCC)    Asthma, mild intermittent    Acute lumbar radiculopathy    Esophageal reflux    Opioid dependence ([facility]    Current every day smoker    Neurogenic claudication due to lumbar spinal stenosis       Past Medical History:   Diagnosis Date    Allergic reaction to bee sting     last assessed - [id number]    Asthma     [facility]     Chronic pain syndrome     Depression     Diabetes mellitus [facility]     Esophageal reflux     Hyperlipidemia     Hypertension     Lumbar radiculopathy     Lyme disease     last assessed - [id number]    Obesity     Opioid dependence [facility]     PPD positive     had treatment w/ INH 15 years ago    Vitamin D deficiency        Past Surgical History:   Procedure Laterality Date    APPENDECTOMY      CHOLECYSTECTOMY      JOINT REPLACEMENT      R knee, B/l total hip    MD REVISE/REMOVE SPINAL NEUROSTIM/ Left 12/13/2016    Procedure: REPLACEMENT BUTTOCK SPINAL CORD STIMULATOR IPG;  Surgeon: Samantha Andersen MD;  Location: [facility] OR;  Service: Neurosurgery    SHOULDER SURGERY      Resolved - 1992    SPINAL CORD STIMULATOR IMPLANT      spinal stereotaxis stimulation of cord    TOTAL HIP ARTHROPLASTY Bilateral        Family History   Problem Relation Age of Onset    Diabetes Mother         Diabetes mellitus    Cancer Mother     Cancer Father     Diabetes Maternal Grandmother     Cancer Paternal Uncle     Brain cancer Family     Breast cancer Family     Cervical cancer Family     Diabetes Family         Diabetes mellitus    Hypertension Family     Ovarian cancer Family     Stroke Family         Stroke complications       Social History Occupational History    Not on file       Social History Main Topics    Smoking status: Current Every Day Smoker     Packs/day: 1 00     Years: 5 00    Smokeless tobacco: Never Used      Comment: pt desires to quit in January    Alcohol use No      Comment: Denied history of alcohol use    Drug use: No      Comment: Denied history of drug use    Sexual activity: Not on file         Current Outpatient Prescriptions:     acetaminophen (TYLENOL) 325 mg tablet, Take 3 tablets by mouth 3 (three) times a day Please beware of high dose of tylenol which can cause liver dysfunction, Disp: 30 tablet, Rfl: 0    atorvastatin (LIPITOR) 10 mg tablet, Take 1 tablet (10 mg total) by mouth daily taks in am, Disp: 30 tablet, Rfl: 5    baclofen 10 mg tablet, Take 1 tablet (10 mg total) by mouth 3 (three) times a day, Disp: 90 tablet, Rfl: 0    Cholecalciferol (VITAMIN D3) 04215 units CAPS, Take 1 capsule (50,000 Units total) by mouth once a week for 8 doses, Disp: 8 capsule, Rfl: 0    Cholecalciferol (VITAMIN D3) 86268 units CAPS, Take 1 capsule by mouth once a week, Disp: , Rfl: 11    Diclofenac Epolamine 1 3 % PTCH, Place 1 patch on the skin 2 (two) times a day as needed (PAIN) for up to 30 days, Disp: 60 patch, Rfl: 0    glimepiride (AMARYL) 1 mg tablet, Take 1 tablet (1 mg total) by mouth 2 (two) times a day, Disp: 60 tablet, Rfl: 5    glucose blood (ONE TOUCH ULTRA TEST) test strip, Test blood sugar once a day, Disp: 100 each, Rfl: 5    lisinopril (ZESTRIL) 20 mg tablet, Take 1 tablet (20 mg total) by mouth daily, Disp: 90 tablet, Rfl: 3    meloxicam (MOBIC) 15 mg tablet, Take 1 tablet (15 mg total) by mouth daily as needed (pain) for up to 30 days, Disp: 30 tablet, Rfl: 0    methocarbamol (ROBAXIN) 750 mg tablet, Take 1 tablet (750 mg total) by mouth 3 (three) times a day as needed for muscle spasms, Disp: 90 tablet, Rfl: 0    NALTREXONE HCL PO, Take by mouth, Disp: , Rfl:     Nerve Stimulator (STANDARD TENS) ISAEL, by Does not apply route 4 (four) times a day as needed (MUSCLE SPASMS), Disp: 1 Device, Rfl: 0    predniSONE 1 mg tablet, Take 4 mg by mouth daily, Disp: , Rfl: 2    predniSONE 5 mg tablet, , Disp: , Rfl:     tiZANidine (ZANAFLEX) 2 mg tablet, Take 1 tablet (2 mg total) by mouth every 6 (six) hours, Disp: 30 tablet, Rfl: 0    tiZANidine (ZANAFLEX) 2 mg tablet, Take 1 tablet (2 mg total) by mouth every 6 (six) hours, Disp: 90 tablet, Rfl: 0    topiramate (TOPAMAX) 100 mg tablet, Take 1 tablet (100 mg total) by mouth 2 (two) times a day Takes 50mg in am and 100mg at hs, Disp: 60 tablet, Rfl: 2    venlafaxine (EFFEXOR-XR) 75 mg 24 hr capsule, Take 1 capsule (75 mg total) by mouth daily, Disp: 30 capsule, Rfl: 5    Allergies   Allergen Reactions    Bee Venom Anaphylaxis    Other Other (See Comments)     Stainless steel and surgical steel  *Severe blistering*    Aleve [Naproxen Sodium] Hives    Augmentin [Amoxicillin-Pot Clavulanate] Hives    Metformin And Related Hives    Morphine And Related Hives    Motrin [Ibuprofen] Hives    Nortriptyline Hives    Penicillins Hives    Soy Lecithin [Lecithin] Hives    Sulfa Antibiotics Hives    Tradjenta [Linagliptin] Hives    Tramadol        Physical Exam:    LMP  (LMP Unknown)     Constitutional:{General Appearance:75469::"normal, well developed, well nourished, alert, in no distress and non-toxic and no overt pain behavior  "}  Eyes:{Sclera:31074::"anicteric"}  HEENT:{Hearin::"grossly intact"}  Neck:{Neck:88006::"supple, symmetric, trachea midline and no masses "}  Pulmonary:{Respiratory effort:73602::"even and unlabored"}  Cardiovascular:{Examination of Extremities:77063::"No edema or pitting edema present"}  Skin:{Skin and Subcutaneous tissues:56902::"Normal without rashes or lesions and well hydrated"}  Psychiatric:{Mood and Affect:34599::"Mood and affect appropriate"}  Neurologic:{Cranial Nerves:25075::"Cranial Nerves II-XII grossly intact"}  Musculoskeletal:{Gair and Station:73000::"normal"}      Imaging  No orders to display         No orders of the defined types were placed in this encounter

## 2018-08-28 ENCOUNTER — TELEPHONE (OUTPATIENT)
Dept: RADIOLOGY | Facility: CLINIC | Age: 63
End: 2018-08-28

## 2018-09-05 ENCOUNTER — OFFICE VISIT (OUTPATIENT)
Dept: INTERNAL MEDICINE CLINIC | Age: 63
End: 2018-09-05
Payer: MEDICARE

## 2018-09-05 VITALS
DIASTOLIC BLOOD PRESSURE: 70 MMHG | SYSTOLIC BLOOD PRESSURE: 110 MMHG | HEIGHT: 65 IN | BODY MASS INDEX: 48.82 KG/M2 | WEIGHT: 293 LBS | TEMPERATURE: 98.8 F | OXYGEN SATURATION: 96 % | HEART RATE: 76 BPM

## 2018-09-05 DIAGNOSIS — N61.0 CELLULITIS OF RIGHT BREAST: ICD-10-CM

## 2018-09-05 DIAGNOSIS — E11.9 CONTROLLED TYPE 2 DIABETES MELLITUS WITHOUT COMPLICATION, WITHOUT LONG-TERM CURRENT USE OF INSULIN (HCC): ICD-10-CM

## 2018-09-05 DIAGNOSIS — R60.9 PERIPHERAL EDEMA: Primary | ICD-10-CM

## 2018-09-05 DIAGNOSIS — M62.838 MUSCLE SPASM: ICD-10-CM

## 2018-09-05 PROBLEM — S91.301A OPEN WOUND OF RIGHT FOOT: Status: ACTIVE | Noted: 2018-09-05

## 2018-09-05 PROBLEM — R60.0 PERIPHERAL EDEMA: Status: ACTIVE | Noted: 2018-09-05

## 2018-09-05 PROCEDURE — 99214 OFFICE O/P EST MOD 30 MIN: CPT | Performed by: INTERNAL MEDICINE

## 2018-09-05 RX ORDER — TIZANIDINE 2 MG/1
2 TABLET ORAL EVERY 6 HOURS
Qty: 30 TABLET | Refills: 0 | Status: SHIPPED | OUTPATIENT
Start: 2018-09-05 | End: 2018-09-13 | Stop reason: SDUPTHER

## 2018-09-05 RX ORDER — TIZANIDINE 2 MG/1
2 TABLET ORAL EVERY 6 HOURS
Qty: 90 TABLET | Refills: 0 | Status: SHIPPED | OUTPATIENT
Start: 2018-09-05 | End: 2018-09-13 | Stop reason: SDUPTHER

## 2018-09-05 RX ORDER — DOXYCYCLINE HYCLATE 100 MG/1
100 CAPSULE ORAL EVERY 12 HOURS SCHEDULED
Qty: 20 CAPSULE | Refills: 0 | Status: SHIPPED | OUTPATIENT
Start: 2018-09-05 | End: 2018-09-15

## 2018-09-05 RX ORDER — FUROSEMIDE 40 MG/1
40 TABLET ORAL 2 TIMES DAILY
Qty: 90 TABLET | Refills: 1 | Status: SHIPPED | OUTPATIENT
Start: 2018-09-05 | End: 2020-04-02 | Stop reason: SDUPTHER

## 2018-09-05 NOTE — ASSESSMENT & PLAN NOTE
She also dropped something heavy on her right foot last week  Her big toe is currently  black and blue and has obvious abrasion but appears clean  She states that has been steadily healing  She knows that if it starts to get worse though she needs to see her foot doctor Dr Alona Lock    At present there is no surrounding erythema or purulent drainage

## 2018-09-05 NOTE — ASSESSMENT & PLAN NOTE
Patient is not taking any diuretics at present and has had frequent treatment with steroids in the past several months  She complains of increasing weight and increasing edema  Will try Lasix 40 mg daily p r n
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
## 2018-09-05 NOTE — PATIENT INSTRUCTIONS
Leg Edema   WHAT YOU NEED TO KNOW:   Leg edema is swelling caused by fluid buildup  Your legs may swell if you sit or stand for long periods of time, are pregnant, or are injured  Swelling may also occur if you have heart failure or circulation problems  This means that your heart does not pump blood through your body as it should  DISCHARGE INSTRUCTIONS:   Self-care:   · Elevate your legs:  Raise your legs above the level of your heart as often as you can  This will help decrease swelling and pain  Prop your legs on pillows or blankets to keep them elevated comfortably  · Wear pressure stockings: These tight stockings put pressure on your legs to promote blood flow and prevent blood clots  Wear the stockings during the day  Do not wear them while you sleep  · Apply heat:  Heat helps decrease pain and swelling  Apply heat on the area for 20 to 30 minutes every 2 hours for as many days as directed  · Stay active:  Do not stand or sit for long periods of time  Ask your healthcare provider about the best exercise plan for you  · Eat healthy foods:  Healthy foods include fruits, vegetables, whole-grain breads, low-fat dairy products, beans, lean meats, and fish  Ask if you need to be on a special diet  Limit salt  Salt will make your body hold even more fluid  Follow up with your healthcare provider as directed:  Write down your questions so you remember to ask them during your visits  Contact your healthcare provider if:   · You have a fever or feel more tired than usual     · The veins in your legs look larger than usual  They may look full or bulging  · Your legs itch or feel heavy  · You have red or white areas or sores on your legs  The skin may also appear dimpled or have indentations  · You are gaining weight  · You have trouble moving your ankles  · The swelling does not go away, or other parts of your body swell      · You have questions or concerns about your condition or care   Return to the emergency department if:   · You cannot walk  · You feel faint or confused  · Your skin turns blue or gray  · Your leg feels warm, tender, and painful  It may be swollen and red  · You have chest pain or trouble breathing that is worse when you lie down  · You suddenly feel lightheaded and have trouble breathing  · You have new and sudden chest pain  You may have more pain when you take deep breaths or cough  You may also cough up blood  © 2017 [employer] [employer]  Information is for End User's use only and may not be sold, redistributed or otherwise used for commercial purposes  All illustrations and images included in CareNotes® are the copyrighted property of [employer]  or [employer]  The above information is an  only  It is not intended as medical advice for individual conditions or treatments  Talk to your doctor, nurse or pharmacist before following any medical regimen to see if it is safe and effective for you

## 2018-09-05 NOTE — ASSESSMENT & PLAN NOTE
Lab Results   Component Value Date    HGBA1C 6 8 (H) 04/18/2018       No results for input(s): POCGLU in the last 72 hours  Blood Sugar Average: Last 72 hrs:   her diabetes has actually been well controlled over the past year despite her weight and her steroid use    She is due for repeat studies

## 2018-09-11 PROBLEM — R79.82 CRP ELEVATED: Status: ACTIVE | Noted: 2018-09-11

## 2018-09-11 NOTE — PROGRESS NOTES
Assessment/Plan:      Diagnoses and all orders for this visit:    Polymyalgia rheumatica ([facility] [address] )    ESR raised    CRP elevated    Muscle spasm  -     tiZANidine (ZANAFLEX) 2 mg tablet; Take 1 tablet (2 mg total) by mouth every 6 (six) hours  -     tiZANidine (ZANAFLEX) 2 mg tablet; Take 1 tablet (2 mg total) by mouth every 6 (six) hours  -     XR spine thoracic 2 vw; Future    Other orders  -     Magnesium 400 MG CAPS; Take by mouth        M*Modal software was used to dictate this note  It may contain errors with dictating incorrect words/spelling  Please contact provider directly with any questions  Subjective:     Patient ID: Balbir Mcqueen is a 66 y o  female  HPI Last seen 8/16/18  Presents today for follow-up of muscular spasm after starting methocarbamol, and completing lab work  Has been getting TPIs from pain medicine  Additionally followed by rheumatology (Dr Vera Trivedi) who increased her methotrexate from 8 mg to 10 mg the day before last visit; methotrexate was previously being tapered down  Has had Trigger injections from both PM and Rheum pain is more centralizied w/o radiation to anterior chest Will be seeing Dr Aldo Mccormick  Problem with muscle relaxers and pharmacy ([id number] [facility] [phone]) will not fill either Rx       PAST MEDICAL HISTORY  Past Medical History:   Diagnosis Date    Allergic reaction to bee sting     last assessed - 13Jun2016    Asthma     [facility]     Chronic pain syndrome     Depression     [facility]     Esophageal reflux     Hyperlipidemia     Hypertension     Lumbar radiculopathy     Lyme disease     last assessed - 28Jun2016    Obesity     [facility]     PPD positive     had treatment w/ INH 15 years ago    Vitamin D deficiency      PAST SURGICAL HISTORY  Past Surgical History:   Procedure Laterality Date    APPENDECTOMY      CHOLECYSTECTOMY      JOINT REPLACEMENT      R knee, B/l total hip    [place] REVISE/REMOVE SPINAL NEUROSTIM/ Left 12/13/2016    Procedure: REPLACEMENT BUTTOCK SPINAL CORD STIMULATOR IPG;  Surgeon: Irene Ordaz MD;  Location: [facility];  Service: Neurosurgery    SHOULDER SURGERY      [facility] [address] IM[address]      spinal stereotaxis stimulation of cord    TOTAL HIP ARTHROPLASTY Bilateral        FAMILY HISTORY  Family History   Problem Relation Age of Onset    Diabetes Mother         Diabetes mellitus    Cancer Mother     Cancer Father     Diabetes Maternal Grandmother     Cancer Paternal Uncle     Brain cancer Family     Breast cancer Family     Cervical cancer Family     Diabetes Family         Diabetes mellitus    Hypertension Family     Ovarian cancer Family     Stroke Family         Stroke complications     HOME MEDICATIONS  Current Outpatient Prescriptions   Medication Sig Dispense Refill    acetaminophen (TYLENOL) 325 mg tablet Take 3 tablets by mouth 3 (three) times a day Please beware of high dose of tylenol which can cause liver dysfunction 30 tablet 0    atorvastatin (LIPITOR) 10 mg tablet Take 1 tablet (10 mg total) by mouth daily taks in am 30 tablet 5    baclofen 10 mg tablet Take 1 tablet (10 mg total) by mouth 3 (three) times a day 90 tablet 0    Cholecalciferol (VITAMIN D3) 35185 units CAPS Take 1 capsule by mouth once a week  11    doxycycline hyclate (VIBRAMYCIN) 100 mg capsule Take 1 capsule (100 mg total) by mouth every 12 (twelve) hours for 10 days 20 capsule 0    furosemide (LASIX) 40 mg tablet Take 1 tablet (40 mg total) by mouth 2 (two) times a day 90 tablet 1    glimepiride (AMARYL) 1 mg tablet Take 1 tablet (1 mg total) by mouth 2 (two) times a day 60 tablet 5    glucose blood (ONE TOUCH ULTRA TEST) test strip Test blood sugar once a day 100 each 5    lisinopril (ZESTRIL) 20 mg tablet Take 1 tablet (20 mg total) by mouth daily 90 tablet 1    Magnesium 400 MG CAPS Take by mouth      meloxicam (MOBIC) 15 mg tablet Take 1 tablet (15 mg total) by mouth daily as needed (pain) for up to 30 days 30 tablet 0    methocarbamol (ROBAXIN) 750 mg tablet Take 1 tablet (750 mg total) by mouth 3 (three) times a day as needed for muscle spasms 90 tablet 0    NALTREXONE HCL PO Take by mouth      Nerve Stimulator (STANDARD TENS) ISAEL by Does not apply route 4 (four) times a day as needed (MUSCLE SPASMS) 1 Device 0    predniSONE 1 mg tablet Take 4 mg by mouth daily  2    predniSONE 5 mg tablet       tiZANidine (ZANAFLEX) 2 mg tablet Take 1 tablet (2 mg total) by mouth every 6 (six) hours 120 tablet 1    tiZANidine (ZANAFLEX) 2 mg tablet Take 1 tablet (2 mg total) by mouth every 6 (six) hours 90 tablet 0    topiramate (TOPAMAX) 100 mg tablet Take 1 tablet (100 mg total) by mouth 2 (two) times a day Takes 50mg in am and 100mg at hs 60 tablet 2    venlafaxine (EFFEXOR-XR) 75 mg 24 hr capsule Take 1 capsule (75 mg total) by mouth daily 30 capsule 5    Cholecalciferol (VITAMIN D3) 33867 units CAPS Take 1 capsule (50,000 Units total) by mouth once a week for 8 doses 8 capsule 0    Diclofenac Epolamine 1 3 % PTCH Place 1 patch on the skin 2 (two) times a day as needed (PAIN) for up to 30 days 60 patch 0     No current facility-administered medications for this visit  ALLERGIES  Bee venom; Other; Aleve [naproxen sodium]; Augmentin [amoxicillin-pot clavulanate]; Metformin and related; Morphine and related; Motrin [ibuprofen]; Nortriptyline; Penicillins; Soy lecithin [lecithin]; Sulfa antibiotics; Tradjenta [linagliptin]; and Tramadol      SOCIAL HISTORY  History   Alcohol Use No     Comment: Denied history of alcohol use     History   Drug Use No     Comment: Denied history of drug use     History   Smoking Status    Current Every Day Smoker    Packs/day: 1 00    Years: 5 00   Smokeless Tobacco    Never Used     Comment: pt desires to quit in January         Review of Systems   Respiratory: Negative      Cardiovascular: Positive for leg swelling  Gastrointestinal: Negative  Genitourinary: Negative  Musculoskeletal: Positive for back pain and myalgias  Objective:    Vitals:    09/13/18 1320   BP: 130/88   Pulse: (!) 108       Last Physical Therapy Note:    Imaging:  No images are attached to the encounter  Labs:  Appointment on 08/20/2018   Component Date Value Ref Range Status    Aldolase 08/20/2018 8 1  3 3 - 10 3 U/L Final    Total CK 08/20/2018 105  26 - 192 U/L Final    Sed Rate 08/20/2018 51* 0 - 20 mm/hour Final    LD 08/20/2018 145  81 - 234 U/L Final    CRP 08/20/2018 14 8* <3 0 mg/L Final    Sodium 08/20/2018 136  136 - 145 mmol/L Final    Potassium 08/20/2018 3 8  3 5 - 5 3 mmol/L Final    Chloride 08/20/2018 101  100 - 108 mmol/L Final    CO2 08/20/2018 28  21 - 32 mmol/L Final    ANION GAP 08/20/2018 7  4 - 13 mmol/L Final    BUN 08/20/2018 24  5 - 25 mg/dL Final    Creatinine 08/20/2018 1 22  0 60 - 1 30 mg/dL Final    Standardized to IDMS reference method    Glucose, Fasting 08/20/2018 150* 65 - 99 mg/dL Final      Specimen collection should occur prior to Sulfasalazine administration due to the potential for falsely depressed results  Specimen collection should occur prior to Sulfapyridine administration due to the potential for falsely elevated results   Calcium 08/20/2018 9 8  8 3 - 10 1 mg/dL Final    eGFR 08/20/2018 48  ml/min/1 73sq m Final        Physical Exam   Constitutional: She appears well-developed and well-nourished

## 2018-09-13 ENCOUNTER — OFFICE VISIT (OUTPATIENT)
Dept: PAIN MEDICINE | Facility: CLINIC | Age: 63
End: 2018-09-13
Payer: MEDICARE

## 2018-09-13 VITALS
WEIGHT: 289 LBS | BODY MASS INDEX: 48.15 KG/M2 | DIASTOLIC BLOOD PRESSURE: 88 MMHG | HEART RATE: 108 BPM | SYSTOLIC BLOOD PRESSURE: 130 MMHG | HEIGHT: 65 IN

## 2018-09-13 DIAGNOSIS — R70.0 ESR RAISED: ICD-10-CM

## 2018-09-13 DIAGNOSIS — R79.82 CRP ELEVATED: ICD-10-CM

## 2018-09-13 DIAGNOSIS — M62.838 MUSCLE SPASM: ICD-10-CM

## 2018-09-13 DIAGNOSIS — M35.3 POLYMYALGIA RHEUMATICA (HCC): Primary | ICD-10-CM

## 2018-09-13 PROBLEM — M62.830 BACK MUSCLE SPASM: Status: ACTIVE | Noted: 2018-09-13

## 2018-09-13 PROCEDURE — 99214 OFFICE O/P EST MOD 30 MIN: CPT | Performed by: PHYSICAL MEDICINE & REHABILITATION

## 2018-09-13 RX ORDER — TIZANIDINE 2 MG/1
2 TABLET ORAL EVERY 6 HOURS
Qty: 90 TABLET | Refills: 0 | Status: SHIPPED | OUTPATIENT
Start: 2018-09-13 | End: 2018-10-11

## 2018-09-13 RX ORDER — TIZANIDINE 2 MG/1
2 TABLET ORAL EVERY 6 HOURS
Qty: 120 TABLET | Refills: 1 | Status: SHIPPED | OUTPATIENT
Start: 2018-09-13 | End: 2018-10-11 | Stop reason: SDUPTHER

## 2018-09-13 NOTE — PATIENT INSTRUCTIONS
1   Discontinue methocarbamol  2   Increase dose of tizanidine 1 or 2 tablets every 6 hours as needed for back pain  3  Awaiting 2nd opinion for pain medicine

## 2018-09-13 NOTE — LETTER
September 13, 2018     Taylor Mcpherson MD  [facility]    Patient: Kaitlin Albert   YOB: 1955   Date of Visit: 9/13/2018       Dear Dr Brennan Buckner: Thank you for referring Anup William to me for evaluation  Below are my notes for this consultation  If you have questions, please do not hesitate to call me  I look forward to following your patient along with you  Sincerely,        Henry Barney DO        CC: Donna Meehan, DO Henry Barney DO  9/13/2018  1:49 PM  Incomplete  Assessment/Plan:      Diagnoses and all orders for this visit:    Polymyalgia rheumatica ([facility] [address] )    ESR raised    CRP elevated        M*Modal software was used to dictate this note  It may contain errors with dictating incorrect words/spelling  Please contact provider directly with any questions  Subjective:     Patient ID: Kaitlin Albert is a 66 y o  female  HPI Last seen 8/16/18  Presents today for follow-up of muscular spasm after starting methocarbamol, and completing lab work  Has been getting TPIs from pain medicine  Additionally followed by rheumatology (Dr Ashley Artis) who increased her methotrexate from 8 mg to 10 mg the day before last visit; methotrexate was previously being tapered down  Has had Trigger injections from both PM and Rheum pain is more centralizied w/o radiation to anterior chest Will be seeing Dr Cata Davison  Problem with muscle relaxers and pharmacy ([id number] [facility] [phone]) will not fill either Rx       PAST MEDICAL HISTORY  Past Medical History:   Diagnosis Date    Allergic reaction to bee sting     last assessed - 13Jun2016    Asthma     Chronic narcotic dependence ([address] )     Chronic pain syndrome     Depression     Diabetes mellitus ([address] )     Esophageal reflux     Hyperlipidemia     Hypertension     Lumbar radiculopathy     Lyme disease     last assessed - 28Jun2016    Obesity     Opioid dependence ([facility] [address] )     PPD positive     had treatment w/ INH 15 years ago    Vitamin D deficiency      PAST SURGICAL HISTORY  Past Surgical History:   Procedure Laterality Date    APPENDECTOMY      CHOLECYSTECTOMY      JOINT REPLACEMENT      R knee, B/l total hip    IL REVISE/REMOVE SPINAL NEUROSTIM/ Left 12/13/2016    Procedure: REPLACEMENT BUTTOCK SPINAL CORD STIMULATOR IPG;  Surgeon: Sussy Ferrari MD;  Location: [facility];  Service: Neurosurgery    SHOULDER SURGERY      [facility] [address] [facility]      spinal stereotaxis stimulation of cord    TOTAL HIP ARTHROPLASTY Bilateral        FAMILY HISTORY  Family History   Problem Relation Age of Onset    Diabetes Mother         Diabetes mellitus    Cancer Mother     Cancer Father     Diabetes Maternal Grandmother     Cancer Paternal Uncle     Brain cancer Family     Breast cancer Family     Cervical cancer Family     Diabetes Family         Diabetes mellitus    Hypertension Family     Ovarian cancer Family     Stroke Family         Stroke complications     HOME MEDICATIONS  Current Outpatient Prescriptions   Medication Sig Dispense Refill    acetaminophen (TYLENOL) 325 mg tablet Take 3 tablets by mouth 3 (three) times a day Please beware of high dose of tylenol which can cause liver dysfunction 30 tablet 0    atorvastatin (LIPITOR) 10 mg tablet Take 1 tablet (10 mg total) by mouth daily taks in am 30 tablet 5    baclofen 10 mg tablet Take 1 tablet (10 mg total) by mouth 3 (three) times a day 90 tablet 0    Cholecalciferol (VITAMIN D3) 68570 units CAPS Take 1 capsule (50,000 Units total) by mouth once a week for 8 doses 8 capsule 0    Cholecalciferol (VITAMIN D3) 11516 units CAPS Take 1 capsule by mouth once a week  11    Diclofenac Epolamine 1 3 % PTCH Place 1 patch on the skin 2 (two) times a day as needed (PAIN) for up to 30 days 60 patch 0    doxycycline hyclate (VIBRAMYCIN) 100 mg capsule Take 1 capsule (100 mg total) by mouth every 12 (twelve) hours for 10 days 20 capsule 0    furosemide (LASIX) 40 mg tablet Take 1 tablet (40 mg total) by mouth 2 (two) times a day 90 tablet 1    glimepiride (AMARYL) 1 mg tablet Take 1 tablet (1 mg total) by mouth 2 (two) times a day 60 tablet 5    glucose blood (ONE TOUCH ULTRA TEST) test strip Test blood sugar once a day 100 each 5    lisinopril (ZESTRIL) 20 mg tablet Take 1 tablet (20 mg total) by mouth daily 90 tablet 1    meloxicam (MOBIC) 15 mg tablet Take 1 tablet (15 mg total) by mouth daily as needed (pain) for up to 30 days 30 tablet 0    methocarbamol (ROBAXIN) 750 mg tablet Take 1 tablet (750 mg total) by mouth 3 (three) times a day as needed for muscle spasms 90 tablet 0    NALTREXONE HCL PO Take by mouth      Nerve Stimulator (STANDARD TENS) ISAEL by Does not apply route 4 (four) times a day as needed (MUSCLE SPASMS) 1 Device 0    predniSONE 1 mg tablet Take 4 mg by mouth daily  2    predniSONE 5 mg tablet       tiZANidine (ZANAFLEX) 2 mg tablet Take 1 tablet (2 mg total) by mouth every 6 (six) hours 90 tablet 0    tiZANidine (ZANAFLEX) 2 mg tablet Take 1 tablet (2 mg total) by mouth every 6 (six) hours 30 tablet 0    topiramate (TOPAMAX) 100 mg tablet Take 1 tablet (100 mg total) by mouth 2 (two) times a day Takes 50mg in am and 100mg at hs 60 tablet 2    venlafaxine (EFFEXOR-XR) 75 mg 24 hr capsule Take 1 capsule (75 mg total) by mouth daily 30 capsule 5     No current facility-administered medications for this visit  ALLERGIES  Bee venom; Other; Aleve [naproxen sodium]; Augmentin [amoxicillin-pot clavulanate]; Metformin and related; Morphine and related; Motrin [ibuprofen]; Nortriptyline; Penicillins;  Soy lecithin [lecithin]; Sulfa antibiotics; Tradjenta [linagliptin]; and Tramadol      SOCIAL HISTORY  History   Alcohol Use No     Comment: Denied history of alcohol use     History   Drug Use No     Comment: Denied history of drug use     History   Smoking Status    Current Every Day Smoker    Packs/day: 1 00    Years: 5 00   Smokeless Tobacco    Never Used     Comment: pt desires to quit in January         Review of Systems   Respiratory: Negative  Cardiovascular: Positive for leg swelling  Gastrointestinal: Negative  Genitourinary: Negative  Musculoskeletal: Positive for back pain and myalgias  Objective: There were no vitals filed for this visit  Last Physical Therapy Note:    Imaging:  No images are attached to the encounter  Labs:  Appointment on 08/20/2018   Component Date Value Ref Range Status    Aldolase 08/20/2018 8 1  3 3 - 10 3 U/L Final    Total CK 08/20/2018 105  26 - 192 U/L Final    Sed Rate 08/20/2018 51* 0 - 20 mm/hour Final    LD 08/20/2018 145  81 - 234 U/L Final    CRP 08/20/2018 14 8* <3 0 mg/L Final    Sodium 08/20/2018 136  136 - 145 mmol/L Final    Potassium 08/20/2018 3 8  3 5 - 5 3 mmol/L Final    Chloride 08/20/2018 101  100 - 108 mmol/L Final    CO2 08/20/2018 28  21 - 32 mmol/L Final    ANION GAP 08/20/2018 7  4 - 13 mmol/L Final    BUN 08/20/2018 24  5 - 25 mg/dL Final    Creatinine 08/20/2018 1 22  0 60 - 1 30 mg/dL Final    Standardized to IDMS reference method    Glucose, Fasting 08/20/2018 150* 65 - 99 mg/dL Final      Specimen collection should occur prior to Sulfasalazine administration due to the potential for falsely depressed results  Specimen collection should occur prior to Sulfapyridine administration due to the potential for falsely elevated results   Calcium 08/20/2018 9 8  8 3 - 10 1 mg/dL Final    eGFR 08/20/2018 48  ml/min/1 73sq m Final        Physical Exam   Constitutional: She appears well-developed and well-nourished  Jane Atkins PA-C  9/11/2018  2:05 PM  Sign at close encounter  Assessment/Plan:      Diagnoses and all orders for this visit:    Polymyalgia rheumatica ([address] )    ESR raised    CRP elevated        M*Modal software was used to dictate this note   It may contain errors with dictating incorrect words/spelling  Please contact provider directly with any questions  Subjective:     Patient ID: Radha Goddard is a 66 y o  female  HPI Last seen 8/16/18  Presents today for follow-up of muscular spasm after starting methocarbamol, and completing lab work  Has been getting TPIs from pain medicine  Additionally followed by rheumatology (Dr Marybel Biswas) who increased her methotrexate from 8 mg to 10 mg the day before last visit; methotrexate was previously being tapered down        PAST MEDICAL HISTORY  Past Medical History:   Diagnosis Date    Allergic reaction to bee sting     last assessed - 13Jun2016    Asthma     Chronic narc[facility] dependence [facility] [address] [facility]     Chronic pain syndrome     Depression     Diabetes mellitus ([facility] [address] )     Esophageal reflux     Hyperlipidemia     Hypertension     Lumbar radiculopathy     Lyme disease     last assessed - 28Jun2016    Obesity     [facility]     PPD positive     had treatment w/ INH 15 years ago    Vitamin D deficiency      PAST SURGICAL HISTORY  Past Surgical History:   Procedure Laterality Date    APPENDECTOMY      CHOLECYSTECTOMY      JOINT REPLACEMENT      R knee, B/l total hip    SD REVISE/REMOVE SPINAL NEUROSTIM/ Left 12/13/2016    Procedure: REPLACEMENT BUTTOCK SPINAL CORD STIMULATOR IPG;  Surgeon: Johnna Martinez MD;  Location:  MAIN OR;  Service: Neurosurgery    SHOULDER SURGERY      [facility] [address]      spinal stereotaxis stimulation of cord    TOTAL HIP ARTHROPLASTY Bilateral        FAMILY HISTORY  Family History   Problem Relation Age of Onset    Diabetes Mother         Diabetes mellitus    Cancer Mother     Cancer Father     Diabetes Maternal Grandmother     Cancer Paternal Uncle     Brain cancer Family     Breast cancer Family     Cervical cancer Family     Diabetes Family         Diabetes mellitus    Hypertension Family     Ovarian cancer Family     Stroke Family         Stroke complications     HOME MEDICATIONS  Current Outpatient Prescriptions   Medication Sig Dispense Refill    acetaminophen (TYLENOL) 325 mg tablet Take 3 tablets by mouth 3 (three) times a day Please beware of high dose of tylenol which can cause liver dysfunction 30 tablet 0    atorvastatin (LIPITOR) 10 mg tablet Take 1 tablet (10 mg total) by mouth daily taks in am 30 tablet 5    baclofen 10 mg tablet Take 1 tablet (10 mg total) by mouth 3 (three) times a day 90 tablet 0    Cholecalciferol (VITAMIN D3) 18491 units CAPS Take 1 capsule (50,000 Units total) by mouth once a week for 8 doses 8 capsule 0    Cholecalciferol (VITAMIN D3) 58555 units CAPS Take 1 capsule by mouth once a week  11    Diclofenac Epolamine 1 3 % PTCH Place 1 patch on the skin 2 (two) times a day as needed (PAIN) for up to 30 days 60 patch 0    doxycycline hyclate (VIBRAMYCIN) 100 mg capsule Take 1 capsule (100 mg total) by mouth every 12 (twelve) hours for 10 days 20 capsule 0    furosemide (LASIX) 40 mg tablet Take 1 tablet (40 mg total) by mouth 2 (two) times a day 90 tablet 1    glimepiride (AMARYL) 1 mg tablet Take 1 tablet (1 mg total) by mouth 2 (two) times a day 60 tablet 5    glucose blood (ONE TOUCH ULTRA TEST) test strip Test blood sugar once a day 100 each 5    lisinopril (ZESTRIL) 20 mg tablet Take 1 tablet (20 mg total) by mouth daily 90 tablet 1    meloxicam (MOBIC) 15 mg tablet Take 1 tablet (15 mg total) by mouth daily as needed (pain) for up to 30 days 30 tablet 0    methocarbamol (ROBAXIN) 750 mg tablet Take 1 tablet (750 mg total) by mouth 3 (three) times a day as needed for muscle spasms 90 tablet 0    NALTREXONE HCL PO Take by mouth      Nerve Stimulator (STANDARD TENS) ISAEL by Does not apply route 4 (four) times a day as needed (MUSCLE SPASMS) 1 Device 0    predniSONE 1 mg tablet Take 4 mg by mouth daily  2    predniSONE 5 mg tablet       tiZANidine (ZANAFLEX) 2 mg tablet Take 1 tablet (2 mg total) by mouth every 6 (six) hours 90 tablet 0    tiZANidine (ZANAFLEX) 2 mg tablet Take 1 tablet (2 mg total) by mouth every 6 (six) hours 30 tablet 0    topiramate (TOPAMAX) 100 mg tablet Take 1 tablet (100 mg total) by mouth 2 (two) times a day Takes 50mg in am and 100mg at hs 60 tablet 2    venlafaxine (EFFEXOR-XR) 75 mg 24 hr capsule Take 1 capsule (75 mg total) by mouth daily 30 capsule 5     No current facility-administered medications for this visit  ALLERGIES  Bee venom; Other; Aleve [naproxen sodium]; Augmentin [amoxicillin-pot clavulanate]; Metformin and related; Morphine and related; Motrin [ibuprofen]; Nortriptyline; Penicillins; Soy lecithin [lecithin]; Sulfa antibiotics; Tradjenta [linagliptin]; and Tramadol      SOCIAL HISTORY  History   Alcohol Use No     Comment: Denied history of alcohol use     History   Drug Use No     Comment: Denied history of drug use     History   Smoking Status    Current Every Day Smoker    Packs/day: 1 00    Years: 5 00   Smokeless Tobacco    Never Used     Comment: pt desires to quit in January         Review of Systems      Objective: There were no vitals filed for this visit  Last Physical Therapy Note:    Imaging:  No images are attached to the encounter      Labs:  Appointment on 08/20/2018   Component Date Value Ref Range Status    Aldolase 08/20/2018 8 1  3 3 - 10 3 U/L Final    Total CK 08/20/2018 105  26 - 192 U/L Final    Sed Rate 08/20/2018 51* 0 - 20 mm/hour Final    LD 08/20/2018 145  81 - 234 U/L Final    CRP 08/20/2018 14 8* <3 0 mg/L Final    Sodium 08/20/2018 136  136 - 145 mmol/L Final    Potassium 08/20/2018 3 8  3 5 - 5 3 mmol/L Final    Chloride 08/20/2018 101  100 - 108 mmol/L Final    CO2 08/20/2018 28  21 - 32 mmol/L Final    ANION GAP 08/20/2018 7  4 - 13 mmol/L Final    BUN 08/20/2018 24  5 - 25 mg/dL Final    Creatinine 08/20/2018 1 22  0 60 - 1 30 mg/dL Final    Standardized to IDMS reference method  Glucose, Fasting 08/20/2018 150* 65 - 99 mg/dL Final      Specimen collection should occur prior to Sulfasalazine administration due to the potential for falsely depressed results  Specimen collection should occur prior to Sulfapyridine administration due to the potential for falsely elevated results      Calcium 08/20/2018 9 8  8 3 - 10 1 mg/dL Final    eGFR 08/20/2018 48  ml/min/1 73sq m Final        Physical Exam

## 2018-09-27 ENCOUNTER — CLINICAL SUPPORT (OUTPATIENT)
Dept: PAIN MEDICINE | Facility: CLINIC | Age: 63
End: 2018-09-27
Payer: MEDICARE

## 2018-09-27 VITALS
HEIGHT: 65 IN | WEIGHT: 288 LBS | BODY MASS INDEX: 47.98 KG/M2 | DIASTOLIC BLOOD PRESSURE: 82 MMHG | SYSTOLIC BLOOD PRESSURE: 122 MMHG | TEMPERATURE: 98 F

## 2018-09-27 DIAGNOSIS — G89.29 CHRONIC LOW BACK PAIN WITH SCIATICA, SCIATICA LATERALITY UNSPECIFIED, UNSPECIFIED BACK PAIN LATERALITY: ICD-10-CM

## 2018-09-27 DIAGNOSIS — G89.4 CHRONIC PAIN SYNDROME: ICD-10-CM

## 2018-09-27 DIAGNOSIS — M54.50 CHRONIC BILATERAL LOW BACK PAIN WITHOUT SCIATICA: ICD-10-CM

## 2018-09-27 DIAGNOSIS — M48.061 SPINAL STENOSIS OF LUMBAR REGION, UNSPECIFIED WHETHER NEUROGENIC CLAUDICATION PRESENT: ICD-10-CM

## 2018-09-27 DIAGNOSIS — M54.16 LUMBAR RADICULOPATHY: ICD-10-CM

## 2018-09-27 DIAGNOSIS — G89.29 CHRONIC BILATERAL LOW BACK PAIN WITHOUT SCIATICA: ICD-10-CM

## 2018-09-27 DIAGNOSIS — M54.40 CHRONIC LOW BACK PAIN WITH SCIATICA, SCIATICA LATERALITY UNSPECIFIED, UNSPECIFIED BACK PAIN LATERALITY: ICD-10-CM

## 2018-09-27 DIAGNOSIS — M79.18 MYOFASCIAL PAIN SYNDROME: Primary | ICD-10-CM

## 2018-09-27 DIAGNOSIS — M35.3 POLYMYALGIA RHEUMATICA (HCC): ICD-10-CM

## 2018-09-27 PROCEDURE — 99214 OFFICE O/P EST MOD 30 MIN: CPT | Performed by: ANESTHESIOLOGY

## 2018-09-27 NOTE — PROGRESS NOTES
Assessment:  1  Myofascial pain syndrome    2  Chronic pain syndrome    3  Chronic bilateral low back pain without sciatica    4  Chronic low back pain with sciatica, sciatica laterality unspecified, unspecified back pain laterality    5  Spinal stenosis of lumbar region, unspecified whether neurogenic claudication present    6  Polymyalgia rheumatica (HCC)    7  Lumbar radiculopathy        Plan:  28-year-old female with a history of polymyalgia rheumatica, lumbar stenosis with radiculopathy in the right lower extremity, chronic myofascial pain, and chronic pain syndrome returning for follow-up  The patient's major complaint is her left axial lumbar back pain that radiates into the quadratus lumborum area  The patient's right lower extremity radicular symptoms are quite well controlled by her spinal cord stimulator  The patient has had very favorable responses to trigger point injections into the left thoracic and lumbar paraspinal musculature as well as her latissimus dorsi  The patient is still noting relief today  The last injection was August 27, 2018  She also takes topiramate 100 mg b i d  and tizanidine 2 mg b i d  p r n  with approximately 50% relief  She denies any side effects to the medication  She also takes prednisone which is prescribed by rheumatology for her polymyalgia rheumatica which the patient is stating she is being weaned off of  The patient would like a refill of her meloxicam, however I did discuss with the patient that I do not want her to take corticosteroids and NSAIDs concurrently as this could precipitate ulceration of the gastric mucosa  The patient did verbalize understanding  1   I will repeat trigger point injections p r n   I advised the patient that she may call the office to schedule them at any time  2   The patient will continue with topiramate 100 mg b i d  She does not need a refill at this time  3   The patient may continue with tizanidine 2 mg b i d  p r n  as prescribed by physiatry  4  The patient will continue with her home exercise program  5  I advised the patient to contact her spinal cord stimulator representative should her radicular symptoms worsen for reprogramming  6  I will follow up the patient in 3 months    History of Present Illness: The patient is a 66 y o  female with a history of polymyalgia rheumatica, lumbar stenosis with radiculopathy in the right lower extremity, chronic myofascial pain, and chronic pain syndrome returning for follow-up  The patient's major complaint is her left axial lumbar back pain that radiates into the quadratus lumborum area  The patient's right lower extremity radicular symptoms are quite well controlled by her spinal cord stimulator  She denies any bladder or bowel incontinence or saddle anesthesia  The patient has had very favorable responses to trigger point injections into the left thoracic and lumbar paraspinal musculature as well as her latissimus dorsi  The patient is still noting relief today  The last injection was August 27, 2018  She also takes topiramate 100 mg b i d  and tizanidine 2 mg b i d  p r n  with approximately 50% relief  She denies any side effects to the medication  She also takes prednisone which is prescribed by rheumatology for her polymyalgia rheumatica which the patient is stating she is being weaned off of  She also takes meloxicam 15 mg daily p r n     The patient rates her pain a 5/10 on the pain does not follow any particular pattern throughout the day  The pain is constant and described as burning and sharp  The pain is worse with standing, walking, and bending  The pain is alleviated with sitting, relaxation, and lying down  I have personally reviewed and/or updated the patient's past medical history, past surgical history, family history, social history, allergies, and vital signs today          Other than as stated above, the patient denies any interval changes in medications, medical condition, mental condition, symptoms, or allergies since the last office visit  Review of Systems:    Review of Systems   Reason unable to perform ROS: leg pain and decreased ROM  Musculoskeletal: Positive for gait problem  Past Medical History:   Diagnosis Date    Allergic reaction to bee sting     last assessed - 13Jun2016    Asthma     [facility] dependence [facility]     Chronic pain syndrome     Depression     Diabetes mellitus ([facility]     Esophageal reflux     Hyperlipidemia     Hypertension     Lumbar radiculopathy     Lyme disease     last assessed - 28Jun2016    Obesity     Opioid dependence ([facility] )     PPD positive     had treatment w/ INH 15 years ago    Vitamin D deficiency        Past Surgical History:   Procedure Laterality Date    APPENDECTOMY      CHOLECYSTECTOMY      JOINT REPLACEMENT      R knee, B/l total hip    AL REVISE/REMOVE SPINAL NEUROSTIM/ Left 12/13/2016    Procedure: REPLACEMENT BUTTOCK SPINAL CORD STIMULATOR IPG;  Surgeon: Tanmay Stewart MD;  Location:  MAIN OR;  Service: Neurosurgery    SHOULDER SURGERY      Resolved - [id number]      spinal stereotaxis stimulation of cord    TOTAL HIP ARTHROPLASTY Bilateral        Family History   Problem Relation Age of Onset    Diabetes Mother         Diabetes mellitus    Cancer Mother     Cancer Father     Diabetes Maternal Grandmother     Cancer Paternal Uncle     Brain cancer Family     Breast cancer Family     Cervical cancer Family     Diabetes Family         Diabetes mellitus    Hypertension Family     Ovarian cancer Family     Stroke Family         Stroke complications       Social History     Occupational History    Not on file       Social History Main Topics    Smoking status: Current Every Day Smoker     Packs/day: 1 00     Years: 5 00    Smokeless tobacco: Never Used      Comment: pt desires to quit in January    Alcohol use No      Comment: Denied history of alcohol use    Drug use: No      Comment: Denied history of drug use    Sexual activity: Not on file         Current Outpatient Prescriptions:     atorvastatin (LIPITOR) 10 mg tablet, Take 1 tablet (10 mg total) by mouth daily taks in am, Disp: 30 tablet, Rfl: 5    Cholecalciferol (VITAMIN D3) 07935 units CAPS, Take 1 capsule by mouth once a week, Disp: , Rfl: 11    furosemide (LASIX) 40 mg tablet, Take 1 tablet (40 mg total) by mouth 2 (two) times a day, Disp: 90 tablet, Rfl: 1    glimepiride (AMARYL) 1 mg tablet, Take 1 tablet (1 mg total) by mouth 2 (two) times a day, Disp: 60 tablet, Rfl: 5    glucose blood (ONE TOUCH ULTRA TEST) test strip, Test blood sugar once a day, Disp: 100 each, Rfl: 5    lisinopril (ZESTRIL) 20 mg tablet, Take 1 tablet (20 mg total) by mouth daily, Disp: 90 tablet, Rfl: 1    Magnesium 400 MG CAPS, Take by mouth, Disp: , Rfl:     Nerve Stimulator (STANDARD TENS) ISAEL, by Does not apply route 4 (four) times a day as needed (MUSCLE SPASMS), Disp: 1 Device, Rfl: 0    predniSONE 1 mg tablet, Take 4 mg by mouth daily, Disp: , Rfl: 2    predniSONE 5 mg tablet, , Disp: , Rfl:     tiZANidine (ZANAFLEX) 2 mg tablet, Take 1 tablet (2 mg total) by mouth every 6 (six) hours, Disp: 120 tablet, Rfl: 1    tiZANidine (ZANAFLEX) 2 mg tablet, Take 1 tablet (2 mg total) by mouth every 6 (six) hours, Disp: 90 tablet, Rfl: 0    topiramate (TOPAMAX) 100 mg tablet, Take 1 tablet (100 mg total) by mouth 2 (two) times a day Takes 50mg in am and 100mg at hs, Disp: 60 tablet, Rfl: 2    venlafaxine (EFFEXOR-XR) 75 mg 24 hr capsule, Take 1 capsule (75 mg total) by mouth daily, Disp: 30 capsule, Rfl: 5    acetaminophen (TYLENOL) 325 mg tablet, Take 3 tablets by mouth 3 (three) times a day Please beware of high dose of tylenol which can cause liver dysfunction (Patient not taking: Reported on 9/27/2018 ), Disp: 30 tablet, Rfl: 0    baclofen 10 mg tablet, Take 1 tablet (10 mg total) by mouth 3 (three) times a day (Patient not taking: Reported on 9/27/2018 ), Disp: 90 tablet, Rfl: 0    Cholecalciferol (VITAMIN D3) 34790 units CAPS, Take 1 capsule (50,000 Units total) by mouth once a week for 8 doses, Disp: 8 capsule, Rfl: 0    Diclofenac Epolamine 1 3 % PTCH, Place 1 patch on the skin 2 (two) times a day as needed (PAIN) for up to 30 days, Disp: 60 patch, Rfl: 0    meloxicam (MOBIC) 15 mg tablet, Take 1 tablet (15 mg total) by mouth daily as needed (pain) for up to 30 days, Disp: 30 tablet, Rfl: 0    methocarbamol (ROBAXIN) 750 mg tablet, Take 1 tablet (750 mg total) by mouth 3 (three) times a day as needed for muscle spasms (Patient not taking: Reported on 9/27/2018 ), Disp: 90 tablet, Rfl: 0    NALTREXONE HCL PO, Take by mouth, Disp: , Rfl:     Allergies   Allergen Reactions    Bee Venom Anaphylaxis    Other Other (See Comments)     Stainless steel and surgical steel  *Severe blistering*    Aleve [Naproxen Sodium] Hives    Augmentin [Amoxicillin-Pot Clavulanate] Hives    Metformin And Related Hives    Morphine And Related Hives    Motrin [Ibuprofen] Hives    Nortriptyline Hives    Penicillins Hives    Soy Lecithin [Lecithin] Hives    Sulfa Antibiotics Hives    Tradjenta [Linagliptin] Hives    Tramadol        Physical Exam:    Ht 5' 5" (1 651 m)   Wt 131 kg (288 lb)   LMP  (LMP Unknown)   BMI 47 93 kg/m²     Constitutional: obese  Eyes: anicteric  HEENT: grossly intact  Neck: supple, symmetric, trachea midline and no masses   Pulmonary:even and unlabored  Cardiovascular:No edema or pitting edema present  Skin:Normal without rashes or lesions and well hydrated  Psychiatric:Mood and affect appropriate  Neurologic:Cranial Nerves II-XII grossly intact  Musculoskeletal:antalgic gait  Bilateral lumbar paraspinals tender to palpation from L2-L5 more so on the left than the right    Muscle spasms noted in the bilateral lumbar paraspinal musculature, left latissimus dorsi, and left quadratus lumborum muscle  Bilateral lower extremity strength 5/5 in all muscle groups  Negative seated straight leg raise bilaterally  Imaging  Imaging reviewed  No orders to display       No orders of the defined types were placed in this encounter

## 2018-10-04 DIAGNOSIS — M79.18 MYOFASCIAL PAIN SYNDROME: ICD-10-CM

## 2018-10-04 DIAGNOSIS — G89.4 CHRONIC PAIN SYNDROME: ICD-10-CM

## 2018-10-04 DIAGNOSIS — G89.29 CHRONIC BILATERAL LOW BACK PAIN WITHOUT SCIATICA: ICD-10-CM

## 2018-10-04 DIAGNOSIS — M54.50 CHRONIC BILATERAL LOW BACK PAIN WITHOUT SCIATICA: ICD-10-CM

## 2018-10-05 ENCOUNTER — APPOINTMENT (OUTPATIENT)
Dept: LAB | Age: 63
End: 2018-10-05
Payer: MEDICARE

## 2018-10-05 DIAGNOSIS — M35.3 POLYMYALGIA RHEUMATICA (HCC): Primary | ICD-10-CM

## 2018-10-05 DIAGNOSIS — E55.9 VITAMIN D DEFICIENCY: ICD-10-CM

## 2018-10-05 DIAGNOSIS — E11.9 CONTROLLED TYPE 2 DIABETES MELLITUS WITHOUT COMPLICATION, WITHOUT LONG-TERM CURRENT USE OF INSULIN (HCC): ICD-10-CM

## 2018-10-05 DIAGNOSIS — M15.0 PRIMARY GENERALIZED HYPERTROPHIC OSTEOARTHROSIS: ICD-10-CM

## 2018-10-05 LAB
25(OH)D3 SERPL-MCNC: 39 NG/ML (ref 30–100)
ALBUMIN SERPL BCP-MCNC: 3.3 G/DL (ref 3.5–5)
ALP SERPL-CCNC: 91 U/L (ref 46–116)
ALT SERPL W P-5'-P-CCNC: 26 U/L (ref 12–78)
ANION GAP SERPL CALCULATED.3IONS-SCNC: 9 MMOL/L (ref 4–13)
AST SERPL W P-5'-P-CCNC: 16 U/L (ref 5–45)
BILIRUB SERPL-MCNC: 0.58 MG/DL (ref 0.2–1)
BUN SERPL-MCNC: 23 MG/DL (ref 5–25)
CALCIUM SERPL-MCNC: 9.4 MG/DL (ref 8.3–10.1)
CHLORIDE SERPL-SCNC: 107 MMOL/L (ref 100–108)
CO2 SERPL-SCNC: 24 MMOL/L (ref 21–32)
CREAT SERPL-MCNC: 1.23 MG/DL (ref 0.6–1.3)
CRP SERPL QL: 18.8 MG/L
ERYTHROCYTE [SEDIMENTATION RATE] IN BLOOD: 44 MM/HOUR (ref 0–20)
EST. AVERAGE GLUCOSE BLD GHB EST-MCNC: 140 MG/DL
GFR SERPL CREATININE-BSD FRML MDRD: 47 ML/MIN/1.73SQ M
GLUCOSE P FAST SERPL-MCNC: 131 MG/DL (ref 65–99)
HBA1C MFR BLD: 6.5 % (ref 4.2–6.3)
POTASSIUM SERPL-SCNC: 3.6 MMOL/L (ref 3.5–5.3)
PROT SERPL-MCNC: 7.5 G/DL (ref 6.4–8.2)
SODIUM SERPL-SCNC: 140 MMOL/L (ref 136–145)

## 2018-10-05 PROCEDURE — 82306 VITAMIN D 25 HYDROXY: CPT

## 2018-10-05 PROCEDURE — 86140 C-REACTIVE PROTEIN: CPT

## 2018-10-05 PROCEDURE — 80053 COMPREHEN METABOLIC PANEL: CPT

## 2018-10-05 PROCEDURE — 83036 HEMOGLOBIN GLYCOSYLATED A1C: CPT

## 2018-10-05 PROCEDURE — 36415 COLL VENOUS BLD VENIPUNCTURE: CPT

## 2018-10-05 PROCEDURE — 85652 RBC SED RATE AUTOMATED: CPT

## 2018-10-05 RX ORDER — TOPIRAMATE 100 MG/1
TABLET, FILM COATED ORAL
Qty: 60 TABLET | Refills: 1 | OUTPATIENT
Start: 2018-10-05

## 2018-10-09 NOTE — PROGRESS NOTES
Assessment/Plan:      Diagnoses and all orders for this visit:    Myofascial pain syndrome    Chronic pain syndrome    Polymyalgia rheumatica (HCC)    Neurogenic claudication due to lumbar spinal stenosis    CRP elevated    ESR raised        M*Modal software was used to dictate this note  It may contain errors with dictating incorrect words/spelling  Please contact provider directly with any questions  Subjective:     Patient ID: Brayan Henley is a 66 y o  female  HPI Last seen 9/13/18 for follow-up  Since last visit has been seen by Dr Jc Henson for pain management evaluation  Was previously followed by Dr Adilene Grover  Discussed additional trigger point injections as needed, and recommended discontinuing meloxicam while on corticosteroids  Continues to be followed by rheumatology, Dr Reba Chan, who is titrating methotrexate  Utilizing tizanidine from this office       PAST MEDICAL HISTORY  Past Medical History:   Diagnosis Date    Allergic reaction to bee sting     last assessed - 13Jun2016    Asthma     [facility]     Chronic pain syndrome     Depression     Diabetes mellitus [facility]     Esophageal reflux     Hyperlipidemia     Hypertension     Lumbar radiculopathy     Lyme disease     last assessed - 28Jun2016    Obesity     [facility]ioid dependence [facility]     PPD positive     had treatment w/ INH 15 years ago    Vitamin D deficiency      PAST SURGICAL HISTORY  Past Surgical History:   Procedure Laterality Date    APPENDECTOMY      CHOLECYSTECTOMY      JOINT REPLACEMENT      R knee, B/l total hip    SC REVISE/REMOVE SPINAL NEUROSTIM/ Left 12/13/2016    Procedure: REPLACEMENT BUTTOCK SPINAL CORD STIMULATOR IPG;  Surgeon: Randy Espinal MD;  Location:  MAIN OR;  Service: Neurosurgery    SHOULDER SURGERY      Resolved - [id number]      spinal stereotaxis stimulation of cord    TOTAL HIP ARTHROPLASTY Bilateral        FAMILY HISTORY  Family History   Problem Relation Age of Onset    Diabetes Mother         Diabetes mellitus    Cancer Mother     Cancer Father     Diabetes Maternal Grandmother     Cancer Paternal Uncle     Brain cancer Family     Breast cancer Family     Cervical cancer Family     Diabetes Family         Diabetes mellitus    Hypertension Family     Ovarian cancer Family     Stroke Family         Stroke complications     HOME MEDICATIONS  Current Outpatient Prescriptions   Medication Sig Dispense Refill    acetaminophen (TYLENOL) 325 mg tablet Take 3 tablets by mouth 3 (three) times a day Please beware of high dose of tylenol which can cause liver dysfunction (Patient not taking: Reported on 9/27/2018 ) 30 tablet 0    atorvastatin (LIPITOR) 10 mg tablet Take 1 tablet (10 mg total) by mouth daily taks in am 30 tablet 5    baclofen 10 mg tablet Take 1 tablet (10 mg total) by mouth 3 (three) times a day (Patient not taking: Reported on 9/27/2018 ) 90 tablet 0    Cholecalciferol (VITAMIN D3) 38829 units CAPS Take 1 capsule (50,000 Units total) by mouth once a week for 8 doses 8 capsule 0    Cholecalciferol (VITAMIN D3) 07144 units CAPS Take 1 capsule by mouth once a week  11    Diclofenac Epolamine 1 3 % PTCH Place 1 patch on the skin 2 (two) times a day as needed (PAIN) for up to 30 days 60 patch 0    furosemide (LASIX) 40 mg tablet Take 1 tablet (40 mg total) by mouth 2 (two) times a day 90 tablet 1    glimepiride (AMARYL) 1 mg tablet Take 1 tablet (1 mg total) by mouth 2 (two) times a day 60 tablet 5    glucose blood (ONE TOUCH ULTRA TEST) test strip Test blood sugar once a day 100 each 5    lisinopril (ZESTRIL) 20 mg tablet Take 1 tablet (20 mg total) by mouth daily 90 tablet 1    Magnesium 400 MG CAPS Take by mouth      meloxicam (MOBIC) 15 mg tablet Take 1 tablet (15 mg total) by mouth daily as needed (pain) for up to 30 days 30 tablet 0    methocarbamol (ROBAXIN) 750 mg tablet Take 1 tablet (750 mg total) by mouth 3 (three) times a day as needed for muscle spasms (Patient not taking: Reported on 9/27/2018 ) 90 tablet 0    NALTREXONE HCL PO Take by mouth      Nerve Stimulator (STANDARD TENS) ISAEL by Does not apply route 4 (four) times a day as needed (MUSCLE SPASMS) 1 Device 0    predniSONE 1 mg tablet Take 4 mg by mouth daily  2    predniSONE 5 mg tablet       tiZANidine (ZANAFLEX) 2 mg tablet Take 1 tablet (2 mg total) by mouth every 6 (six) hours 120 tablet 1    tiZANidine (ZANAFLEX) 2 mg tablet Take 1 tablet (2 mg total) by mouth every 6 (six) hours 90 tablet 0    topiramate (TOPAMAX) 100 mg tablet Take 1 tablet (100 mg total) by mouth 2 (two) times a day Takes 50mg in am and 100mg at hs 60 tablet 2    venlafaxine (EFFEXOR-XR) 75 mg 24 hr capsule Take 1 capsule (75 mg total) by mouth daily 30 capsule 5     No current facility-administered medications for this visit  ALLERGIES  Bee venom; Other; Aleve [naproxen sodium]; Augmentin [amoxicillin-pot clavulanate]; Metformin and related; Morphine and related; Motrin [ibuprofen]; Nortriptyline; Penicillins; Soy lecithin [lecithin]; Sulfa antibiotics; Tradjenta [linagliptin]; and Tramadol      SOCIAL HISTORY  History   Alcohol Use No     Comment: Denied history of alcohol use     History   Drug Use No     Comment: Denied history of drug use     History   Smoking Status    Current Every Day Smoker    Packs/day: 1 00    Years: 5 00   Smokeless Tobacco    Never Used     Comment: pt desires to quit in January     Review of Systems   Respiratory: Negative  Cardiovascular: Negative  Gastrointestinal: Negative  Genitourinary: Positive for urgency  Negative for difficulty urinating  Musculoskeletal: Positive for arthralgias, back pain and myalgias  Objective: There were no vitals filed for this visit  X ray Thoracic spine with DDD and osteophytes but no fractures and normal alignment    Labs:  Appointment on 10/05/2018   Component Date Value Ref Range Status    Hemoglobin A1C 10/05/2018 6 5* 4 2 - 6 3 % Final    EAG 10/05/2018 140  mg/dl Final    Sed Rate 10/05/2018 44* 0 - 20 mm/hour Final    CRP 10/05/2018 18 8* <3 0 mg/L Final    Sodium 10/05/2018 140  136 - 145 mmol/L Final    Potassium 10/05/2018 3 6  3 5 - 5 3 mmol/L Final    Chloride 10/05/2018 107  100 - 108 mmol/L Final    CO2 10/05/2018 24  21 - 32 mmol/L Final    ANION GAP 10/05/2018 9  4 - 13 mmol/L Final    BUN 10/05/2018 23  5 - 25 mg/dL Final    Creatinine 10/05/2018 1 23  0 60 - 1 30 mg/dL Final    Standardized to IDMS reference method    Glucose, Fasting 10/05/2018 131* 65 - 99 mg/dL Final      Specimen collection should occur prior to Sulfasalazine administration due to the potential for falsely depressed results  Specimen collection should occur prior to Sulfapyridine administration due to the potential for falsely elevated results   Calcium 10/05/2018 9 4  8 3 - 10 1 mg/dL Final    AST 10/05/2018 16  5 - 45 U/L Final      Specimen collection should occur prior to Sulfasalazine administration due to the potential for falsely depressed results   ALT 10/05/2018 26  12 - 78 U/L Final      Specimen collection should occur prior to Sulfasalazine and/or Sulfapyridine administration due to the potential for falsely depressed results       Alkaline Phosphatase 10/05/2018 91  46 - 116 U/L Final    Total Protein 10/05/2018 7 5  6 4 - 8 2 g/dL Final    Albumin 10/05/2018 3 3* 3 5 - 5 0 g/dL Final    Total Bilirubin 10/05/2018 0 58  0 20 - 1 00 mg/dL Final    eGFR 10/05/2018 47  ml/min/1 73sq m Final    Vit D, 25-Hydroxy 10/05/2018 39 0  30 0 - 100 0 ng/mL Final     Component      Latest Ref Rng & Units 2/19/2018 4/4/2018 5/22/2018   ERYTHROCYTE SEDIMENTATION RATE      0 - 20 mm/hour 44 (H) 48 (H) 54 (H)     Component      Latest Ref Rng & Units 6/28/2018 8/20/2018 10/5/2018   ERYTHROCYTE SEDIMENTATION RATE      0 - 20 mm/hour 44 (H) 51 (H) 44 (H) Component      Latest Ref Rng & Units 4/4/2018 5/22/2018 6/28/2018 8/20/2018   C-REACTIVE PROTEIN      <3 0 mg/L 17 7 (H) 26 8 (H) 6 6 (H) 14 8 (H)     Component      Latest Ref Rng & Units 10/5/2018   C-REACTIVE PROTEIN      <3 0 mg/L 18 8 (H)      Physical Exam   Constitutional: She appears well-developed and well-nourished  No distress  HENT:   Head: Normocephalic  Eyes: Pupils are equal, round, and reactive to light  Musculoskeletal:        Right shoulder: She exhibits decreased range of motion and tenderness (Tenderness is moreParacentral not over the spinous processes  )  She exhibits no deformity  Arms:  Neurological: She has normal strength  She displays no atrophy and no tremor  No cranial nerve deficit  She exhibits normal muscle tone  She displays no seizure activity  Coordination and gait normal    Reflex Scores:       Tricep reflexes are 1+ on the right side and 1+ on the left side  Bicep reflexes are 1+ on the right side and 1+ on the left side  Brachioradialis reflexes are 1+ on the right side and 1+ on the left side  Patellar reflexes are 0 on the right side and 0 on the left side  Achilles reflexes are 0 on the right side and 0 on the left side  Skin: She is not diaphoretic

## 2018-10-11 ENCOUNTER — OFFICE VISIT (OUTPATIENT)
Dept: PAIN MEDICINE | Facility: CLINIC | Age: 63
End: 2018-10-11
Payer: MEDICARE

## 2018-10-11 VITALS
SYSTOLIC BLOOD PRESSURE: 128 MMHG | HEART RATE: 100 BPM | DIASTOLIC BLOOD PRESSURE: 78 MMHG | WEIGHT: 293 LBS | HEIGHT: 65 IN | BODY MASS INDEX: 48.82 KG/M2

## 2018-10-11 DIAGNOSIS — M35.3 POLYMYALGIA RHEUMATICA (HCC): ICD-10-CM

## 2018-10-11 DIAGNOSIS — M48.062 NEUROGENIC CLAUDICATION DUE TO LUMBAR SPINAL STENOSIS: ICD-10-CM

## 2018-10-11 DIAGNOSIS — M79.18 MYOFASCIAL PAIN SYNDROME: Primary | ICD-10-CM

## 2018-10-11 DIAGNOSIS — G89.4 CHRONIC PAIN SYNDROME: ICD-10-CM

## 2018-10-11 DIAGNOSIS — M62.838 MUSCLE SPASM: ICD-10-CM

## 2018-10-11 DIAGNOSIS — R79.82 CRP ELEVATED: ICD-10-CM

## 2018-10-11 DIAGNOSIS — R70.0 ESR RAISED: ICD-10-CM

## 2018-10-11 PROCEDURE — 99213 OFFICE O/P EST LOW 20 MIN: CPT | Performed by: PHYSICAL MEDICINE & REHABILITATION

## 2018-10-11 RX ORDER — TIZANIDINE 2 MG/1
2 TABLET ORAL EVERY 6 HOURS
Qty: 120 TABLET | Refills: 0 | Status: SHIPPED | OUTPATIENT
Start: 2018-10-11 | End: 2018-12-17 | Stop reason: SDUPTHER

## 2018-10-11 NOTE — LETTER
October 11, 2018     Rilla Oppenheim, MD  [facility]    Patient: Dilshad Ramesh   YOB: 1955   Date of Visit: 10/11/2018       Dear Dr Enma Quiroz: Thank you for referring Easton Lily to me for evaluation  Below are the relevant portions of my assessment and plan of care  If you have questions, please do not hesitate to call me  I look forward to following Nael Tracy along with you           Sincerely,        Anna Gupta DO        CC: No Recipients

## 2018-10-17 ENCOUNTER — OFFICE VISIT (OUTPATIENT)
Dept: INTERNAL MEDICINE CLINIC | Age: 63
End: 2018-10-17
Payer: MEDICARE

## 2018-10-17 VITALS
SYSTOLIC BLOOD PRESSURE: 112 MMHG | OXYGEN SATURATION: 97 % | DIASTOLIC BLOOD PRESSURE: 72 MMHG | TEMPERATURE: 97.6 F | BODY MASS INDEX: 48.82 KG/M2 | HEART RATE: 84 BPM | HEIGHT: 65 IN | WEIGHT: 293 LBS

## 2018-10-17 DIAGNOSIS — Z23 NEED FOR INFLUENZA VACCINATION: Primary | ICD-10-CM

## 2018-10-17 PROBLEM — N61.0 CELLULITIS OF RIGHT BREAST: Status: RESOLVED | Noted: 2018-09-05 | Resolved: 2018-10-17

## 2018-10-17 PROBLEM — S91.301A OPEN WOUND OF RIGHT FOOT: Status: RESOLVED | Noted: 2018-09-05 | Resolved: 2018-10-17

## 2018-10-17 PROCEDURE — 90682 RIV4 VACC RECOMBINANT DNA IM: CPT

## 2018-10-17 PROCEDURE — 90471 IMMUNIZATION ADMIN: CPT

## 2018-10-17 PROCEDURE — 99214 OFFICE O/P EST MOD 30 MIN: CPT | Performed by: INTERNAL MEDICINE

## 2018-10-17 NOTE — ASSESSMENT & PLAN NOTE
She continues to smoke and once again was counseled 3-10 minutes on ways to and benefits of quitting

## 2018-10-17 NOTE — ASSESSMENT & PLAN NOTE
Lab Results   Component Value Date    HGBA1C 6 5 (H) 10/05/2018       No results for input(s): POCGLU in the last 72 hours      Blood Sugar Average: Last 72 hrs:   patient's blood sugar has been well controlled with Amaryl 1 mg

## 2018-10-17 NOTE — PROGRESS NOTES
Assessment/Plan:    DMII (diabetes mellitus, type 2) ([facility])  Lab Results   Component Value Date    HGBA1C 6 5 (H) 10/05/2018       No results for input(s): POCGLU in the last 72 hours  Blood Sugar Average: Last 72 hrs:   patient's blood sugar has been well controlled with Amaryl 1 mg    Current every day smoker   She continues to smoke and once again was counseled 3-10 minutes on ways to and benefits of quitting    Depression   Her mood has significantly improved on Effexor and with the passage of time after her 's suicide    Morbid obesity ([facility] )   She unfortunately still is morbidly obese and has showed no improvement she was counseled both on diet and exercise in effort to reduce her weight  Unfortunately due to her severe back issues exercises difficult    Polymyalgia rheumatica ([facility] [address] )   She continues to follow with Rheumatology and is on a taper of her prednisone  Currently she is on 5 mg a day    Chronic low back pain    She continues to follow with pain management for her chronic back pain and is on chronic narcotic       Diagnoses and all orders for this visit:    Need for influenza vaccination  -     influenza vaccine, [id number], quadrivalent, recombinant, PF, 0 5 mL, for patients 18 yr+ (FLUBLOK)          Subjective:      Patient ID: Tricia Waldron is a 66 y o  female  Patient is here for a checkup and a flu shot  She is not moving till for [place] until next spring  Her biggest problem continues to be her low back pain for which she sees Pain and Spine for control of     Medically she is a type 2 diabetic but is in good control  Her risk factors for  For cardiovascular disease include hypertension smoking hyperlipidemia obesity and type 2 diabetes  Despite that she has had no obvious evidence of cardiovascular disease      Her depression has improved significantly with medication and with time passing after 's suicide            Review of Systems   Constitutional: Positive for fatigue and unexpected weight change  Negative for chills and fever  HENT: Negative for congestion, ear pain, hearing loss, postnasal drip, sinus pressure, sore throat, trouble swallowing and voice change  Eyes: Negative for visual disturbance  Respiratory: Negative for cough, chest tightness, shortness of breath and wheezing  Cardiovascular: Positive for leg swelling  Negative for chest pain and palpitations  Gastrointestinal: Negative for abdominal distention, abdominal pain, blood in stool, constipation, diarrhea and nausea  Anal bleeding:  mild  Endocrine: Negative for cold intolerance, polydipsia, polyphagia and polyuria  Genitourinary: Negative for dysuria, flank pain, frequency, hematuria and urgency  Musculoskeletal: Positive for arthralgias, back pain, gait problem and myalgias  Negative for joint swelling and neck pain  Skin: Negative for rash  Allergic/Immunologic: Positive for immunocompromised state  Neurological: Negative for dizziness, syncope, facial asymmetry, weakness, light-headedness, numbness and headaches  Intermittent sciatica from spinal stenosis   Hematological: Negative for adenopathy  Psychiatric/Behavioral: Negative for confusion, sleep disturbance and suicidal ideas  Objective:      /72 (BP Location: Left arm, Patient Position: Sitting)   Pulse 84   Temp 97 6 °F (36 4 °C) (Tympanic)   Ht 5' 5" (1 651 m)   Wt 134 kg (294 lb 9 6 oz)   LMP  (LMP Unknown)   SpO2 97%   BMI 49 02 kg/m²          Physical Exam   Constitutional: She is oriented to person, place, and time  She appears well-developed and well-nourished  No distress  Morbidly obese   HENT:   Right Ear: External ear normal    Left Ear: External ear normal    Nose: Nose normal    Mouth/Throat: Oropharynx is clear and moist  No oropharyngeal exudate  He done she less   Eyes: Pupils are equal, round, and reactive to light  EOM are normal    Neck: Normal range of motion   Neck supple  No JVD present  No thyromegaly present  Cardiovascular: Normal rate, regular rhythm, normal heart sounds and intact distal pulses  Exam reveals no gallop  No murmur heard  Pulmonary/Chest: Effort normal and breath sounds normal  No respiratory distress  She has no wheezes  She has no rales  Abdominal: Soft  Bowel sounds are normal  She exhibits no distension and no mass  There is no tenderness  Musculoskeletal: Normal range of motion  She exhibits edema ( trace edema)  She exhibits no tenderness  Lymphadenopathy:     She has no cervical adenopathy  Neurological: She is alert and oriented to person, place, and time  No cranial nerve deficit  Coordination normal    Skin: No rash noted  Psychiatric: She has a normal mood and affect  Her behavior is normal  Judgment and thought content normal    Vitals reviewed

## 2018-10-17 NOTE — PATIENT INSTRUCTIONS
Obesity   AMBULATORY CARE:   Obesity  is when your body mass index (BMI) is greater than 30  Your healthcare provider will use your height and weight to measure your BMI  The risks of obesity include  many health problems, such as injuries or physical disability  You may need tests to check for the following:  · Diabetes     · High blood pressure or high cholesterol     · Heart disease     · Gallbladder or liver disease     · Cancer of the colon, breast, prostate, liver, or kidney     · Sleep apnea     · Arthritis or gout  Seek care immediately if:   · You have a severe headache, confusion, or difficulty speaking  · You have weakness on one side of your body  · You have chest pain, sweating, or shortness of breath  Contact your healthcare provider if:   · You have symptoms of gallbladder or liver disease, such as pain in your upper abdomen  · You have knee or hip pain and discomfort while walking  · You have symptoms of diabetes, such as intense hunger and thirst, and frequent urination  · You have symptoms of sleep apnea, such as snoring or daytime sleepiness  · You have questions or concerns about your condition or care  Treatment for obesity  focuses on helping you lose weight to improve your health  Even a small decrease in BMI can reduce the risk for many health problems  Your healthcare provider will help you set a weight-loss goal   · Lifestyle changes  are the first step in treating obesity  These include making healthy food choices and getting regular physical activity  Your healthcare provider may suggest a weight-loss program that involves coaching, education, and therapy  · Medicine  may help you lose weight when it is used with a healthy diet and physical activity  · Surgery  can help you lose weight if you are very obese and have other health problems  There are several types of weight-loss surgery  Ask your healthcare provider for more information    Be successful losing weight:   · Set small, realistic goals  An example of a small goal is to walk for 20 minutes 5 days a week  Anther goal is to lose 5% of your body weight  · Tell friends, family members, and coworkers about your goals  and ask for their support  Ask a friend to lose weight with you, or join a weight-loss support group  · Identify foods or triggers that may cause you to overeat , and find ways to avoid them  Remove tempting high-calorie foods from your home and workplace  Place a bowl of fresh fruit on your kitchen counter  If stress causes you to eat, then find other ways to cope with stress  · Keep a diary to track what you eat and drink  Also write down how many minutes of physical activity you do each day  Weigh yourself once a week and record it in your diary  Eating changes: You will need to eat 500 to 1,000 fewer calories each day than you currently eat to lose 1 to 2 pounds a week  The following changes will help you cut calories:  · Eat smaller portions  Use small plates, no larger than 9 inches in diameter  Fill your plate half full of fruits and vegetables  Measure your food using measuring cups until you know what a serving size looks like  · Eat 3 meals and 1 or 2 snacks each day  Plan your meals in advance  Shu [employer] and eat at home most of the time  Eat slowly  · Eat fruits and vegetables at every meal   They are low in calories and high in fiber, which makes you feel full  Do not add butter, margarine, or cream sauce to vegetables  Use herbs to season steamed vegetables  · Eat less fat and fewer fried foods  Eat more baked or grilled chicken and fish  These protein sources are lower in calories and fat than red meat  Limit fast food  Dress your salads with olive oil and vinegar instead of bottled dressing  · Limit the amount of sugar you eat  Do not drink sugary beverages  Limit alcohol  Activity changes:  Physical activity is good for your body in many ways   It helps you burn calories and build strong muscles  It decreases stress and depression, and improves your mood  It can also help you sleep better  Talk to your healthcare provider before you begin an exercise program   · Exercise for at least 30 minutes 5 days a week  Start slowly  Set aside time each day for physical activity that you enjoy and that is convenient for you  It is best to do both weight training and an activity that increases your heart rate, such as walking, bicycling, or swimming  · Find ways to be more active  Do yard work and housecleaning  Walk up the stairs instead of using elevators  Spend your leisure time going to events that require walking, such as outdoor festivals or fairs  This extra physical activity can help you lose weight and keep it off  Follow up with your healthcare provider as directed: You may need to meet with a dietitian  Write down your questions so you remember to ask them during your visits  © 2017 [employer] [employer] Information is for End User's use only and may not be sold, redistributed or otherwise used for commercial purposes  All illustrations and images included in CareNotes® are the copyrighted property of A D A M , Inc  or [employer]  The above information is an  only  It is not intended as medical advice for individual conditions or treatments  Talk to your doctor, nurse or pharmacist before following any medical regimen to see if it is safe and effective for you

## 2018-10-17 NOTE — ASSESSMENT & PLAN NOTE
She unfortunately still is morbidly obese and has showed no improvement she was counseled both on diet and exercise in effort to reduce her weight    Unfortunately due to her severe back issues exercises difficult

## 2018-10-17 NOTE — ASSESSMENT & PLAN NOTE
She continues to follow with Rheumatology and is on a taper of her prednisone    Currently she is on 5 mg a day

## 2018-11-13 ENCOUNTER — TELEPHONE (OUTPATIENT)
Dept: OBGYN CLINIC | Facility: HOSPITAL | Age: 63
End: 2018-11-13

## 2018-11-13 NOTE — TELEPHONE ENCOUNTER
Caller: patient  Call back number: [phone]    Patient called stating she is having a lot of pain on her left side low back  She states it is mostly at night  Patient states at her last appointment Dr Shana Hong told her to call if she gets this pain so trigger point injections can be scheduled  Can this be done?  Please advise

## 2018-11-13 NOTE — TELEPHONE ENCOUNTER
S/W pt  Pt stated she has left lower back pain and it goes into the left side to rib area and pain is 6-7/10  Pt stated she is taking tizanidine and meloxicam which are not helping  Pt stated the pain started on Saturday and has been getting worse  Pharmacy on file  Pt asking if she can get a trigger point? Please advise

## 2018-11-15 ENCOUNTER — TELEPHONE (OUTPATIENT)
Dept: PAIN MEDICINE | Facility: MEDICAL CENTER | Age: 63
End: 2018-11-15

## 2018-11-15 DIAGNOSIS — M79.18 MYOFASCIAL PAIN SYNDROME: ICD-10-CM

## 2018-11-15 DIAGNOSIS — G89.4 CHRONIC PAIN SYNDROME: ICD-10-CM

## 2018-11-15 DIAGNOSIS — M54.50 CHRONIC BILATERAL LOW BACK PAIN WITHOUT SCIATICA: ICD-10-CM

## 2018-11-15 DIAGNOSIS — G89.29 CHRONIC BILATERAL LOW BACK PAIN WITHOUT SCIATICA: ICD-10-CM

## 2018-11-15 RX ORDER — TOPIRAMATE 100 MG/1
100 TABLET, FILM COATED ORAL 2 TIMES DAILY
Qty: 60 TABLET | Refills: 2 | Status: SHIPPED | OUTPATIENT
Start: 2018-11-15 | End: 2018-12-17 | Stop reason: SDUPTHER

## 2018-11-15 NOTE — TELEPHONE ENCOUNTER
Spoke w/ patient requests refill for topiramate (TOPAMAX) 100 mg tablet,    Pt uses:  [id number], [address] [facility] [address], [place], [place]    Please call patient [phone] to confirm refill was called into pharmacy

## 2018-11-15 NOTE — TELEPHONE ENCOUNTER
Refill of Topamax sent to pharmacy    Okay to schedule trigger point injections with either myself or [facility]

## 2018-11-15 NOTE — TELEPHONE ENCOUNTER
S/W pt, she is no longer seeing [facility], she is now seeing Lucita Thomas  States topamax is working well with no side effects  Also, she is inquiring about being scheduled for TPI  See other task  Needs refill as per below

## 2018-11-16 ENCOUNTER — TELEPHONE (OUTPATIENT)
Dept: PAIN MEDICINE | Facility: CLINIC | Age: 63
End: 2018-11-16

## 2018-11-16 NOTE — TELEPHONE ENCOUNTER
Patient left message w/ the service at   Message stated " Medication clarification    Dr Noman Blanco called and conference to pharmacy"    Please call patient to clarify    I believe this is a medication issue  Thank you

## 2018-11-23 NOTE — TELEPHONE ENCOUNTER
lvm for pt to call and schedule   Please transfer to [address] [facility]  [phone]

## 2018-11-26 PROCEDURE — 88305 TISSUE EXAM BY PATHOLOGIST: CPT | Performed by: PATHOLOGY

## 2018-11-30 NOTE — TELEPHONE ENCOUNTER
Tried to call pt, she is not scheduled for TPI at all, she never called to schedule it- the appt she has on 12/6 is not with our office  Please transfer all calls for procedures to the schedulers rather than tasking

## 2018-11-30 NOTE — TELEPHONE ENCOUNTER
Pt called to cx her TPI that was scheduled for 12/6    Pt stated she is feeling much better with PT and does not have the pain, she will like to hold off and see if her pain returns, at this time appt was cx

## 2018-12-04 DIAGNOSIS — F33.41 RECURRENT MAJOR DEPRESSIVE DISORDER, IN PARTIAL REMISSION (HCC): ICD-10-CM

## 2018-12-04 RX ORDER — VENLAFAXINE HYDROCHLORIDE 75 MG/1
75 CAPSULE, EXTENDED RELEASE ORAL DAILY
Qty: 90 CAPSULE | Refills: 1 | Status: SHIPPED | OUTPATIENT
Start: 2018-12-04 | End: 2019-01-25 | Stop reason: SDUPTHER

## 2018-12-11 ENCOUNTER — LAB REQUISITION (OUTPATIENT)
Dept: LAB | Facility: HOSPITAL | Age: 63
End: 2018-12-11
Payer: MEDICARE

## 2018-12-11 DIAGNOSIS — D48.9 NEOPLASM OF UNCERTAIN BEHAVIOR: ICD-10-CM

## 2018-12-17 ENCOUNTER — OFFICE VISIT (OUTPATIENT)
Dept: PAIN MEDICINE | Facility: CLINIC | Age: 63
End: 2018-12-17
Payer: MEDICARE

## 2018-12-17 VITALS
WEIGHT: 292 LBS | HEIGHT: 65 IN | DIASTOLIC BLOOD PRESSURE: 86 MMHG | BODY MASS INDEX: 48.65 KG/M2 | SYSTOLIC BLOOD PRESSURE: 138 MMHG | HEART RATE: 72 BPM

## 2018-12-17 DIAGNOSIS — M46.1 SACROILIITIS (HCC): ICD-10-CM

## 2018-12-17 DIAGNOSIS — G89.4 CHRONIC PAIN SYNDROME: Primary | ICD-10-CM

## 2018-12-17 DIAGNOSIS — M54.40 CHRONIC LOW BACK PAIN WITH SCIATICA, SCIATICA LATERALITY UNSPECIFIED, UNSPECIFIED BACK PAIN LATERALITY: ICD-10-CM

## 2018-12-17 DIAGNOSIS — M62.838 MUSCLE SPASM: ICD-10-CM

## 2018-12-17 DIAGNOSIS — M54.50 CHRONIC BILATERAL LOW BACK PAIN WITHOUT SCIATICA: ICD-10-CM

## 2018-12-17 DIAGNOSIS — G89.29 CHRONIC LOW BACK PAIN WITH SCIATICA, SCIATICA LATERALITY UNSPECIFIED, UNSPECIFIED BACK PAIN LATERALITY: ICD-10-CM

## 2018-12-17 DIAGNOSIS — M79.18 MYOFASCIAL PAIN SYNDROME: ICD-10-CM

## 2018-12-17 DIAGNOSIS — M54.16 LUMBAR RADICULOPATHY: ICD-10-CM

## 2018-12-17 DIAGNOSIS — G89.29 CHRONIC BILATERAL LOW BACK PAIN WITHOUT SCIATICA: ICD-10-CM

## 2018-12-17 DIAGNOSIS — M48.061 SPINAL STENOSIS OF LUMBAR REGION WITHOUT NEUROGENIC CLAUDICATION: ICD-10-CM

## 2018-12-17 PROCEDURE — 99213 OFFICE O/P EST LOW 20 MIN: CPT | Performed by: NURSE PRACTITIONER

## 2018-12-17 RX ORDER — TOPIRAMATE 100 MG/1
100 TABLET, FILM COATED ORAL 2 TIMES DAILY
Qty: 60 TABLET | Refills: 3 | Status: SHIPPED | OUTPATIENT
Start: 2018-12-17 | End: 2019-04-08 | Stop reason: SDUPTHER

## 2018-12-17 RX ORDER — TIZANIDINE 2 MG/1
2 TABLET ORAL EVERY 6 HOURS
Qty: 120 TABLET | Refills: 3 | Status: SHIPPED | OUTPATIENT
Start: 2018-12-17 | End: 2019-04-08 | Stop reason: SDUPTHER

## 2018-12-17 RX ORDER — MELOXICAM 15 MG/1
15 TABLET ORAL DAILY
Refills: 5 | COMMUNITY
Start: 2018-12-07 | End: 2019-08-09 | Stop reason: SDUPTHER

## 2018-12-17 RX ORDER — TOPIRAMATE 100 MG/1
100 TABLET, FILM COATED ORAL 2 TIMES DAILY
Qty: 60 TABLET | Refills: 2 | Status: SHIPPED | OUTPATIENT
Start: 2018-12-17 | End: 2018-12-17 | Stop reason: SDUPTHER

## 2018-12-17 RX ORDER — TIZANIDINE 2 MG/1
2 TABLET ORAL EVERY 6 HOURS
Qty: 120 TABLET | Refills: 2 | Status: SHIPPED | OUTPATIENT
Start: 2018-12-17 | End: 2018-12-17 | Stop reason: SDUPTHER

## 2018-12-17 NOTE — PROGRESS NOTES
Assessment:  1  Chronic pain syndrome    2  Chronic low back pain with sciatica, sciatica laterality unspecified, unspecified back pain laterality    3  Lumbar radiculopathy    4  Myofascial pain syndrome    5  Sacroiliitis ([address] )    6  Chronic bilateral low back pain without sciatica    7  Muscle spasm    8  Spinal stenosis of lumbar region without neurogenic claudication        Plan:  1  Patient may continue on Topamax 100 mg b i d  and tizanidine 2 mg Q6 hours prn myofascial pain  These medications were refilled at today's office visit  2   Patient may continue on meloxicam 15 mg daily as prescribed by her PCP, however I cautioned against daily use as she is on chronic prednisone therapy, which could increase risk for gastric ulceration  3   We can repeat trigger point injections p r n     The patient does not feel that her pain symptoms warrant this procedure at this time  4  Patient will continue with her home exercise program as she has received significant relief of her myofascial pain with her home stretches  5  Patient continues to receive significant relief of her low back pain and lower extremity symptoms with the use of her spinal cord stimulator  I advised that she contact the representative should her symptoms worsen in the future to discuss the reprogramming session  I also advised that she contact a representative regarding MRI compatibility  The patient was agreeable and verbalized an understanding  6   The patient will follow-up in 4 months for medication prescription refill and reevaluation  The patient was advised to contact the office should their symptoms worsen in the interim  The patient was agreeable and verbalized an understanding  [place] Prescription Drug Monitoring Program report was reviewed and was appropriate     History of Present Illness:     The patient is a 66 y o  female with a history of permanent spinal cord stimulator implantation, polymyalgia rheumatica, lumbar stenosis and radiculopathy into the right lower extremity, chronic myofascial pain, and chronic pain syndrome last seen on 9/27/18 who presents for a follow up office visit  At today's office visit, the patient states that her pain complaints are well managed on her current medication regimen and with the use of her thoracic spinal cord stimulator  She states she occasionally experiences muscle spasms, however for the most part they have been well controlled with the use of tizanidine  She was scheduled for trigger point injections after her last office visit, however cancel because her home exercise program significantly improved her symptoms  She states she is doing much better overall at this time and is happy with her current pain relief  The patient currently rates her pain a 5/10 on the numeric pain rating scale  She states her pain is constant in nature most bothersome at night  She characterizes the pain as sharp and pressure like  Current pain medications includes: Topamax 100 mg b i d  and tizanidine 2 mg q 6 hours p r n  myofascial pain  Patient is also on meloxicam 15 mg daily as prescribed by her PCP  The patient reports that this regimen is providing 50% pain relief  The patient is reporting no side effects from this pain medication regimen  I have personally reviewed and/or updated the patient's past medical history, past surgical history, family history, social history, current medications, allergies, and vital signs today  Review of Systems:    Review of Systems   Respiratory: Negative for shortness of breath  Cardiovascular: Negative for chest pain  Gastrointestinal: Negative for constipation, diarrhea, nausea and vomiting  Musculoskeletal: Positive for gait problem (Difficulty walking, decreased range of motion)  Negative for arthralgias, joint swelling and myalgias  Joint stiffness   Skin: Negative for rash     Neurological: Negative for dizziness, seizures and weakness  All other systems reviewed and are negative  Past Medical History:   Diagnosis Date    Allergic reaction to bee sting     last assessed - 13Jun2016    Asthma     Chronic narcotic dependence ([facility] [address] )     Chronic pain syndrome     Depression     [facility]     Esophageal reflux     Hyperlipidemia     Hypertension     Lumbar radiculopathy     Lyme disease     last assessed - 28Jun2016    Obesity     Opioid dependence ([facility] [address] )     PPD positive     had treatment w/ INH 15 years ago    Vitamin D deficiency        Past Surgical History:   Procedure Laterality Date    APPENDECTOMY      CHOLECYSTECTOMY      JOINT REPLACEMENT      R knee, B/l total hip    NC REVISE/REMOVE SPINAL NEUROSTIM/ Left 12/13/2016    Procedure: REPLACEMENT BUTTOCK SPINAL CORD STIMULATOR IPG;  Surgeon: Zarina Cuevas MD;  Location:  MAIN OR;  Service: Neurosurgery    SHOULDER SURGERY      [facility] [address] IMPLANT      spinal stereotaxis stimulation of cord    TOTAL HIP ARTHROPLASTY Bilateral        Family History   Problem Relation Age of Onset    Diabetes Mother         Diabetes mellitus    Cancer Mother     Cancer Father     Diabetes Maternal Grandmother     Cancer Paternal Uncle     Brain cancer Family     Breast cancer Family     Cervical cancer Family     Diabetes Family         Diabetes mellitus    Hypertension Family     Ovarian cancer Family     Stroke Family         Stroke complications       Social History     Occupational History    Not on file       Social History Main Topics    Smoking status: Current Every Day Smoker     Packs/day: 1 00     Years: 5 00    Smokeless tobacco: Never Used      Comment: pt desires to quit in January    Alcohol use No      Comment: Denied history of alcohol use    Drug use: No      Comment: Denied history of drug use    Sexual activity: Not on file         Current Outpatient Prescriptions:    atorvastatin (LIPITOR) 10 mg tablet, Take 1 tablet (10 mg total) by mouth daily taks in am, Disp: 30 tablet, Rfl: 5    Cholecalciferol (VITAMIN D3) 67227 units CAPS, Take 1 capsule by mouth once a week, Disp: , Rfl: 11    furosemide (LASIX) 40 mg tablet, Take 1 tablet (40 mg total) by mouth 2 (two) times a day, Disp: 90 tablet, Rfl: 1    glimepiride (AMARYL) 1 mg tablet, Take 1 tablet (1 mg total) by mouth 2 (two) times a day, Disp: 60 tablet, Rfl: 5    glucose blood (ONE TOUCH ULTRA TEST) test strip, Test blood sugar once a day, Disp: 100 each, Rfl: 5    lisinopril (ZESTRIL) 20 mg tablet, Take 1 tablet (20 mg total) by mouth daily, Disp: 90 tablet, Rfl: 1    Magnesium 400 MG CAPS, Take by mouth, Disp: , Rfl:     meloxicam (MOBIC) 15 mg tablet, Take 15 mg by mouth daily, Disp: , Rfl: 5    Nerve Stimulator (STANDARD TENS) ISAEL, by Does not apply route 4 (four) times a day as needed (MUSCLE SPASMS), Disp: 1 Device, Rfl: 0    predniSONE 5 mg tablet, Take 1 mg by mouth daily  , Disp: , Rfl:     tiZANidine (ZANAFLEX) 2 mg tablet, Take 1 tablet (2 mg total) by mouth every 6 (six) hours, Disp: 120 tablet, Rfl: 3    topiramate (TOPAMAX) 100 mg tablet, Take 1 tablet (100 mg total) by mouth 2 (two) times a day, Disp: 60 tablet, Rfl: 3    venlafaxine (EFFEXOR-XR) 75 mg 24 hr capsule, Take 1 capsule (75 mg total) by mouth daily, Disp: 90 capsule, Rfl: 1    Allergies   Allergen Reactions    Bee Venom Anaphylaxis    Other Other (See Comments)     Stainless steel and surgical steel  *Severe blistering*    Aleve [Naproxen Sodium] Hives    Augmentin [Amoxicillin-Pot Clavulanate] Hives    Metformin And Related Hives    Morphine And Related Hives    Motrin [Ibuprofen] Hives    Nortriptyline Hives    Penicillins Hives    Soy Lecithin [Lecithin] Hives    Sulfa Antibiotics Hives    Tradjenta [Linagliptin] Hives    Tramadol        Physical Exam:    /86   Pulse 72   Ht 5' 5" (1 651 m)   Wt 132 kg (292 lb) LMP  (LMP Unknown)   BMI 48 59 kg/m²     Constitutional:obese  Eyes:anicteric  HEENT:grossly intact  Neck:supple, symmetric, trachea midline and no masses   Pulmonary:even and unlabored  Cardiovascular:No edema or pitting edema present  Skin:Normal without rashes or lesions and well hydrated  Psychiatric:Mood and affect appropriate  Neurologic:Cranial Nerves II-XII grossly intact  Musculoskeletal:Slightly antalgic gait, but steady without the use of assistive devices      Imaging  No orders to display      CT LUMBAR SPINE     INDICATION: Back pain     COMPARISON: CT performed on 10/2/2017     TECHNIQUE:  Contiguous axial images through the lumbar spine were obtained  Sagittal and coronal reconstructions were performed        Radiation dose length product (DLP) for this visit:  1263 63 mGy-cm   This examination, like all CT scans performed in the [place], was performed utilizing techniques to minimize radiation dose exposure, including the use of   iterative reconstruction and automated exposure control        IMAGE QUALITY:  Diagnostic      FINDINGS:     ALIGNMENT:  Alignment is maintained  Developmental narrowing of the spinal canal      VERTEBRAL BODIES:  No focal lytic or blastic lesion  No acute fracture      DEGENERATIVE CHANGES:     Lower Thoracic spine:  Normal lower thoracic disc spaces  ]     L1-2:  Bilateral facet arthropathy with ligament flavum thickening  Circumferential disc bulge  This is superimposed on developmental narrowing  Mild to moderate spinal canal stenosis  Mild right and mild left neural foraminal stenosis  No significant   interval change       L2-3:  Circumferential disc bulge  Ligamentum flavum thickening and facet arthropathy  This is superimposed on developmental narrowing  Mild to moderate spinal canal stenosis    Moderate right and moderate left neural foraminal stenosis       L3-4:  Circumferential disc bulge with ligamentum flavum and facet arthropathy  This is superimposed on developmental narrowing  Moderate spinal canal stenosis  Moderate to severe right and moderate to severe left neural foraminal stenosis      L4-5:  Circumferential disc bulge  Bilateral facet arthropathy  This is superimposed on developmental narrowing  Moderate spinal canal stenosis  Severe right and severe left neural foraminal stenosis  No significant interval change       L5-S1:  Circumferential disc bulge  Bilateral facet arthropathy  Of omental Mild spinal canal stenosis  Moderate to severe right and moderate to severe left neural foraminal stenosis      PARASPINAL SOFT TISSUES:  Aortoiliac calcifications  Residual contrast from prior examination      IMPRESSION:     Stable lumbar spine when compared to 10/2/2017      Developmental narrowing the spinal canal      Moderate to severe degrees of stenosis as detailed level by level        No orders of the defined types were placed in this encounter

## 2018-12-27 ENCOUNTER — OFFICE VISIT (OUTPATIENT)
Dept: PAIN MEDICINE | Facility: CLINIC | Age: 63
End: 2018-12-27
Payer: MEDICARE

## 2018-12-27 VITALS
HEIGHT: 65 IN | WEIGHT: 293 LBS | BODY MASS INDEX: 48.82 KG/M2 | DIASTOLIC BLOOD PRESSURE: 70 MMHG | SYSTOLIC BLOOD PRESSURE: 120 MMHG | HEART RATE: 92 BPM

## 2018-12-27 DIAGNOSIS — M79.18 MYOFASCIAL PAIN SYNDROME: Primary | ICD-10-CM

## 2018-12-27 PROCEDURE — 99213 OFFICE O/P EST LOW 20 MIN: CPT | Performed by: PHYSICIAN ASSISTANT

## 2018-12-27 NOTE — PROGRESS NOTES
Assessment/Plan:      Diagnoses and all orders for this visit:    Myofascial pain syndrome      Discussion: 65 yo female presenting for follow up of myofacial pain  At this time she finds her pain is stable, and offers no new complaints at visit today  She was advised to continue follow up with rheumatology, as well as pain management  She is to continue HEP as she feels it is providing adaquate relief of bilateral leg cramping in addition to tizanidine use as needed  Discussed further refills of tizanidine and states pain management told her they would take over and provided most recent refill  At this time there is nothing additional this office can offer her and she is to follow up as needed  The patient was advised that muscle relaxant medications have very important potential side effects including dry mouth, dizziness, and drowsiness/fatigue  She was asked to stop medication if she experiences any adverse side effects, and additionally advised to avoid alcohol, and driving/operating heavy machinery while taking this medication  The patient expresses understanding of these issues and questions were answered  Subjective:     Patient ID: Melida Rod is a 66 y o  female  HPI Last seen 10/11/18 by Dr Tram Rubio  Continues to be followed by pain medicine and rheumatology  She has not required TPIs since last visit  She is completing HEP and notices improvement in spasms  Notices only time bilateral leg cramping and spasm is with weather changes, specifically thunder storms  Feels overall her pain is well controlled and at this time has no new complaints  Continues to have mild axial LBP and finds particularly uncomfortable at night lying supine  She does suffer from left sided knee pain and states is to schedule for TKR in February with orthopedics  Continues to take tizanidine for relief of leg cramping at nighttime as needed   Down to 5 mg prednisone daily from rheumatology and is scheduled to stop steroid completely in January       PAST MEDICAL HISTORY  Past Medical History:   Diagnosis Date    Allergic reaction to bee sting     last assessed - 13Jun2016    Asthma     Chronic narcotic dependence ([address]     Chronic pain syndrome     Depression     Diabetes mellitus ([address]     Esophageal reflux     Hyperlipidemia     Hypertension     Lumbar radiculopathy     Lyme disease     last assessed - 28Jun2016    Obesity     Opioid dependence ([address] )     PPD positive     had treatment w/ INH 15 years ago    Vitamin D deficiency      PAST SURGICAL HISTORY  Past Surgical History:   Procedure Laterality Date    APPENDECTOMY      CHOLECYSTECTOMY      JOINT REPLACEMENT      R knee, B/l total hip    NC REVISE/REMOVE SPINAL NEUROSTIM/ Left 12/13/2016    Procedure: REPLACEMENT BUTTOCK SPINAL CORD STIMULATOR IPG;  Surgeon: Armin Mendez MD;  Location: [facility];  Service: Neurosurgery    SHOULDER SURGERY      Resolved - [address] IMPL[address]      spinal stereotaxis stimulation of cord    TOTAL HIP ARTHROPLASTY Bilateral        FAMILY HISTORY  Family History   Problem Relation Age of Onset    Diabetes Mother         Diabetes mellitus    Cancer Mother     Cancer Father     Diabetes Maternal Grandmother     Cancer Paternal Uncle     Brain cancer Family     Breast cancer Family     Cervical cancer Family     Diabetes Family         Diabetes mellitus    Hypertension Family     Ovarian cancer Family     Stroke Family         Stroke complications     HOME MEDICATIONS  Current Outpatient Prescriptions   Medication Sig Dispense Refill    atorvastatin (LIPITOR) 10 mg tablet Take 1 tablet (10 mg total) by mouth daily taks in am 30 tablet 5    Cholecalciferol (VITAMIN D3) 81897 units CAPS Take 1 capsule by mouth once a week  11    furosemide (LASIX) 40 mg tablet Take 1 tablet (40 mg total) by mouth 2 (two) times a day 90 tablet 1    glimepiride (AMARYL) 1 mg tablet Take 1 tablet (1 mg total) by mouth 2 (two) times a day 60 tablet 5    glucose blood (ONE TOUCH ULTRA TEST) test strip Test blood sugar once a day 100 each 5    lisinopril (ZESTRIL) 20 mg tablet Take 1 tablet (20 mg total) by mouth daily 90 tablet 1    Magnesium 400 MG CAPS Take by mouth      meloxicam (MOBIC) 15 mg tablet Take 15 mg by mouth daily  5    Nerve Stimulator (STANDARD TENS) ISAEL by Does not apply route 4 (four) times a day as needed (MUSCLE SPASMS) 1 Device 0    predniSONE 5 mg tablet Take 1 mg by mouth daily        tiZANidine (ZANAFLEX) 2 mg tablet Take 1 tablet (2 mg total) by mouth every 6 (six) hours 120 tablet 3    topiramate (TOPAMAX) 100 mg tablet Take 1 tablet (100 mg total) by mouth 2 (two) times a day 60 tablet 3    venlafaxine (EFFEXOR-XR) 75 mg 24 hr capsule Take 1 capsule (75 mg total) by mouth daily 90 capsule 1     No current facility-administered medications for this visit  ALLERGIES  Bee venom; Other; Aleve [naproxen sodium]; Augmentin [amoxicillin-pot clavulanate]; Metformin and related; Morphine and related; Motrin [ibuprofen]; Nortriptyline; Penicillins; Soy lecithin [lecithin]; Sulfa antibiotics; Tradjenta [linagliptin]; and Tramadol      SOCIAL HISTORY  History   Alcohol Use No     Comment: Denied history of alcohol use     History   Drug Use No     Comment: Denied history of drug use     History   Smoking Status    Current Every Day Smoker    Packs/day: 1 00    Years: 5 00   Smokeless Tobacco    Never Used     Comment: pt desires to quit in January       Review of Systems   Constitutional: Negative  HENT: Negative  Respiratory: Negative  Negative for chest tightness and shortness of breath  Cardiovascular: Negative  Negative for chest pain  Gastrointestinal: Negative  Genitourinary: Negative  Musculoskeletal: Positive for arthralgias (left knee), back pain and myalgias  Negative for neck pain  Skin: Negative      Neurological: Negative for weakness and numbness  Psychiatric/Behavioral: Positive for sleep disturbance  Objective:  Vitals:    12/27/18 1030   BP: 120/70   Pulse: 92        Physical Exam   Constitutional: She is oriented to person, place, and time  She appears well-developed and well-nourished  No distress  HENT:   Head: Normocephalic and atraumatic  Musculoskeletal:        Lumbar back: She exhibits decreased range of motion and tenderness  She exhibits no bony tenderness  Mild TTP right lumbar paraspinals    Neurological: She is alert and oriented to person, place, and time  She has normal strength  She displays no atrophy  No sensory deficit  She exhibits normal muscle tone  Coordination and gait normal    Reflex Scores:       Tricep reflexes are 1+ on the right side and 1+ on the left side  Bicep reflexes are 1+ on the right side and 1+ on the left side  Brachioradialis reflexes are 1+ on the right side and 1+ on the left side  Patellar reflexes are 0 on the right side and 0 on the left side  Achilles reflexes are 0 on the right side and 0 on the left side   - SLR bilaterally   Able to heel stand, toe stand, knee bend    Skin: Skin is warm and dry  No rash noted  She is not diaphoretic  Psychiatric: She has a normal mood and affect   Her behavior is normal  Judgment and thought content normal

## 2019-01-25 DIAGNOSIS — F33.41 RECURRENT MAJOR DEPRESSIVE DISORDER, IN PARTIAL REMISSION (HCC): ICD-10-CM

## 2019-01-25 RX ORDER — VENLAFAXINE HYDROCHLORIDE 75 MG/1
75 CAPSULE, EXTENDED RELEASE ORAL DAILY
Qty: 90 CAPSULE | Refills: 1 | Status: SHIPPED | OUTPATIENT
Start: 2019-01-25 | End: 2020-02-19 | Stop reason: SDUPTHER

## 2019-02-26 ENCOUNTER — OFFICE VISIT (OUTPATIENT)
Dept: INTERNAL MEDICINE CLINIC | Age: 64
End: 2019-02-26
Payer: MEDICARE

## 2019-02-26 VITALS
SYSTOLIC BLOOD PRESSURE: 138 MMHG | HEIGHT: 65 IN | WEIGHT: 286 LBS | BODY MASS INDEX: 47.65 KG/M2 | HEART RATE: 80 BPM | TEMPERATURE: 97.6 F | OXYGEN SATURATION: 94 % | DIASTOLIC BLOOD PRESSURE: 80 MMHG

## 2019-02-26 DIAGNOSIS — F17.200 CURRENT EVERY DAY SMOKER: ICD-10-CM

## 2019-02-26 DIAGNOSIS — E11.9 TYPE 2 DIABETES MELLITUS WITHOUT COMPLICATION, WITHOUT LONG-TERM CURRENT USE OF INSULIN (HCC): ICD-10-CM

## 2019-02-26 DIAGNOSIS — E66.01 MORBID OBESITY (HCC): ICD-10-CM

## 2019-02-26 DIAGNOSIS — J01.90 SUBACUTE SINUSITIS, UNSPECIFIED LOCATION: Primary | ICD-10-CM

## 2019-02-26 DIAGNOSIS — J45.20 MILD INTERMITTENT ASTHMA WITHOUT COMPLICATION: ICD-10-CM

## 2019-02-26 PROCEDURE — 99214 OFFICE O/P EST MOD 30 MIN: CPT | Performed by: INTERNAL MEDICINE

## 2019-02-26 PROCEDURE — 99406 BEHAV CHNG SMOKING 3-10 MIN: CPT | Performed by: INTERNAL MEDICINE

## 2019-02-26 RX ORDER — MOMETASONE FUROATE 50 UG/1
2 SPRAY, METERED NASAL DAILY
Qty: 1 ACT | Refills: 1 | Status: SHIPPED | OUTPATIENT
Start: 2019-02-26 | End: 2020-07-22

## 2019-02-26 RX ORDER — AZITHROMYCIN 250 MG/1
TABLET, FILM COATED ORAL
Qty: 6 TABLET | Refills: 0 | Status: SHIPPED | OUTPATIENT
Start: 2019-02-26 | End: 2019-03-03

## 2019-02-27 ENCOUNTER — TELEPHONE (OUTPATIENT)
Dept: INTERNAL MEDICINE CLINIC | Age: 64
End: 2019-02-27

## 2019-02-27 PROBLEM — J01.90 SUBACUTE SINUSITIS: Status: ACTIVE | Noted: 2019-02-27

## 2019-02-27 NOTE — ASSESSMENT & PLAN NOTE
Lab Results   Component Value Date    HGBA1C 6 5 (H) 10/05/2018       No results for input(s): POCGLU in the last 72 hours      Blood Sugar Average: Last 72 hrs:   her diabetes continues to be well controlled but she is due for blood work soon a slip was given

## 2019-02-27 NOTE — PROGRESS NOTES
Assessment/Plan:    Subacute sinusitis  She is here for ongoing sinus congestion and postnasal drip  She always worries that will going to her chest and aggravate her asthma  Will treat with a Z-Jimi  She is on prednisone for her low back at present    Asthma, mild intermittent  Patient does carry a diagnosis of intermittent mild asthma  It really only acts up 1 she has a cold    Body mass index (BMI) of 45 0-49 9 in [facility])  Patient remains morbidly obese  She has difficulty with any form of exercise due to her back  Her weight fluctuates minimally  She was counseled on diet and exercise once again    Current every day smoker  Despite her asthma she continues to smoke and was again counseled 3-10 minutes on the benefits of and ways to quit smoking    DMII (diabetes mellitus, type 2) ([facility] )  Lab Results   Component Value Date    HGBA1C 6 5 (H) 10/05/2018       No results for input(s): POCGLU in the last 72 hours  Blood Sugar Average: Last 72 hrs:   her diabetes continues to be well controlled but she is due for blood work soon a slip was given       Diagnoses and all orders for this visit:    Subacute sinusitis, unspecified location  -     azithromycin (ZITHROMAX) 250 mg tablet; Take 2 tablets today then 1 tablet daily x 4 days  -     mometasone (NASONEX) 50 mcg/act nasal spray; 2 sprays into each nostril daily    Body mass index (BMI) of 45 0-49 9 in [facility])    Type 2 diabetes mellitus without complication, without long-term current use of insulin (HCC)  -     Basic metabolic panel; Future  -     Hemoglobin A1C; Future  -     Microalbumin / creatinine urine ratio    Current every day smoker    Mild intermittent asthma without complication    Morbid obesity (HCC)          Subjective:      Patient ID: Chandler Batista is a 66 y o  female  Patient is here mostly for her URI but she is also due for a checkup    Sinusitis   This is a new problem  The problem has been waxing and waning since onset  There has been no fever  The pain is mild  Associated symptoms include congestion, coughing, ear pain, a hoarse voice, sinus pressure and a sore throat  Diaphoresis: Chronic  Past treatments include acetaminophen, sitting up and oral decongestants  The treatment provided no relief  Diabetes   She presents for her follow-up diabetic visit  She has type 2 diabetes mellitus  Her disease course has been stable  There are no hypoglycemic associated symptoms  Associated symptoms include fatigue and foot paresthesias (But has spinal stenosis)  Symptoms are stable  Risk factors for coronary artery disease include diabetes mellitus, dyslipidemia, hypertension, obesity, post-menopausal, sedentary lifestyle, stress and tobacco exposure  Current diabetic treatment includes diet and oral agent (monotherapy)  She is compliant with treatment most of the time  Her weight is stable  She is following a diabetic and low salt diet  Meal planning includes avoidance of concentrated sweets  She never participates in exercise  There is no change in her home blood glucose trend  An ACE inhibitor/angiotensin II receptor blocker is being taken  She sees a podiatrist Eye exam is current  Review of Systems   Constitutional: Positive for fatigue  Diaphoresis: Chronic  HENT: Positive for congestion, ear pain, hoarse voice, sinus pressure and sore throat  Respiratory: Positive for cough  Cardiovascular: Positive for leg swelling  Musculoskeletal: Positive for back pain and gait problem  Objective:      /80 (BP Location: Left arm, Patient Position: Sitting)   Pulse 80   Temp 97 6 °F (36 4 °C) (Tympanic)   Ht 5' 5" (1 651 m)   Wt 130 kg (286 lb)   LMP  (LMP Unknown)   SpO2 94%   BMI 47 59 kg/m²          Physical Exam   Constitutional: She is oriented to person, place, and time  She appears well-developed and well-nourished  No distress     Morbidly obese   HENT:   Right Ear: External ear normal    Left Ear: External ear normal    Mouth/Throat: No oropharyngeal exudate  Moderate nasal congestion postnasal drip red pharynx   Eyes: Pupils are equal, round, and reactive to light  EOM are normal    Neck: Normal range of motion  Neck supple  No JVD present  No thyromegaly present  Cardiovascular: Normal rate, regular rhythm, normal heart sounds and intact distal pulses  Exam reveals no gallop  No murmur heard  Pulmonary/Chest: Effort normal and breath sounds normal  No respiratory distress  She has no wheezes  She has no rales  Abdominal: Soft  Bowel sounds are normal  She exhibits no distension and no mass  There is no tenderness  Musculoskeletal: Normal range of motion  She exhibits edema  She exhibits no tenderness  Wide-based gait   Lymphadenopathy:     She has no cervical adenopathy  Neurological: She is alert and oriented to person, place, and time  No cranial nerve deficit  Coordination normal    Skin: No rash noted  Psychiatric: She has a normal mood and affect  Her behavior is normal  Judgment and thought content normal    Vitals reviewed  BMI Counseling: Body mass index is 47 59 kg/m²  Discussed the patient's BMI with her  The BMI is above average  BMI counseling and education was provided to the patient  Nutrition recommendations include decreasing overall calorie intake

## 2019-02-27 NOTE — ASSESSMENT & PLAN NOTE
Patient does carry a diagnosis of intermittent mild asthma    It really only acts up 1 she has a cold

## 2019-02-27 NOTE — ASSESSMENT & PLAN NOTE
Patient remains morbidly obese  She has difficulty with any form of exercise due to her back  Her weight fluctuates minimally    She was counseled on diet and exercise once again

## 2019-02-27 NOTE — ASSESSMENT & PLAN NOTE
Despite her asthma she continues to smoke and was again counseled 3-10 minutes on the benefits of and ways to quit smoking

## 2019-02-27 NOTE — ASSESSMENT & PLAN NOTE
She is here for ongoing sinus congestion and postnasal drip  She always worries that will going to her chest and aggravate her asthma  Will treat with a Z-Jimi    She is on prednisone for her low back at present

## 2019-02-27 NOTE — PATIENT INSTRUCTIONS
Obesity   AMBULATORY CARE:   Obesity  is when your body mass index (BMI) is greater than 30  Your healthcare provider will use your height and weight to measure your BMI  The risks of obesity include  many health problems, such as injuries or physical disability  You may need tests to check for the following:  · Diabetes     · High blood pressure or high cholesterol     · Heart disease     · Gallbladder or liver disease     · Cancer of the colon, breast, prostate, liver, or kidney     · Sleep apnea     · Arthritis or gout  Seek care immediately if:   · You have a severe headache, confusion, or difficulty speaking  · You have weakness on one side of your body  · You have chest pain, sweating, or shortness of breath  Contact your healthcare provider if:   · You have symptoms of gallbladder or liver disease, such as pain in your upper abdomen  · You have knee or hip pain and discomfort while walking  · You have symptoms of diabetes, such as intense hunger and thirst, and frequent urination  · You have symptoms of sleep apnea, such as snoring or daytime sleepiness  · You have questions or concerns about your condition or care  Treatment for obesity  focuses on helping you lose weight to improve your health  Even a small decrease in BMI can reduce the risk for many health problems  Your healthcare provider will help you set a weight-loss goal   · Lifestyle changes  are the first step in treating obesity  These include making healthy food choices and getting regular physical activity  Your healthcare provider may suggest a weight-loss program that involves coaching, education, and therapy  · Medicine  may help you lose weight when it is used with a healthy diet and physical activity  · Surgery  can help you lose weight if you are very obese and have other health problems  There are several types of weight-loss surgery  Ask your healthcare provider for more information    Be successful losing weight:   · Set small, realistic goals  An example of a small goal is to walk for 20 minutes 5 days a week  [place] goal is to lose 5% of your body weight  · Tell friends, family members, and coworkers about your goals  and ask for their support  Ask a friend to lose weight with you, or join a weight-loss support group  · Identify foods or triggers that may cause you to overeat , and find ways to avoid them  Remove tempting high-calorie foods from your home and workplace  Place a bowl of fresh fruit on your kitchen counter  If stress causes you to eat, then find other ways to cope with stress  · Keep a diary to track what you eat and drink  Also write down how many minutes of physical activity you do each day  Weigh yourself once a week and record it in your diary  Eating changes: You will need to eat 500 to 1,000 fewer calories each day than you currently eat to lose 1 to 2 pounds a week  The following changes will help you cut calories:  · Eat smaller portions  Use small plates, no larger than 9 inches in diameter  Fill your plate half full of fruits and vegetables  Measure your food using measuring cups until you know what a serving size looks like  · Eat 3 meals and 1 or 2 snacks each day  Plan your meals in advance  Debra Balderas and eat at home most of the time  Eat slowly  · Eat fruits and vegetables at every meal   They are low in calories and high in fiber, which makes you feel full  Do not add butter, margarine, or cream sauce to vegetables  Use herbs to season steamed vegetables  · Eat less fat and fewer fried foods  Eat more baked or grilled chicken and fish  These protein sources are lower in calories and fat than red meat  Limit fast food  Dress your salads with olive oil and vinegar instead of bottled dressing  · Limit the amount of sugar you eat  Do not drink sugary beverages  Limit alcohol  Activity changes:  Physical activity is good for your body in many ways   It helps you burn calories and build strong muscles  It decreases stress and depression, and improves your mood  It can also help you sleep better  Talk to your healthcare provider before you begin an exercise program   · Exercise for at least 30 minutes 5 days a week  Start slowly  Set aside time each day for physical activity that you enjoy and that is convenient for you  It is best to do both weight training and an activity that increases your heart rate, such as walking, bicycling, or swimming  · Find ways to be more active  Do yard work and housecleaning  Walk up the stairs instead of using elevators  Spend your leisure time going to events that require walking, such as outdoor festivals or fairs  This extra physical activity can help you lose weight and keep it off  Follow up with your healthcare provider as directed: You may need to meet with a dietitian  Write down your questions so you remember to ask them during your visits  © 2017 [address] Information is for End User's use only and may not be sold, redistributed or otherwise used for commercial purposes  All illustrations and images included in CareNotes® are the copyrighted property of [employer]  or Prosper Del Real  The above information is an  only  It is not intended as medical advice for individual conditions or treatments  Talk to your doctor, nurse or pharmacist before following any medical regimen to see if it is safe and effective for you  Heart Healthy Diet   AMBULATORY CARE:   A heart healthy diet  is an eating plan low in total fat, unhealthy fats, and sodium (salt)  A heart healthy diet helps decrease your risk for heart disease and stroke  Limit the amount of fat you eat to 25% to 35% of your total daily calories  Limit sodium to less than 2,300 mg each day  Healthy fats:  Healthy fats can help improve cholesterol levels   The risk for heart disease is decreased when cholesterol levels are normal  Choose healthy fats, such as the following:  · Unsaturated fat  is found in foods such as soybean, canola, olive, corn, and safflower oils  It is also found in soft tub margarine that is made with liquid vegetable oil  · Omega-3 fat  is found in certain fish, such as salmon, tuna, and trout, and in walnuts and flaxseed  Unhealthy fats:  Unhealthy fats can cause unhealthy cholesterol levels in your blood and increase your risk of heart disease  Limit unhealthy fats, such as the following:  · Cholesterol  is found in animal foods, such as eggs and lobster, and in dairy products made from whole milk  Limit cholesterol to less than 300 milligrams (mg) each day  You may need to limit cholesterol to 200 mg each day if you have heart disease  · Saturated fat  is found in meats, such as mullins and hamburger  It is also found in chicken or turkey skin, whole milk, and butter  Limit saturated fat to less than 7% of your total daily calories  Limit saturated fat to less than 6% if you have heart disease or are at increased risk for it  · Trans fat  is found in packaged foods, such as potato chips and cookies  It is also in hard margarine, some fried foods, and shortening  Avoid trans fats as much as possible    Heart healthy foods and drinks to include:  Ask your dietitian or healthcare provider how many servings to have from each of the following food groups:  · Grains:      ¨ Whole-wheat breads, cereals, and pastas, and brown rice    ¨ Low-fat, low-sodium crackers and chips    · Vegetables:      ¨ Broccoli, green beans, green peas, and spinach    ¨ Collards, kale, and lima beans    ¨ Carrots, sweet potatoes, tomatoes, and peppers    ¨ Canned vegetables with no salt added    · Fruits:      ¨ Bananas, peaches, pears, and pineapple    ¨ Grapes, raisins, and dates    ¨ Oranges, tangerines, grapefruit, orange juice, and grapefruit juice    ¨ Apricots, mangoes, melons, and papaya    ¨ Raspberries and strawberries    ¨ Canned fruit with no added sugar    · Low-fat dairy products:      ¨ Nonfat (skim) milk, 1% milk, and low-fat almond, cashew, or soy milks fortified with calcium    ¨ Low-fat cheese, regular or frozen yogurt, and cottage cheese    · Meats and proteins , such as lean cuts of beef and pork (loin, leg, round), skinless chicken and turkey, legumes, soy products, egg whites, and nuts  Foods and drinks to limit or avoid:  Ask your dietitian or healthcare provider about these and other foods that are high in unhealthy fat, sodium, and sugar:  · Snack or packaged foods , such as frozen dinners, cookies, macaroni and cheese, and cereals with more than 300 mg of sodium per serving    · Canned or dry mixes  for cakes, soups, sauces, or gravies    · Vegetables with added sodium , such as instant potatoes, vegetables with added sauces, or regular canned vegetables    · Other foods high in sodium , such as ketchup, barbecue sauce, salad dressing, pickles, olives, soy sauce, and miso    · High-fat dairy foods  such as whole or 2% milk, cream cheese, or sour cream, and cheeses     · High-fat protein foods  such as high-fat cuts of beef (T-bone steaks, ribs), chicken or turkey with skin, and organ meats, such as liver    · Cured or smoked meats , such as hot dogs, mullins, and sausage    · Unhealthy fats and oils , such as butter, stick margarine, shortening, and cooking oils such as coconut or palm oil    · Food and drinks high in sugar , such as soft drinks (soda), sports drinks, sweetened tea, candy, cake, cookies, pies, and doughnuts  Other diet guidelines to follow:   · Eat more foods containing omega-3 fats  Eat fish high in omega-3 fats at least 2 times a week  · Limit alcohol  Too much alcohol can damage your heart and raise your blood pressure  Women should limit alcohol to 1 drink a day  Men should limit alcohol to 2 drinks a day   A drink of alcohol is 12 ounces of beer, 5 ounces of wine, or 1½ ounces of liquor  · Choose low-sodium foods  High-sodium foods can lead to high blood pressure  Add little or no salt to food you prepare  Use herbs and spices in place of salt  · Eat more fiber  to help lower cholesterol levels  Eat at least 5 servings of fruits and vegetables each day  Eat 3 ounces of whole-grain foods each day  Legumes (beans) are also a good source of fiber  Lifestyle guidelines:   · Do not smoke  Nicotine and other chemicals in cigarettes and cigars can cause lung and heart damage  Ask your healthcare provider for information if you currently smoke and need help to quit  E-cigarettes or smokeless tobacco still contain nicotine  Talk to your healthcare provider before you use these products  · Exercise regularly  to help you maintain a healthy weight and improve your blood pressure and cholesterol levels  Ask your healthcare provider about the best exercise plan for you  Do not start an exercise program without asking your healthcare provider  Follow up with your healthcare provider as directed:  Write down your questions so you remember to ask them during your visits  © 2017 [facility] Information is for End User's use only and may not be sold, redistributed or otherwise used for commercial purposes  All illustrations and images included in CareNotes® are the copyrighted property of [employer] D [employer] M [employer]  or [employer]  The above information is an  only  It is not intended as medical advice for individual conditions or treatments  Talk to your doctor, nurse or pharmacist before following any medical regimen to see if it is safe and effective for you  Calorie Counting Diet   WHAT YOU NEED TO KNOW:   What is a calorie counting diet? It is a meal plan based on counting calories each day to reach a healthy body weight  You will need to eat fewer calories if you are trying to lose weight   Weight loss may decrease your risk for certain health problems or improve your health if you have health problems  Some of these health problems include heart disease, high blood pressure, and diabetes  What foods should I avoid? Your dietitian will tell you if you need to avoid certain foods based on your body weight and health condition  You may need to avoid high-fat foods if you are at risk for or have heart disease  You may need to eat fewer foods from the breads and starches food group if you have diabetes  How many calories are in foods? The following is a list of foods and drinks with the approximate number of calories in each  Check the food label to find the exact number of calories  A dietitian can tell you how many calories you should have from each food group each day    · Carbohydrate:      ¨ ½ of a 3-inch bagel, 1 slice of bread, or ½ of a hamburger bun or hot dog bun (80)    ¨ 1 (8-inch) flour tortilla or ½ cup of cooked rice (100)    ¨ 1 (6-inch) corn tortilla (80)    ¨ 1 (6-inch) pancake or 1 cup of bran flakes cereal (110)    ¨ ½ cup of cooked cereal (80)    ¨ ½ cup of cooked pasta (85)    ¨ 1 ounce of pretzels (100)    ¨ 3 cups of air-popped popcorn without butter or oil (80)    · Dairy:      ¨ 1 cup of skim or 1% milk (90)    ¨ 1 cup of 2% milk (120)    ¨ 1 cup of whole milk (160)    ¨ 1 cup of 2% chocolate milk (220)    ¨ 1 ounce of low-fat cheese with 3 grams of fat per ounce (70)    ¨ 1 ounce of cheddar cheese (114)    ¨ ½ cup of 1% fat cottage cheese (80)    ¨ 1 cup of plain or sugar-free, fat-free yogurt (90)    · Protein foods:      ¨ 3 ounces of fish (not breaded or fried) (95)    ¨ 3 ounces of breaded, fried fish (195)    ¨ ¾ cup of tuna canned in water (105)    ¨ 3 ounces of chicken breast without skin (105)    ¨ 1 fried chicken breast with skin (350)    ¨ ¼ cup of fat free egg substitute (40)    ¨ 1 large egg (75)    ¨ 3 ounces of lean beef or pork (165)    ¨ 3 ounces of fried pork chop or ham (185)    ¨ ½ cup of cooked dried beans, such as kidney, delgado, lentils, or navy (115)    ¨ 3 ounces of bologna or lunch meat (225)    ¨ 2 links of breakfast sausage (140)    · Vegetables:      ¨ ½ cup of sliced mushrooms (10)    ¨ 1 cup of salad greens, such as lettuce, spinach, or carolee (15)    ¨ ½ cup of steamed asparagus (20)    ¨ ½ cup of cooked summer squash, zucchini squash, or green or wax beans (25)    ¨ 1 cup of broccoli or cauliflower florets, or 1 medium tomato (25)    ¨ 1 large raw carrot or ½ cup of cooked carrots (40)    ¨ ? of a medium cucumber or 1 stalk of celery (5)    ¨ 1 small baked potato (160)    ¨ 1 cup of breaded, fried vegetables (230)    · Fruit:      ¨ 1 (6-inch) banana (55)     ¨ ½ of a 4-inch grapefruit (55)    ¨ 15 grapes (60)    ¨ 1 medium orange or apple (70)    ¨ 1 large peach (65)    ¨ 1 cup of fresh pineapple chunks (75)    ¨ 1 cup of melon cubes (50)    ¨ 1¼ cups of whole strawberries (45)    ¨ ½ cup of fruit canned in juice (55)    ¨ ½ cup of fruit canned in heavy syrup (110)    ¨ ?  cup of raisins (130)    ¨ ½ cup of unsweetened fruit juice (60)    ¨ ½ cup of grape, cranberry, or prune juice (90)    · Fat:      ¨ 10 peanuts or 2 teaspoons of peanut butter (55)    ¨ 2 tablespoons of avocado or 1 tablespoon of regular salad dressing (45)    ¨ 2 slices of mullins (90)    ¨ 1 teaspoon of oil, such as safflower, canola, corn, or olive oil (45)    ¨ 2 teaspoons of low-fat margarine, or 1 tablespoon of low-fat mayonnaise (50)    ¨ 1 teaspoon of regular margarine (40)    ¨ 1 tablespoon of regular mayonnaise (135)    ¨ 1 tablespoon of cream cheese or 2 tablespoons of low-fat cream cheese (45)    ¨ 2 tablespoons of vegetable shortening (215)    · Dessert and sweets:      ¨ 8 animal crackers or 5 vanilla wafers (80)    ¨ 1 frozen fruit juice bar (80)    ¨ ½ cup of ice milk or low-fat frozen yogurt (90)    ¨ ½ cup of sherbet or sorbet (125)    ¨ ½ cup of sugar-free pudding or custard (60)    ¨ ½ cup of ice cream (140)    ¨ ½ cup of pudding or custard (175)    ¨ 1 (2-inch) square chocolate brownie (185)    · Combination foods:      ¨ Bean burrito made with an 8-inch tortilla, without cheese (275)    ¨ Chicken breast sandwich with lettuce and tomato (325)    ¨ 1 cup of chicken noodle soup (60)    ¨ 1 beef taco (175)    ¨ Regular hamburger with lettuce and tomato (310)    ¨ Regular cheeseburger with lettuce and tomato (410)     ¨ ¼ of a 12-inch cheese pizza (280)    ¨ Fried fish sandwich with lettuce and tomato (425)    ¨ Hot dog and bun (275)    ¨ 1½ cups of macaroni and cheese (310)    ¨ Taco salad with a fried tortilla shell (870)    · Low-calorie foods:      ¨ 1 tablespoon of ketchup or 1 tablespoon of fat free sour cream (15)    ¨ 1 teaspoon of mustard (5)    ¨ ¼ cup of salsa (20)    ¨ 1 large dill pickle (15)    ¨ 1 tablespoon of fat free salad dressing (10)    ¨ 2 teaspoons of low-sugar, light jam or jelly, or 1 tablespoon of sugar-free syrup (15)    ¨ 1 sugar-free popsicle (15)    ¨ 1 cup of club soda, seltzer water, or diet soda (0)  CARE AGREEMENT:   You have the right to help plan your care  Discuss treatment options with your caregivers to decide what care you want to receive  You always have the right to refuse treatment  The above information is an  only  It is not intended as medical advice for individual conditions or treatments  Talk to your doctor, nurse or pharmacist before following any medical regimen to see if it is safe and effective for you  © 2017 [address] Information is for End User's use only and may not be sold, redistributed or otherwise used for commercial purposes  All illustrations and images included in CareNotes® are the copyrighted property of [employer]  or [employer]  Obesity   AMBULATORY CARE:   Obesity  is when your body mass index (BMI) is greater than 30  Your healthcare provider will use your height and weight to measure your BMI    The risks of obesity include  many health problems, such as injuries or physical disability  You may need tests to check for the following:  · Diabetes     · High blood pressure or high cholesterol     · Heart disease     · Gallbladder or liver disease     · Cancer of the colon, breast, prostate, liver, or kidney     · Sleep apnea     · Arthritis or gout  Seek care immediately if:   · You have a severe headache, confusion, or difficulty speaking  · You have weakness on one side of your body  · You have chest pain, sweating, or shortness of breath  Contact your healthcare provider if:   · You have symptoms of gallbladder or liver disease, such as pain in your upper abdomen  · You have knee or hip pain and discomfort while walking  · You have symptoms of diabetes, such as intense hunger and thirst, and frequent urination  · You have symptoms of sleep apnea, such as snoring or daytime sleepiness  · You have questions or concerns about your condition or care  Treatment for obesity  focuses on helping you lose weight to improve your health  Even a small decrease in BMI can reduce the risk for many health problems  Your healthcare provider will help you set a weight-loss goal   · Lifestyle changes  are the first step in treating obesity  These include making healthy food choices and getting regular physical activity  Your healthcare provider may suggest a weight-loss program that involves coaching, education, and therapy  · Medicine  may help you lose weight when it is used with a healthy diet and physical activity  · Surgery  can help you lose weight if you are very obese and have other health problems  There are several types of weight-loss surgery  Ask your healthcare provider for more information  Be successful losing weight:   · Set small, realistic goals  An example of a small goal is to walk for 20 minutes 5 days a week  [place] goal is to lose 5% of your body weight      · Tell friends, family members, and coworkers about your goals  and ask for their support  Ask a friend to lose weight with you, or join a weight-loss support group  · Identify foods or triggers that may cause you to overeat , and find ways to avoid them  Remove tempting high-calorie foods from your home and workplace  Place a bowl of fresh fruit on your kitchen counter  If stress causes you to eat, then find other ways to cope with stress  · Keep a diary to track what you eat and drink  Also write down how many minutes of physical activity you do each day  Weigh yourself once a week and record it in your diary  Eating changes: You will need to eat 500 to 1,000 fewer calories each day than you currently eat to lose 1 to 2 pounds a week  The following changes will help you cut calories:  · Eat smaller portions  Use small plates, no larger than 9 inches in diameter  Fill your plate half full of fruits and vegetables  Measure your food using measuring cups until you know what a serving size looks like  · Eat 3 meals and 1 or 2 snacks each day  Plan your meals in advance  Shan Fagan and eat at home most of the time  Eat slowly  · Eat fruits and vegetables at every meal   They are low in calories and high in fiber, which makes you feel full  Do not add butter, margarine, or cream sauce to vegetables  Use herbs to season steamed vegetables  · Eat less fat and fewer fried foods  Eat more baked or grilled chicken and fish  These protein sources are lower in calories and fat than red meat  Limit fast food  Dress your salads with olive oil and vinegar instead of bottled dressing  · Limit the amount of sugar you eat  Do not drink sugary beverages  Limit alcohol  Activity changes:  Physical activity is good for your body in many ways  It helps you burn calories and build strong muscles  It decreases stress and depression, and improves your mood  It can also help you sleep better   Talk to your healthcare provider before you begin an exercise program   · Exercise for at least 30 minutes 5 days a week  Start slowly  Set aside time each day for physical activity that you enjoy and that is convenient for you  It is best to do both weight training and an activity that increases your heart rate, such as walking, bicycling, or swimming  · Find ways to be more active  Do yard work and housecleaning  Walk up the stairs instead of using elevators  Spend your leisure time going to events that require walking, such as outdoor festivals or fairs  This extra physical activity can help you lose weight and keep it off  Follow up with your healthcare provider as directed: You may need to meet with a dietitian  Write down your questions so you remember to ask them during your visits  © 2017 [address] Information is for End User's use only and may not be sold, redistributed or otherwise used for commercial purposes  All illustrations and images included in CareNotes® are the copyrighted property of [employer] M [employer]  or [employer]  The above information is an  only  It is not intended as medical advice for individual conditions or treatments  Talk to your doctor, nurse or pharmacist before following any medical regimen to see if it is safe and effective for you  Heart Healthy Diet   AMBULATORY CARE:   A heart healthy diet  is an eating plan low in total fat, unhealthy fats, and sodium (salt)  A heart healthy diet helps decrease your risk for heart disease and stroke  Limit the amount of fat you eat to 25% to 35% of your total daily calories  Limit sodium to less than 2,300 mg each day  Healthy fats:  Healthy fats can help improve cholesterol levels  The risk for heart disease is decreased when cholesterol levels are normal  Choose healthy fats, such as the following:  · Unsaturated fat  is found in foods such as soybean, canola, olive, corn, and safflower oils   It is also found in soft tub margarine that is made with liquid vegetable oil  · Omega-3 fat  is found in certain fish, such as salmon, tuna, and trout, and in walnuts and flaxseed  Unhealthy fats:  Unhealthy fats can cause unhealthy cholesterol levels in your blood and increase your risk of heart disease  Limit unhealthy fats, such as the following:  · Cholesterol  is found in animal foods, such as eggs and lobster, and in dairy products made from whole milk  Limit cholesterol to less than 300 milligrams (mg) each day  You may need to limit cholesterol to 200 mg each day if you have heart disease  · Saturated fat  is found in meats, such as mullins and hamburger  It is also found in chicken or turkey skin, whole milk, and butter  Limit saturated fat to less than 7% of your total daily calories  Limit saturated fat to less than 6% if you have heart disease or are at increased risk for it  · Trans fat  is found in packaged foods, such as potato chips and cookies  It is also in hard margarine, some fried foods, and shortening  Avoid trans fats as much as possible    Heart healthy foods and drinks to include:  Ask your dietitian or healthcare provider how many servings to have from each of the following food groups:  · Grains:      ¨ Whole-wheat breads, cereals, and pastas, and brown rice    ¨ Low-fat, low-sodium crackers and chips    · Vegetables:      ¨ Broccoli, green beans, green peas, and spinach    ¨ Collards, kale, and lima beans    ¨ Carrots, sweet potatoes, tomatoes, and peppers    ¨ Canned vegetables with no salt added    · Fruits:      ¨ Bananas, peaches, pears, and pineapple    ¨ Grapes, raisins, and dates    ¨ Oranges, tangerines, grapefruit, orange juice, and grapefruit juice    ¨ Apricots, mangoes, melons, and papaya    ¨ Raspberries and strawberries    ¨ Canned fruit with no added sugar    · Low-fat dairy products:      ¨ Nonfat (skim) milk, 1% milk, and low-fat almond, cashew, or soy milks fortified with calcium    ¨ Low-fat cheese, regular or frozen yogurt, and cottage cheese    · Meats and proteins , such as lean cuts of beef and pork (loin, leg, round), skinless chicken and turkey, legumes, soy products, egg whites, and nuts  Foods and drinks to limit or avoid:  Ask your dietitian or healthcare provider about these and other foods that are high in unhealthy fat, sodium, and sugar:  · Snack or packaged foods , such as frozen dinners, cookies, macaroni and cheese, and cereals with more than 300 mg of sodium per serving    · Canned or dry mixes  for cakes, soups, sauces, or gravies    · Vegetables with added sodium , such as instant potatoes, vegetables with added sauces, or regular canned vegetables    · Other foods high in sodium , such as ketchup, barbecue sauce, salad dressing, pickles, olives, soy sauce, and miso    · High-fat dairy foods  such as whole or 2% milk, cream cheese, or sour cream, and cheeses     · High-fat protein foods  such as high-fat cuts of beef (T-bone steaks, ribs), chicken or turkey with skin, and organ meats, such as liver    · Cured or smoked meats , such as hot dogs, mullins, and sausage    · Unhealthy fats and oils , such as butter, stick margarine, shortening, and cooking oils such as coconut or palm oil    · Food and drinks high in sugar , such as soft drinks (soda), sports drinks, sweetened tea, candy, cake, cookies, pies, and doughnuts  Other diet guidelines to follow:   · Eat more foods containing omega-3 fats  Eat fish high in omega-3 fats at least 2 times a week  · Limit alcohol  Too much alcohol can damage your heart and raise your blood pressure  Women should limit alcohol to 1 drink a day  Men should limit alcohol to 2 drinks a day  A drink of alcohol is 12 ounces of beer, 5 ounces of wine, or 1½ ounces of liquor  · Choose low-sodium foods  High-sodium foods can lead to high blood pressure  Add little or no salt to food you prepare   Use herbs and spices in place of salt     · Eat more fiber  to help lower cholesterol levels  Eat at least 5 servings of fruits and vegetables each day  Eat 3 ounces of whole-grain foods each day  Legumes (beans) are also a good source of fiber  Lifestyle guidelines:   · Do not smoke  Nicotine and other chemicals in cigarettes and cigars can cause lung and heart damage  Ask your healthcare provider for information if you currently smoke and need help to quit  E-cigarettes or smokeless tobacco still contain nicotine  Talk to your healthcare provider before you use these products  · Exercise regularly  to help you maintain a healthy weight and improve your blood pressure and cholesterol levels  Ask your healthcare provider about the best exercise plan for you  Do not start an exercise program without asking your healthcare provider  Follow up with your healthcare provider as directed:  Write down your questions so you remember to ask them during your visits  © 2017 [address] Information is for End User's use only and may not be sold, redistributed or otherwise used for commercial purposes  All illustrations and images included in CareNotes® are the copyrighted property of [employer]  or [employer]  The above information is an  only  It is not intended as medical advice for individual conditions or treatments  Talk to your doctor, nurse or pharmacist before following any medical regimen to see if it is safe and effective for you  Calorie Counting Diet   WHAT YOU NEED TO KNOW:   What is a calorie counting diet? It is a meal plan based on counting calories each day to reach a healthy body weight  You will need to eat fewer calories if you are trying to lose weight  Weight loss may decrease your risk for certain health problems or improve your health if you have health problems  Some of these health problems include heart disease, high blood pressure, and diabetes     What foods should I avoid? Your dietitian will tell you if you need to avoid certain foods based on your body weight and health condition  You may need to avoid high-fat foods if you are at risk for or have heart disease  You may need to eat fewer foods from the breads and starches food group if you have diabetes  How many calories are in foods? The following is a list of foods and drinks with the approximate number of calories in each  Check the food label to find the exact number of calories  A dietitian can tell you how many calories you should have from each food group each day    · Carbohydrate:      ¨ ½ of a 3-inch bagel, 1 slice of bread, or ½ of a hamburger bun or hot dog bun (80)    ¨ 1 (8-inch) flour tortilla or ½ cup of cooked rice (100)    ¨ 1 (6-inch) corn tortilla (80)    ¨ 1 (6-inch) pancake or 1 cup of bran flakes cereal (110)    ¨ ½ cup of cooked cereal (80)    ¨ ½ cup of cooked pasta (85)    ¨ 1 ounce of pretzels (100)    ¨ 3 cups of air-popped popcorn without butter or oil (80)    · Dairy:      ¨ 1 cup of skim or 1% milk (90)    ¨ 1 cup of 2% milk (120)    ¨ 1 cup of whole milk (160)    ¨ 1 cup of 2% chocolate milk (220)    ¨ 1 ounce of low-fat cheese with 3 grams of fat per ounce (70)    ¨ 1 ounce of cheddar cheese (114)    ¨ ½ cup of 1% fat cottage cheese (80)    ¨ 1 cup of plain or sugar-free, fat-free yogurt (90)    · Protein foods:      ¨ 3 ounces of fish (not breaded or fried) (95)    ¨ 3 ounces of breaded, fried fish (195)    ¨ ¾ cup of tuna canned in water (105)    ¨ 3 ounces of chicken breast without skin (105)    ¨ 1 fried chicken breast with skin (350)    ¨ ¼ cup of fat free egg substitute (40)    ¨ 1 large egg (75)    ¨ 3 ounces of lean beef or pork (165)    ¨ 3 ounces of fried pork chop or ham (185)    ¨ ½ cup of cooked dried beans, such as kidney, delgado, lentils, or navy (115)    ¨ 3 ounces of bologna or lunch meat (225)    ¨ 2 links of breakfast sausage (140)    · Vegetables:      ¨ ½ cup of sliced mushrooms (10)    ¨ 1 cup of salad greens, such as lettuce, spinach, or carolee (15)    ¨ ½ cup of steamed asparagus (20)    ¨ ½ cup of cooked summer squash, zucchini squash, or green or wax beans (25)    ¨ 1 cup of broccoli or cauliflower florets, or 1 medium tomato (25)    ¨ 1 large raw carrot or ½ cup of cooked carrots (40)    ¨ ? of a medium cucumber or 1 stalk of celery (5)    ¨ 1 small baked potato (160)    ¨ 1 cup of breaded, fried vegetables (230)    · Fruit:      ¨ 1 (6-inch) banana (55)     ¨ ½ of a 4-inch grapefruit (55)    ¨ 15 grapes (60)    ¨ 1 medium orange or apple (70)    ¨ 1 large peach (65)    ¨ 1 cup of fresh pineapple chunks (75)    ¨ 1 cup of melon cubes (50)    ¨ 1¼ cups of whole strawberries (45)    ¨ ½ cup of fruit canned in juice (55)    ¨ ½ cup of fruit canned in heavy syrup (110)    ¨ ?  cup of raisins (130)    ¨ ½ cup of unsweetened fruit juice (60)    ¨ ½ cup of grape, cranberry, or prune juice (90)    · Fat:      ¨ 10 peanuts or 2 teaspoons of peanut butter (55)    ¨ 2 tablespoons of avocado or 1 tablespoon of regular salad dressing (45)    ¨ 2 slices of mullins (90)    ¨ 1 teaspoon of oil, such as safflower, canola, corn, or olive oil (45)    ¨ 2 teaspoons of low-fat margarine, or 1 tablespoon of low-fat mayonnaise (50)    ¨ 1 teaspoon of regular margarine (40)    ¨ 1 tablespoon of regular mayonnaise (135)    ¨ 1 tablespoon of cream cheese or 2 tablespoons of low-fat cream cheese (45)    ¨ 2 tablespoons of vegetable shortening (215)    · Dessert and sweets:      ¨ 8 animal crackers or 5 vanilla wafers (80)    ¨ 1 frozen fruit juice bar (80)    ¨ ½ cup of ice milk or low-fat frozen yogurt (90)    ¨ ½ cup of sherbet or sorbet (125)    ¨ ½ cup of sugar-free pudding or custard (60)    ¨ ½ cup of ice cream (140)    ¨ ½ cup of pudding or custard (175)    ¨ 1 (2-inch) square chocolate brownie (185)    · Combination foods:      ¨ Bean burrito made with an 8-inch tortilla, without cheese (275)    ¨ Chicken breast sandwich with lettuce and tomato (325)    ¨ 1 cup of chicken noodle soup (60)    ¨ 1 beef taco (175)    ¨ Regular hamburger with lettuce and tomato (310)    ¨ Regular cheeseburger with lettuce and tomato (410)     ¨ ¼ of a 12-inch cheese pizza (280)    ¨ Fried fish sandwich with lettuce and tomato (425)    ¨ Hot dog and bun (275)    ¨ 1½ cups of macaroni and cheese (310)    ¨ Taco salad with a fried tortilla shell (870)    · Low-calorie foods:      ¨ 1 tablespoon of ketchup or 1 tablespoon of fat free sour cream (15)    ¨ 1 teaspoon of mustard (5)    ¨ ¼ cup of salsa (20)    ¨ 1 large dill pickle (15)    ¨ 1 tablespoon of fat free salad dressing (10)    ¨ 2 teaspoons of low-sugar, light jam or jelly, or 1 tablespoon of sugar-free syrup (15)    ¨ 1 sugar-free popsicle (15)    ¨ 1 cup of club soda, seltzer water, or diet soda (0)  CARE AGREEMENT:   You have the right to help plan your care  Discuss treatment options with your caregivers to decide what care you want to receive  You always have the right to refuse treatment  The above information is an  only  It is not intended as medical advice for individual conditions or treatments  Talk to your doctor, nurse or pharmacist before following any medical regimen to see if it is safe and effective for you  © 2017 [address] Information is for End User's use only and may not be sold, redistributed or otherwise used for commercial purposes  All illustrations and images included in CareNotes® are the copyrighted property of [employer]  or [employer]

## 2019-03-04 DIAGNOSIS — E11.21 TYPE 2 DIABETES MELLITUS WITH DIABETIC NEPHROPATHY, WITHOUT LONG-TERM CURRENT USE OF INSULIN (HCC): ICD-10-CM

## 2019-03-04 RX ORDER — GLIMEPIRIDE 1 MG/1
TABLET ORAL
Qty: 180 TABLET | Refills: 1 | Status: SHIPPED | OUTPATIENT
Start: 2019-03-04 | End: 2019-09-11 | Stop reason: SDUPTHER

## 2019-03-26 ENCOUNTER — TRANSCRIBE ORDERS (OUTPATIENT)
Dept: ADMINISTRATIVE | Facility: HOSPITAL | Age: 64
End: 2019-03-26

## 2019-03-26 ENCOUNTER — APPOINTMENT (OUTPATIENT)
Dept: LAB | Facility: IMAGING CENTER | Age: 64
End: 2019-03-26
Payer: MEDICARE

## 2019-03-26 DIAGNOSIS — E55.9 VITAMIN D DEFICIENCY: Primary | ICD-10-CM

## 2019-03-26 DIAGNOSIS — M15.0 PRIMARY GENERALIZED HYPERTROPHIC OSTEOARTHROSIS: ICD-10-CM

## 2019-03-26 DIAGNOSIS — E55.9 VITAMIN D DEFICIENCY: ICD-10-CM

## 2019-03-26 DIAGNOSIS — M35.3 POLYMYALGIA RHEUMATICA (HCC): ICD-10-CM

## 2019-03-26 DIAGNOSIS — E11.9 TYPE 2 DIABETES MELLITUS WITHOUT COMPLICATION, WITHOUT LONG-TERM CURRENT USE OF INSULIN (HCC): ICD-10-CM

## 2019-03-26 LAB
25(OH)D3 SERPL-MCNC: 52 NG/ML (ref 30–100)
ALBUMIN SERPL BCP-MCNC: 3.8 G/DL (ref 3.5–5)
ALP SERPL-CCNC: 131 U/L (ref 46–116)
ALT SERPL W P-5'-P-CCNC: 25 U/L (ref 12–78)
ANION GAP SERPL CALCULATED.3IONS-SCNC: 5 MMOL/L (ref 4–13)
AST SERPL W P-5'-P-CCNC: 21 U/L (ref 5–45)
BILIRUB SERPL-MCNC: 0.38 MG/DL (ref 0.2–1)
BUN SERPL-MCNC: 20 MG/DL (ref 5–25)
CALCIUM SERPL-MCNC: 9.4 MG/DL (ref 8.3–10.1)
CHLORIDE SERPL-SCNC: 108 MMOL/L (ref 100–108)
CO2 SERPL-SCNC: 26 MMOL/L (ref 21–32)
CREAT SERPL-MCNC: 1.06 MG/DL (ref 0.6–1.3)
CREAT UR-MCNC: 79.4 MG/DL
CRP SERPL QL: 10.2 MG/L
ERYTHROCYTE [SEDIMENTATION RATE] IN BLOOD: 34 MM/HOUR (ref 0–20)
EST. AVERAGE GLUCOSE BLD GHB EST-MCNC: 134 MG/DL
GFR SERPL CREATININE-BSD FRML MDRD: 57 ML/MIN/1.73SQ M
GLUCOSE P FAST SERPL-MCNC: 125 MG/DL (ref 65–99)
HBA1C MFR BLD: 6.3 % (ref 4.2–6.3)
MICROALBUMIN UR-MCNC: 7.1 MG/L (ref 0–20)
MICROALBUMIN/CREAT 24H UR: 9 MG/G CREATININE (ref 0–30)
POTASSIUM SERPL-SCNC: 4 MMOL/L (ref 3.5–5.3)
PROT SERPL-MCNC: 7.8 G/DL (ref 6.4–8.2)
SODIUM SERPL-SCNC: 139 MMOL/L (ref 136–145)

## 2019-03-26 PROCEDURE — 83036 HEMOGLOBIN GLYCOSYLATED A1C: CPT

## 2019-03-26 PROCEDURE — 36415 COLL VENOUS BLD VENIPUNCTURE: CPT

## 2019-03-26 PROCEDURE — 85652 RBC SED RATE AUTOMATED: CPT

## 2019-03-26 PROCEDURE — 82306 VITAMIN D 25 HYDROXY: CPT

## 2019-03-26 PROCEDURE — 80053 COMPREHEN METABOLIC PANEL: CPT

## 2019-03-26 PROCEDURE — 86140 C-REACTIVE PROTEIN: CPT

## 2019-03-26 PROCEDURE — 82043 UR ALBUMIN QUANTITATIVE: CPT | Performed by: INTERNAL MEDICINE

## 2019-03-26 PROCEDURE — 82570 ASSAY OF URINE CREATININE: CPT | Performed by: INTERNAL MEDICINE

## 2019-03-27 ENCOUNTER — OFFICE VISIT (OUTPATIENT)
Dept: INTERNAL MEDICINE CLINIC | Age: 64
End: 2019-03-27
Payer: MEDICARE

## 2019-03-27 VITALS
HEIGHT: 65 IN | WEIGHT: 288.2 LBS | HEART RATE: 76 BPM | SYSTOLIC BLOOD PRESSURE: 122 MMHG | DIASTOLIC BLOOD PRESSURE: 60 MMHG | BODY MASS INDEX: 48.02 KG/M2 | TEMPERATURE: 97.7 F

## 2019-03-27 DIAGNOSIS — F11.20 UNCOMPLICATED OPIOID DEPENDENCE (HCC): ICD-10-CM

## 2019-03-27 DIAGNOSIS — E11.21 TYPE 2 DIABETES MELLITUS WITH DIABETIC NEPHROPATHY, WITHOUT LONG-TERM CURRENT USE OF INSULIN (HCC): Primary | ICD-10-CM

## 2019-03-27 DIAGNOSIS — I10 ESSENTIAL HYPERTENSION: ICD-10-CM

## 2019-03-27 DIAGNOSIS — E66.01 MORBID OBESITY (HCC): ICD-10-CM

## 2019-03-27 PROBLEM — J01.90 SUBACUTE SINUSITIS: Status: RESOLVED | Noted: 2019-02-27 | Resolved: 2019-03-27

## 2019-03-27 PROCEDURE — G0439 PPPS, SUBSEQ VISIT: HCPCS | Performed by: INTERNAL MEDICINE

## 2019-03-27 PROCEDURE — 99214 OFFICE O/P EST MOD 30 MIN: CPT | Performed by: INTERNAL MEDICINE

## 2019-03-27 NOTE — PATIENT INSTRUCTIONS
Obesity   AMBULATORY CARE:   Obesity  is when your body mass index (BMI) is greater than 30  Your healthcare provider will use your height and weight to measure your BMI  The risks of obesity include  many health problems, such as injuries or physical disability  You may need tests to check for the following:  · Diabetes     · High blood pressure or high cholesterol     · Heart disease     · Gallbladder or liver disease     · Cancer of the colon, breast, prostate, liver, or kidney     · Sleep apnea     · Arthritis or gout  Seek care immediately if:   · You have a severe headache, confusion, or difficulty speaking  · You have weakness on one side of your body  · You have chest pain, sweating, or shortness of breath  Contact your healthcare provider if:   · You have symptoms of gallbladder or liver disease, such as pain in your upper abdomen  · You have knee or hip pain and discomfort while walking  · You have symptoms of diabetes, such as intense hunger and thirst, and frequent urination  · You have symptoms of sleep apnea, such as snoring or daytime sleepiness  · You have questions or concerns about your condition or care  Treatment for obesity  focuses on helping you lose weight to improve your health  Even a small decrease in BMI can reduce the risk for many health problems  Your healthcare provider will help you set a weight-loss goal   · Lifestyle changes  are the first step in treating obesity  These include making healthy food choices and getting regular physical activity  Your healthcare provider may suggest a weight-loss program that involves coaching, education, and therapy  · Medicine  may help you lose weight when it is used with a healthy diet and physical activity  · Surgery  can help you lose weight if you are very obese and have other health problems  There are several types of weight-loss surgery  Ask your healthcare provider for more information    Be successful losing weight:   · Set small, realistic goals  An example of a small goal is to walk for 20 minutes 5 days a week  Anther goal is to lose 5% of your body weight  · Tell friends, family members, and coworkers about your goals  and ask for their support  Ask a friend to lose weight with you, or join a weight-loss support group  · Identify foods or triggers that may cause you to overeat , and find ways to avoid them  Remove tempting high-calorie foods from your home and workplace  Place a bowl of fresh fruit on your kitchen counter  If stress causes you to eat, then find other ways to cope with stress  · Keep a diary to track what you eat and drink  Also write down how many minutes of physical activity you do each day  Weigh yourself once a week and record it in your diary  Eating changes: You will need to eat 500 to 1,000 fewer calories each day than you currently eat to lose 1 to 2 pounds a week  The following changes will help you cut calories:  · Eat smaller portions  Use small plates, no larger than 9 inches in diameter  Fill your plate half full of fruits and vegetables  Measure your food using measuring cups until you know what a serving size looks like  · Eat 3 meals and 1 or 2 snacks each day  Plan your meals in advance  Dave Tapia and eat at home most of the time  Eat slowly  · Eat fruits and vegetables at every meal   They are low in calories and high in fiber, which makes you feel full  Do not add butter, margarine, or cream sauce to vegetables  Use herbs to season steamed vegetables  · Eat less fat and fewer fried foods  Eat more baked or grilled chicken and fish  These protein sources are lower in calories and fat than red meat  Limit fast food  Dress your salads with olive oil and vinegar instead of bottled dressing  · Limit the amount of sugar you eat  Do not drink sugary beverages  Limit alcohol  Activity changes:  Physical activity is good for your body in many ways   It helps you burn calories and build strong muscles  It decreases stress and depression, and improves your mood  It can also help you sleep better  Talk to your healthcare provider before you begin an exercise program   · Exercise for at least 30 minutes 5 days a week  Start slowly  Set aside time each day for physical activity that you enjoy and that is convenient for you  It is best to do both weight training and an activity that increases your heart rate, such as walking, bicycling, or swimming  · Find ways to be more active  Do yard work and housecleaning  Walk up the stairs instead of using elevators  Spend your leisure time going to events that require walking, such as outdoor festivals or fairs  This extra physical activity can help you lose weight and keep it off  Follow up with your healthcare provider as directed: You may need to meet with a dietitian  Write down your questions so you remember to ask them during your visits  © 2017 [address] Information is for End User's use only and may not be sold, redistributed or otherwise used for commercial purposes  All illustrations and images included in CareNotes® are the copyrighted property of [employer]  or [employer]  The above information is an  only  It is not intended as medical advice for individual conditions or treatments  Talk to your doctor, nurse or pharmacist before following any medical regimen to see if it is safe and effective for you  Heart Healthy Diet   AMBULATORY CARE:   A heart healthy diet  is an eating plan low in total fat, unhealthy fats, and sodium (salt)  A heart healthy diet helps decrease your risk for heart disease and stroke  Limit the amount of fat you eat to 25% to 35% of your total daily calories  Limit sodium to less than 2,300 mg each day  Healthy fats:  Healthy fats can help improve cholesterol levels   The risk for heart disease is decreased when cholesterol levels are normal  Choose healthy fats, such as the following:  · Unsaturated fat  is found in foods such as soybean, canola, olive, corn, and safflower oils  It is also found in soft tub margarine that is made with liquid vegetable oil  · Omega-3 fat  is found in certain fish, such as salmon, tuna, and trout, and in walnuts and flaxseed  Unhealthy fats:  Unhealthy fats can cause unhealthy cholesterol levels in your blood and increase your risk of heart disease  Limit unhealthy fats, such as the following:  · Cholesterol  is found in animal foods, such as eggs and lobster, and in dairy products made from whole milk  Limit cholesterol to less than 300 milligrams (mg) each day  You may need to limit cholesterol to 200 mg each day if you have heart disease  · Saturated fat  is found in meats, such as mullins and hamburger  It is also found in chicken or turkey skin, whole milk, and butter  Limit saturated fat to less than 7% of your total daily calories  Limit saturated fat to less than 6% if you have heart disease or are at increased risk for it  · Trans fat  is found in packaged foods, such as potato chips and cookies  It is also in hard margarine, some fried foods, and shortening  Avoid trans fats as much as possible    Heart healthy foods and drinks to include:  Ask your dietitian or healthcare provider how many servings to have from each of the following food groups:  · Grains:      ¨ Whole-wheat breads, cereals, and pastas, and brown rice    ¨ Low-fat, low-sodium crackers and chips    · Vegetables:      ¨ Broccoli, green beans, green peas, and spinach    ¨ Collards, kale, and lima beans    ¨ Carrots, sweet potatoes, tomatoes, and peppers    ¨ Canned vegetables with no salt added    · Fruits:      ¨ Bananas, peaches, pears, and pineapple    ¨ Grapes, raisins, and dates    ¨ Oranges, tangerines, grapefruit, orange juice, and grapefruit juice    ¨ Apricots, mangoes, melons, and papaya    ¨ Raspberries and strawberries    ¨ Canned fruit with no added sugar    · Low-fat dairy products:      ¨ Nonfat (skim) milk, 1% milk, and low-fat almond, cashew, or soy milks fortified with calcium    ¨ Low-fat cheese, regular or frozen yogurt, and cottage cheese    · Meats and proteins , such as lean cuts of beef and pork (loin, leg, round), skinless chicken and turkey, legumes, soy products, egg whites, and nuts  Foods and drinks to limit or avoid:  Ask your dietitian or healthcare provider about these and other foods that are high in unhealthy fat, sodium, and sugar:  · Snack or packaged foods , such as frozen dinners, cookies, macaroni and cheese, and cereals with more than 300 mg of sodium per serving    · Canned or dry mixes  for cakes, soups, sauces, or gravies    · Vegetables with added sodium , such as instant potatoes, vegetables with added sauces, or regular canned vegetables    · Other foods high in sodium , such as ketchup, barbecue sauce, salad dressing, pickles, olives, soy sauce, and miso    · High-fat dairy foods  such as whole or 2% milk, cream cheese, or sour cream, and cheeses     · High-fat protein foods  such as high-fat cuts of beef (T-bone steaks, ribs), chicken or turkey with skin, and organ meats, such as liver    · Cured or smoked meats , such as hot dogs, mullins, and sausage    · Unhealthy fats and oils , such as butter, stick margarine, shortening, and cooking oils such as coconut or palm oil    · Food and drinks high in sugar , such as soft drinks (soda), sports drinks, sweetened tea, candy, cake, cookies, pies, and doughnuts  Other diet guidelines to follow:   · Eat more foods containing omega-3 fats  Eat fish high in omega-3 fats at least 2 times a week  · Limit alcohol  Too much alcohol can damage your heart and raise your blood pressure  Women should limit alcohol to 1 drink a day  Men should limit alcohol to 2 drinks a day   A drink of alcohol is 12 ounces of beer, 5 ounces of wine, or 1½ ounces of liquor  · Choose low-sodium foods  High-sodium foods can lead to high blood pressure  Add little or no salt to food you prepare  Use herbs and spices in place of salt  · Eat more fiber  to help lower cholesterol levels  Eat at least 5 servings of fruits and vegetables each day  Eat 3 ounces of whole-grain foods each day  Legumes (beans) are also a good source of fiber  Lifestyle guidelines:   · Do not smoke  Nicotine and other chemicals in cigarettes and cigars can cause lung and heart damage  Ask your healthcare provider for information if you currently smoke and need help to quit  E-cigarettes or smokeless tobacco still contain nicotine  Talk to your healthcare provider before you use these products  · Exercise regularly  to help you maintain a healthy weight and improve your blood pressure and cholesterol levels  Ask your healthcare provider about the best exercise plan for you  Do not start an exercise program without asking your healthcare provider  Follow up with your healthcare provider as directed:  Write down your questions so you remember to ask them during your visits  © 2017 [facility] Information is for End User's use only and may not be sold, redistributed or otherwise used for commercial purposes  All illustrations and images included in CareNotes® are the copyrighted property of A [employer] M [employer]  or [employer]  The above information is an  only  It is not intended as medical advice for individual conditions or treatments  Talk to your doctor, nurse or pharmacist before following any medical regimen to see if it is safe and effective for you  Calorie Counting Diet   WHAT YOU NEED TO KNOW:   What is a calorie counting diet? It is a meal plan based on counting calories each day to reach a healthy body weight  You will need to eat fewer calories if you are trying to lose weight   Weight loss may decrease your risk for certain health problems or improve your health if you have health problems  Some of these health problems include heart disease, high blood pressure, and diabetes  What foods should I avoid? Your dietitian will tell you if you need to avoid certain foods based on your body weight and health condition  You may need to avoid high-fat foods if you are at risk for or have heart disease  You may need to eat fewer foods from the breads and starches food group if you have diabetes  How many calories are in foods? The following is a list of foods and drinks with the approximate number of calories in each  Check the food label to find the exact number of calories  A dietitian can tell you how many calories you should have from each food group each day    · Carbohydrate:      ¨ ½ of a 3-inch bagel, 1 slice of bread, or ½ of a hamburger bun or hot dog bun (80)    ¨ 1 (8-inch) flour tortilla or ½ cup of cooked rice (100)    ¨ 1 (6-inch) corn tortilla (80)    ¨ 1 (6-inch) pancake or 1 cup of bran flakes cereal (110)    ¨ ½ cup of cooked cereal (80)    ¨ ½ cup of cooked pasta (85)    ¨ 1 ounce of pretzels (100)    ¨ 3 cups of air-popped popcorn without butter or oil (80)    · Dairy:      ¨ 1 cup of skim or 1% milk (90)    ¨ 1 cup of 2% milk (120)    ¨ 1 cup of whole milk (160)    ¨ 1 cup of 2% chocolate milk (220)    ¨ 1 ounce of low-fat cheese with 3 grams of fat per ounce (70)    ¨ 1 ounce of cheddar cheese (114)    ¨ ½ cup of 1% fat cottage cheese (80)    ¨ 1 cup of plain or sugar-free, fat-free yogurt (90)    · Protein foods:      ¨ 3 ounces of fish (not breaded or fried) (95)    ¨ 3 ounces of breaded, fried fish (195)    ¨ ¾ cup of tuna canned in water (105)    ¨ 3 ounces of chicken breast without skin (105)    ¨ 1 fried chicken breast with skin (350)    ¨ ¼ cup of fat free egg substitute (40)    ¨ 1 large egg (75)    ¨ 3 ounces of lean beef or pork (165)    ¨ 3 ounces of fried pork chop or ham (185)    ¨ ½ cup of cooked dried beans, such as kidney, delgado, lentils, or navy (115)    ¨ 3 ounces of bologna or lunch meat (225)    ¨ 2 links of breakfast sausage (140)    · Vegetables:      ¨ ½ cup of sliced mushrooms (10)    ¨ 1 cup of salad greens, such as lettuce, spinach, or carolee (15)    ¨ ½ cup of steamed asparagus (20)    ¨ ½ cup of cooked summer squash, zucchini squash, or green or wax beans (25)    ¨ 1 cup of broccoli or cauliflower florets, or 1 medium tomato (25)    ¨ 1 large raw carrot or ½ cup of cooked carrots (40)    ¨ ? of a medium cucumber or 1 stalk of celery (5)    ¨ 1 small baked potato (160)    ¨ 1 cup of breaded, fried vegetables (230)    · Fruit:      ¨ 1 (6-inch) banana (55)     ¨ ½ of a 4-inch grapefruit (55)    ¨ 15 grapes (60)    ¨ 1 medium orange or apple (70)    ¨ 1 large peach (65)    ¨ 1 cup of fresh pineapple chunks (75)    ¨ 1 cup of melon cubes (50)    ¨ 1¼ cups of whole strawberries (45)    ¨ ½ cup of fruit canned in juice (55)    ¨ ½ cup of fruit canned in heavy syrup (110)    ¨ ?  cup of raisins (130)    ¨ ½ cup of unsweetened fruit juice (60)    ¨ ½ cup of grape, cranberry, or prune juice (90)    · Fat:      ¨ 10 peanuts or 2 teaspoons of peanut butter (55)    ¨ 2 tablespoons of avocado or 1 tablespoon of regular salad dressing (45)    ¨ 2 slices of mullins (90)    ¨ 1 teaspoon of oil, such as safflower, canola, corn, or olive oil (45)    ¨ 2 teaspoons of low-fat margarine, or 1 tablespoon of low-fat mayonnaise (50)    ¨ 1 teaspoon of regular margarine (40)    ¨ 1 tablespoon of regular mayonnaise (135)    ¨ 1 tablespoon of cream cheese or 2 tablespoons of low-fat cream cheese (45)    ¨ 2 tablespoons of vegetable shortening (215)    · Dessert and sweets:      ¨ 8 animal crackers or 5 vanilla wafers (80)    ¨ 1 frozen fruit juice bar (80)    ¨ ½ cup of ice milk or low-fat frozen yogurt (90)    ¨ ½ cup of sherbet or sorbet (125)    ¨ ½ cup of sugar-free pudding or custard (60)    ¨ ½ cup of ice cream (140)    ¨ ½ cup of pudding or custard (175)    ¨ 1 (2-inch) square chocolate brownie (185)    · Combination foods:      ¨ Bean burrito made with an 8-inch tortilla, without cheese (275)    ¨ Chicken breast sandwich with lettuce and tomato (325)    ¨ 1 cup of chicken noodle soup (60)    ¨ 1 beef taco (175)    ¨ Regular hamburger with lettuce and tomato (310)    ¨ Regular cheeseburger with lettuce and tomato (410)     ¨ ¼ of a 12-inch cheese pizza (280)    ¨ Fried fish sandwich with lettuce and tomato (425)    ¨ Hot dog and bun (275)    ¨ 1½ cups of macaroni and cheese (310)    ¨ Taco salad with a fried tortilla shell (870)    · Low-calorie foods:      ¨ 1 tablespoon of ketchup or 1 tablespoon of fat free sour cream (15)    ¨ 1 teaspoon of mustard (5)    ¨ ¼ cup of salsa (20)    ¨ 1 large dill pickle (15)    ¨ 1 tablespoon of fat free salad dressing (10)    ¨ 2 teaspoons of low-sugar, light jam or jelly, or 1 tablespoon of sugar-free syrup (15)    ¨ 1 sugar-free popsicle (15)    ¨ 1 cup of club soda, seltzer water, or diet soda (0)  CARE AGREEMENT:   You have the right to help plan your care  Discuss treatment options with your caregivers to decide what care you want to receive  You always have the right to refuse treatment  The above information is an  only  It is not intended as medical advice for individual conditions or treatments  Talk to your doctor, nurse or pharmacist before following any medical regimen to see if it is safe and effective for you  © 2017 [address] Information is for End User's use only and may not be sold, redistributed or otherwise used for commercial purposes  All illustrations and images included in CareNotes® are the copyrighted property of A D A M [employer]  or [employer]

## 2019-03-27 NOTE — ASSESSMENT & PLAN NOTE
Patient continues to have good control of her blood pressure with only Zestril 20    And no obvious symptoms of cardiovascular disease

## 2019-03-27 NOTE — ASSESSMENT & PLAN NOTE
Lab Results   Component Value Date    HGBA1C 6 3 03/26/2019       No results for input(s): POCGLU in the last 72 hours  She also continues to have excellent control of her diabetes her most recent A1c is above 6 3  That is on a combination of Amaryl 1 mg     She was intolerant to metformin  Blood Sugar Average: Last 72 hrs:

## 2019-03-27 NOTE — PROGRESS NOTES
Assessment/Plan:    HTN (hypertension)  Patient continues to have good control of her blood pressure with only Zestril 20  And no obvious symptoms of cardiovascular disease    DMII (diabetes mellitus, type 2) ([facility] )  Lab Results   Component Value Date    HGBA1C 6 3 03/26/2019       No results for input(s): POCGLU in the last 72 hours  She also continues to have excellent control of her diabetes her most recent A1c is above 6 3  That is on a combination of Amaryl 1 mg   She was intolerant to metformin  Blood Sugar Average: Last 72 hrs: Morbid obesity ([facility] )  Patient continues to be morbidly obese and has had no significant weight change she was again counseled on diet and exercise for same    Depression  Patient's mood is great but she continues on Effexor 75 mg daily    Opioid dependence ([facility] )  Continues to take chronic opioids prescribed by her pain management doctor for her back pain       Diagnoses and all orders for this visit:    Type 2 diabetes mellitus with diabetic nephropathy, without long-term current use of insulin ([facility] )    Uncomplicated opioid dependence ([facility] )    Essential hypertension    Morbid obesity ([facility] )          Subjective:      Patient ID: Loreto Mccarty is a 66 y o  female  Patient is here for a wellness and to go over her "[employer] work  Despite her obesity she has excellent control of both diabetes and blood pressure  And has no symptoms of cardiovascular disease better obvious  She does have some neuropathy but part of that at least is felt due to her ongoing back issues          Review of Systems   Constitutional: Positive for fatigue  Negative for chills, fever and unexpected weight change  HENT: Negative for congestion, ear pain, hearing loss, postnasal drip, sinus pressure, sore throat, trouble swallowing and voice change  Eyes: Negative for visual disturbance  Respiratory: Negative for cough, chest tightness, shortness of breath and wheezing      Cardiovascular: Positive for leg swelling  Negative for chest pain and palpitations  Gastrointestinal: Negative for abdominal distention, anal bleeding, blood in stool, constipation, diarrhea and nausea  Abdominal pain: mild  Endocrine: Negative for cold intolerance, polydipsia, polyphagia and polyuria  Genitourinary: Positive for urgency  Negative for dysuria, flank pain, frequency and hematuria  Musculoskeletal: Positive for arthralgias and back pain  Negative for gait problem, joint swelling, myalgias and neck pain  Skin: Negative for rash  Allergic/Immunologic: Negative for immunocompromised state  Neurological: Positive for numbness  Negative for dizziness, syncope, facial asymmetry, weakness, light-headedness and headaches  Hematological: Negative for adenopathy  Psychiatric/Behavioral: Negative for confusion, sleep disturbance and suicidal ideas  Objective:      /60 (BP Location: Left arm, Patient Position: Sitting)   Pulse 76   Temp 97 7 °F (36 5 °C) (Tympanic)   Ht 5' 5" (1 651 m)   Wt 131 kg (288 lb 3 2 oz)   LMP  (LMP Unknown)   BMI 47 96 kg/m²          Physical Exam   Constitutional: She is oriented to person, place, and time  She appears well-developed and well-nourished  No distress  Morbidly obese   HENT:   Right Ear: External ear normal    Left Ear: External ear normal    Nose: Nose normal    Mouth/Throat: Oropharynx is clear and moist  No oropharyngeal exudate  edentulous   Eyes: Pupils are equal, round, and reactive to light  EOM are normal    Neck: Normal range of motion  Neck supple  No JVD present  No thyromegaly present  Cardiovascular: Normal rate, regular rhythm, normal heart sounds and intact distal pulses  Exam reveals no gallop  No murmur heard  Pulmonary/Chest: Effort normal and breath sounds normal  No respiratory distress  She has no wheezes  She has no rales  Abdominal: Soft  Bowel sounds are normal  She exhibits no distension and no mass   There is no tenderness  Hernia: ation feet  Musculoskeletal: Normal range of motion  She exhibits edema (Chronic 1+ edema bilaterally)  She exhibits no tenderness  Wide-based gait   Lymphadenopathy:     She has no cervical adenopathy  Neurological: She is alert and oriented to person, place, and time  A sensory deficit (Decreased sensation feet) is present  No cranial nerve deficit  Coordination normal    Skin: No rash noted  Psychiatric: She has a normal mood and affect  Her behavior is normal  Judgment and thought content normal    Vitals reviewed  BMI Counseling: Body mass index is 47 96 kg/m²  Discussed the patient's BMI with her  The BMI is above average  BMI counseling and education was provided to the patient  Nutrition recommendations include decreasing overall calorie intake

## 2019-03-27 NOTE — PROGRESS NOTES
Assessment and Plan:  Problem List Items Addressed This Visit     None        Health Maintenance Due   Topic Date Due    Medicare Annual Wellness Visit (AWV)  1955    DM Eye Exam  05/11/1967    HEPATITIS B VACCINES (1 of 3 - Risk 3-dose series) 05/11/1976    MAMMOGRAM  08/12/2014    Diabetic Foot Exam  06/20/2018         HPI:  Patient Active Problem List   Diagnosis    Chronic low back pain    Chronic pain syndrome    DMII (diabetes mellitus, type 2) ([facility]    [facility]    HTN (hypertension)    Depression    Myofascial pain syndrome    Sacroiliitis (HCC)    Lumbar spinal stenosis    Hip flexor tightness, left    Hip flexor tendon tightness, right    Hypovitaminosis D    ESR raised    Polymyalgia rheumatica (HCC)    Asthma, mild intermittent    Lumbar radiculopathy    Opioid dependence (HCC)    Current every day smoker    Neurogenic claudication due to lumbar spinal stenosis    Peripheral edema    CRP elevated    Back muscle spasm    Body mass index (BMI) of 45 0-49 9 in [facility])    Subacute sinusitis     Past Medical History:   Diagnosis Date    Allergic reaction to bee sting     last assessed - 13Jun2016    Asthma     Chronic narcotic dependence (HCC)     Chronic pain syndrome     Depression     Diabetes mellitus ([facility]     Esophageal reflux     Hyperlipidemia     Hypertension     Lumbar radiculopathy     Lyme disease     last assessed - 28Jun2016    Obesity     Opioid dependence ([facility] )     PPD positive     had treatment w/ INH 15 years ago    Vitamin D deficiency      Past Surgical History:   Procedure Laterality Date    APPENDECTOMY      CHOLECYSTECTOMY      JOINT REPLACEMENT      R knee, B/l total hip    MO REVISE/REMOVE SPINAL NEUROSTIM/ Left 12/13/2016    Procedure: REPLACEMENT BUTTOCK SPINAL CORD STIMULATOR IPG;  Surgeon: Aris Reaves MD;  Location:  MAIN OR;  Service: Neurosurgery    SHOULDER SURGERY      Uipveclp - 0579    SPINAL CORD STIMULATOR IMPLANT      spinal stereotaxis stimulation of cord    TOTAL HIP ARTHROPLASTY Bilateral      Family History   Problem Relation Age of Onset    Diabetes Mother         Diabetes mellitus    Cancer Mother     Cancer Father     Diabetes Maternal Grandmother     Cancer Paternal Uncle     Brain cancer Family     Breast cancer Family     Cervical cancer Family     Diabetes Family         Diabetes mellitus    Hypertension Family     Ovarian cancer Family     Stroke Family         Stroke complications     Social History     Tobacco Use   Smoking Status Current Every Day Smoker    Packs/day: 1 00    Years: 5 00    Pack years: 5 00   Smokeless Tobacco Never Used   Tobacco Comment    pt desires to quit in January     Social History     Substance and Sexual Activity   Alcohol Use No    Comment: Denied history of alcohol use      Social History     Substance and Sexual Activity   Drug Use No    Comment: Denied history of drug use         Current Outpatient Medications   Medication Sig Dispense Refill    atorvastatin (LIPITOR) 10 mg tablet Take 1 tablet (10 mg total) by mouth daily taks in am 30 tablet 5    Cholecalciferol (VITAMIN D3) 77428 units CAPS Take 1 capsule by mouth once a week  11    furosemide (LASIX) 40 mg tablet Take 1 tablet (40 mg total) by mouth 2 (two) times a day 90 tablet 1    glimepiride (AMARYL) 1 mg tablet TAKE 1 TABLET TWICE DAILY 180 tablet 1    glucose blood (ONE TOUCH ULTRA TEST) test strip Test blood sugar once a day 100 each 5    lisinopril (ZESTRIL) 20 mg tablet Take 1 tablet (20 mg total) by mouth daily 90 tablet 1    Magnesium 400 MG CAPS Take by mouth      meloxicam (MOBIC) 15 mg tablet Take 15 mg by mouth daily  5    mometasone (NASONEX) 50 mcg/act nasal spray 2 sprays into each nostril daily 1 Act 1    Nerve Stimulator (STANDARD TENS) ISAEL by Does not apply route 4 (four) times a day as needed (MUSCLE SPASMS) 1 Device 0    predniSONE 5 mg tablet Take 1 mg by mouth daily        tiZANidine (ZANAFLEX) 2 mg tablet Take 1 tablet (2 mg total) by mouth every 6 (six) hours 120 tablet 3    topiramate (TOPAMAX) 100 mg tablet Take 1 tablet (100 mg total) by mouth 2 (two) times a day 60 tablet 3    venlafaxine (EFFEXOR-XR) 75 mg 24 hr capsule Take 1 capsule (75 mg total) by mouth daily 90 capsule 1     No current facility-administered medications for this visit  Allergies   Allergen Reactions    Bee Venom Anaphylaxis    Other Other (See Comments)     Stainless steel and surgical steel  *Severe blistering*    Aleve [Naproxen Sodium] Hives    Augmentin [Amoxicillin-Pot Clavulanate] Hives    Metformin And Related Hives    Morphine And Related Hives    Motrin [Ibuprofen] Hives    Nortriptyline Hives    Penicillins Hives    Soy Lecithin [Lecithin] Hives    Sulfa Antibiotics Hives    Tradjenta [Linagliptin] Hives    Tramadol      Immunization History   Administered Date(s) Administered    Influenza Quadrivalent, 6-35 Months IM 11/06/2017    Influenza TIV (IM) 10/24/2014, 12/01/2015, 09/26/2016    Influenza, recombinant, quadrivalent,injectable, preservative free 10/17/2018    Pneumococcal Conjugate 13-Valent 06/20/2017    Pneumococcal Polysaccharide PPV23 12/02/2015    Tdap 04/22/2015       Patient Care Team:  Yana Ruiz MD as PCP - General  MD Holly Hensley MD (Pain Medicine)  Lucho Esparza DO (Physical Medicine and Rehabilitation)    Medicare Screening Tests and Risk Assessments:    Review of Systems   Gastrointestinal: Negative for bowel incontinence  Psychiatric/Behavioral: The patient is not nervous/anxious  Vitals:    03/27/19 1002   BP: 122/60   BP Location: Left arm   Patient Position: Sitting   Pulse: 76   Temp: 97 7 °F (36 5 °C)   TempSrc: Tympanic   Weight: 131 kg (288 lb 3 2 oz)   Height: 5' 5" (1 651 m)   Body mass index is 47 96 kg/m²

## 2019-04-08 ENCOUNTER — OFFICE VISIT (OUTPATIENT)
Dept: PAIN MEDICINE | Facility: CLINIC | Age: 64
End: 2019-04-08
Payer: MEDICARE

## 2019-04-08 VITALS
WEIGHT: 285 LBS | SYSTOLIC BLOOD PRESSURE: 158 MMHG | DIASTOLIC BLOOD PRESSURE: 96 MMHG | HEART RATE: 72 BPM | BODY MASS INDEX: 47.48 KG/M2 | HEIGHT: 65 IN

## 2019-04-08 DIAGNOSIS — E11.21 TYPE 2 DIABETES MELLITUS WITH DIABETIC NEPHROPATHY, WITHOUT LONG-TERM CURRENT USE OF INSULIN (HCC): ICD-10-CM

## 2019-04-08 DIAGNOSIS — M54.50 CHRONIC BILATERAL LOW BACK PAIN WITHOUT SCIATICA: ICD-10-CM

## 2019-04-08 DIAGNOSIS — M54.12 CERVICAL RADICULOPATHY: Primary | ICD-10-CM

## 2019-04-08 DIAGNOSIS — G89.29 CHRONIC BILATERAL LOW BACK PAIN WITHOUT SCIATICA: ICD-10-CM

## 2019-04-08 DIAGNOSIS — G89.4 CHRONIC PAIN SYNDROME: ICD-10-CM

## 2019-04-08 DIAGNOSIS — M79.18 MYOFASCIAL PAIN SYNDROME: ICD-10-CM

## 2019-04-08 DIAGNOSIS — E78.5 HYPERLIPIDEMIA, UNSPECIFIED HYPERLIPIDEMIA TYPE: ICD-10-CM

## 2019-04-08 DIAGNOSIS — M62.838 MUSCLE SPASM: ICD-10-CM

## 2019-04-08 PROCEDURE — 99214 OFFICE O/P EST MOD 30 MIN: CPT | Performed by: NURSE PRACTITIONER

## 2019-04-08 RX ORDER — TOPIRAMATE 100 MG/1
TABLET, FILM COATED ORAL
Qty: 60 TABLET | Refills: 2 | OUTPATIENT
Start: 2019-04-08

## 2019-04-08 RX ORDER — ATORVASTATIN CALCIUM 10 MG/1
TABLET, FILM COATED ORAL
Qty: 90 TABLET | Refills: 0 | Status: SHIPPED | OUTPATIENT
Start: 2019-04-08 | End: 2019-08-09

## 2019-04-08 RX ORDER — ATORVASTATIN CALCIUM 10 MG/1
10 TABLET, FILM COATED ORAL DAILY
Qty: 30 TABLET | Refills: 5 | Status: SHIPPED | OUTPATIENT
Start: 2019-04-08 | End: 2019-08-09 | Stop reason: SDUPTHER

## 2019-04-08 RX ORDER — TOPIRAMATE 100 MG/1
100 TABLET, FILM COATED ORAL 2 TIMES DAILY
Qty: 180 TABLET | Refills: 0 | Status: SHIPPED | OUTPATIENT
Start: 2019-04-08 | End: 2019-06-03 | Stop reason: SDUPTHER

## 2019-04-08 RX ORDER — TIZANIDINE 2 MG/1
2 TABLET ORAL EVERY 8 HOURS PRN
Qty: 270 TABLET | Refills: 0 | Status: SHIPPED | OUTPATIENT
Start: 2019-04-08 | End: 2019-06-03 | Stop reason: SDUPTHER

## 2019-04-09 ENCOUNTER — TELEPHONE (OUTPATIENT)
Dept: PAIN MEDICINE | Facility: CLINIC | Age: 64
End: 2019-04-09

## 2019-04-24 ENCOUNTER — HOSPITAL ENCOUNTER (OUTPATIENT)
Dept: RADIOLOGY | Facility: CLINIC | Age: 64
Discharge: HOME | End: 2019-04-24
Admitting: ANESTHESIOLOGY
Payer: MEDICARE

## 2019-04-24 VITALS
SYSTOLIC BLOOD PRESSURE: 141 MMHG | DIASTOLIC BLOOD PRESSURE: 88 MMHG | HEART RATE: 83 BPM | OXYGEN SATURATION: 95 % | TEMPERATURE: 98.1 F | RESPIRATION RATE: 20 BRPM

## 2019-04-24 DIAGNOSIS — M54.12 CERVICAL RADICULOPATHY: ICD-10-CM

## 2019-04-24 PROCEDURE — 62321 NJX INTERLAMINAR CRV/THRC: CPT | Performed by: ANESTHESIOLOGY

## 2019-04-24 RX ORDER — LIDOCAINE HYDROCHLORIDE 10 MG/ML
5 INJECTION, SOLUTION EPIDURAL; INFILTRATION; INTRACAUDAL; PERINEURAL ONCE
Status: COMPLETED | OUTPATIENT
Start: 2019-04-24 | End: 2019-04-24

## 2019-04-24 RX ORDER — PAPAVERINE HCL 150 MG
10 CAPSULE, EXTENDED RELEASE ORAL ONCE
Status: COMPLETED | OUTPATIENT
Start: 2019-04-24 | End: 2019-04-24

## 2019-04-24 RX ADMIN — LIDOCAINE HYDROCHLORIDE 3 ML: 10 INJECTION, SOLUTION EPIDURAL; INFILTRATION; INTRACAUDAL; PERINEURAL at 09:56

## 2019-04-24 RX ADMIN — IOHEXOL 1 ML: 300 INJECTION, SOLUTION INTRAVENOUS at 09:58

## 2019-04-24 RX ADMIN — DEXAMETHASONE SODIUM PHOSPHATE 10 MG: 10 INJECTION, SOLUTION INTRAMUSCULAR; INTRAVENOUS at 09:59

## 2019-05-01 ENCOUNTER — TELEPHONE (OUTPATIENT)
Dept: PAIN MEDICINE | Facility: CLINIC | Age: 64
End: 2019-05-01

## 2019-05-24 DIAGNOSIS — I10 ESSENTIAL HYPERTENSION: ICD-10-CM

## 2019-05-24 RX ORDER — LISINOPRIL 20 MG/1
TABLET ORAL
Qty: 90 TABLET | Refills: 0 | Status: SHIPPED | OUTPATIENT
Start: 2019-05-24 | End: 2019-08-09

## 2019-06-03 ENCOUNTER — OFFICE VISIT (OUTPATIENT)
Dept: PAIN MEDICINE | Facility: CLINIC | Age: 64
End: 2019-06-03
Payer: MEDICARE

## 2019-06-03 VITALS
HEIGHT: 65 IN | WEIGHT: 273 LBS | HEART RATE: 63 BPM | BODY MASS INDEX: 45.48 KG/M2 | DIASTOLIC BLOOD PRESSURE: 85 MMHG | TEMPERATURE: 98 F | SYSTOLIC BLOOD PRESSURE: 123 MMHG

## 2019-06-03 DIAGNOSIS — G89.4 CHRONIC PAIN SYNDROME: ICD-10-CM

## 2019-06-03 DIAGNOSIS — Z96.89 SPINAL CORD STIMULATOR STATUS: ICD-10-CM

## 2019-06-03 DIAGNOSIS — M54.50 CHRONIC BILATERAL LOW BACK PAIN WITHOUT SCIATICA: ICD-10-CM

## 2019-06-03 DIAGNOSIS — M54.12 CERVICAL RADICULOPATHY: Primary | ICD-10-CM

## 2019-06-03 DIAGNOSIS — G89.29 CHRONIC BILATERAL LOW BACK PAIN WITHOUT SCIATICA: ICD-10-CM

## 2019-06-03 DIAGNOSIS — M79.18 MYOFASCIAL PAIN SYNDROME: ICD-10-CM

## 2019-06-03 DIAGNOSIS — M54.16 LUMBAR RADICULOPATHY: ICD-10-CM

## 2019-06-03 DIAGNOSIS — M62.838 MUSCLE SPASM: ICD-10-CM

## 2019-06-03 DIAGNOSIS — M48.061 SPINAL STENOSIS OF LUMBAR REGION WITHOUT NEUROGENIC CLAUDICATION: ICD-10-CM

## 2019-06-03 PROCEDURE — 99213 OFFICE O/P EST LOW 20 MIN: CPT | Performed by: NURSE PRACTITIONER

## 2019-06-03 RX ORDER — TIZANIDINE 2 MG/1
2 TABLET ORAL EVERY 8 HOURS PRN
Qty: 270 TABLET | Refills: 0 | Status: SHIPPED | OUTPATIENT
Start: 2019-06-03 | End: 2019-09-05 | Stop reason: SDUPTHER

## 2019-06-03 RX ORDER — TOPIRAMATE 100 MG/1
100 TABLET, FILM COATED ORAL 2 TIMES DAILY
Qty: 180 TABLET | Refills: 0 | Status: SHIPPED | OUTPATIENT
Start: 2019-06-03 | End: 2019-09-05 | Stop reason: SDUPTHER

## 2019-08-05 ENCOUNTER — TELEPHONE (OUTPATIENT)
Dept: INTERNAL MEDICINE CLINIC | Age: 64
End: 2019-08-05

## 2019-08-05 DIAGNOSIS — E11.21 TYPE 2 DIABETES MELLITUS WITH DIABETIC NEPHROPATHY, WITHOUT LONG-TERM CURRENT USE OF INSULIN (HCC): Primary | ICD-10-CM

## 2019-08-06 ENCOUNTER — APPOINTMENT (OUTPATIENT)
Dept: LAB | Age: 64
End: 2019-08-06
Payer: MEDICARE

## 2019-08-06 DIAGNOSIS — E11.21 TYPE 2 DIABETES MELLITUS WITH DIABETIC NEPHROPATHY, WITHOUT LONG-TERM CURRENT USE OF INSULIN (HCC): ICD-10-CM

## 2019-08-06 LAB
ANION GAP SERPL CALCULATED.3IONS-SCNC: 6 MMOL/L (ref 4–13)
BUN SERPL-MCNC: 29 MG/DL (ref 5–25)
CALCIUM SERPL-MCNC: 8.9 MG/DL (ref 8.3–10.1)
CHLORIDE SERPL-SCNC: 114 MMOL/L (ref 100–108)
CO2 SERPL-SCNC: 22 MMOL/L (ref 21–32)
CREAT SERPL-MCNC: 1.15 MG/DL (ref 0.6–1.3)
EST. AVERAGE GLUCOSE BLD GHB EST-MCNC: 128 MG/DL
GFR SERPL CREATININE-BSD FRML MDRD: 51 ML/MIN/1.73SQ M
GLUCOSE P FAST SERPL-MCNC: 127 MG/DL (ref 65–99)
HBA1C MFR BLD: 6.1 % (ref 4.2–6.3)
POTASSIUM SERPL-SCNC: 3.6 MMOL/L (ref 3.5–5.3)
SODIUM SERPL-SCNC: 142 MMOL/L (ref 136–145)

## 2019-08-06 PROCEDURE — 83036 HEMOGLOBIN GLYCOSYLATED A1C: CPT

## 2019-08-06 PROCEDURE — 36415 COLL VENOUS BLD VENIPUNCTURE: CPT

## 2019-08-06 PROCEDURE — 80048 BASIC METABOLIC PNL TOTAL CA: CPT

## 2019-08-09 ENCOUNTER — OFFICE VISIT (OUTPATIENT)
Dept: INTERNAL MEDICINE CLINIC | Age: 64
End: 2019-08-09
Payer: MEDICARE

## 2019-08-09 VITALS
SYSTOLIC BLOOD PRESSURE: 130 MMHG | TEMPERATURE: 98.2 F | HEIGHT: 65 IN | OXYGEN SATURATION: 97 % | BODY MASS INDEX: 44.45 KG/M2 | DIASTOLIC BLOOD PRESSURE: 80 MMHG | WEIGHT: 266.8 LBS | HEART RATE: 84 BPM

## 2019-08-09 DIAGNOSIS — Z12.39 ENCOUNTER FOR SCREENING BREAST EXAMINATION: Primary | ICD-10-CM

## 2019-08-09 DIAGNOSIS — E11.21 TYPE 2 DIABETES MELLITUS WITH DIABETIC NEPHROPATHY, WITHOUT LONG-TERM CURRENT USE OF INSULIN (HCC): ICD-10-CM

## 2019-08-09 DIAGNOSIS — M54.40 CHRONIC LOW BACK PAIN WITH SCIATICA, SCIATICA LATERALITY UNSPECIFIED, UNSPECIFIED BACK PAIN LATERALITY: ICD-10-CM

## 2019-08-09 DIAGNOSIS — E11.9 TYPE 2 DIABETES MELLITUS WITHOUT COMPLICATION, WITHOUT LONG-TERM CURRENT USE OF INSULIN (HCC): ICD-10-CM

## 2019-08-09 DIAGNOSIS — G89.29 CHRONIC LOW BACK PAIN WITH SCIATICA, SCIATICA LATERALITY UNSPECIFIED, UNSPECIFIED BACK PAIN LATERALITY: ICD-10-CM

## 2019-08-09 DIAGNOSIS — M54.16 LUMBAR RADICULOPATHY: ICD-10-CM

## 2019-08-09 DIAGNOSIS — E78.5 HYPERLIPIDEMIA, UNSPECIFIED HYPERLIPIDEMIA TYPE: ICD-10-CM

## 2019-08-09 DIAGNOSIS — I10 ESSENTIAL HYPERTENSION: ICD-10-CM

## 2019-08-09 PROCEDURE — 99406 BEHAV CHNG SMOKING 3-10 MIN: CPT | Performed by: INTERNAL MEDICINE

## 2019-08-09 PROCEDURE — 99214 OFFICE O/P EST MOD 30 MIN: CPT | Performed by: INTERNAL MEDICINE

## 2019-08-09 RX ORDER — MELOXICAM 15 MG/1
15 TABLET ORAL DAILY
Qty: 90 TABLET | Refills: 3 | Status: SHIPPED | OUTPATIENT
Start: 2019-08-09 | End: 2020-07-22 | Stop reason: SDUPTHER

## 2019-08-09 RX ORDER — ATORVASTATIN CALCIUM 10 MG/1
10 TABLET, FILM COATED ORAL DAILY
Qty: 30 TABLET | Refills: 5 | Status: SHIPPED | OUTPATIENT
Start: 2019-08-09 | End: 2019-11-13 | Stop reason: SDUPTHER

## 2019-08-09 RX ORDER — LISINOPRIL 20 MG/1
20 TABLET ORAL DAILY
Qty: 90 TABLET | Refills: 1 | Status: SHIPPED | OUTPATIENT
Start: 2019-08-09 | End: 2020-03-25

## 2019-08-09 NOTE — PATIENT INSTRUCTIONS
Foot Care for People with Diabetes   AMBULATORY CARE:   What you need to know about foot care:   · Foot care helps protect your feet and prevent foot ulcers or sores  Long-term high blood sugar levels can damage the blood vessels and nerves in your legs and feet  This damage makes it hard to feel pressure, pain, temperature, and touch  You may not be able to feel a cut or sore, or shoes that are too tight  Foot care is needed to prevent serious problems, such as an infection or amputation  · Diabetes may cause your toes to become crooked or curved under  These changes may affect the way you walk and can lead to increased pressure on your foot  The pressure can decrease blood flow to your feet  Lack of blood flow increases your risk for a foot ulcer  Do not ignore small problems, such as dry skin or small wounds  These can become life-threatening over time without proper care  Contact your healthcare provider if:   · Your feet become numb, weak, or hard to move  · You have pus draining from a sore on your foot  · You have a wound on your foot that gets bigger, deeper, or does not heal      · You see blisters, cuts, scratches, calluses, or sores on your foot  · You have a fever, and your feet become red, warm, and swollen  · Your toenails become thick, curled, or yellow  · You find it hard to check your feet because your vision is poor  · You have questions or concerns about your condition or care  How to care for your feet:   · Check your feet each day  Look at your whole foot, including the bottom, and between and under your toes  Check for wounds, corns, and calluses  Use a mirror to see the bottom of your feet  The skin on your feet may be shiny, tight, or darker than normal  Your feet may also be cold and pale  Feel your feet by running your hands along the tops, bottoms, sides, and between your toes   Redness, swelling, and warmth are signs of blood flow problems that can lead to a foot ulcer  Do not try to remove corns or calluses yourself  · Wash your feet each day with soap and warm water  Do not use hot water, because this can injure your foot  Dry your feet gently with a towel after you wash them  Dry between and under your toes  · Apply lotion or a moisturizer on your dry feet  Ask your healthcare provider what lotions are best to use  Do not put lotion or moisturizer between your toes  · Cut your toenails correctly  File or cut your toenails straight across  Use a soft brush to clean around your toenails  If your toenails are very thick, you may need to have a healthcare provider or specialist cut them  · Protect your feet  Do not walk barefoot or wear your shoes without socks  Check your shoes for rocks or other objects that can hurt your feet  Wear cotton socks to help keep your feet dry  Wear socks without toe seams, or wear them with the seams inside out  Change your socks each day  Do not wear socks that are dirty or damp  · Wear shoes that fit well  Wear shoes that do not rub against any area of your feet  Your shoes should be ½ to ¾ inch (1 to 2 centimeters) longer than your feet  Your shoes should also have extra space around the widest part of your feet  Walking or athletic shoes with laces or straps that adjust are best  Ask your healthcare provider for help to choose shoes that fit you best  Ask him if you need to wear an insert, orthotic, or bandage on your feet  · Go to your follow-up visits  Your healthcare provider will do a foot exam at least once a year  You may need a foot exam more often if you have nerve damage, foot deformities, or ulcers  He will check for nerve damage and how well you can feel your feet  He will check your shoes to see if they fit well  Follow up with your healthcare provider or foot specialist as directed: You will need to have your feet checked at least once a year   You may need a foot exam more often if you have nerve damage, foot deformities, or ulcers  Write down your questions so you remember to ask them during your visits  © 2017 [employer] Information is for End User's use only and may not be sold, redistributed or otherwise used for commercial purposes  All illustrations and images included in CareNotes® are the copyrighted property of [employer]  or [employer]  The above information is an  only  It is not intended as medical advice for individual conditions or treatments  Talk to your doctor, nurse or pharmacist before following any medical regimen to see if it is safe and effective for you

## 2019-08-09 NOTE — ASSESSMENT & PLAN NOTE
She unfortunately continues to smoke though and was counseled again 3-10 minutes on the benefits of and ways to quit smoking

## 2019-08-09 NOTE — PROGRESS NOTES
Assessment/Plan:    Chronic low back pain  SHE CONTINUES TO FOLLOW PAIN MANAGEMENT FOR CHRONIC BACK PAIN    Type 2 diabetes mellitus without complication, without long-term current use of insulin ([facility])  Lab Results   Component Value Date    HGBA1C 6 1 08/06/2019       No results for input(s): POCGLU in the last 72 hours  Blood Sugar Average: Last 72 hrs:   her A1c is remarkably improved this time and her weight is down 7 lb  She states she has been eating a lot of fresh fruits and vegetables and has been much more active outside    Morbid obesity ([address] )  She has had a change in her activity and diet and has lost 7 lb    HTN (hypertension)  Her blood pressure is also stable with the weight loss and lisinopril    Current every day smoker  She unfortunately continues to smoke though and was counseled again 3-10 minutes on the benefits of and ways to quit smoking       Diagnoses and all orders for this visit:    Encounter for screening breast examination  -     Mammo screening bilateral w 3d & cad; Future    Type 2 diabetes mellitus with diabetic nephropathy, without long-term current use of insulin (HCC)  -     atorvastatin (LIPITOR) 10 mg tablet; Take 1 tablet (10 mg total) by mouth daily taks in am    Hyperlipidemia, unspecified hyperlipidemia type  -     atorvastatin (LIPITOR) 10 mg tablet; Take 1 tablet (10 mg total) by mouth daily taks in am    Essential hypertension  -     lisinopril (ZESTRIL) 20 mg tablet; Take 1 tablet (20 mg total) by mouth daily    Lumbar radiculopathy  -     meloxicam (MOBIC) 15 mg tablet; Take 1 tablet (15 mg total) by mouth daily  -     Ambulatory referral to Pain Management; Future    Chronic low back pain with sciatica, sciatica laterality unspecified, unspecified back pain laterality    Type 2 diabetes mellitus without complication, without long-term current use of insulin (HCC)          Subjective:      Patient ID: Corrinne Dally is a 60 y o  female      She has no new complaints she still has chronic back pain is sees pain management for it but otherwise has been active outside working in the garden and fishing  She has changed her diet completely to a much healthier vegetable based diet and is now losing some weight        Review of Systems   Constitutional: Positive for activity change  Negative for chills, fatigue, fever and unexpected weight change  HENT: Negative for congestion, ear pain, hearing loss, postnasal drip, sinus pressure, sore throat, trouble swallowing and voice change  Eyes: Negative for visual disturbance  Respiratory: Negative for cough, chest tightness, shortness of breath and wheezing  Cardiovascular: Negative for chest pain, palpitations and leg swelling  Gastrointestinal: Negative for abdominal distention, abdominal pain, anal bleeding, blood in stool, constipation, diarrhea and nausea  Endocrine: Negative for cold intolerance, polydipsia, polyphagia and polyuria  Genitourinary: Negative for dysuria, flank pain, frequency, hematuria and urgency  Musculoskeletal: Positive for back pain  Negative for arthralgias, gait problem, joint swelling, myalgias and neck pain  Skin: Negative for rash  Allergic/Immunologic: Negative for immunocompromised state  Neurological: Negative for dizziness, syncope, facial asymmetry, weakness, light-headedness, numbness and headaches  Hematological: Negative for adenopathy  Psychiatric/Behavioral: Negative for confusion, sleep disturbance and suicidal ideas  Objective:      /80 (BP Location: Left arm, Patient Position: Sitting)   Pulse 84   Temp 98 2 °F (36 8 °C) (Tympanic)   Ht 5' 5" (1 651 m)   Wt 121 kg (266 lb 12 8 oz)   LMP  (LMP Unknown)   SpO2 97%   BMI 44 40 kg/m²          Physical Exam   Constitutional: She is oriented to person, place, and time  She appears well-developed and well-nourished  No distress     Morbidly obese   HENT:   Right Ear: External ear normal    Left Ear: External ear normal    Nose: Nose normal    Mouth/Throat: Oropharynx is clear and moist  No oropharyngeal exudate  Eyes: Pupils are equal, round, and reactive to light  EOM are normal    Neck: Normal range of motion  Neck supple  No JVD present  No thyromegaly present  Cardiovascular: Normal rate, regular rhythm, normal heart sounds and intact distal pulses  Exam reveals no gallop  No murmur heard  Pulmonary/Chest: Effort normal and breath sounds normal  No respiratory distress  She has no wheezes  She has no rales  Abdominal: Soft  Bowel sounds are normal  She exhibits no distension and no mass  There is no tenderness  Musculoskeletal: Normal range of motion  She exhibits no tenderness  Wide-based gait   Lymphadenopathy:     She has no cervical adenopathy  Neurological: She is alert and oriented to person, place, and time  No cranial nerve deficit  Coordination normal    Skin: No rash noted  Psychiatric: She has a normal mood and affect  Her behavior is normal  Judgment and thought content normal    Vitals reviewed

## 2019-08-09 NOTE — ASSESSMENT & PLAN NOTE
Lab Results   Component Value Date    HGBA1C 6 1 08/06/2019       No results for input(s): POCGLU in the last 72 hours  Blood Sugar Average: Last 72 hrs:   her A1c is remarkably improved this time and her weight is down 7 lb    She states she has been eating a lot of fresh fruits and vegetables and has been much more active outside

## 2019-08-12 DIAGNOSIS — E11.9 TYPE 2 DIABETES MELLITUS WITHOUT COMPLICATION, WITHOUT LONG-TERM CURRENT USE OF INSULIN (HCC): Primary | ICD-10-CM

## 2019-08-12 RX ORDER — BLOOD-GLUCOSE METER
KIT MISCELLANEOUS DAILY
Qty: 1 EACH | Refills: 0 | Status: SHIPPED | OUTPATIENT
Start: 2019-08-12

## 2019-08-13 DIAGNOSIS — E11.9 TYPE 2 DIABETES MELLITUS WITHOUT COMPLICATION, WITHOUT LONG-TERM CURRENT USE OF INSULIN (HCC): Primary | ICD-10-CM

## 2019-08-19 DIAGNOSIS — E11.9 TYPE 2 DIABETES MELLITUS WITHOUT COMPLICATION, WITHOUT LONG-TERM CURRENT USE OF INSULIN (HCC): Primary | ICD-10-CM

## 2019-09-05 ENCOUNTER — OFFICE VISIT (OUTPATIENT)
Dept: PAIN MEDICINE | Facility: CLINIC | Age: 64
End: 2019-09-05
Payer: MEDICARE

## 2019-09-05 VITALS
DIASTOLIC BLOOD PRESSURE: 75 MMHG | HEART RATE: 65 BPM | SYSTOLIC BLOOD PRESSURE: 120 MMHG | WEIGHT: 266 LBS | BODY MASS INDEX: 44.32 KG/M2 | HEIGHT: 65 IN

## 2019-09-05 DIAGNOSIS — M48.061 SPINAL STENOSIS OF LUMBAR REGION WITHOUT NEUROGENIC CLAUDICATION: ICD-10-CM

## 2019-09-05 DIAGNOSIS — M54.40 CHRONIC BILATERAL LOW BACK PAIN WITH SCIATICA, SCIATICA LATERALITY UNSPECIFIED: ICD-10-CM

## 2019-09-05 DIAGNOSIS — M47.812 OSTEOARTHRITIS OF CERVICAL SPINE, UNSPECIFIED SPINAL OSTEOARTHRITIS COMPLICATION STATUS: ICD-10-CM

## 2019-09-05 DIAGNOSIS — M79.18 MYOFASCIAL PAIN SYNDROME: ICD-10-CM

## 2019-09-05 DIAGNOSIS — G89.4 CHRONIC PAIN SYNDROME: Primary | ICD-10-CM

## 2019-09-05 DIAGNOSIS — M48.02 CERVICAL SPINAL STENOSIS: ICD-10-CM

## 2019-09-05 DIAGNOSIS — M54.2 NECK PAIN: ICD-10-CM

## 2019-09-05 DIAGNOSIS — G89.29 CHRONIC BILATERAL LOW BACK PAIN WITHOUT SCIATICA: ICD-10-CM

## 2019-09-05 DIAGNOSIS — M47.816 LUMBAR SPONDYLOSIS: ICD-10-CM

## 2019-09-05 DIAGNOSIS — G89.29 CHRONIC BILATERAL LOW BACK PAIN WITH SCIATICA, SCIATICA LATERALITY UNSPECIFIED: ICD-10-CM

## 2019-09-05 DIAGNOSIS — M62.838 MUSCLE SPASM: ICD-10-CM

## 2019-09-05 DIAGNOSIS — M54.50 CHRONIC BILATERAL LOW BACK PAIN WITHOUT SCIATICA: ICD-10-CM

## 2019-09-05 PROCEDURE — 99214 OFFICE O/P EST MOD 30 MIN: CPT | Performed by: NURSE PRACTITIONER

## 2019-09-05 RX ORDER — TIZANIDINE 2 MG/1
2 TABLET ORAL EVERY 8 HOURS PRN
Qty: 270 TABLET | Refills: 1 | Status: SHIPPED | OUTPATIENT
Start: 2019-09-05 | End: 2019-11-22 | Stop reason: SDUPTHER

## 2019-09-05 RX ORDER — TOPIRAMATE 100 MG/1
100 TABLET, FILM COATED ORAL 2 TIMES DAILY
Qty: 180 TABLET | Refills: 1 | Status: SHIPPED | OUTPATIENT
Start: 2019-09-05 | End: 2019-11-22 | Stop reason: SDUPTHER

## 2019-09-05 NOTE — PROGRESS NOTES
Assessment:  1  Chronic pain syndrome    2  Neck pain    3  Osteoarthritis of cervical spine, unspecified spinal osteoarthritis complication status    4  Cervical spinal stenosis    5  Chronic bilateral low back pain with sciatica, sciatica laterality unspecified    6  Lumbar spondylosis    7  Spinal stenosis of lumbar region without neurogenic claudication    8  Muscle spasm    9  Chronic bilateral low back pain without sciatica    10  Myofascial pain syndrome        Plan:    The patient is a 60 y o  female  with a history of chronic pain syndrome secondary to low back pain, lumbar spondylosis, lumbar stenosis, lumbar radiculopathy, neck pain, cervical spondylosis, cervical stenosis, and cervical radiculopathy  The patient presents today with ongoing neck and low back pain  She continues to receive pain relief from the cervical epidural steroid injection she had in April 2019  She does continue with paresthesias in her arm, but would like to hold off on additional treatment options at this time  In regards to the increased cramping in her right leg, I will increase her tizanidine at bedtime  She will continue to take tizanidine 2 mg in the morning and afternoon, but increased to 2 tablets at bedtime  The increased dose places her increased risk for side effects which would include drowsiness and dizziness  She was instructed to contact the office in 1 week if this does not help with the pain symptoms  We discussed meeting with an [employer] representative for reprogramming, but the patient is not interested at this time  The patient will follow-up in 12 weeks for medication prescription refill and reevaluation  The patient was advised to contact the office should their symptoms worsen in the interim  The patient was agreeable and verbalized an understanding  History of Present Illness:     The patient is a 60 y o  female  with a history of chronic pain syndrome secondary to low back pain, lumbar spondylosis, lumbar stenosis, lumbar radiculopathy, neck pain, cervical spondylosis, cervical stenosis, and cervical radiculopathy  She presents today with ongoing neck and low back pain  Her neck pain had improved after undergoing a cervical epidural steroid injection on April 24, 2019  She continues with paresthesias that are located mostly in the right bicep and thumb  She describes them as aching and itching  She also has been experiencing more pain in her right leg which she believes is due to the upcoming [facility]  She has an [employer] spinal cord stimulator and has not had reprogramming with a representative in several months  She is currently rating her pain a 10/10 on the numeric rating scale    She is currently taking tizanidine 2 mg every 8 hours and Topamax 100 mg twice a day  The medication is providing 50-75% pain relief along with her spinal cord stimulator  She denies side effects or bowel or bladder issues  I have personally reviewed and/or updated the patient's past medical history, past surgical history, family history, social history, current medications, allergies, and vital signs today  Review of Systems:    Review of Systems   Constitutional: Negative for fever and unexpected weight change  HENT: Negative for trouble swallowing  Eyes: Negative for visual disturbance  Respiratory: Negative for shortness of breath and wheezing  Cardiovascular: Negative for chest pain and palpitations  Gastrointestinal: Negative for constipation, diarrhea, nausea and vomiting  Endocrine: Negative for cold intolerance, heat intolerance and polydipsia  Genitourinary: Negative for difficulty urinating and frequency  Musculoskeletal: Negative for arthralgias, gait problem, joint swelling and myalgias  Skin: Negative for rash  Neurological: Negative for dizziness, seizures, syncope, weakness and headaches  Hematological: Does not bruise/bleed easily     Psychiatric/Behavioral: Negative for dysphoric mood  All other systems reviewed and are negative          Past Medical History:   Diagnosis Date    Allergic reaction to bee sting     last assessed - 13Jun2016    Asthma     Chronic narcotic dependence [facility] [address] [facility]     Chronic pain syndrome     Depression     Diabetes mellitus ([facility] [address] [facility]     Esophageal reflux     Hyperlipidemia     Hypertension     Lumbar radiculopathy     Lyme disease     last assessed - 28Jun2016    Obesity     Opioid dependence ([facility] [address] [facility]     PPD positive     had treatment w/ INH 15 years ago    Vitamin D deficiency        Past Surgical History:   Procedure Laterality Date    APPENDECTOMY      CHOLECYSTECTOMY      JOINT REPLACEMENT      R knee, B/l total hip    [place] REVISE/REMOVE SPINAL NEUROSTIM/ Left 12/13/2016    Procedure: REPLACEMENT BUTTOCK SPINAL CORD STIMULATOR IPG;  Surgeon: Bob Costa MD;  Location:  MAIN OR;  Service: Neurosurgery    SHOULDER SURGERY      [facility] [address] [facility]      spinal stereotaxis stimulation of cord    TOTAL HIP ARTHROPLASTY Bilateral        Family History   Problem Relation Age of Onset    Diabetes Mother         Diabetes mellitus    Cancer Mother     Cancer Father     Diabetes Maternal Grandmother     Cancer Paternal Uncle     Brain cancer Family     Breast cancer Family     Cervical cancer Family     Diabetes Family         Diabetes mellitus    Hypertension Family     Ovarian cancer Family     Stroke Family         Stroke complications       Social History     Occupational History    Not on file   Tobacco Use    Smoking status: Current Every Day Smoker     Packs/day: 1 00     Years: 5 00     Pack years: 5 00    Smokeless tobacco: Never Used    Tobacco comment: pt desires to quit in January   Substance and Sexual Activity    Alcohol use: No     Comment: Denied history of alcohol use    Drug use: No     Comment: Denied history of drug use    Sexual activity: Not on file Current Outpatient Medications:     atorvastatin (LIPITOR) 10 mg tablet, Take 1 tablet (10 mg total) by mouth daily taks in am, Disp: 30 tablet, Rfl: 5    Blood Glucose Monitoring Suppl (ONE TOUCH ULTRA MINI) w/Device KIT, by Does not apply route daily, Disp: 1 each, Rfl: 0    Cholecalciferol (VITAMIN D3) 95213 units CAPS, Take 1 capsule by mouth once a week, Disp: , Rfl: 11    furosemide (LASIX) 40 mg tablet, Take 1 tablet (40 mg total) by mouth 2 (two) times a day, Disp: 90 tablet, Rfl: 1    glimepiride (AMARYL) 1 mg tablet, TAKE 1 TABLET TWICE DAILY (Patient taking differently: Take 1 mg by mouth daily at bedtime ), Disp: 180 tablet, Rfl: 1    glucose blood (ACCU-CHEK XI PLUS) test strip, CHECK BLOOD SUGAR TWICE DAILY      DX CODE E11 9, Disp: 100 each, Rfl: 6    glucose blood (ONE TOUCH ULTRA TEST) test strip, Test blood sugar once a day, Disp: 100 each, Rfl: 5    lisinopril (ZESTRIL) 20 mg tablet, Take 1 tablet (20 mg total) by mouth daily, Disp: 90 tablet, Rfl: 1    Magnesium 400 MG CAPS, Take by mouth, Disp: , Rfl:     meloxicam (MOBIC) 15 mg tablet, Take 1 tablet (15 mg total) by mouth daily, Disp: 90 tablet, Rfl: 3    mometasone (NASONEX) 50 mcg/act nasal spray, 2 sprays into each nostril daily, Disp: 1 Act, Rfl: 1    Nerve Stimulator (STANDARD TENS) ISAEL, by Does not apply route 4 (four) times a day as needed (MUSCLE SPASMS), Disp: 1 Device, Rfl: 0    tiZANidine (ZANAFLEX) 2 mg tablet, Take 1 tablet (2 mg total) by mouth every 8 (eight) hours as needed for muscle spasms, Disp: 270 tablet, Rfl: 1    topiramate (TOPAMAX) 100 mg tablet, Take 1 tablet (100 mg total) by mouth 2 (two) times a day, Disp: 180 tablet, Rfl: 1    venlafaxine (EFFEXOR-XR) 75 mg 24 hr capsule, Take 1 capsule (75 mg total) by mouth daily, Disp: 90 capsule, Rfl: 1    Allergies   Allergen Reactions    Bee Venom Anaphylaxis    Other Other (See Comments)     Stainless steel and surgical steel  *Severe blistering*  Aleve [Naproxen Sodium] Hives    Augmentin [Amoxicillin-Pot Clavulanate] Hives    Metformin And Related Hives    Morphine And Related Hives    Motrin [Ibuprofen] Hives    Nortriptyline Hives    Penicillins Hives    Soy Lecithin [Lecithin] Hives    Sulfa Antibiotics Hives    Tradjenta [Linagliptin] Hives    Tramadol        Physical Exam:    /75   Pulse 65   Ht 5' 5" (1 651 m)   Wt 121 kg (266 lb)   LMP  (LMP Unknown)   BMI 44 26 kg/m²     Constitutional:normal, well developed, well nourished, alert, in no distress and non-toxic and no overt pain behavior  Eyes:anicteric  HEENT:grossly intact  Neck:supple, symmetric, trachea midline and no masses   Pulmonary:even and unlabored  Cardiovascular:No edema or pitting edema present  Skin:Normal without rashes or lesions and well hydrated  Psychiatric:Mood and affect appropriate  Neurologic:Cranial Nerves II-XII grossly intact  Musculoskeletal:normal     Cervical Spine Exam    Appearance:  Normal lordosis  Palpation/Tenderness:   right cervical paraspinal tenderness  Range of Motion:  Flexion:  Minimally limited  with pain  Extension:  No limitation  with pain  Motor Strength:  Left Arm Flexion  5/5  Left Arm Extension  5/5  Right Arm Flexion  5/5  Right Arm Extension  5/5  Left Finger Abduction  5/5  Right Finger Abduction  5/5  Left    5/5  Right   5/5        Imaging  CT CERVICAL SPINE - WITHOUT CONTRAST 2/28/18     INDICATION:  Neck pain with radiculopathy in right hand numbness  M54 2: Cervicalgia  M54 12: Radiculopathy, cervical region     COMPARISON: None      TECHNIQUE:  CT examination of the cervical spine was performed without intravenous contrast   Contiguous axial images were obtained  Sagittal and coronal reconstructions were performed        Radiation dose length product (DLP) for this visit:  679 mGy-cm     This examination, like all CT scans performed in the [facility], was performed utilizing techniques to minimize radiation dose exposure, including the use of iterative   reconstruction and automated exposure control        IMAGE QUALITY:  Diagnostic      FINDINGS:     ALIGNMENT:  There is straightening with slight reversal the normal cervical lordosis apex C5-6      VERTEBRAL BODIES:  No fracture      DEGENERATIVE CHANGES:       C3-4: Right-sided uncinate osteophyte results in mild right foraminal narrowing with mild facet hypertrophy noted      C4-5: No significant central or foraminal narrowing      C5-6: Degenerative disc osteophyte complex with marginal osteophytes and facet hypertrophy combined result in mild central and mild bilateral foraminal narrowing      C6-7: Mild facet degenerative change  Beam hardening artifact limits evaluation of the central canal and foramina      C7-T1: Facet degenerative change  Hardening artifact limits the evaluation of the central canal and foramina      PREVERTEBRAL AND PARASPINAL SOFT TISSUES:  Unremarkable      THORACIC INLET:  Normal      IMPRESSION:     Spondylotic degenerative changes mid cervical spine result in mild osseous foraminal narrowing and mild central stenosis particularly C5-6                   No orders to display         No orders of the defined types were placed in this encounter

## 2019-09-11 DIAGNOSIS — E11.21 TYPE 2 DIABETES MELLITUS WITH DIABETIC NEPHROPATHY, WITHOUT LONG-TERM CURRENT USE OF INSULIN (HCC): ICD-10-CM

## 2019-09-12 RX ORDER — GLIMEPIRIDE 1 MG/1
1 TABLET ORAL
Qty: 90 TABLET | Refills: 1 | Status: SHIPPED | OUTPATIENT
Start: 2019-09-12 | End: 2020-04-02

## 2019-09-26 ENCOUNTER — APPOINTMENT (OUTPATIENT)
Dept: LAB | Facility: IMAGING CENTER | Age: 64
End: 2019-09-26
Payer: MEDICARE

## 2019-09-26 ENCOUNTER — TRANSCRIBE ORDERS (OUTPATIENT)
Dept: ADMINISTRATIVE | Facility: HOSPITAL | Age: 64
End: 2019-09-26

## 2019-09-26 DIAGNOSIS — M35.3 POLYMYALGIA RHEUMATICA (HCC): ICD-10-CM

## 2019-09-26 DIAGNOSIS — M35.3 POLYMYALGIA RHEUMATICA (HCC): Primary | ICD-10-CM

## 2019-09-26 DIAGNOSIS — E55.9 AVITAMINOSIS D: ICD-10-CM

## 2019-09-26 LAB
25(OH)D3 SERPL-MCNC: 57.2 NG/ML (ref 30–100)
CRP SERPL QL: 9.1 MG/L
ERYTHROCYTE [SEDIMENTATION RATE] IN BLOOD: 28 MM/HOUR (ref 0–20)

## 2019-09-26 PROCEDURE — 85652 RBC SED RATE AUTOMATED: CPT

## 2019-09-26 PROCEDURE — 86140 C-REACTIVE PROTEIN: CPT

## 2019-09-26 PROCEDURE — 36415 COLL VENOUS BLD VENIPUNCTURE: CPT

## 2019-09-26 PROCEDURE — 82306 VITAMIN D 25 HYDROXY: CPT

## 2019-10-04 ENCOUNTER — IMMUNIZATIONS (OUTPATIENT)
Dept: INTERNAL MEDICINE CLINIC | Age: 64
End: 2019-10-04
Payer: MEDICARE

## 2019-10-04 DIAGNOSIS — Z23 ENCOUNTER FOR IMMUNIZATION: ICD-10-CM

## 2019-10-04 PROCEDURE — 90682 RIV4 VACC RECOMBINANT DNA IM: CPT

## 2019-10-04 PROCEDURE — G0008 ADMIN INFLUENZA VIRUS VAC: HCPCS

## 2019-11-01 ENCOUNTER — OFFICE VISIT (OUTPATIENT)
Dept: INTERNAL MEDICINE CLINIC | Age: 64
End: 2019-11-01
Payer: MEDICARE

## 2019-11-01 VITALS
HEART RATE: 72 BPM | WEIGHT: 261 LBS | SYSTOLIC BLOOD PRESSURE: 120 MMHG | OXYGEN SATURATION: 96 % | DIASTOLIC BLOOD PRESSURE: 70 MMHG | HEIGHT: 65 IN | BODY MASS INDEX: 43.49 KG/M2 | TEMPERATURE: 97.7 F

## 2019-11-01 DIAGNOSIS — E66.01 MORBID OBESITY (HCC): ICD-10-CM

## 2019-11-01 DIAGNOSIS — E11.9 CONTROLLED TYPE 2 DIABETES MELLITUS WITHOUT COMPLICATION, WITHOUT LONG-TERM CURRENT USE OF INSULIN (HCC): Primary | ICD-10-CM

## 2019-11-01 DIAGNOSIS — I10 ESSENTIAL HYPERTENSION: ICD-10-CM

## 2019-11-01 PROBLEM — M35.3 POLYMYALGIA RHEUMATICA (HCC): Status: RESOLVED | Noted: 2018-04-12 | Resolved: 2019-11-01

## 2019-11-01 PROCEDURE — 99214 OFFICE O/P EST MOD 30 MIN: CPT | Performed by: INTERNAL MEDICINE

## 2019-11-01 NOTE — PROGRESS NOTES
Assessment/Plan:    Type 2 diabetes mellitus without complication, without long-term current use of insulin (HCC)    Lab Results   Component Value Date    HGBA1C 6 1 08/06/2019    Patient continues to slowly lose weight and her A1c is down to 6 1 on only 1 medicine    Morbid obesity ([facility] [address] )  She continues to be morbidly obese but actually is slowly losing weight through diet and mild exercise    HTN (hypertension)  Blood pressure is under excellent control with lisinopril and Lasix    Depression  Her mo to stay on her effect od is doing well  Her daughters moving home from [place] but she continues       Diagnoses and all orders for this visit:    Controlled type 2 diabetes mellitus without complication, without long-term current use of insulin (HCC)  -     Hemoglobin A1C; Future  -     Basic metabolic panel; Future    Morbid obesity ([facility] [address] )    Essential hypertension          Subjective:      Patient ID: Pravin Akbar is a 60 y o  female  Diabetes   She presents for her follow-up diabetic visit  She has type 2 diabetes mellitus  Her disease course has been improving  There are no hypoglycemic associated symptoms  Pertinent negatives for hypoglycemia include no confusion, dizziness or headaches  Associated symptoms include weight loss  Pertinent negatives for diabetes include no chest pain, no fatigue, no foot ulcerations, no polydipsia, no polyphagia, no polyuria, no visual change and no weakness  There are no diabetic complications  Risk factors for coronary artery disease include diabetes mellitus, dyslipidemia, hypertension, post-menopausal, sedentary lifestyle, tobacco exposure, obesity and family history  Current diabetic treatment includes diet and oral agent (monotherapy)  She is compliant with treatment most of the time  Her weight is decreasing steadily  She is following a diabetic, low salt and low fat/cholesterol diet  Meal planning includes avoidance of concentrated sweets and calorie counting   She has had a previous visit with a dietitian  She rarely participates in exercise  Her home blood glucose trend is decreasing steadily  She sees a podiatrist Eye exam is current  Review of Systems   Constitutional: Positive for weight loss  Negative for chills, fatigue, fever and unexpected weight change  HENT: Negative for congestion, ear pain, hearing loss, postnasal drip, sinus pressure, sore throat, trouble swallowing and voice change  Eyes: Negative for visual disturbance  Respiratory: Negative for cough, chest tightness, shortness of breath and wheezing  Cardiovascular: Negative for chest pain, palpitations and leg swelling  Gastrointestinal: Negative for abdominal distention, abdominal pain, anal bleeding, blood in stool, constipation, diarrhea and nausea  Endocrine: Negative for cold intolerance, polydipsia, polyphagia and polyuria  Genitourinary: Negative for dysuria, flank pain, frequency, hematuria and urgency  Musculoskeletal: Positive for arthralgias, back pain and gait problem  Negative for joint swelling, myalgias and neck pain  Skin: Negative for rash  Allergic/Immunologic: Negative for immunocompromised state  Neurological: Negative for dizziness, syncope, facial asymmetry, weakness, light-headedness, numbness and headaches  Hematological: Negative for adenopathy  Psychiatric/Behavioral: Negative for confusion, sleep disturbance and suicidal ideas  Objective:      /70 (BP Location: Left arm, Patient Position: Sitting)   Pulse 72   Temp 97 7 °F (36 5 °C) (Tympanic)   Ht 5' 5" (1 651 m)   Wt 118 kg (261 lb)   LMP  (LMP Unknown)   SpO2 96%   BMI 43 43 kg/m²          Physical Exam   Constitutional: She is oriented to person, place, and time  She appears well-developed and well-nourished  No distress     Morbidly obese   HENT:   Right Ear: External ear normal    Left Ear: External ear normal    Nose: Nose normal    Mouth/Throat: Oropharynx is clear and moist  No oropharyngeal exudate  edentulous   Eyes: Pupils are equal, round, and reactive to light  EOM are normal    Neck: Normal range of motion  Neck supple  No JVD present  No thyromegaly present  Cardiovascular: Normal rate, regular rhythm, normal heart sounds and intact distal pulses  Exam reveals no gallop  No murmur heard  Pulmonary/Chest: Effort normal and breath sounds normal  No respiratory distress  She has no wheezes  She has no rales  Abdominal: Soft  Bowel sounds are normal  She exhibits no distension and no mass  There is no tenderness  Musculoskeletal: Normal range of motion  She exhibits edema ( Trace)  She exhibits no tenderness  Wide-based gait   Lymphadenopathy:     She has no cervical adenopathy  Neurological: She is alert and oriented to person, place, and time  No cranial nerve deficit  Coordination normal    Skin: No rash noted  Psychiatric: She has a normal mood and affect  Her behavior is normal  Judgment and thought content normal    Vitals reviewed

## 2019-11-01 NOTE — ASSESSMENT & PLAN NOTE
Lab Results   Component Value Date    HGBA1C 6 1 08/06/2019    Patient continues to slowly lose weight and her A1c is down to 6 1 on only 1 medicine

## 2019-11-01 NOTE — ASSESSMENT & PLAN NOTE
Her mo to stay on her effect od is doing well    Her daughters moving home from [place] but she continues

## 2019-11-01 NOTE — ASSESSMENT & PLAN NOTE
She continues to be morbidly obese but actually is slowly losing weight through diet and mild exercise

## 2019-11-01 NOTE — PATIENT INSTRUCTIONS
Obesity   AMBULATORY CARE:   Obesity  is when your body mass index (BMI) is greater than 30  Your healthcare provider will use your height and weight to measure your BMI  The risks of obesity include  many health problems, such as injuries or physical disability  You may need tests to check for the following:  · Diabetes     · High blood pressure or high cholesterol     · Heart disease     · Gallbladder or liver disease     · Cancer of the colon, breast, prostate, liver, or kidney     · Sleep apnea     · Arthritis or gout  Seek care immediately if:   · You have a severe headache, confusion, or difficulty speaking  · You have weakness on one side of your body  · You have chest pain, sweating, or shortness of breath  Contact your healthcare provider if:   · You have symptoms of gallbladder or liver disease, such as pain in your upper abdomen  · You have knee or hip pain and discomfort while walking  · You have symptoms of diabetes, such as intense hunger and thirst, and frequent urination  · You have symptoms of sleep apnea, such as snoring or daytime sleepiness  · You have questions or concerns about your condition or care  Treatment for obesity  focuses on helping you lose weight to improve your health  Even a small decrease in BMI can reduce the risk for many health problems  Your healthcare provider will help you set a weight-loss goal   · Lifestyle changes  are the first step in treating obesity  These include making healthy food choices and getting regular physical activity  Your healthcare provider may suggest a weight-loss program that involves coaching, education, and therapy  · Medicine  may help you lose weight when it is used with a healthy diet and physical activity  · Surgery  can help you lose weight if you are very obese and have other health problems  There are several types of weight-loss surgery  Ask your healthcare provider for more information    Be successful losing weight:   · Set small, realistic goals  An example of a small goal is to walk for 20 minutes 5 days a week  Anther goal is to lose 5% of your body weight  · Tell friends, family members, and coworkers about your goals  and ask for their support  Ask a friend to lose weight with you, or join a weight-loss support group  · Identify foods or triggers that may cause you to overeat , and find ways to avoid them  Remove tempting high-calorie foods from your home and workplace  Place a bowl of fresh fruit on your kitchen counter  If stress causes you to eat, then find other ways to cope with stress  · Keep a diary to track what you eat and drink  Also write down how many minutes of physical activity you do each day  Weigh yourself once a week and record it in your diary  Eating changes: You will need to eat 500 to 1,000 fewer calories each day than you currently eat to lose 1 to 2 pounds a week  The following changes will help you cut calories:  · Eat smaller portions  Use small plates, no larger than 9 inches in diameter  Fill your plate half full of fruits and vegetables  Measure your food using measuring cups until you know what a serving size looks like  · Eat 3 meals and 1 or 2 snacks each day  Plan your meals in advance  [employer] and eat at home most of the time  Eat slowly  · Eat fruits and vegetables at every meal   They are low in calories and high in fiber, which makes you feel full  Do not add butter, margarine, or cream sauce to vegetables  Use herbs to season steamed vegetables  · Eat less fat and fewer fried foods  Eat more baked or grilled chicken and fish  These protein sources are lower in calories and fat than red meat  Limit fast food  Dress your salads with olive oil and vinegar instead of bottled dressing  · Limit the amount of sugar you eat  Do not drink sugary beverages  Limit alcohol  Activity changes:  Physical activity is good for your body in many ways   It helps you burn calories and build strong muscles  It decreases stress and depression, and improves your mood  It can also help you sleep better  Talk to your healthcare provider before you begin an exercise program   · Exercise for at least 30 minutes 5 days a week  Start slowly  Set aside time each day for physical activity that you enjoy and that is convenient for you  It is best to do both weight training and an activity that increases your heart rate, such as walking, bicycling, or swimming  · Find ways to be more active  Do yard work and housecleaning  Walk up the stairs instead of using elevators  Spend your leisure time going to events that require walking, such as outdoor festivals or fairs  This extra physical activity can help you lose weight and keep it off  Follow up with your healthcare provider as directed: You may need to meet with a dietitian  Write down your questions so you remember to ask them during your visits  © 2017 [employer] Information is for End User's use only and may not be sold, redistributed or otherwise used for commercial purposes  All illustrations and images included in CareNotes® are the copyrighted property of [employer]  or [employer]  The above information is an  only  It is not intended as medical advice for individual conditions or treatments  Talk to your doctor, nurse or pharmacist before following any medical regimen to see if it is safe and effective for you

## 2019-11-13 DIAGNOSIS — E11.21 TYPE 2 DIABETES MELLITUS WITH DIABETIC NEPHROPATHY, WITHOUT LONG-TERM CURRENT USE OF INSULIN (HCC): ICD-10-CM

## 2019-11-13 DIAGNOSIS — E78.5 HYPERLIPIDEMIA, UNSPECIFIED HYPERLIPIDEMIA TYPE: ICD-10-CM

## 2019-11-13 RX ORDER — ATORVASTATIN CALCIUM 10 MG/1
10 TABLET, FILM COATED ORAL DAILY
Qty: 30 TABLET | Refills: 5 | Status: SHIPPED | OUTPATIENT
Start: 2019-11-13 | End: 2020-04-02 | Stop reason: SDUPTHER

## 2019-11-22 ENCOUNTER — OFFICE VISIT (OUTPATIENT)
Dept: PAIN MEDICINE | Facility: CLINIC | Age: 64
End: 2019-11-22
Payer: MEDICARE

## 2019-11-22 VITALS — WEIGHT: 260 LBS | HEIGHT: 65 IN | BODY MASS INDEX: 43.32 KG/M2

## 2019-11-22 DIAGNOSIS — Z96.89 SPINAL CORD STIMULATOR STATUS: ICD-10-CM

## 2019-11-22 DIAGNOSIS — G89.29 CHRONIC BILATERAL LOW BACK PAIN WITHOUT SCIATICA: ICD-10-CM

## 2019-11-22 DIAGNOSIS — M79.18 MYOFASCIAL PAIN SYNDROME: ICD-10-CM

## 2019-11-22 DIAGNOSIS — M62.838 MUSCLE SPASM: ICD-10-CM

## 2019-11-22 DIAGNOSIS — M54.16 LUMBAR RADICULOPATHY: ICD-10-CM

## 2019-11-22 DIAGNOSIS — M54.50 CHRONIC BILATERAL LOW BACK PAIN WITHOUT SCIATICA: ICD-10-CM

## 2019-11-22 DIAGNOSIS — M48.061 SPINAL STENOSIS OF LUMBAR REGION WITHOUT NEUROGENIC CLAUDICATION: ICD-10-CM

## 2019-11-22 DIAGNOSIS — G89.4 CHRONIC PAIN SYNDROME: Primary | ICD-10-CM

## 2019-11-22 PROCEDURE — 99213 OFFICE O/P EST LOW 20 MIN: CPT | Performed by: NURSE PRACTITIONER

## 2019-11-22 RX ORDER — TOPIRAMATE 100 MG/1
100 TABLET, FILM COATED ORAL 2 TIMES DAILY
Qty: 180 TABLET | Refills: 1 | Status: SHIPPED | OUTPATIENT
Start: 2019-11-22 | End: 2020-01-28 | Stop reason: SDUPTHER

## 2019-11-22 RX ORDER — TIZANIDINE 2 MG/1
2 TABLET ORAL EVERY 8 HOURS PRN
Qty: 270 TABLET | Refills: 1 | Status: SHIPPED | OUTPATIENT
Start: 2019-11-22 | End: 2020-01-28 | Stop reason: SDUPTHER

## 2019-11-22 NOTE — PROGRESS NOTES
Assessment:  1  Chronic pain syndrome    2  Chronic bilateral low back pain without sciatica    3  Myofascial pain syndrome    4  Muscle spasm    5  Spinal stenosis of lumbar region without neurogenic claudication    6  Lumbar radiculopathy    7  Spinal cord stimulator status        Plan:  1  Patient is experiencing some right sided buttock and posterior thigh pain  The patient will reach out to [place] to have her SCS reprogrammed to see if they can capture this pain  2   If pain persists despite reprogramming, can consider a right piriformis injection as she does have tenderness over the piriformis fossa  3  Patient may continue Topamax, tizanidine, and meloxicam as prescribed  4  Patient will continue with her home exercise program  5  We can repeat cervical epidural steroid injection as needed  6  The patient will follow-up in 12 weeks for medication prescription refill and reevaluation  The patient was advised to contact the office should their symptoms worsen in the interim  The patient was agreeable and verbalized an understanding  [employer] software was used to dictate this note  It may contain errors with dictating incorrect words or incorrect spelling  Please contact the provider directly with any questions  History of Present Illness: The patient is a 60 y o  female last seen on 9/5/19 who presents for a follow up office visit in regards to chronic pain secondary to chronic pain syndrome, lumbar spondylosis, lumbar stenosis, cervical spondylosis, cervical stenosis and lumbar and cervical radiculopathy  The patient does have an implanted spinal cord stimulator which has not been reprogrammed in approximately 2 years  She does complain of worsening right buttock and posterior thigh pain  She has also had cervical epidural steroid injections in the past with ongoing relief at this time  The patient currently rates her pain a 7/10 on the numeric pain rating scale    She states her pain is constant nature and bothersome in the evening and at night  She characterizes the pain as pressure like and pins and needles  Current pain medications includes:  Tizanidine 2 mg q 8 hours p r n  Myofascial pain, Topamax 100 mg b i d , and meloxicam 15 mg daily p r n  As prescribed by her PCP  The patient reports that this regimen is providing 50% pain relief  The patient is reporting no side effects from this pain medication regimen  I have personally reviewed and/or updated the patient's past medical history, past surgical history, family history, social history, current medications, allergies, and vital signs today  Review of Systems:    Review of Systems   Respiratory: Negative for shortness of breath  Cardiovascular: Negative for chest pain  Gastrointestinal: Negative for constipation, diarrhea, nausea and vomiting  Musculoskeletal: Negative for arthralgias, gait problem, joint swelling and myalgias  Skin: Negative for rash  Neurological: Negative for dizziness, seizures and weakness  All other systems reviewed and are negative          Past Medical History:   Diagnosis Date    Allergic reaction to bee sting     last assessed - 13Jun2016    Asthma     [facility]     Chronic pain syndrome     Depression     Diabetes mellitus [facility]     Esophageal reflux     Hyperlipidemia     Hypertension     Lumbar radiculopathy     Lyme disease     last assessed - 28Jun2016    Obesity     Opioid dependence [facility]     PPD positive     had treatment w/ INH 15 years ago    Vitamin D deficiency        Past Surgical History:   Procedure Laterality Date    APPENDECTOMY      CHOLECYSTECTOMY      JOINT REPLACEMENT      R knee, B/l total hip    SC REVISE/REMOVE SPINAL NEUROSTIM/ Left 12/13/2016    Procedure: REPLACEMENT BUTTOCK SPINAL CORD STIMULATOR IPG;  Surgeon: Cecilia Villa MD;  Location:  MAIN OR;  Service: Neurosurgery    SHOULDER SURGERY Resolved - [id number]      spinal stereotaxis stimulation of cord    TOTAL HIP ARTHROPLASTY Bilateral        Family History   Problem Relation Age of Onset    Diabetes Mother         Diabetes mellitus    Cancer Mother     Cancer Father     Diabetes Maternal Grandmother     Cancer Paternal Uncle     Brain cancer Family     Breast cancer Family     Cervical cancer Family     Diabetes Family         Diabetes mellitus    Hypertension Family     Ovarian cancer Family     Stroke Family         Stroke complications       Social History     Occupational History    Not on file   Tobacco Use    Smoking status: Current Every Day Smoker     Packs/day: 1 00     Years: 5 00     Pack years: 5 00    Smokeless tobacco: Never Used    Tobacco comment: pt desires to quit in January   Substance and Sexual Activity    Alcohol use: No     Comment: Denied history of alcohol use    Drug use: No     Comment: Denied history of drug use    Sexual activity: Not on file         Current Outpatient Medications:     atorvastatin (LIPITOR) 10 mg tablet, Take 1 tablet (10 mg total) by mouth daily taks in am, Disp: 30 tablet, Rfl: 5    Blood Glucose Monitoring Suppl (ONE TOUCH ULTRA MINI) w/Device KIT, by Does not apply route daily, Disp: 1 each, Rfl: 0    Cholecalciferol (VITAMIN D3) 59093 units CAPS, Take 1 capsule by mouth once a week, Disp: , Rfl: 11    furosemide (LASIX) 40 mg tablet, Take 1 tablet (40 mg total) by mouth 2 (two) times a day, Disp: 90 tablet, Rfl: 1    glimepiride (AMARYL) 1 mg tablet, Take 1 tablet (1 mg total) by mouth daily at bedtime, Disp: 90 tablet, Rfl: 1    glucose blood (ACCU-CHEK XI PLUS) test strip, CHECK BLOOD SUGAR TWICE DAILY      DX CODE E11 9, Disp: 100 each, Rfl: 6    glucose blood (ONE TOUCH ULTRA TEST) test strip, Test blood sugar once a day, Disp: 100 each, Rfl: 5    lisinopril (ZESTRIL) 20 mg tablet, Take 1 tablet (20 mg total) by mouth daily, Disp: 90 tablet, Rfl: 1    Magnesium 400 MG CAPS, Take by mouth, Disp: , Rfl:     meloxicam (MOBIC) 15 mg tablet, Take 1 tablet (15 mg total) by mouth daily, Disp: 90 tablet, Rfl: 3    mometasone (NASONEX) 50 mcg/act nasal spray, 2 sprays into each nostril daily, Disp: 1 Act, Rfl: 1    Nerve Stimulator (STANDARD TENS) ISAEL, by Does not apply route 4 (four) times a day as needed (MUSCLE SPASMS), Disp: 1 Device, Rfl: 0    tiZANidine (ZANAFLEX) 2 mg tablet, Take 1 tablet (2 mg total) by mouth every 8 (eight) hours as needed for muscle spasms, Disp: 270 tablet, Rfl: 1    topiramate (TOPAMAX) 100 mg tablet, Take 1 tablet (100 mg total) by mouth 2 (two) times a day, Disp: 180 tablet, Rfl: 1    venlafaxine (EFFEXOR-XR) 75 mg 24 hr capsule, Take 1 capsule (75 mg total) by mouth daily, Disp: 90 capsule, Rfl: 1    Allergies   Allergen Reactions    Bee Venom Anaphylaxis    Other Other (See Comments)     Stainless steel and surgical steel  *Severe blistering*    Aleve [Naproxen Sodium] Hives    Augmentin [Amoxicillin-Pot Clavulanate] Hives    Gabapentin     Glyburide     Metformin And Related Hives    Morphine And Related Hives    Motrin [Ibuprofen] Hives    Nortriptyline Hives    Penicillins Hives    Soy Lecithin [Lecithin] Hives    Sulfa Antibiotics Hives    Tradjenta [Linagliptin] Hives    Tramadol        Physical Exam:    Ht 5' 5" (1 651 m)   Wt 118 kg (260 lb)   LMP  (LMP Unknown)   BMI 43 27 kg/m²     Constitutional:overweight  Eyes:anicteric  HEENT:grossly intact  Neck:supple, symmetric, trachea midline and no masses   Pulmonary:even and unlabored  Cardiovascular:No edema or pitting edema present  Skin:Normal without rashes or lesions and well hydrated  Psychiatric:Mood and affect appropriate  Neurologic:Cranial Nerves II-XII grossly intact  Musculoskeletal:Slightly antalgic gait but steady without the use of assistive devices  Right piriformis fossa mildly tender to palpation    Bilateral SI joints nontender to palpation  Negative seated straight leg raise bilaterally      Imaging  No orders to display         No orders of the defined types were placed in this encounter

## 2020-01-28 ENCOUNTER — OFFICE VISIT (OUTPATIENT)
Dept: PAIN MEDICINE | Facility: CLINIC | Age: 65
End: 2020-01-28
Payer: MEDICARE

## 2020-01-28 VITALS
BODY MASS INDEX: 42.99 KG/M2 | HEIGHT: 65 IN | HEART RATE: 72 BPM | WEIGHT: 258 LBS | DIASTOLIC BLOOD PRESSURE: 68 MMHG | SYSTOLIC BLOOD PRESSURE: 125 MMHG

## 2020-01-28 DIAGNOSIS — G89.4 CHRONIC PAIN SYNDROME: Primary | ICD-10-CM

## 2020-01-28 DIAGNOSIS — M79.18 MYOFASCIAL PAIN SYNDROME: ICD-10-CM

## 2020-01-28 DIAGNOSIS — M54.50 CHRONIC BILATERAL LOW BACK PAIN WITHOUT SCIATICA: ICD-10-CM

## 2020-01-28 DIAGNOSIS — M54.16 LUMBAR RADICULOPATHY: ICD-10-CM

## 2020-01-28 DIAGNOSIS — M62.838 MUSCLE SPASM: ICD-10-CM

## 2020-01-28 DIAGNOSIS — G89.29 CHRONIC BILATERAL LOW BACK PAIN WITHOUT SCIATICA: ICD-10-CM

## 2020-01-28 DIAGNOSIS — Z96.89 SPINAL CORD STIMULATOR STATUS: ICD-10-CM

## 2020-01-28 PROCEDURE — 99214 OFFICE O/P EST MOD 30 MIN: CPT | Performed by: NURSE PRACTITIONER

## 2020-01-28 RX ORDER — TOPIRAMATE 100 MG/1
100 TABLET, FILM COATED ORAL 2 TIMES DAILY
Qty: 180 TABLET | Refills: 1 | Status: SHIPPED | OUTPATIENT
Start: 2020-01-28 | End: 2020-03-24 | Stop reason: SDUPTHER

## 2020-01-28 RX ORDER — TIZANIDINE 2 MG/1
2 TABLET ORAL EVERY 8 HOURS PRN
Qty: 270 TABLET | Refills: 1 | Status: SHIPPED | OUTPATIENT
Start: 2020-01-28 | End: 2020-03-24 | Stop reason: SDUPTHER

## 2020-01-28 NOTE — PROGRESS NOTES
Assessment:  1  Chronic pain syndrome    2  Spinal cord stimulator status    3  Lumbar radiculopathy    4  Muscle spasm    5  Chronic bilateral low back pain without sciatica    6  Myofascial pain syndrome        Plan:  1  Patient is now complaining of low back pain that radiates in the L5 distribution of the right lower extremity  She has not had advanced imaging of her lumbar spine in 3 years, therefore I feel it is reasonable to order an updated CT of the lumbar spine as she cannot have an MRI secondary to spinal cord stimulator  I will call her with the results  2  Patient may continue Topamax and tizanidine at prescribed  Both these medications were refilled at today's office visit  3  I did encourage that the patient contact [address] [place] [address] [phone] for reprogramming session of her stimulator device  4  The patient will continue with her home exercise program  5  We can repeat cervical epidural steroid injection as needed  6  The patient will follow-up in 8 weeks for medication prescription refill and reevaluation  The patient was advised to contact the office should their symptoms worsen in the interim  The patient was agreeable and verbalized an understanding  M*Modal software was used to dictate this note  It may contain errors with dictating incorrect words or incorrect spelling  Please contact the provider directly with any questions  History of Present Illness: The patient is a 60 y o  female last seen on 11/22/19 who presents for a follow up office visit in regards to chronic right-sided low back pain that will radiate into the buttock and down the lateral aspect of the right lower extremity to her big toe in the L5 distribution secondary to lumbar degenerative disc disease, lumbar spondylosis, lumbar stenosis and chronic pain syndrome  The patient does have an implanted spinal cord stimulator, and states she has not had reprogrammed for approximately a year    She does have known stenosis on CT of the lumbar spine, however this imaging is from 2017 and she states this issue has worsened and change since last imaging  The patient currently rates her pain an 8/10 on the numeric pain rating scale  She states her pain is constant nature and follows no particular pattern throughout the day  She characterizes the pain as sharp  Current pain medications includes:  Tizanidine 2 mg q 8 hours p r n  Myofascial pain, and Topamax 100 mg b i d     The patient reports that this regimen is providing 0% pain relief  The patient is reporting no side effects from this pain medication regimen  I have personally reviewed and/or updated the patient's past medical history, past surgical history, family history, social history, current medications, allergies, and vital signs today  Review of Systems:    Review of Systems   Respiratory: Negative for shortness of breath  Cardiovascular: Negative for chest pain  Gastrointestinal: Negative for constipation, diarrhea, nausea and vomiting  Musculoskeletal: Positive for gait problem  Negative for arthralgias, joint swelling and myalgias  Skin: Negative for rash  Neurological: Positive for weakness  Negative for dizziness and seizures  All other systems reviewed and are negative          Past Medical History:   Diagnosis Date    Allergic reaction to bee sting     last assessed - 13Jun2016    Asthma     [facility]     Chronic pain syndrome     Depression     Diabetes mellitus [facility]     Esophageal reflux     Hyperlipidemia     Hypertension     Lumbar radiculopathy     Lyme disease     last assessed - 28Jun2016    Obesity     [facility]     PPD positive     had treatment w/ INH 15 years ago    Vitamin D deficiency        Past Surgical History:   Procedure Laterality Date    APPENDECTOMY      CHOLECYSTECTOMY      JOINT REPLACEMENT      R knee, B/l total hip    UT REVISE/REMOVE SPINAL NEUROSTIM/ Left 12/13/2016    Procedure: REPLACEMENT BUTTOCK SPINAL CORD STIMULATOR IPG;  Surgeon: Jose Mosqueda MD;  Location: [facility];  Service: Neurosurgery    SHOULDER SURGERY      Resolved - [address] [employer]      spinal stereotaxis stimulation of cord    TOTAL HIP ARTHROPLASTY Bilateral        Family History   Problem Relation Age of Onset    Diabetes Mother         Diabetes mellitus    Cancer Mother     Cancer Father     Diabetes Maternal Grandmother     Cancer Paternal Uncle     Brain cancer Family     Breast cancer Family     Cervical cancer Family     Diabetes Family         Diabetes mellitus    Hypertension Family     Ovarian cancer Family     Stroke Family         Stroke complications       Social History     Occupational History    Not on file   Tobacco Use    Smoking status: Current Every Day Smoker     Packs/day: 1 00     Years: 5 00     Pack years: 5 00    Smokeless tobacco: Never Used    Tobacco comment: pt desires to quit in January   Substance and Sexual Activity    Alcohol use: No     Comment: Denied history of alcohol use    Drug use: No     Comment: Denied history of drug use    Sexual activity: Not on file         Current Outpatient Medications:     atorvastatin (LIPITOR) 10 mg tablet, Take 1 tablet (10 mg total) by mouth daily taks in am, Disp: 30 tablet, Rfl: 5    Blood Glucose Monitoring Suppl (ONE TOUCH ULTRA MINI) w/Device KIT, by Does not apply route daily, Disp: 1 each, Rfl: 0    Cholecalciferol (VITAMIN D3) 28705 units CAPS, Take 1 capsule by mouth once a week, Disp: , Rfl: 11    furosemide (LASIX) 40 mg tablet, Take 1 tablet (40 mg total) by mouth 2 (two) times a day, Disp: 90 tablet, Rfl: 1    glimepiride (AMARYL) 1 mg tablet, Take 1 tablet (1 mg total) by mouth daily at bedtime, Disp: 90 tablet, Rfl: 1    glucose blood (ACCU-CHEK XI PLUS) test strip, CHECK BLOOD SUGAR TWICE DAILY      DX CODE E11 9, Disp: 100 each, Rfl: 6    glucose blood (ONE TOUCH ULTRA TEST) test strip, Test blood sugar once a day, Disp: 100 each, Rfl: 5    lisinopril (ZESTRIL) 20 mg tablet, Take 1 tablet (20 mg total) by mouth daily, Disp: 90 tablet, Rfl: 1    Magnesium 400 MG CAPS, Take by mouth, Disp: , Rfl:     meloxicam (MOBIC) 15 mg tablet, Take 1 tablet (15 mg total) by mouth daily, Disp: 90 tablet, Rfl: 3    mometasone (NASONEX) 50 mcg/act nasal spray, 2 sprays into each nostril daily, Disp: 1 Act, Rfl: 1    Nerve Stimulator (STANDARD TENS) ISAEL, by Does not apply route 4 (four) times a day as needed (MUSCLE SPASMS), Disp: 1 Device, Rfl: 0    tiZANidine (ZANAFLEX) 2 mg tablet, Take 1 tablet (2 mg total) by mouth every 8 (eight) hours as needed for muscle spasms, Disp: 270 tablet, Rfl: 1    topiramate (TOPAMAX) 100 mg tablet, Take 1 tablet (100 mg total) by mouth 2 (two) times a day, Disp: 180 tablet, Rfl: 1    venlafaxine (EFFEXOR-XR) 75 mg 24 hr capsule, Take 1 capsule (75 mg total) by mouth daily, Disp: 90 capsule, Rfl: 1    Allergies   Allergen Reactions    Bee Venom Anaphylaxis    Other Other (See Comments)     Stainless steel and surgical steel  *Severe blistering*    Aleve [Naproxen Sodium] Hives    Augmentin [Amoxicillin-Pot Clavulanate] Hives    Gabapentin     Glyburide     Metformin And Related Hives    Morphine And Related Hives    Motrin [Ibuprofen] Hives    Nortriptyline Hives    Penicillins Hives    Soy Lecithin [Lecithin] Hives    Sulfa Antibiotics Hives    Tradjenta [Linagliptin] Hives    Tramadol        Physical Exam:    /68   Pulse 72   Ht 5' 5" (1 651 m)   Wt 117 kg (258 lb)   LMP  (LMP Unknown)   BMI 42 93 kg/m²     Constitutional:obese  Eyes:anicteric  HEENT:grossly intact  Neck:supple, symmetric, trachea midline and no masses   Pulmonary:even and unlabored  Cardiovascular:No edema or pitting edema present  Skin:Normal without rashes or lesions and well hydrated  Psychiatric:Mood and affect appropriate  Neurologic:Cranial Nerves II-XII grossly intact  Musculoskeletal:antalgic gait but steady without the use of assistive devices  Bilateral SI joints nontender to palpation  Bilateral lower extremity strength 5/5 in all muscle groups  Negative straight leg raise bilaterally  Negative Juan M's test bilaterally  Negative piriformis stretch test on the right  Bilateral greater trochanter bursa nontender to palpation        Imaging  CT lumbar spine without contrast    (Results Pending)         Orders Placed This Encounter   Procedures    CT lumbar spine without contrast

## 2020-02-05 ENCOUNTER — HOSPITAL ENCOUNTER (OUTPATIENT)
Dept: RADIOLOGY | Facility: IMAGING CENTER | Age: 65
Discharge: HOME | End: 2020-02-05
Payer: MEDICARE

## 2020-02-05 DIAGNOSIS — M54.16 LUMBAR RADICULOPATHY: ICD-10-CM

## 2020-02-05 PROCEDURE — 72131 CT LUMBAR SPINE W/O DYE: CPT

## 2020-02-06 ENCOUNTER — TELEPHONE (OUTPATIENT)
Dept: PAIN MEDICINE | Facility: CLINIC | Age: 65
End: 2020-02-06

## 2020-02-06 NOTE — TELEPHONE ENCOUNTER
RITAI: S/W pt and advised of below  Pt would like to try reprogramming first and if this is not helpful, she will CB to make appt for injection

## 2020-02-06 NOTE — TELEPHONE ENCOUNTER
Patient has multilevel moderately severe central and foraminal lumbar stenosis from L1-2 to L5-S1  For the patient's low back and right lower extremity symptoms, I can offer her a right L4 and L5 TFESI with Dr Pawan Salgado if she wishes  If she does not wish to move forward with interventional therapy, I would recommend she contact her SCS company to try and reprogram the device

## 2020-02-19 DIAGNOSIS — F33.41 RECURRENT MAJOR DEPRESSIVE DISORDER, IN PARTIAL REMISSION (HCC): ICD-10-CM

## 2020-02-19 RX ORDER — VENLAFAXINE HYDROCHLORIDE 75 MG/1
75 CAPSULE, EXTENDED RELEASE ORAL DAILY
Qty: 90 CAPSULE | Refills: 1 | Status: SHIPPED | OUTPATIENT
Start: 2020-02-19 | End: 2020-04-02 | Stop reason: SDUPTHER

## 2020-03-24 ENCOUNTER — TRANSCRIBE ORDERS (OUTPATIENT)
Dept: ADMINISTRATIVE | Age: 65
End: 2020-03-24

## 2020-03-24 ENCOUNTER — TELEPHONE (OUTPATIENT)
Dept: PAIN MEDICINE | Facility: CLINIC | Age: 65
End: 2020-03-24

## 2020-03-24 ENCOUNTER — TELEMEDICINE (OUTPATIENT)
Dept: PAIN MEDICINE | Facility: CLINIC | Age: 65
End: 2020-03-24
Payer: MEDICARE

## 2020-03-24 ENCOUNTER — APPOINTMENT (OUTPATIENT)
Dept: LAB | Age: 65
End: 2020-03-24
Payer: MEDICARE

## 2020-03-24 DIAGNOSIS — M54.50 CHRONIC BILATERAL LOW BACK PAIN WITHOUT SCIATICA: ICD-10-CM

## 2020-03-24 DIAGNOSIS — M35.3 POLYMYALGIA RHEUMATICA (HCC): ICD-10-CM

## 2020-03-24 DIAGNOSIS — E11.9 CONTROLLED TYPE 2 DIABETES MELLITUS WITHOUT COMPLICATION, WITHOUT LONG-TERM CURRENT USE OF INSULIN (HCC): ICD-10-CM

## 2020-03-24 DIAGNOSIS — M54.12 CERVICAL RADICULOPATHY: ICD-10-CM

## 2020-03-24 DIAGNOSIS — M79.18 MYOFASCIAL PAIN SYNDROME: ICD-10-CM

## 2020-03-24 DIAGNOSIS — M62.838 MUSCLE SPASM: ICD-10-CM

## 2020-03-24 DIAGNOSIS — Z96.89 SPINAL CORD STIMULATOR STATUS: ICD-10-CM

## 2020-03-24 DIAGNOSIS — G89.29 CHRONIC BILATERAL LOW BACK PAIN WITHOUT SCIATICA: ICD-10-CM

## 2020-03-24 DIAGNOSIS — M35.3 POLYMYALGIA RHEUMATICA (HCC): Primary | ICD-10-CM

## 2020-03-24 DIAGNOSIS — G89.4 CHRONIC PAIN SYNDROME: Primary | ICD-10-CM

## 2020-03-24 DIAGNOSIS — M54.16 LUMBAR RADICULOPATHY: ICD-10-CM

## 2020-03-24 LAB
ALBUMIN SERPL BCP-MCNC: 3.7 G/DL (ref 3.5–5)
ALP SERPL-CCNC: 118 U/L (ref 46–116)
ALT SERPL W P-5'-P-CCNC: 26 U/L (ref 12–78)
ANION GAP SERPL CALCULATED.3IONS-SCNC: 4 MMOL/L (ref 4–13)
AST SERPL W P-5'-P-CCNC: 20 U/L (ref 5–45)
BACTERIA UR QL AUTO: ABNORMAL /HPF
BASOPHILS # BLD AUTO: 0.08 THOUSANDS/ΜL (ref 0–0.1)
BASOPHILS NFR BLD AUTO: 1 % (ref 0–1)
BILIRUB SERPL-MCNC: 0.31 MG/DL (ref 0.2–1)
BILIRUB UR QL STRIP: NEGATIVE
BUN SERPL-MCNC: 20 MG/DL (ref 5–25)
CALCIUM SERPL-MCNC: 9.5 MG/DL (ref 8.3–10.1)
CHLORIDE SERPL-SCNC: 114 MMOL/L (ref 100–108)
CLARITY UR: CLEAR
CO2 SERPL-SCNC: 25 MMOL/L (ref 21–32)
COLOR UR: YELLOW
CREAT SERPL-MCNC: 1.08 MG/DL (ref 0.6–1.3)
CRP SERPL QL: 6.2 MG/L
EOSINOPHIL # BLD AUTO: 0.26 THOUSAND/ΜL (ref 0–0.61)
EOSINOPHIL NFR BLD AUTO: 3 % (ref 0–6)
ERYTHROCYTE [DISTWIDTH] IN BLOOD BY AUTOMATED COUNT: 13.9 % (ref 11.6–15.1)
ERYTHROCYTE [SEDIMENTATION RATE] IN BLOOD: 20 MM/HOUR (ref 0–20)
EST. AVERAGE GLUCOSE BLD GHB EST-MCNC: 117 MG/DL
GFR SERPL CREATININE-BSD FRML MDRD: 55 ML/MIN/1.73SQ M
GLUCOSE P FAST SERPL-MCNC: 116 MG/DL (ref 65–99)
GLUCOSE UR STRIP-MCNC: NEGATIVE MG/DL
HBA1C MFR BLD: 5.7 %
HCT VFR BLD AUTO: 41.1 % (ref 34.8–46.1)
HGB BLD-MCNC: 12.9 G/DL (ref 11.5–15.4)
HGB UR QL STRIP.AUTO: NEGATIVE
HYALINE CASTS #/AREA URNS LPF: ABNORMAL /LPF
IMM GRANULOCYTES # BLD AUTO: 0.02 THOUSAND/UL (ref 0–0.2)
IMM GRANULOCYTES NFR BLD AUTO: 0 % (ref 0–2)
KETONES UR STRIP-MCNC: NEGATIVE MG/DL
LEUKOCYTE ESTERASE UR QL STRIP: ABNORMAL
LYMPHOCYTES # BLD AUTO: 1.69 THOUSANDS/ΜL (ref 0.6–4.47)
LYMPHOCYTES NFR BLD AUTO: 22 % (ref 14–44)
MCH RBC QN AUTO: 28.8 PG (ref 26.8–34.3)
MCHC RBC AUTO-ENTMCNC: 31.4 G/DL (ref 31.4–37.4)
MCV RBC AUTO: 92 FL (ref 82–98)
MONOCYTES # BLD AUTO: 0.62 THOUSAND/ΜL (ref 0.17–1.22)
MONOCYTES NFR BLD AUTO: 8 % (ref 4–12)
NEUTROPHILS # BLD AUTO: 4.9 THOUSANDS/ΜL (ref 1.85–7.62)
NEUTS SEG NFR BLD AUTO: 66 % (ref 43–75)
NITRITE UR QL STRIP: NEGATIVE
NON-SQ EPI CELLS URNS QL MICRO: ABNORMAL /HPF
NRBC BLD AUTO-RTO: 0 /100 WBCS
PH UR STRIP.AUTO: 6 [PH]
PLATELET # BLD AUTO: 175 THOUSANDS/UL (ref 149–390)
PMV BLD AUTO: 11.6 FL (ref 8.9–12.7)
POTASSIUM SERPL-SCNC: 4.1 MMOL/L (ref 3.5–5.3)
PROT SERPL-MCNC: 7.3 G/DL (ref 6.4–8.2)
PROT UR STRIP-MCNC: NEGATIVE MG/DL
RBC # BLD AUTO: 4.48 MILLION/UL (ref 3.81–5.12)
RBC #/AREA URNS AUTO: ABNORMAL /HPF
SODIUM SERPL-SCNC: 143 MMOL/L (ref 136–145)
SP GR UR STRIP.AUTO: 1.02 (ref 1–1.03)
UROBILINOGEN UR QL STRIP.AUTO: 0.2 E.U./DL
WBC # BLD AUTO: 7.57 THOUSAND/UL (ref 4.31–10.16)
WBC #/AREA URNS AUTO: ABNORMAL /HPF

## 2020-03-24 PROCEDURE — 83036 HEMOGLOBIN GLYCOSYLATED A1C: CPT

## 2020-03-24 PROCEDURE — G2012 BRIEF CHECK IN BY MD/QHP: HCPCS | Performed by: NURSE PRACTITIONER

## 2020-03-24 PROCEDURE — 80053 COMPREHEN METABOLIC PANEL: CPT

## 2020-03-24 PROCEDURE — 81001 URINALYSIS AUTO W/SCOPE: CPT | Performed by: INTERNAL MEDICINE

## 2020-03-24 PROCEDURE — 85025 COMPLETE CBC W/AUTO DIFF WBC: CPT

## 2020-03-24 PROCEDURE — 86140 C-REACTIVE PROTEIN: CPT

## 2020-03-24 PROCEDURE — 36415 COLL VENOUS BLD VENIPUNCTURE: CPT

## 2020-03-24 PROCEDURE — 85652 RBC SED RATE AUTOMATED: CPT

## 2020-03-24 RX ORDER — TOPIRAMATE 100 MG/1
100 TABLET, FILM COATED ORAL 2 TIMES DAILY
Qty: 180 TABLET | Refills: 1 | Status: SHIPPED | OUTPATIENT
Start: 2020-03-24 | End: 2020-06-12 | Stop reason: SDUPTHER

## 2020-03-24 RX ORDER — TIZANIDINE 2 MG/1
2 TABLET ORAL EVERY 8 HOURS PRN
Qty: 270 TABLET | Refills: 1 | Status: SHIPPED | OUTPATIENT
Start: 2020-03-24 | End: 2020-06-12 | Stop reason: SDUPTHER

## 2020-03-24 NOTE — PATIENT INSTRUCTIONS
Plan:  1  The patient would be a candidate for right L4 and L5 TFESI once our office before resumes with steroid procedures if her pain were to worsen in the future  2  The patient may continue tizanidine and Topamax as prescribed  Both of these medications were refilled today  3  The patient will continue to follow with [facility] for reprogramming sessions of her spinal cord stimulator device as needed  4  The patient may continue with Tylenol p r n  Should not exceed more than 3000 mg daily  5  We can repeat cervical epidural steroid injection as needed  6  The patient will continue with her home exercise program  7  The patient will follow-up in 12 weeks for medication prescription refill and reevaluation  The patient was advised to contact the office should their symptoms worsen in the interim  The patient was agreeable and verbalized an understanding

## 2020-03-24 NOTE — PROGRESS NOTES
Virtual Regular Visit    Problem List Items Addressed This Visit        Nervous and Auditory    Lumbar radiculopathy    Cervical radiculopathy       Musculoskeletal and Integument    Myofascial pain syndrome    Relevant Medications    topiramate (TOPAMAX) 100 mg tablet       Other    Chronic low back pain    Relevant Medications    topiramate (TOPAMAX) 100 mg tablet    Chronic pain syndrome - Primary    Relevant Medications    topiramate (TOPAMAX) 100 mg tablet    Spinal cord stimulator status      Other Visit Diagnoses     Muscle spasm        Relevant Medications    tiZANidine (ZANAFLEX) 2 mg tablet               Reason for visit is for a follow up regarding chronic lumbosacral back pain that radiates into the right lower extremity and ongoing neck pain  Encounter provider Iggy Bird    Provider located at [address] [place] [address]      Recent Visits  No visits were found meeting these conditions  Showing recent visits within past 7 days and meeting all other requirements     Today's Visits  Date Type Provider Dept   03/24/20 Telephone Lauren Ledezma today's visits and meeting all other requirements     Future Appointments  Date Type Provider Dept   03/24/20 Telephone Caitlin Arshad [address] [place] [address]   Showing future appointments within next 150 days and meeting all other requirements        After connecting through telephone, the patient was identified by name and date of birth  Jeronimo Israel was informed that this is a telemedicine visit and that the visit is being conducted through telephone which may not be secure and therefore, might not be HIPAA-compliant  My office door was closed  No one else was in the room  She acknowledged consent and understanding of privacy and security of the video platform   The patient has agreed to participate and understands they can discontinue the visit at any time  Subjective  Leopold Pellegrini is a 60 y o  female an implanted [employer] spinal cord stimulator device last seen in the office on January 28, 2020 who is currently concerned about the risks of COVID-19, and although not experiencing signs or symptoms of COVID-19, out of fear of exposure, the patient preferred to proceed with virtual visit today  The patient continues to follow with our office for chronic right-sided low back pain that radiates into the lateral aspect of the right lower extremity to the big toe in the L5 distribution  Updated CT of the lumbar spine revealsmultilevel moderately severe central and foraminal lumbar stenosis from L1-2 to L5-S1  I did offer her a right L4 and L5 TFESI with Dr Candance Bruckner to address the inflammatory component of her pain, however she which to try a reprogramming session of her spinal cord stimulator device first   She did have her device reprogrammed and states that it provided a 50% improvement of her pain  The patient currently rates her pain a 2/10 on the numeric pain rating scale  She states her pain is intermittent in nature and follows no particular pattern throughout the day  She does feel she notices worsening pain with inclement weather        Past Medical History:   Diagnosis Date    Allergic reaction to bee sting     last assessed - 13Jun2016    Asthma     Chronic narcotic dependence ([address]     Chronic pain syndrome     Depression     Diabetes mellitus ([address]     Esophageal reflux     Hyperlipidemia     Hypertension     Lumbar radiculopathy     Lyme disease     last assessed - 28Jun2016    Obesity     [facility]io[facility] dependence [facility]     PPD positive     had treatment w/ INH 15 years ago    Vitamin D deficiency        Past Surgical History:   Procedure Laterality Date    APPENDECTOMY      CHOLECYSTECTOMY      JOINT REPLACEMENT      R knee, B/l total hip    [place] REVISE/REMOVE SPINAL NEUROSTIM/ Left 12/13/2016    Procedure: REPLACEMENT BUTTOCK SPINAL CORD STIMULATOR IPG;  Surgeon: Hira Mena MD;  [facility];  Service: Neurosurgery    SHOULDER SURGERY      Resolved - [address] IMPLANT      spinal stereotaxis stimulation of cord    TOTAL HIP ARTHROPLASTY Bilateral        Current Outpatient Medications   Medication Sig Dispense Refill    atorvastatin (LIPITOR) 10 mg tablet Take 1 tablet (10 mg total) by mouth daily taks in am 30 tablet 5    Blood Glucose Monitoring Suppl (ONE TOUCH ULTRA MINI) w/Device KIT by Does not apply route daily 1 each 0    Cholecalciferol (VITAMIN D3) 23818 units CAPS Take 1 capsule by mouth once a week  11    furosemide (LASIX) 40 mg tablet Take 1 tablet (40 mg total) by mouth 2 (two) times a day 90 tablet 1    glimepiride (AMARYL) 1 mg tablet Take 1 tablet (1 mg total) by mouth daily at bedtime 90 tablet 1    glucose blood (ACCU-CHEK XI PLUS) test strip CHECK BLOOD SUGAR TWICE DAILY  DX CODE E11 9 100 each 6    glucose blood (ONE TOUCH ULTRA TEST) test strip Test blood sugar once a day 100 each 5    lisinopril (ZESTRIL) 20 mg tablet Take 1 tablet (20 mg total) by mouth daily 90 tablet 1    Magnesium 400 MG CAPS Take by mouth      meloxicam (MOBIC) 15 mg tablet Take 1 tablet (15 mg total) by mouth daily 90 tablet 3    mometasone (NASONEX) 50 mcg/act nasal spray 2 sprays into each nostril daily 1 Act 1    Nerve Stimulator (STANDARD TENS) ISAEL by Does not apply route 4 (four) times a day as needed (MUSCLE SPASMS) 1 Device 0    tiZANidine (ZANAFLEX) 2 mg tablet Take 1 tablet (2 mg total) by mouth every 8 (eight) hours as needed for muscle spasms 270 tablet 1    topiramate (TOPAMAX) 100 mg tablet Take 1 tablet (100 mg total) by mouth 2 (two) times a day 180 tablet 1    venlafaxine (EFFEXOR-XR) 75 mg 24 hr capsule Take 1 capsule (75 mg total) by mouth daily 90 capsule 1     No current facility-administered medications for this visit           Allergies Allergen Reactions    Bee Venom Anaphylaxis    Other Other (See Comments)     Stainless steel and surgical steel  *Severe blistering*    Aleve [Naproxen Sodium] Hives    Augmentin [Amoxicillin-Pot Clavulanate] Hives    Gabapentin     Glyburide     Metformin And Related Hives    Morphine And Related Hives    Motrin [Ibuprofen] Hives    Nortriptyline Hives    Penicillins Hives    Soy Lecithin [Lecithin] Hives    Sulfa Antibiotics Hives    Tradjenta [Linagliptin] Hives    Tramadol      Plan:  1  The patient would be a candidate for right L4 and L5 TFESI once our office before resumes with steroid procedures if her pain were to worsen in the future  2  The patient may continue tizanidine and Topamax as prescribed  Both of these medications were refilled today  3  The patient will continue to follow with Ervin for reprogramming sessions of her spinal cord stimulator device as needed  4  The patient may continue with Tylenol p r n  Should not exceed more than 3000 mg daily  5  We can repeat cervical epidural steroid injection as needed  6  The patient will continue with her home exercise program  7  The patient will follow-up in 12 weeks for medication prescription refill and reevaluation  The patient was advised to contact the office should their symptoms worsen in the interim  The patient was agreeable and verbalized an understanding  I spent 3 minutes with the patient during this visit    Established patient level 4

## 2020-03-25 DIAGNOSIS — I10 ESSENTIAL HYPERTENSION: ICD-10-CM

## 2020-03-25 RX ORDER — LISINOPRIL 20 MG/1
TABLET ORAL
Qty: 90 TABLET | Refills: 0 | Status: SHIPPED | OUTPATIENT
Start: 2020-03-25 | End: 2020-04-02 | Stop reason: SDUPTHER

## 2020-04-02 ENCOUNTER — TELEMEDICINE (OUTPATIENT)
Dept: INTERNAL MEDICINE CLINIC | Age: 65
End: 2020-04-02
Payer: MEDICARE

## 2020-04-02 VITALS
HEART RATE: 76 BPM | HEIGHT: 65 IN | DIASTOLIC BLOOD PRESSURE: 70 MMHG | BODY MASS INDEX: 42.49 KG/M2 | SYSTOLIC BLOOD PRESSURE: 120 MMHG | TEMPERATURE: 98 F | WEIGHT: 255 LBS

## 2020-04-02 DIAGNOSIS — Z00.00 MEDICARE ANNUAL WELLNESS VISIT, SUBSEQUENT: ICD-10-CM

## 2020-04-02 DIAGNOSIS — E11.21 TYPE 2 DIABETES MELLITUS WITH DIABETIC NEPHROPATHY, WITHOUT LONG-TERM CURRENT USE OF INSULIN (HCC): ICD-10-CM

## 2020-04-02 DIAGNOSIS — F33.41 RECURRENT MAJOR DEPRESSIVE DISORDER, IN PARTIAL REMISSION (HCC): ICD-10-CM

## 2020-04-02 DIAGNOSIS — E78.5 HYPERLIPIDEMIA, UNSPECIFIED HYPERLIPIDEMIA TYPE: ICD-10-CM

## 2020-04-02 DIAGNOSIS — E66.01 MORBID OBESITY (HCC): ICD-10-CM

## 2020-04-02 DIAGNOSIS — R60.9 PERIPHERAL EDEMA: ICD-10-CM

## 2020-04-02 DIAGNOSIS — I10 ESSENTIAL HYPERTENSION: Primary | ICD-10-CM

## 2020-04-02 DIAGNOSIS — E66.01 MORBID OBESITY WITH BMI OF 40.0-44.9, ADULT (HCC): ICD-10-CM

## 2020-04-02 DIAGNOSIS — E11.9 TYPE 2 DIABETES MELLITUS WITHOUT COMPLICATION, WITHOUT LONG-TERM CURRENT USE OF INSULIN (HCC): ICD-10-CM

## 2020-04-02 PROBLEM — R60.0 PERIPHERAL EDEMA: Status: RESOLVED | Noted: 2018-09-05 | Resolved: 2020-04-02

## 2020-04-02 PROCEDURE — 99214 OFFICE O/P EST MOD 30 MIN: CPT | Performed by: INTERNAL MEDICINE

## 2020-04-02 PROCEDURE — G0438 PPPS, INITIAL VISIT: HCPCS | Performed by: INTERNAL MEDICINE

## 2020-04-02 RX ORDER — VENLAFAXINE HYDROCHLORIDE 75 MG/1
75 CAPSULE, EXTENDED RELEASE ORAL DAILY
Qty: 90 CAPSULE | Refills: 1 | Status: SHIPPED | OUTPATIENT
Start: 2020-04-02 | End: 2020-07-22 | Stop reason: SDUPTHER

## 2020-04-02 RX ORDER — ATORVASTATIN CALCIUM 10 MG/1
10 TABLET, FILM COATED ORAL DAILY
Qty: 30 TABLET | Refills: 5 | Status: SHIPPED | OUTPATIENT
Start: 2020-04-02 | End: 2020-04-02 | Stop reason: SDUPTHER

## 2020-04-02 RX ORDER — FUROSEMIDE 40 MG/1
40 TABLET ORAL 2 TIMES DAILY
Qty: 90 TABLET | Refills: 1 | Status: SHIPPED | OUTPATIENT
Start: 2020-04-02 | End: 2020-04-02

## 2020-04-02 RX ORDER — ATORVASTATIN CALCIUM 10 MG/1
10 TABLET, FILM COATED ORAL DAILY
Qty: 30 TABLET | Refills: 5 | Status: SHIPPED | OUTPATIENT
Start: 2020-04-02 | End: 2020-07-22 | Stop reason: SDUPTHER

## 2020-04-02 RX ORDER — LISINOPRIL 20 MG/1
20 TABLET ORAL DAILY
Qty: 90 TABLET | Refills: 0 | Status: SHIPPED | OUTPATIENT
Start: 2020-04-02 | End: 2020-07-22 | Stop reason: SDUPTHER

## 2020-05-18 DIAGNOSIS — Z91.030 BEE STING ALLERGY: ICD-10-CM

## 2020-05-18 DIAGNOSIS — Z91.030 BEE STING ALLERGY: Primary | ICD-10-CM

## 2020-05-18 RX ORDER — EPINEPHRINE 0.3 MG/.3ML
0.3 INJECTION SUBCUTANEOUS ONCE
Qty: 0.6 ML | Refills: 0 | Status: SHIPPED | OUTPATIENT
Start: 2020-05-18 | End: 2020-05-19

## 2020-05-19 DIAGNOSIS — Z91.030 BEE STING ALLERGY: ICD-10-CM

## 2020-05-19 RX ORDER — EPINEPHRINE 0.3 MG/.3ML
INJECTION SUBCUTANEOUS
Qty: 2 EACH | Refills: 5 | Status: SHIPPED | OUTPATIENT
Start: 2020-05-19 | End: 2020-05-19 | Stop reason: SDUPTHER

## 2020-05-19 RX ORDER — EPINEPHRINE 0.3 MG/.3ML
0.3 INJECTION SUBCUTANEOUS AS NEEDED
Qty: 2 EACH | Refills: 5 | Status: SHIPPED | OUTPATIENT
Start: 2020-05-19 | End: 2022-06-07 | Stop reason: SDUPTHER

## 2020-06-11 NOTE — MISCELLANEOUS
Message   Recorded as Task   Date: 12/19/2017 01:16 PM, Created By: Wendi Oakley   Task Name: Miscellaneous   Assigned To: [address] [facility] [address] [facility] Clinical,Team   Regarding Patient: Tye Watson, Status: In Progress   Comment:    June Clark - 19 Dec 2017 1:16 PM     TASK CREATED  Pt called stating Dr Marylou Willis has her on tylenol and it is not enough to help for her back pain  She is asking for something for the pain  (Her next appt is on 1/9/18 in [place])  Pls call pt at [phone]  Shantal Mancuso - 19 Dec 2017 1:29 PM     TASK EDITED  S/W pt  Pt states she is taking Tylenol 325 mg  3 tablets 3 times a day and she has continued to have constant pain in her spine  Pt denies it is a spasm  Her next appt is on 1/9/18 and would like to know if there is something else she can have for pain at this time  Advised pt will c/b with AS recommendations  Please advise  Thanks  Benny Boston - 19 Dec 2017 2:30 PM     TASK REPLIED TO: Previously Assigned To Waleska Weeks  Schedule patient for repeat TPI in office  India Osuna - 19 Dec 2017 2:58 PM     TASK EDITED  Called pt to schedule her for TGI, she is requesting to do this tomorrow d/t pain, you have an opening at 10:30, ok to add on? Also should I cx her upcoming appt on 1/9 for 6 wk f/u or keep this appt? Waleska Weeks - 19 Dec 2017 3:31 PM     TASK REPLIED TO: Previously Assigned To [address] 1/9 appointment for 4 week follow up from tomorrow's TPI  Ok to schedule TPI tomorrow  India Osuna - 19 Dec 2017 3:52 PM     TASK EDITED  Attempted to reach pt to push back 4 wk f/u appt a week, the week of 1/15, LMOM to c/b, c/b# and OH given  Appt on 1/9 cx'd in [facility]  India Osuna - 21 Dec 2017 8:00 AM     TASK EDITED  Appt made at [address] yesterday, for SOVS f/u on 1/17 with AS  Active Problems    1  Acute lumbar radiculopathy (724 4) (M54 16)   2  Analgesic use (V58 69) (Z79 899)   3  Asthma (857 90) (J45 909)   4   Cervical radiculopathy (723 4) (M54 12)   5  Chronic right shoulder pain (719 41,338 29) (M25 511,G89 29)   6  Depression (311) (F32 9)   7  DM2 (diabetes mellitus, type 2) (250 00) (E11 9)   8  Encounter for screening for cardiovascular disorders (V81 2) (Z13 6)   9  Esophageal reflux (530 81) (K21 9)   10  Hand paresthesia (782 0) (R20 2)   11  Hyperlipidemia (272 4) (E78 5)   12  Hypertension (401 9) (I10)   13  Low back pain (724 2) (M54 5)   14  Lumbar canal stenosis (724 02) (M48 061)   15  Lumbar radiculopathy (724 4) (M54 16)   16  Morbid or severe obesity due to excess calories (278 01) (E66 01)   17  Myalgia and myositis (729 1)   18  Myofascial pain syndrome (729 1) (M79 1)   19  Neck pain (723 1) (M54 2)   20  Need for immunization against influenza (V04 81) (Z23)   21  Opioid dependence (304 00) (F11 20)   22  Osteoporosis screening (V82 81) (Z13 820)   23  Pain of right heel (729 5) (M79 671)   24  Pain syndrome, chronic (338 4) (G89 4)   25  Sacroiliitis (720 2) (M46 1)   26  Spondylosis of lumbar region without myelopathy or radiculopathy (721 3) (M47 816)   27  Urine, incontinence, stress female (625 6) (N39 3)   28  Visit for screening mammogram (V76 12) (Z12 31)    Current Meds   1  Atorvastatin Calcium 10 MG Oral Tablet (Lipitor); TAKE 1 TABLET DAILY; Therapy: 73FZK3604 to (Silvia Velarde)  Requested for: 83BTD6874; Last   Rx:06Nov2017 Ordered   2  [employer] Vitamin D3 1000 UNIT CAPS; TAKE 1 CAPSULE BY MOUTH TWICE DAILY; Therapy: 52HEY1293 to (Evaluate:73Vzx2821)  Requested for: 40AUH9131; Last   Rx:01Yau3891 Ordered   3  EPINEPHrine 0 3 MG/0 3ML Injection Solution Auto-injector; INJECT 0 3ML   INTRAMUSCULARLY AS DIRECTED; Therapy: 74NAO1460 to (Last Rx:03Apr2017)  Requested for: 03Apr2017 Ordered   4  Glimepiride 1 MG Oral Tablet; Take 1 tablet twice daily; Therapy: 57Yvi1368 to (Evaluate:14Nov2017)  Requested for: 05GVC8423; Last   Rx:42Mkv6710 Ordered   5   Lisinopril-Hydrochlorothiazide 20-25 MG Oral Tablet; TAKE 1 TABLET DAILY; Therapy: 84Uer3875 to (Evaluate:03Sep2018)  Requested for: 73FWR2011; Last   Rx:99Yxk4366 Ordered   6  Omeprazole 20 MG Oral Capsule Delayed Release; Therapy: 87MYJ1565 to (Last Ganga Karen)  Requested for: 84SDP7556 Ordered   7  OneTouch Ultra Blue In [employer]; test twice daily; Therapy: 64XCZ4411 to (Evaluate:12Nov2017)  Requested for: 46Bvi5883; Last   Rx:26Apr2017 Ordered   8  TiZANidine HCl - 4 MG Oral Capsule; TAKE 1 CAPSULE 3 times daily PRN MUSCLE   SPASMS; Therapy: 78WSC3770 to (Evaluate:07Jan2018)  Requested for: 81SBN3585; Last   Rx:68Tco8485 Ordered   9  Topiramate 50 MG Oral Tablet; TAKE 2 TABLETS TWICE DAILY; Therapy: 39JNW8988 to (Evaluate:05Jan2018)  Requested for: 84JUY8325; Last   Rx:06Nov2017 Ordered   10  Venlafaxine HCl ER 75 MG Oral Capsule Extended Release 24 Hour (Effexor XR); TAKE    1 CAPSULE DAILY; Therapy: 45IPZ4282 to [phone])  Requested for: 31IJN9551; Last    Rx:06Nov2017 Ordered   11  Vitamin D 1000 UNIT CAPS; Take 1 capsule twice daily; Therapy: 16Cnz2308 to (Last Rx:12Nov2014)  Requested for: 65WDM6690 Ordered    Allergies    1  Motrin TABS   2  Penicillins   3  MetFORMIN HCl TABS   4  Nortriptyline HCl CAPS   5  Tradjenta TABS   6  Aleve TABS   7  Augmentin TABS   8  Sulfa Drugs    9   Bee sting    Signatures   Electronically signed by : Justino Moseley, ; Dec 21 2017  8:01AM EST                       (Author) negative - not suicidal, no depression

## 2020-06-12 ENCOUNTER — TELEPHONE (OUTPATIENT)
Dept: PAIN MEDICINE | Facility: CLINIC | Age: 65
End: 2020-06-12

## 2020-06-12 ENCOUNTER — TELEPHONE (OUTPATIENT)
Dept: RADIOLOGY | Facility: CLINIC | Age: 65
End: 2020-06-12

## 2020-06-12 ENCOUNTER — TELEMEDICINE (OUTPATIENT)
Dept: PAIN MEDICINE | Facility: CLINIC | Age: 65
End: 2020-06-12
Payer: MEDICARE

## 2020-06-12 DIAGNOSIS — G89.29 CHRONIC BILATERAL LOW BACK PAIN WITHOUT SCIATICA: ICD-10-CM

## 2020-06-12 DIAGNOSIS — M54.16 LUMBAR RADICULOPATHY: Primary | ICD-10-CM

## 2020-06-12 DIAGNOSIS — M54.50 CHRONIC BILATERAL LOW BACK PAIN WITHOUT SCIATICA: ICD-10-CM

## 2020-06-12 DIAGNOSIS — M79.18 MYOFASCIAL PAIN SYNDROME: ICD-10-CM

## 2020-06-12 DIAGNOSIS — G89.4 CHRONIC PAIN SYNDROME: ICD-10-CM

## 2020-06-12 DIAGNOSIS — M62.838 MUSCLE SPASM: ICD-10-CM

## 2020-06-12 PROCEDURE — 99442 PR PHYS/QHP TELEPHONE EVALUATION 11-20 MIN: CPT | Performed by: NURSE PRACTITIONER

## 2020-06-12 RX ORDER — TOPIRAMATE 100 MG/1
100 TABLET, FILM COATED ORAL 2 TIMES DAILY
Qty: 180 TABLET | Refills: 1 | Status: SHIPPED | OUTPATIENT
Start: 2020-06-12 | End: 2020-07-22 | Stop reason: SDUPTHER

## 2020-06-12 RX ORDER — TIZANIDINE 2 MG/1
2 TABLET ORAL EVERY 8 HOURS PRN
Qty: 270 TABLET | Refills: 1 | Status: SHIPPED | OUTPATIENT
Start: 2020-06-12 | End: 2020-07-22 | Stop reason: SDUPTHER

## 2020-07-08 ENCOUNTER — HOSPITAL ENCOUNTER (OUTPATIENT)
Dept: RADIOLOGY | Facility: CLINIC | Age: 65
Discharge: HOME | End: 2020-07-08
Attending: ANESTHESIOLOGY | Admitting: ANESTHESIOLOGY
Payer: MEDICARE

## 2020-07-08 VITALS
DIASTOLIC BLOOD PRESSURE: 75 MMHG | HEART RATE: 67 BPM | SYSTOLIC BLOOD PRESSURE: 127 MMHG | RESPIRATION RATE: 20 BRPM | TEMPERATURE: 98.6 F | OXYGEN SATURATION: 97 %

## 2020-07-08 DIAGNOSIS — M54.16 LUMBAR RADICULOPATHY: ICD-10-CM

## 2020-07-08 PROCEDURE — 64484 NJX AA&/STRD TFRM EPI L/S EA: CPT | Performed by: ANESTHESIOLOGY

## 2020-07-08 PROCEDURE — 64483 NJX AA&/STRD TFRM EPI L/S 1: CPT | Performed by: ANESTHESIOLOGY

## 2020-07-08 RX ORDER — PAPAVERINE HCL 150 MG
20 CAPSULE, EXTENDED RELEASE ORAL ONCE
Status: COMPLETED | OUTPATIENT
Start: 2020-07-08 | End: 2020-07-08

## 2020-07-08 RX ORDER — LIDOCAINE HYDROCHLORIDE 10 MG/ML
5 INJECTION, SOLUTION EPIDURAL; INFILTRATION; INTRACAUDAL; PERINEURAL ONCE
Status: COMPLETED | OUTPATIENT
Start: 2020-07-08 | End: 2020-07-08

## 2020-07-08 RX ADMIN — DEXAMETHASONE SODIUM PHOSPHATE 15 MG: 10 INJECTION, SOLUTION INTRAMUSCULAR; INTRAVENOUS at 11:33

## 2020-07-08 RX ADMIN — LIDOCAINE HYDROCHLORIDE 2 ML: 20 INJECTION, SOLUTION EPIDURAL; INFILTRATION; INTRACAUDAL; PERINEURAL at 11:33

## 2020-07-08 RX ADMIN — LIDOCAINE HYDROCHLORIDE 4 ML: 10 INJECTION, SOLUTION EPIDURAL; INFILTRATION; INTRACAUDAL; PERINEURAL at 11:28

## 2020-07-08 RX ADMIN — IOHEXOL 2 ML: 300 INJECTION, SOLUTION INTRAVENOUS at 11:31

## 2020-07-08 NOTE — H&P
History of Present Illness: The patient is a 63 y o  female who presents with complaints of low back and right leg pain      Patient Active Problem List   Diagnosis    Chronic low back pain    Chronic pain syndrome    Type 2 diabetes mellitus without complication, without long-term current use of insulin (HCC)    Morbid obesity (HCC)    HTN (hypertension)    Depression    Myofascial pain syndrome    Sacroiliitis (HCC)    Lumbar spinal stenosis    Hip flexor tightness, left    Hip flexor tendon tightness, right    Hypovitaminosis D    ESR raised    Asthma, mild intermittent    Lumbar radiculopathy    [facility] dependence ([facility])    Current every day smoker    Neurogenic claudication due to lumbar spinal stenosis    CRP elevated    Back muscle spasm    Cervical radiculopathy    Spinal cord stimulator status       Past Medical History:   Diagnosis Date    Allergic reaction to bee sting     last assessed - 13Jun2016    Asthma     [facility]     Chronic pain syndrome     Depression     Diabetes mellitus ([facility]     Esophageal reflux     Hyperlipidemia     Hypertension     Lumbar radiculopathy     Lyme disease     last assessed - 28Jun2016    Obesity     Opioid dependence ([facility]     PPD positive     had treatment w/ INH 15 years ago    Vitamin D deficiency        Past Surgical History:   Procedure Laterality Date    APPENDECTOMY      CHOLECYSTECTOMY      JOINT REPLACEMENT      R knee, B/l total hip    [place] REVISE/REMOVE SPINAL NEUROSTIM/ Left 12/13/2016    Procedure: REPLACEMENT BUTTOCK SPINAL CORD STIMULATOR IPG;  Surgeon: Jim Desai MD;  [facility];  Service: Neurosurgery    SHOULDER SURGERY      Resolved - 1992    SPINAL CORD STIMULATOR IMPLANT      spinal stereotaxis stimulation of cord    TOTAL HIP ARTHROPLASTY Bilateral          Current Outpatient Medications:     atorvastatin (LIPITOR) 10 mg tablet, Take 1 tablet (10 mg total) by mouth daily taks in am, Disp: 30 tablet, Rfl: 5    Blood Glucose Monitoring Suppl (ONE TOUCH ULTRA MINI) w/Device KIT, by Does not apply route daily, Disp: 1 each, Rfl: 0    Cholecalciferol (VITAMIN D3) 26856 units CAPS, Take 1 capsule by mouth once a week, Disp: , Rfl: 11    EPINEPHrine (EPIPEN) 0 3 mg/0 3 mL SOAJ, Inject 0 3 mL (0 3 mg total) into a muscle as needed for anaphylaxis, Disp: 2 each, Rfl: 5    glucose blood (ACCU-CHEK XI PLUS) test strip, CHECK BLOOD SUGAR TWICE DAILY      DX CODE E11 9, Disp: 100 each, Rfl: 6    glucose blood (ONE TOUCH ULTRA TEST) test strip, Test blood sugar once a day, Disp: 100 each, Rfl: 5    lisinopril (ZESTRIL) 20 mg tablet, Take 1 tablet (20 mg total) by mouth daily, Disp: 90 tablet, Rfl: 0    Magnesium 400 MG CAPS, Take by mouth, Disp: , Rfl:     meloxicam (MOBIC) 15 mg tablet, Take 1 tablet (15 mg total) by mouth daily, Disp: 90 tablet, Rfl: 3    mometasone (NASONEX) 50 mcg/act nasal spray, 2 sprays into each nostril daily, Disp: 1 Act, Rfl: 1    Nerve Stimulator (STANDARD TENS) ISAEL, by Does not apply route 4 (four) times a day as needed (MUSCLE SPASMS), Disp: 1 Device, Rfl: 0    tiZANidine (ZANAFLEX) 2 mg tablet, Take 1 tablet (2 mg total) by mouth every 8 (eight) hours as needed for muscle spasms, Disp: 270 tablet, Rfl: 1    topiramate (TOPAMAX) 100 mg tablet, Take 1 tablet (100 mg total) by mouth 2 (two) times a day, Disp: 180 tablet, Rfl: 1    venlafaxine (EFFEXOR-XR) 75 mg 24 hr capsule, Take 1 capsule (75 mg total) by mouth daily, Disp: 90 capsule, Rfl: 1    Allergies   Allergen Reactions    Bee Venom Anaphylaxis    Other Other (See Comments)     Stainless steel and surgical steel  *Severe blistering*    Aleve [Naproxen Sodium] Hives    Augmentin [Amoxicillin-Pot Clavulanate] Hives    Gabapentin     Glyburide     Metformin And Related Hives    Morphine And Related Hives    Motrin [Ibuprofen] Hives    Nortriptyline Hives    Penicillins Hives    Soy Lecithin [Lecithin] Hives    Sulfa Antibiotics Hives    Tradjenta [Linagliptin] Hives    Tramadol        Physical Exam:   Vitals:    07/08/20 1111   BP: 123/82   Pulse: 64   Resp: 20   Temp: 98 6 °F (37 °C)   SpO2: 98%     General: Awake, Alert, Oriented x 3, Mood and affect appropriate  Respiratory: Respirations even and unlabored  Cardiovascular: Peripheral pulses intact; no edema  Musculoskeletal Exam:  Right lumbar paraspinals tender to palpation  ASA Score: 3    Patient/Chart Verification  Patient ID Verified: Verbal  ID Band Applied: No  Consents Confirmed: Procedural  H&P( within 30 days) Verified: To be obtained in the Pre-Procedure area  Interval H&P(within 24 hr) Complete (required for Outpatients and Surgery Admit only): To be obtained in the Pre-Procedure area  Allergies Reviewed: Yes  Anticoag/NSAID held?: No  Currently on antibiotics?: No    Assessment:   1   Lumbar radiculopathy        Plan: Right L4 and L5 TFESI

## 2020-07-08 NOTE — DISCHARGE INSTR - LAB
Epidural Steroid Injection   WHAT YOU NEED TO KNOW:   An epidural steroid injection (SHERLY) is a procedure to inject steroid medicine into the epidural space  The epidural space is between your spinal cord and vertebrae  Steroids reduce inflammation and fluid buildup in your spine that may be causing pain  You may be given pain medicine along with the steroids  ACTIVITY  · Do not drive or operate machinery today  · No strenuous activity today - bending, lifting, etc   · You may resume normal activites starting tomorrow - start slowly and as tolerated  · You may shower today, but no tub baths or hot tubs  · You may have numbness for several hours from the local anesthetic  Please use caution and common sense, especially with weight-bearing activities  CARE OF THE INJECTION SITE  · If you have soreness or pain, apply ice to the area today (20 minutes on/20 minutes off)  · Starting tomorrow, you may use warm, moist heat or ice if needed  · You may have an increase or change in your discomfort for 36-48 hours after your treatment  · Apply ice and continue with any pain medication you have been prescribed  · Notify [facility] if you have any of the following: redness, drainage, swelling, headache, stiff neck or fever above 100°F     SPECIAL INSTRUCTIONS  · Our office will contact you in approximately 7 days for a progress report  MEDICATIONS  · Continue to take all routine medications  · Our office may have instructed you to hold some medications  If you have a problem specifically related to your procedure, please call our office at [phone]  Problems not related to your procedure should be directed to your primary care physician

## 2020-07-15 ENCOUNTER — TELEPHONE (OUTPATIENT)
Dept: PAIN MEDICINE | Facility: CLINIC | Age: 65
End: 2020-07-15

## 2020-07-22 ENCOUNTER — OFFICE VISIT (OUTPATIENT)
Dept: INTERNAL MEDICINE CLINIC | Age: 65
End: 2020-07-22
Payer: MEDICARE

## 2020-07-22 VITALS
HEIGHT: 65 IN | OXYGEN SATURATION: 97 % | SYSTOLIC BLOOD PRESSURE: 110 MMHG | BODY MASS INDEX: 42.88 KG/M2 | HEART RATE: 66 BPM | TEMPERATURE: 98.4 F | WEIGHT: 257.4 LBS | DIASTOLIC BLOOD PRESSURE: 70 MMHG

## 2020-07-22 DIAGNOSIS — E66.01 MORBID OBESITY (HCC): ICD-10-CM

## 2020-07-22 DIAGNOSIS — F33.41 RECURRENT MAJOR DEPRESSIVE DISORDER, IN PARTIAL REMISSION (HCC): ICD-10-CM

## 2020-07-22 DIAGNOSIS — E78.5 HYPERLIPIDEMIA, UNSPECIFIED HYPERLIPIDEMIA TYPE: ICD-10-CM

## 2020-07-22 DIAGNOSIS — I10 ESSENTIAL HYPERTENSION: ICD-10-CM

## 2020-07-22 DIAGNOSIS — G89.4 CHRONIC PAIN SYNDROME: ICD-10-CM

## 2020-07-22 DIAGNOSIS — E11.21 TYPE 2 DIABETES MELLITUS WITH DIABETIC NEPHROPATHY, WITHOUT LONG-TERM CURRENT USE OF INSULIN (HCC): ICD-10-CM

## 2020-07-22 DIAGNOSIS — F17.200 CURRENT EVERY DAY SMOKER: ICD-10-CM

## 2020-07-22 DIAGNOSIS — E11.9 TYPE 2 DIABETES MELLITUS WITHOUT COMPLICATION, WITHOUT LONG-TERM CURRENT USE OF INSULIN (HCC): Primary | ICD-10-CM

## 2020-07-22 DIAGNOSIS — G89.29 CHRONIC BILATERAL LOW BACK PAIN WITHOUT SCIATICA: ICD-10-CM

## 2020-07-22 DIAGNOSIS — M62.838 MUSCLE SPASM: ICD-10-CM

## 2020-07-22 DIAGNOSIS — M46.1 SACROILIITIS (HCC): ICD-10-CM

## 2020-07-22 DIAGNOSIS — M54.50 CHRONIC BILATERAL LOW BACK PAIN WITHOUT SCIATICA: ICD-10-CM

## 2020-07-22 PROBLEM — R79.82 CRP ELEVATED: Status: RESOLVED | Noted: 2018-09-11 | Resolved: 2020-07-22

## 2020-07-22 PROBLEM — R70.0 ESR RAISED: Status: RESOLVED | Noted: 2018-04-12 | Resolved: 2020-07-22

## 2020-07-22 PROCEDURE — 4010F ACE/ARB THERAPY RXD/TAKEN: CPT | Performed by: INTERNAL MEDICINE

## 2020-07-22 PROCEDURE — 3078F DIAST BP <80 MM HG: CPT | Performed by: INTERNAL MEDICINE

## 2020-07-22 PROCEDURE — 3044F HG A1C LEVEL LT 7.0%: CPT | Performed by: INTERNAL MEDICINE

## 2020-07-22 PROCEDURE — 3008F BODY MASS INDEX DOCD: CPT | Performed by: INTERNAL MEDICINE

## 2020-07-22 PROCEDURE — 99214 OFFICE O/P EST MOD 30 MIN: CPT | Performed by: INTERNAL MEDICINE

## 2020-07-22 PROCEDURE — 3066F NEPHROPATHY DOC TX: CPT | Performed by: INTERNAL MEDICINE

## 2020-07-22 PROCEDURE — 3074F SYST BP LT 130 MM HG: CPT | Performed by: INTERNAL MEDICINE

## 2020-07-22 RX ORDER — LISINOPRIL 20 MG/1
20 TABLET ORAL DAILY
Qty: 90 TABLET | Refills: 0 | Status: SHIPPED | OUTPATIENT
Start: 2020-07-22 | End: 2021-01-12

## 2020-07-22 RX ORDER — TIZANIDINE 2 MG/1
2 TABLET ORAL EVERY 8 HOURS PRN
Qty: 270 TABLET | Refills: 1 | Status: SHIPPED | OUTPATIENT
Start: 2020-07-22 | End: 2021-11-01 | Stop reason: SDUPTHER

## 2020-07-22 RX ORDER — TOPIRAMATE 100 MG/1
100 TABLET, FILM COATED ORAL 2 TIMES DAILY
Qty: 180 TABLET | Refills: 1 | Status: SHIPPED | OUTPATIENT
Start: 2020-07-22 | End: 2021-02-10

## 2020-07-22 RX ORDER — VENLAFAXINE HYDROCHLORIDE 75 MG/1
75 CAPSULE, EXTENDED RELEASE ORAL DAILY
Qty: 90 CAPSULE | Refills: 1 | Status: SHIPPED | OUTPATIENT
Start: 2020-07-22 | End: 2021-02-10

## 2020-07-22 RX ORDER — MELOXICAM 15 MG/1
15 TABLET ORAL DAILY
Qty: 90 TABLET | Refills: 3 | Status: SHIPPED | OUTPATIENT
Start: 2020-07-22 | End: 2021-05-11 | Stop reason: SDUPTHER

## 2020-07-22 RX ORDER — ATORVASTATIN CALCIUM 10 MG/1
10 TABLET, FILM COATED ORAL DAILY
Qty: 30 TABLET | Refills: 5 | Status: SHIPPED | OUTPATIENT
Start: 2020-07-22 | End: 2021-05-11 | Stop reason: SDUPTHER

## 2020-07-22 NOTE — PATIENT INSTRUCTIONS
Basic Carbohydrate Counting   AMBULATORY CARE:   Carbohydrate counting  is a way to plan your meals by counting the amount of carbohydrate in foods  Carbohydrates are the sugars, starches, and fiber found in fruit, grains, vegetables, and milk products  Carbohydrates increase your blood sugar levels  Carbohydrate counting can help you eat the right amount of carbohydrate to keep your blood sugar levels under control  What you need to know about planning meals using carbohydrate counting:  · A dietitian or healthcare provider will help you develop a healthy meal plan that works best for you  You will be taught how much carbohydrate to eat or drink for each meal and snack  Your meal plan will be based on your age, weight, usual food intake, and physical activity level  If you have diabetes, it will also include your blood sugar levels and diabetes medicine  Once you know how much carbohydrate you should eat, you can decide what type of food you want to eat  · You will need to know what foods contain carbohydrate and how much they contain  Keep track of the amount of carbohydrate in meals and snacks in order to follow your meal plan  Do not avoid carbohydrates or skip meals  Your blood sugar may fall too low if you do not eat enough carbohydrate or you skip meals  Foods that contain carbohydrate:   · Breads:  Each serving of food listed below contains about 15 g of carbohydrate   ¨ 1 slice of bread (1 ounce) or 1 flour or corn tortilla (6 inch)    ¨ ½ of a hamburger bun or ¼ of a large bagel (about 1 ounce)    ¨ 1 pancake (about 4 inches across and ¼ inch thick)    · Cereals and grains:  Serving sizes of ready-to-eat cereals vary  Look at the serving size and the total carbohydrate amount listed on the food label  Each serving of food listed below contains about 15 g of carbohydrate       ¨ ¾ cup of dry, unsweetened, ready-to-eat cereal or ¼ cup of low-fat granola     ¨ ½ cup of oatmeal or other cooked cereal ¨ ? cup of cooked rice or pasta    · Starchy vegetables and beans:  Each serving of food listed below contains about 15 g of carbohydrate   ¨ ½ cup of corn, green peas, sweet potatoes, or mashed potatoes    ¨ ¼ of a large baked potato    ¨ ½ cup of beans, lentils, and peas (garbanzo, delgado, kidney, white, split, black-eyed)    · Crackers and snacks:  Each serving of food listed below contains about 15 g of carbohydrate   ¨ 3 palma cracker squares or 8 animal crackers     ¨ 6 saltine-type crackers    ¨ 3 cups of popcorn or ¾ ounce of pretzels, potato chips, or tortilla chips    · Fruit:  Each serving of food listed below contains about 15 g of carbohydrate   ¨ 1 small (4 ounce) piece of fresh fruit or ¾ to 1 cup of fresh fruit    ¨ ½ cup of canned or frozen fruit, packed in natural juice    ¨ ½ cup (4 ounces) of unsweetened fruit juice    ¨ 2 tablespoons of dried fruit    · Desserts or sugary foods:  Each serving of food listed below contains about 15 g of carbohydrate   ¨ 2-inch square unfrosted cake or brownie     ¨ 2 small cookies    ¨ ½ cup of ice cream, frozen yogurt, or nondairy frozen yogurt    ¨ ¼ cup of sherbet or sorbet    ¨ 1 tablespoon of regular syrup, jam, or jelly    ¨ 2 tablespoons of light syrup    · Milk and yogurt:  Foods from the milk group contain about 12 g of carbohydrate per serving  ¨ 1 cup of fat-free or low-fat milk    ¨ 1 cup of soy milk    ¨ ? cup of fat-free, yogurt sweetened with artificial sweetener    · Non-starchy vegetables:  Each serving contains about 5 g of carbohydrate   Three servings of non-starch vegetables count as 1 carbohydrate serving  ¨ ½ cup of cooked vegetables or 1 cup of raw vegetables  This includes beets, broccoli, cabbage, cauliflower, cucumber, mushrooms, tomatoes, and zucchini    ¨ ½ cup of vegetable juice  How to use carbohydrate counting to plan meals:   · Count carbohydrate amounts using serving sizes:      ¨ Pasta dinner example:   You plan to have pasta, tossed salad, and an 8-ounce glass of milk  Your healthcare provider tells you that you may have 4 carbohydrate servings for dinner  One carbohydrate serving of pasta is ? cup  One cup of pasta will equal 3 carbohydrate servings  An 8-ounce glass of milk will count as 1 carbohydrate serving  These amounts of food would equal 4 carbohydrate servings  One cup of tossed salad does not count toward your carbohydrate servings  · Count carbohydrate amounts using food labels:  Find the total amount of carbohydrate in a packaged food by reading the food label  Food labels tell you the serving size of the food and the total carbohydrate amount in each serving  Find the serving size on the food label and then decide how many servings you will eat  Multiply the number of servings you plan to eat by the carbohydrate amount per serving  ¨ Granola bar snack example: Your meal plan allows you to have 2 carbohydrate servings (30 grams) of carbohydrate for a snack  You plan to eat 1 package of granola bars, which contains 2 bars  According to the food label, the serving size of food in this package is 1 bar  Each serving (1 bar) contains 25 grams of carbohydrate  The total amount of carbohydrate in this package of granola bars would be 50 g  Based on your meal plan, you should eat only 1 bar  Follow up with your healthcare provider as directed:  Write down your questions so you remember to ask them during your visits  © 2017 [employer] Information is for End User's use only and may not be sold, redistributed or otherwise used for commercial purposes  All illustrations and images included in CareNotes® are the copyrighted property of [employer] , [employer]  or [employer]  The above information is an  only  It is not intended as medical advice for individual conditions or treatments   Talk to your doctor, nurse or pharmacist before following any medical regimen to see if it is safe and effective for you

## 2020-07-22 NOTE — ASSESSMENT & PLAN NOTE
Despite not losing weight she continues to keep her blood sugars well controlled with diet  Lab Results   Component Value Date    HGBA1C 5 7 (H) 03/24/2020

## 2020-07-22 NOTE — PROGRESS NOTES
Assessment/Plan:    Type 2 diabetes mellitus without complication, without long-term current use of insulin ([facility] [place] [address] )  Despite not losing weight she continues to keep her blood sugars well controlled with diet  Lab Results   Component Value Date    HGBA1C 5 7 (H) 03/24/2020       HTN (hypertension)  Likewise blood pressure is well controlled with just lisinopril    Sacroiliitis ([facility] [place] [address] )  She continues to have chronic low back pain and sacroiliitis and follows with pain management    Morbid obesity ([facility] )  She continues to be morbidly obese  She does not exercise on a diet really believe she watches her diet    Depression  She states she does not feel depressed at all a more but likes taking the Effexor for her mood       Diagnoses and all orders for this visit:    Type 2 diabetes mellitus without complication, without long-term current use of insulin ([facility] )  -     Hemoglobin A1C; Future    Essential hypertension  -     lisinopril (ZESTRIL) 20 mg tablet; Take 1 tablet (20 mg total) by mouth daily    Morbid obesity (HCC)    Recurrent major depressive disorder, in partial remission ([facility])  -     venlafaxine (EFFEXOR-XR) 75 mg 24 hr capsule; Take 1 capsule (75 mg total) by mouth daily    Current every day smoker    Type 2 diabetes mellitus with diabetic nephropathy, without long-term current use of insulin ([facility])    Sacroiliitis ([facility])  -     meloxicam (MOBIC) 15 mg tablet; Take 1 tablet (15 mg total) by mouth daily    Muscle spasm  -     tiZANidine (ZANAFLEX) 2 mg tablet; Take 1 tablet (2 mg total) by mouth every 8 (eight) hours as needed for muscle spasms    Hyperlipidemia, unspecified hyperlipidemia type  -     atorvastatin (LIPITOR) 10 mg tablet; Take 1 tablet (10 mg total) by mouth daily taks in am    Chronic bilateral low back pain without sciatica  -     topiramate (TOPAMAX) 100 mg tablet; Take 1 tablet (100 mg total) by mouth 2 (two) times a day    Chronic pain syndrome  -     topiramate (TOPAMAX) 100 mg tablet;  Take 1 tablet (100 mg total) by mouth 2 (two) times a day          Subjective:      Patient ID: Lynsey Paulino is a 63 y o  female  HPI        Review of Systems   Constitutional: Negative for chills, fatigue, fever and unexpected weight change  HENT: Negative for congestion, ear pain, hearing loss, postnasal drip, sinus pressure, sore throat, trouble swallowing and voice change  Eyes: Negative for visual disturbance  Respiratory: Negative for cough, chest tightness, shortness of breath and wheezing  Cardiovascular: Negative for chest pain, palpitations and leg swelling  Gastrointestinal: Negative for abdominal distention, abdominal pain, anal bleeding, blood in stool, constipation, diarrhea and nausea  Endocrine: Negative for cold intolerance, polydipsia, polyphagia and polyuria  Genitourinary: Negative for dysuria, flank pain, frequency, hematuria and urgency  Musculoskeletal: Positive for back pain and gait problem  Negative for arthralgias, joint swelling, myalgias and neck pain  Skin: Negative for rash  Allergic/Immunologic: Negative for immunocompromised state  Neurological: Negative for dizziness, syncope, facial asymmetry, weakness, light-headedness, numbness and headaches  Hematological: Negative for adenopathy  Psychiatric/Behavioral: Negative for confusion, sleep disturbance and suicidal ideas  Objective:      /70 (BP Location: Left arm, Patient Position: Sitting)   Pulse 66   Temp 98 4 °F (36 9 °C) (Tympanic)   Ht 5' 5" (1 651 m)   Wt 117 kg (257 lb 6 4 oz)   LMP  (LMP Unknown)   SpO2 97%   BMI 42 83 kg/m²          Physical Exam   Constitutional: She is oriented to person, place, and time  She appears well-developed and well-nourished  No distress  Morbid obesity   HENT:   Right Ear: External ear normal    Left Ear: External ear normal    Nose: Nose normal    Mouth/Throat: Oropharynx is clear and moist  No oropharyngeal exudate     Edentulous   Eyes: Pupils are equal, round, and reactive to light  EOM are normal    Neck: Normal range of motion  Neck supple  No JVD present  No thyromegaly present  Cardiovascular: Normal rate, regular rhythm, normal heart sounds and intact distal pulses  Exam reveals no gallop  No murmur heard  Pulmonary/Chest: Effort normal and breath sounds normal  No respiratory distress  She has no wheezes  She has no rales  Abdominal: Soft  Bowel sounds are normal  She exhibits no distension and no mass  There is no tenderness  Musculoskeletal: Normal range of motion  She exhibits edema (Trace)  She exhibits no tenderness  Wide-based gait   Lymphadenopathy:     She has no cervical adenopathy  Neurological: She is alert and oriented to person, place, and time  No cranial nerve deficit  Coordination normal    Skin: No rash noted  Psychiatric: She has a normal mood and affect  Her behavior is normal  Judgment and thought content normal    Vitals reviewed

## 2020-07-22 NOTE — ASSESSMENT & PLAN NOTE
She continues to be morbidly obese    She does not exercise on a diet really believe she watches her diet

## 2020-09-10 ENCOUNTER — OFFICE VISIT (OUTPATIENT)
Dept: PAIN MEDICINE | Facility: CLINIC | Age: 65
End: 2020-09-10
Payer: MEDICARE

## 2020-09-10 VITALS
TEMPERATURE: 98.3 F | SYSTOLIC BLOOD PRESSURE: 131 MMHG | BODY MASS INDEX: 42.82 KG/M2 | HEART RATE: 65 BPM | HEIGHT: 65 IN | WEIGHT: 257 LBS | DIASTOLIC BLOOD PRESSURE: 76 MMHG

## 2020-09-10 DIAGNOSIS — M48.061 SPINAL STENOSIS OF LUMBAR REGION, UNSPECIFIED WHETHER NEUROGENIC CLAUDICATION PRESENT: ICD-10-CM

## 2020-09-10 DIAGNOSIS — Z96.89 SPINAL CORD STIMULATOR STATUS: ICD-10-CM

## 2020-09-10 DIAGNOSIS — M47.816 LUMBAR SPONDYLOSIS: ICD-10-CM

## 2020-09-10 DIAGNOSIS — G89.4 CHRONIC PAIN SYNDROME: ICD-10-CM

## 2020-09-10 DIAGNOSIS — M48.062 NEUROGENIC CLAUDICATION DUE TO LUMBAR SPINAL STENOSIS: ICD-10-CM

## 2020-09-10 DIAGNOSIS — M54.16 LUMBAR RADICULOPATHY: Primary | ICD-10-CM

## 2020-09-10 PROCEDURE — 99214 OFFICE O/P EST MOD 30 MIN: CPT | Performed by: ANESTHESIOLOGY

## 2020-09-10 NOTE — PROGRESS NOTES
Assessment  1  Lumbar radiculopathy    2  Spinal stenosis of lumbar region, unspecified whether neurogenic claudication present    3  Neurogenic claudication due to lumbar spinal stenosis    4  Chronic pain syndrome    5  Spinal cord stimulator status    6  Lumbar spondylosis        Plan  79-year-old female with a history of chronic pain syndrome, lumbar degenerative disc disease, spondylosis, stenosis, lumbar radiculopathy, status post Abbott spinal cord stimulator placement returning for follow-up  Patient continues to complain of low back pain with radiculopathy in the right lower extremity  The patient is status post right L4 and L5 TFESI July 8, 2020 with approximately 25% relief  She continues to take Topamax 100 mg b i d , tizanidine 2 mg b i d  P r n , and meloxicam 15 mg daily with approximately 20% relief  She denies any side effects of the medication  1  I will schedule the patient for right L3 and L4 TFESI to reduce the inflammatory component of her pain  2  The patient may continue with Topamax, tizanidine, and meloxicam as prescribed  No refills were required at today's office visit  3  Patient will continue with her home exercise program  4  May consider reprogramming spinal cord stimulator pending results of TFESI  5  I will follow up the patient in 4 weeks       Complete risks and benefits including bleeding, infection, tissue reaction, nerve injury and allergic reaction were discussed  The approach was demonstrated using models and literature was provided  Verbal and written consent was obtained  My impressions and treatment recommendations were discussed in detail with the patient who verbalized understanding and had no further questions  Discharge instructions were provided  I personally saw and examined the patient and I agree with the above discussed plan of care  No orders of the defined types were placed in this encounter      No orders of the defined types were placed in this encounter  History of Present Illness    Nirav Yi is a 63 y o  female with a history of chronic pain syndrome, lumbar degenerative disc disease, spondylosis, stenosis, lumbar radiculopathy, status post Abbott spinal cord stimulator placement returning for follow-up  Patient continues to complain of low back pain with radiculopathy in the right lower extremity  She denies any left lower extremity symptoms, bladder bowel incontinence, or saddle anesthesia  She does occasionally get weakness in the right lower extremity which actually led to a fall recently  The patient is status post right L4 and L5 TFESI July 8, 2020 with approximately 25% relief  She continues to take Topamax 100 mg b i d , tizanidine 2 mg b i d  P r n , and meloxicam 15 mg daily with approximately 20% relief  She denies any side effects of the medication  The patient rates her pain as 7/10 on the pain is constant  The pain does not follow any particular pattern throughout the day  The pain is described as pressure-like and numbness  The pain is worse with standing, walking, bending, and exercise  The pain is alleviated with sitting, relaxation, and lying down  Other than as stated above, the patient denies any interval changes in medications, medical condition, mental condition, symptoms, or allergies since the last office visit  I have personally reviewed and/or updated the patient's past medical history, past surgical history, family history, social history, current medications, allergies, and vital signs today  Review of Systems   Constitutional: Negative for fever and unexpected weight change  HENT: Negative for trouble swallowing  Eyes: Negative for visual disturbance  Respiratory: Negative for shortness of breath and wheezing  Cardiovascular: Negative for chest pain and palpitations  Gastrointestinal: Negative for constipation, diarrhea, nausea and vomiting     Endocrine: Negative for cold intolerance, heat intolerance and polydipsia  Genitourinary: Negative for difficulty urinating and frequency  Musculoskeletal: Positive for gait problem and joint swelling  Negative for arthralgias and myalgias  Decreased ROM, pain in extremity   Skin: Negative for rash  Neurological: Negative for dizziness, seizures, syncope, weakness and headaches  Hematological: Does not bruise/bleed easily  Psychiatric/Behavioral: Negative for dysphoric mood  All other systems reviewed and are negative        Patient Active Problem List   Diagnosis    Chronic low back pain    Chronic pain syndrome    Type 2 diabetes mellitus without complication, without long-term current use of insulin (HCC)    Morbid obesity (HCC)    HTN (hypertension)    Depression    Sacroiliitis (HCC)    Lumbar spinal stenosis    Hypovitaminosis D    Asthma, mild intermittent    Current every day smoker    Neurogenic claudication due to lumbar spinal stenosis    Back muscle spasm    Cervical radiculopathy    Spinal cord stimulator status       Past Medical History:   Diagnosis Date    Allergic reaction to bee sting     last assessed - [id number]    Asthma     [facility])     Chronic pain syndrome     Depression     Diabetes mellitus ([facility] [address] )     Esophageal reflux     Hyperlipidemia     Hypertension     Lumbar radiculopathy     Lyme disease     last assessed - [id number]    Obesity     [facility]     PPD positive     had treatment w/ INH 15 years ago    Vitamin D deficiency        Past Surgical History:   Procedure Laterality Date    APPENDECTOMY      CHOLECYSTECTOMY      JOINT REPLACEMENT      R knee, B/l total hip    [place] REVISE/REMOVE SPINAL NEUROSTIM/ Left 12/13/2016    Procedure: REPLACEMENT BUTTOCK SPINAL CORD STIMULATOR IPG;  Surgeon: Mor Ho MD;  Location:  MAIN OR;  Service: Neurosurgery    SHOULDER SURGERY      Resolved - [address] STIMULATOR IMPLANT      spinal stereotaxis stimulation of cord    TOTAL HIP ARTHROPLASTY Bilateral        Family History   Problem Relation Age of Onset    Diabetes Mother         Diabetes mellitus    Cancer Mother     Cancer Father     Diabetes Maternal Grandmother     Cancer Paternal Uncle     Brain cancer Family     Breast cancer Family     Cervical cancer Family     Diabetes Family         Diabetes mellitus    Hypertension Family     Ovarian cancer Family     Stroke Family         Stroke complications       Social History     Occupational History    Not on file   Tobacco Use    Smoking status: Current Every Day Smoker     Packs/day: 1 00     Years: 5 00     Pack years: 5 00    Smokeless tobacco: Never Used    Tobacco comment: pt desires to quit in January   Substance and Sexual Activity    Alcohol use: No     Comment: Denied history of alcohol use    Drug use: No     Comment: Denied history of drug use    Sexual activity: Not on file       Current Outpatient Medications on File Prior to Visit   Medication Sig    atorvastatin (LIPITOR) 10 mg tablet Take 1 tablet (10 mg total) by mouth daily taks in am    Blood Glucose Monitoring Suppl (ONE TOUCH ULTRA MINI) w/Device KIT by Does not apply route daily    Cholecalciferol (VITAMIN D3) 99131 units CAPS Take 1 capsule by mouth once a week    EPINEPHrine (EPIPEN) 0 3 mg/0 3 mL SOAJ Inject 0 3 mL (0 3 mg total) into a muscle as needed for anaphylaxis    glucose blood (ONE TOUCH ULTRA TEST) test strip Test blood sugar once a day    lisinopril (ZESTRIL) 20 mg tablet Take 1 tablet (20 mg total) by mouth daily    meloxicam (MOBIC) 15 mg tablet Take 1 tablet (15 mg total) by mouth daily    Nerve Stimulator (STANDARD TENS) ISAEL by Does not apply route 4 (four) times a day as needed (MUSCLE SPASMS)    tiZANidine (ZANAFLEX) 2 mg tablet Take 1 tablet (2 mg total) by mouth every 8 (eight) hours as needed for muscle spasms    topiramate (TOPAMAX) 100 mg tablet Take 1 tablet (100 mg total) by mouth 2 (two) times a day    venlafaxine (EFFEXOR-XR) 75 mg 24 hr capsule Take 1 capsule (75 mg total) by mouth daily     No current facility-administered medications on file prior to visit  Allergies   Allergen Reactions    Bee Venom Anaphylaxis    Other Other (See Comments)     Stainless steel and surgical steel  *Severe blistering*    Aleve [Naproxen Sodium] Hives    Augmentin [Amoxicillin-Pot Clavulanate] Hives    Gabapentin     Glyburide     Metformin And Related Hives    Morphine And Related Hives    Motrin [Ibuprofen] Hives    Nortriptyline Hives    Penicillins Hives    Soy Lecithin [Lecithin] Hives    Sulfa Antibiotics Hives    Tradjenta [Linagliptin] Hives    Tramadol        Physical Exam    /76   Pulse 65   Temp 98 3 °F (36 8 °C) (Oral)   Ht 5' 5" (1 651 m)   Wt 117 kg (257 lb)   LMP  (LMP Unknown)   BMI 42 77 kg/m²     Constitutional: normal, well developed, well nourished, alert, in no distress and non-toxic and no overt pain behavior  Eyes: anicteric  HEENT: grossly intact  Neck: supple, symmetric, trachea midline and no masses   Pulmonary:even and unlabored  Cardiovascular:No edema or pitting edema present  Skin:Normal without rashes or lesions and well hydrated  Psychiatric:Mood and affect appropriate  Neurologic:Cranial Nerves II-XII grossly intact  Musculoskeletal:antalgic gait  Right lumbar paraspinals tender to palpation from L3-L5  Bilateral lower extremity strength 5/5 in all muscle groups  Sensation intact to light touch in L3 through S1 dermatomes bilaterally  Negative seated straight leg raise bilaterally      Imaging  Imaging reviewed

## 2020-09-23 ENCOUNTER — HOSPITAL ENCOUNTER (OUTPATIENT)
Dept: RADIOLOGY | Facility: CLINIC | Age: 65
Discharge: HOME | End: 2020-09-23
Attending: ANESTHESIOLOGY
Payer: MEDICARE

## 2020-09-23 VITALS
TEMPERATURE: 97.4 F | OXYGEN SATURATION: 95 % | RESPIRATION RATE: 20 BRPM | DIASTOLIC BLOOD PRESSURE: 83 MMHG | SYSTOLIC BLOOD PRESSURE: 147 MMHG | HEART RATE: 61 BPM

## 2020-09-23 DIAGNOSIS — M54.16 LUMBAR RADICULOPATHY: ICD-10-CM

## 2020-09-23 PROCEDURE — 64484 NJX AA&/STRD TFRM EPI L/S EA: CPT | Performed by: ANESTHESIOLOGY

## 2020-09-23 PROCEDURE — 64483 NJX AA&/STRD TFRM EPI L/S 1: CPT | Performed by: ANESTHESIOLOGY

## 2020-09-23 RX ORDER — PAPAVERINE HCL 150 MG
20 CAPSULE, EXTENDED RELEASE ORAL ONCE
Status: COMPLETED | OUTPATIENT
Start: 2020-09-23 | End: 2020-09-23

## 2020-09-23 RX ORDER — LIDOCAINE HYDROCHLORIDE 10 MG/ML
5 INJECTION, SOLUTION EPIDURAL; INFILTRATION; INTRACAUDAL; PERINEURAL ONCE
Status: COMPLETED | OUTPATIENT
Start: 2020-09-23 | End: 2020-09-23

## 2020-09-23 RX ADMIN — IOHEXOL 6 ML: 300 INJECTION, SOLUTION INTRAVENOUS at 12:02

## 2020-09-23 RX ADMIN — LIDOCAINE HYDROCHLORIDE 4 ML: 10 INJECTION, SOLUTION EPIDURAL; INFILTRATION; INTRACAUDAL; PERINEURAL at 12:00

## 2020-09-23 RX ADMIN — LIDOCAINE HYDROCHLORIDE 2 ML: 20 INJECTION, SOLUTION EPIDURAL; INFILTRATION; INTRACAUDAL; PERINEURAL at 12:03

## 2020-09-23 RX ADMIN — DEXAMETHASONE SODIUM PHOSPHATE 15 MG: 10 INJECTION, SOLUTION INTRAMUSCULAR; INTRAVENOUS at 12:04

## 2020-09-23 NOTE — H&P
History of Present Illness: The patient is a 63 y o  female who presents with complaints of low back and right leg pain      Patient Active Problem List   Diagnosis    Chronic low back pain    Chronic pain syndrome    Type 2 diabetes mellitus without complication, without long-term current use of insulin (HCC)    Morbid obesity (HCC)    HTN (hypertension)    Depression    Sacroiliitis (HCC)    Lumbar spinal stenosis    Hypovitaminosis D    Asthma, mild intermittent    Current every day smoker    Neurogenic claudication due to lumbar spinal stenosis    Back muscle spasm    Cervical radiculopathy    Spinal cord stimulator status    Lumbar radiculopathy    Lumbar spondylosis       Past Medical History:   Diagnosis Date    Allergic reaction to bee sting     last assessed - [id number]    Asthma     Chronic narcotic dependence ([facility] [place] [address] [facility]     Chronic pain syndrome     Depression     Diabetes mellitus ([facility] [place] [address] [facility]     Esophageal reflux     Hyperlipidemia     Hypertension     Lumbar radiculopathy     Lyme disease     last assessed - [id number]    Obesity     Opioid dependence ([facility] [place] [address] [facility]     PPD positive     had treatment w/ INH 15 years ago    Vitamin D deficiency        Past Surgical History:   Procedure Laterality Date    APPENDECTOMY      CHOLECYSTECTOMY      JOINT REPLACEMENT      R knee, B/l total hip    MA REVISE/REMOVE SPINAL NEUROSTIM/ Left 12/13/2016    Procedure: REPLACEMENT BUTTOCK SPINAL CORD STIMULATOR IPG;  Surgeon: Milton Melissa MD;  Location:  MAIN OR;  Service: Neurosurgery    SHOULDER SURGERY      Resolved - 1992    SPINAL CORD STIMULATOR IMPLANT      spinal stereotaxis stimulation of cord    TOTAL HIP ARTHROPLASTY Bilateral          Current Outpatient Medications:     atorvastatin (LIPITOR) 10 mg tablet, Take 1 tablet (10 mg total) by mouth daily taks in am, Disp: 30 tablet, Rfl: 5    Blood Glucose Monitoring Suppl (ONE TOUCH ULTRA MINI) w/Device KIT, by Does not apply route daily, Disp: 1 each, Rfl: 0    Cholecalciferol (VITAMIN D3) 19712 units CAPS, Take 1 capsule by mouth once a week, Disp: , Rfl: 11    EPINEPHrine (EPIPEN) 0 3 mg/0 3 mL SOAJ, Inject 0 3 mL (0 3 mg total) into a muscle as needed for anaphylaxis, Disp: 2 each, Rfl: 5    glucose blood (ONE TOUCH ULTRA TEST) test strip, Test blood sugar once a day, Disp: 100 each, Rfl: 5    lisinopril (ZESTRIL) 20 mg tablet, Take 1 tablet (20 mg total) by mouth daily, Disp: 90 tablet, Rfl: 0    meloxicam (MOBIC) 15 mg tablet, Take 1 tablet (15 mg total) by mouth daily, Disp: 90 tablet, Rfl: 3    Nerve Stimulator (STANDARD TENS) ISEAL, by Does not apply route 4 (four) times a day as needed (MUSCLE SPASMS), Disp: 1 Device, Rfl: 0    tiZANidine (ZANAFLEX) 2 mg tablet, Take 1 tablet (2 mg total) by mouth every 8 (eight) hours as needed for muscle spasms, Disp: 270 tablet, Rfl: 1    topiramate (TOPAMAX) 100 mg tablet, Take 1 tablet (100 mg total) by mouth 2 (two) times a day, Disp: 180 tablet, Rfl: 1    venlafaxine (EFFEXOR-XR) 75 mg 24 hr capsule, Take 1 capsule (75 mg total) by mouth daily, Disp: 90 capsule, Rfl: 1    Allergies   Allergen Reactions    Bee Venom Anaphylaxis    Other Other (See Comments)     Stainless steel and surgical steel  *Severe blistering*    Aleve [Naproxen Sodium] Hives    Augmentin [Amoxicillin-Pot Clavulanate] Hives    Gabapentin     Glyburide     Metformin And Related Hives    Morphine And Related Hives    Motrin [Ibuprofen] Hives    Nortriptyline Hives    Penicillins Hives    Soy Lecithin [Lecithin] Hives    Sulfa Antibiotics Hives    Tradjenta [Linagliptin] Hives    Tramadol        Physical Exam: There were no vitals filed for this visit  General: Awake, Alert, Oriented x 3, Mood and affect appropriate  Respiratory: Respirations even and unlabored  Cardiovascular: Peripheral pulses intact; no edema  Musculoskeletal Exam:  Right lumbar paraspinals tender to palpation      ASA Score: 3         Assessment:   1   Lumbar radiculopathy        Plan: Right L3 and L4 TFESI

## 2020-09-23 NOTE — DISCHARGE INSTRUCTIONS
Epidural Steroid Injection   WHAT YOU NEED TO KNOW:   An epidural steroid injection (SHERLY) is a procedure to inject steroid medicine into the epidural space  The epidural space is between your spinal cord and vertebrae  Steroids reduce inflammation and fluid buildup in your spine that may be causing pain  You may be given pain medicine along with the steroids  ACTIVITY  · Do not drive or operate machinery today  · No strenuous activity today - bending, lifting, etc   · You may resume normal activites starting tomorrow - start slowly and as tolerated  · You may shower today, but no tub baths or hot tubs  · You may have numbness for several hours from the local anesthetic  Please use caution and common sense, especially with weight-bearing activities  CARE OF THE INJECTION SITE  · If you have soreness or pain, apply ice to the area today (20 minutes on/20 minutes off)  · Starting tomorrow, you may use warm, moist heat or ice if needed  · You may have an increase or change in your discomfort for 36-48 hours after your treatment  · Apply ice and continue with any pain medication you have been prescribed  · Notify [facility] if you have any of the following: redness, drainage, swelling, headache, stiff neck or fever above 100°F     SPECIAL INSTRUCTIONS  · Our office will contact you in approximately 7 days for a progress report  MEDICATIONS  · Continue to take all routine medications  · Our office may have instructed you to hold some medications  If you have a problem specifically related to your procedure, please call our office at [phone]  Problems not related to your procedure should be directed to your primary care physician

## 2020-09-30 ENCOUNTER — TELEPHONE (OUTPATIENT)
Dept: PAIN MEDICINE | Facility: CLINIC | Age: 65
End: 2020-09-30

## 2020-10-23 ENCOUNTER — OFFICE VISIT (OUTPATIENT)
Dept: PAIN MEDICINE | Facility: CLINIC | Age: 65
End: 2020-10-23
Payer: MEDICARE

## 2020-10-23 VITALS
WEIGHT: 257 LBS | HEART RATE: 82 BPM | SYSTOLIC BLOOD PRESSURE: 163 MMHG | TEMPERATURE: 98.9 F | HEIGHT: 65 IN | DIASTOLIC BLOOD PRESSURE: 84 MMHG | BODY MASS INDEX: 42.82 KG/M2

## 2020-10-23 DIAGNOSIS — M54.12 CERVICAL RADICULOPATHY: ICD-10-CM

## 2020-10-23 DIAGNOSIS — M54.16 LUMBAR RADICULOPATHY: ICD-10-CM

## 2020-10-23 DIAGNOSIS — Z96.89 SPINAL CORD STIMULATOR STATUS: ICD-10-CM

## 2020-10-23 DIAGNOSIS — G89.29 CHRONIC LOW BACK PAIN WITH SCIATICA, SCIATICA LATERALITY UNSPECIFIED, UNSPECIFIED BACK PAIN LATERALITY: ICD-10-CM

## 2020-10-23 DIAGNOSIS — M48.061 SPINAL STENOSIS OF LUMBAR REGION, UNSPECIFIED WHETHER NEUROGENIC CLAUDICATION PRESENT: ICD-10-CM

## 2020-10-23 DIAGNOSIS — G89.4 CHRONIC PAIN SYNDROME: Primary | ICD-10-CM

## 2020-10-23 DIAGNOSIS — M54.40 CHRONIC LOW BACK PAIN WITH SCIATICA, SCIATICA LATERALITY UNSPECIFIED, UNSPECIFIED BACK PAIN LATERALITY: ICD-10-CM

## 2020-10-23 DIAGNOSIS — M46.1 SACROILIITIS (HCC): ICD-10-CM

## 2020-10-23 DIAGNOSIS — M47.816 LUMBAR SPONDYLOSIS: ICD-10-CM

## 2020-10-23 PROCEDURE — 99214 OFFICE O/P EST MOD 30 MIN: CPT | Performed by: NURSE PRACTITIONER

## 2020-10-23 RX ORDER — GLIMEPIRIDE 1 MG/1
1 TABLET ORAL
COMMUNITY
Start: 2020-08-18 | End: 2020-12-22

## 2020-10-23 RX ORDER — ERGOCALCIFEROL (VITAMIN D2) 1250 MCG
50000 CAPSULE ORAL
COMMUNITY
Start: 2020-09-24 | End: 2021-05-11

## 2020-10-23 RX ORDER — GLIMEPIRIDE 1 MG/1
1 TABLET ORAL
COMMUNITY
End: 2020-12-22

## 2020-10-23 RX ORDER — ERGOCALCIFEROL 1.25 MG/1
CAPSULE ORAL
COMMUNITY
Start: 2020-10-11 | End: 2021-05-11

## 2020-10-27 ENCOUNTER — IMMUNIZATIONS (OUTPATIENT)
Dept: INTERNAL MEDICINE CLINIC | Age: 65
End: 2020-10-27
Payer: MEDICARE

## 2020-10-27 VITALS — TEMPERATURE: 97.1 F

## 2020-10-27 DIAGNOSIS — Z23 NEED FOR IMMUNIZATION AGAINST INFLUENZA: Primary | ICD-10-CM

## 2020-10-27 PROCEDURE — G0008 ADMIN INFLUENZA VIRUS VAC: HCPCS | Performed by: INTERNAL MEDICINE

## 2020-10-27 PROCEDURE — 90682 RIV4 VACC RECOMBINANT DNA IM: CPT | Performed by: INTERNAL MEDICINE

## 2020-11-09 ENCOUNTER — OFFICE VISIT (OUTPATIENT)
Dept: INTERNAL MEDICINE CLINIC | Age: 65
End: 2020-11-09
Payer: MEDICARE

## 2020-11-09 VITALS
HEIGHT: 65 IN | HEART RATE: 80 BPM | WEIGHT: 261.4 LBS | DIASTOLIC BLOOD PRESSURE: 78 MMHG | OXYGEN SATURATION: 97 % | BODY MASS INDEX: 43.55 KG/M2 | TEMPERATURE: 97.9 F | SYSTOLIC BLOOD PRESSURE: 132 MMHG

## 2020-11-09 DIAGNOSIS — I10 ESSENTIAL HYPERTENSION: ICD-10-CM

## 2020-11-09 DIAGNOSIS — E11.9 TYPE 2 DIABETES MELLITUS WITHOUT COMPLICATION, WITHOUT LONG-TERM CURRENT USE OF INSULIN (HCC): Primary | ICD-10-CM

## 2020-11-09 DIAGNOSIS — J45.20 MILD INTERMITTENT ASTHMA WITHOUT COMPLICATION: ICD-10-CM

## 2020-11-09 DIAGNOSIS — E66.01 MORBID OBESITY WITH BMI OF 40.0-44.9, ADULT (HCC): ICD-10-CM

## 2020-11-09 DIAGNOSIS — E66.01 MORBID OBESITY (HCC): ICD-10-CM

## 2020-11-09 DIAGNOSIS — E78.5 HYPERLIPIDEMIA, UNSPECIFIED HYPERLIPIDEMIA TYPE: ICD-10-CM

## 2020-11-09 DIAGNOSIS — F17.200 CURRENT EVERY DAY SMOKER: ICD-10-CM

## 2020-11-09 DIAGNOSIS — F33.41 RECURRENT MAJOR DEPRESSIVE DISORDER, IN PARTIAL REMISSION (HCC): ICD-10-CM

## 2020-11-09 DIAGNOSIS — Z12.31 VISIT FOR SCREENING MAMMOGRAM: ICD-10-CM

## 2020-11-09 LAB — SL AMB POCT HEMOGLOBIN AIC: 6.1 (ref ?–6.5)

## 2020-11-09 PROCEDURE — 99214 OFFICE O/P EST MOD 30 MIN: CPT | Performed by: INTERNAL MEDICINE

## 2020-11-09 PROCEDURE — 83036 HEMOGLOBIN GLYCOSYLATED A1C: CPT | Performed by: INTERNAL MEDICINE

## 2020-11-09 PROCEDURE — 99406 BEHAV CHNG SMOKING 3-10 MIN: CPT | Performed by: INTERNAL MEDICINE

## 2020-12-18 ENCOUNTER — OFFICE VISIT (OUTPATIENT)
Dept: PAIN MEDICINE | Facility: CLINIC | Age: 65
End: 2020-12-18
Payer: MEDICARE

## 2020-12-18 VITALS
WEIGHT: 260 LBS | SYSTOLIC BLOOD PRESSURE: 142 MMHG | DIASTOLIC BLOOD PRESSURE: 79 MMHG | HEART RATE: 71 BPM | HEIGHT: 65 IN | TEMPERATURE: 98.2 F | BODY MASS INDEX: 43.32 KG/M2

## 2020-12-18 DIAGNOSIS — M46.1 SACROILIITIS (HCC): ICD-10-CM

## 2020-12-18 DIAGNOSIS — M54.12 CERVICAL RADICULOPATHY: ICD-10-CM

## 2020-12-18 DIAGNOSIS — M54.16 LUMBAR RADICULOPATHY: ICD-10-CM

## 2020-12-18 DIAGNOSIS — G89.4 CHRONIC PAIN SYNDROME: Primary | ICD-10-CM

## 2020-12-18 DIAGNOSIS — Z96.89 SPINAL CORD STIMULATOR STATUS: ICD-10-CM

## 2020-12-18 DIAGNOSIS — G89.29 CHRONIC LOW BACK PAIN WITH SCIATICA, SCIATICA LATERALITY UNSPECIFIED, UNSPECIFIED BACK PAIN LATERALITY: ICD-10-CM

## 2020-12-18 DIAGNOSIS — M48.062 NEUROGENIC CLAUDICATION DUE TO LUMBAR SPINAL STENOSIS: ICD-10-CM

## 2020-12-18 DIAGNOSIS — M62.830 BACK MUSCLE SPASM: ICD-10-CM

## 2020-12-18 DIAGNOSIS — M47.816 LUMBAR SPONDYLOSIS: ICD-10-CM

## 2020-12-18 DIAGNOSIS — M48.061 SPINAL STENOSIS OF LUMBAR REGION, UNSPECIFIED WHETHER NEUROGENIC CLAUDICATION PRESENT: ICD-10-CM

## 2020-12-18 DIAGNOSIS — M54.40 CHRONIC LOW BACK PAIN WITH SCIATICA, SCIATICA LATERALITY UNSPECIFIED, UNSPECIFIED BACK PAIN LATERALITY: ICD-10-CM

## 2020-12-18 PROCEDURE — 99213 OFFICE O/P EST LOW 20 MIN: CPT | Performed by: NURSE PRACTITIONER

## 2020-12-22 DIAGNOSIS — E11.9 CONTROLLED TYPE 2 DIABETES MELLITUS WITHOUT COMPLICATION, WITHOUT LONG-TERM CURRENT USE OF INSULIN (HCC): Primary | ICD-10-CM

## 2020-12-22 RX ORDER — GLIMEPIRIDE 1 MG/1
TABLET ORAL
Qty: 90 TABLET | Refills: 1 | Status: SHIPPED | OUTPATIENT
Start: 2020-12-22 | End: 2021-10-15 | Stop reason: SDUPTHER

## 2021-01-12 DIAGNOSIS — I10 ESSENTIAL HYPERTENSION: ICD-10-CM

## 2021-01-12 RX ORDER — LISINOPRIL 20 MG/1
20 TABLET ORAL DAILY
Qty: 90 TABLET | Refills: 1 | OUTPATIENT
Start: 2021-01-12

## 2021-01-12 RX ORDER — LISINOPRIL 20 MG/1
TABLET ORAL
Qty: 90 TABLET | Refills: 0 | Status: SHIPPED | OUTPATIENT
Start: 2021-01-12 | End: 2021-04-07

## 2021-02-10 DIAGNOSIS — G89.29 CHRONIC BILATERAL LOW BACK PAIN WITHOUT SCIATICA: ICD-10-CM

## 2021-02-10 DIAGNOSIS — G89.4 CHRONIC PAIN SYNDROME: ICD-10-CM

## 2021-02-10 DIAGNOSIS — F33.41 RECURRENT MAJOR DEPRESSIVE DISORDER, IN PARTIAL REMISSION (HCC): ICD-10-CM

## 2021-02-10 DIAGNOSIS — M54.50 CHRONIC BILATERAL LOW BACK PAIN WITHOUT SCIATICA: ICD-10-CM

## 2021-02-10 RX ORDER — VENLAFAXINE HYDROCHLORIDE 75 MG/1
CAPSULE, EXTENDED RELEASE ORAL
Qty: 90 CAPSULE | Refills: 1 | Status: SHIPPED | OUTPATIENT
Start: 2021-02-10 | End: 2021-05-11 | Stop reason: SDUPTHER

## 2021-02-10 RX ORDER — TOPIRAMATE 100 MG/1
TABLET, FILM COATED ORAL
Qty: 180 TABLET | Refills: 1 | Status: SHIPPED | OUTPATIENT
Start: 2021-02-10 | End: 2021-08-09

## 2021-03-21 NOTE — PLAN OF CARE
Problem: PHYSICAL THERAPY ADULT  Goal: Performs mobility at highest level of function for planned discharge setting  See evaluation for individualized goals  Treatment/Interventions: Functional transfer training, LE strengthening/ROM, Elevations, Therapeutic exercise, Endurance training, Bed mobility, Gait training, Spoke to nursing  Equipment Recommended:  (none)       See flowsheet documentation for full assessment, interventions and recommendations  Prognosis: Fair  Problem List: Decreased strength, Decreased range of motion, Decreased endurance, Decreased mobility, Pain, Obesity  Assessment: Pt is 63 y o  female seen for PT evaluation s/p admit to [facility] on 11/26/2017  Pt presented w/ intractable low back pain  Pt was admitted with a primary dx of: intractable low back pain  PT now consulted for assessment of mobility and d/c needs  Pts current co morbidities effecting treatment include: DM, obesity, HTN, depression, lumbar spinal canal stenosis  Pts current clinical presentation is evolving due to ongoing pain with al activities, decreased mobility compared to baseline, and increased assistance needed from caregiver at current time  Prior to admission, pt was I with mobility at home without the use of an AD  Upon evaluation, pt currently is requiring mod I for bed mobility; S for transfers and S for ambulation w/ no AD   Pt presents at PT eval functioning below baseline and currently w/ overall mobility deficits 2* to: gait deviations, pain, decreased activity tolerance, decreased functional mobility tolerance and fall risk, decreased BLE strength  At conclusion of PT session pt was positioned back in chair/bed with phone and call bell within reach  Pt denies any further questions at this time  Pt will continue to benefit from skilled PT interventions to address stated impairments; to maximize functional mobility; for ongoing pt/ family training; and DME needs   PT is currently recommending Home PT  Pt/ family agreeable to plan and goals as stated on evaluation  PT will continue to follow during hospital stay  Barriers to Discharge: Inaccessible home environment     Recommendation: Home PT     PT - OK to Discharge: Yes (must clear 2 steps )    See flowsheet documentation for full assessment  Celina Guillen)  Neurology; Vascular Neurology  [address], [address] [place] [address]  Phone: [phone]  Fax: [phone]  Follow Up Time:

## 2021-03-23 ENCOUNTER — APPOINTMENT (OUTPATIENT)
Dept: LAB | Facility: IMAGING CENTER | Age: 66
End: 2021-03-23
Payer: MEDICARE

## 2021-03-23 ENCOUNTER — TRANSCRIBE ORDERS (OUTPATIENT)
Dept: ADMINISTRATIVE | Facility: HOSPITAL | Age: 66
End: 2021-03-23

## 2021-03-23 DIAGNOSIS — E78.5 HYPERLIPIDEMIA, UNSPECIFIED HYPERLIPIDEMIA TYPE: ICD-10-CM

## 2021-03-23 DIAGNOSIS — E55.9 VITAMIN D DEFICIENCY: ICD-10-CM

## 2021-03-23 DIAGNOSIS — M35.3 POLYMYALGIA RHEUMATICA (HCC): Primary | ICD-10-CM

## 2021-03-23 DIAGNOSIS — M35.3 POLYMYALGIA RHEUMATICA (HCC): ICD-10-CM

## 2021-03-23 DIAGNOSIS — E11.9 TYPE 2 DIABETES MELLITUS WITHOUT COMPLICATION, WITHOUT LONG-TERM CURRENT USE OF INSULIN (HCC): ICD-10-CM

## 2021-03-23 LAB
25(OH)D3 SERPL-MCNC: 51.4 NG/ML (ref 30–100)
ALBUMIN SERPL BCP-MCNC: 3.5 G/DL (ref 3.5–5)
ALP SERPL-CCNC: 123 U/L (ref 46–116)
ALT SERPL W P-5'-P-CCNC: 30 U/L (ref 12–78)
ANION GAP SERPL CALCULATED.3IONS-SCNC: 7 MMOL/L (ref 4–13)
AST SERPL W P-5'-P-CCNC: 22 U/L (ref 5–45)
BASOPHILS # BLD AUTO: 0.07 THOUSANDS/ΜL (ref 0–0.1)
BASOPHILS NFR BLD AUTO: 1 % (ref 0–1)
BILIRUB SERPL-MCNC: 0.4 MG/DL (ref 0.2–1)
BILIRUB UR QL STRIP: NEGATIVE
BUN SERPL-MCNC: 23 MG/DL (ref 5–25)
CALCIUM SERPL-MCNC: 9.2 MG/DL (ref 8.3–10.1)
CHLORIDE SERPL-SCNC: 112 MMOL/L (ref 100–108)
CHOLEST SERPL-MCNC: 159 MG/DL (ref 50–200)
CLARITY UR: CLEAR
CO2 SERPL-SCNC: 23 MMOL/L (ref 21–32)
COLOR UR: YELLOW
CREAT SERPL-MCNC: 1.23 MG/DL (ref 0.6–1.3)
CRP SERPL QL: 5 MG/L
EOSINOPHIL # BLD AUTO: 0.34 THOUSAND/ΜL (ref 0–0.61)
EOSINOPHIL NFR BLD AUTO: 5 % (ref 0–6)
ERYTHROCYTE [DISTWIDTH] IN BLOOD BY AUTOMATED COUNT: 13.5 % (ref 11.6–15.1)
ERYTHROCYTE [SEDIMENTATION RATE] IN BLOOD: 18 MM/HOUR (ref 0–29)
EST. AVERAGE GLUCOSE BLD GHB EST-MCNC: 143 MG/DL
GFR SERPL CREATININE-BSD FRML MDRD: 47 ML/MIN/1.73SQ M
GLUCOSE P FAST SERPL-MCNC: 145 MG/DL (ref 65–99)
GLUCOSE UR STRIP-MCNC: NEGATIVE MG/DL
HBA1C MFR BLD: 6.6 %
HCT VFR BLD AUTO: 42.2 % (ref 34.8–46.1)
HDLC SERPL-MCNC: 58 MG/DL
HGB BLD-MCNC: 13.2 G/DL (ref 11.5–15.4)
HGB UR QL STRIP.AUTO: NEGATIVE
IMM GRANULOCYTES # BLD AUTO: 0.02 THOUSAND/UL (ref 0–0.2)
IMM GRANULOCYTES NFR BLD AUTO: 0 % (ref 0–2)
KETONES UR STRIP-MCNC: NEGATIVE MG/DL
LDLC SERPL CALC-MCNC: 84 MG/DL (ref 0–100)
LEUKOCYTE ESTERASE UR QL STRIP: NEGATIVE
LYMPHOCYTES # BLD AUTO: 1.68 THOUSANDS/ΜL (ref 0.6–4.47)
LYMPHOCYTES NFR BLD AUTO: 24 % (ref 14–44)
MCH RBC QN AUTO: 29.4 PG (ref 26.8–34.3)
MCHC RBC AUTO-ENTMCNC: 31.3 G/DL (ref 31.4–37.4)
MCV RBC AUTO: 94 FL (ref 82–98)
MONOCYTES # BLD AUTO: 0.58 THOUSAND/ΜL (ref 0.17–1.22)
MONOCYTES NFR BLD AUTO: 8 % (ref 4–12)
NEUTROPHILS # BLD AUTO: 4.46 THOUSANDS/ΜL (ref 1.85–7.62)
NEUTS SEG NFR BLD AUTO: 62 % (ref 43–75)
NITRITE UR QL STRIP: NEGATIVE
NONHDLC SERPL-MCNC: 101 MG/DL
NRBC BLD AUTO-RTO: 0 /100 WBCS
PH UR STRIP.AUTO: 6 [PH]
PLATELET # BLD AUTO: 185 THOUSANDS/UL (ref 149–390)
PMV BLD AUTO: 11.4 FL (ref 8.9–12.7)
POTASSIUM SERPL-SCNC: 4.2 MMOL/L (ref 3.5–5.3)
PROT SERPL-MCNC: 7 G/DL (ref 6.4–8.2)
PROT UR STRIP-MCNC: NEGATIVE MG/DL
RBC # BLD AUTO: 4.49 MILLION/UL (ref 3.81–5.12)
SODIUM SERPL-SCNC: 142 MMOL/L (ref 136–145)
SP GR UR STRIP.AUTO: 1.02 (ref 1–1.03)
TRIGL SERPL-MCNC: 85 MG/DL
UROBILINOGEN UR QL STRIP.AUTO: 0.2 E.U./DL
WBC # BLD AUTO: 7.15 THOUSAND/UL (ref 4.31–10.16)

## 2021-03-23 PROCEDURE — 36415 COLL VENOUS BLD VENIPUNCTURE: CPT

## 2021-03-23 PROCEDURE — 86140 C-REACTIVE PROTEIN: CPT

## 2021-03-23 PROCEDURE — 81003 URINALYSIS AUTO W/O SCOPE: CPT | Performed by: INTERNAL MEDICINE

## 2021-03-23 PROCEDURE — 85652 RBC SED RATE AUTOMATED: CPT

## 2021-03-23 PROCEDURE — 83036 HEMOGLOBIN GLYCOSYLATED A1C: CPT

## 2021-03-23 PROCEDURE — 80061 LIPID PANEL: CPT

## 2021-03-23 PROCEDURE — 80053 COMPREHEN METABOLIC PANEL: CPT

## 2021-03-23 PROCEDURE — 85025 COMPLETE CBC W/AUTO DIFF WBC: CPT

## 2021-03-23 PROCEDURE — 82306 VITAMIN D 25 HYDROXY: CPT

## 2021-04-07 DIAGNOSIS — I10 ESSENTIAL HYPERTENSION: ICD-10-CM

## 2021-04-07 RX ORDER — LISINOPRIL 20 MG/1
TABLET ORAL
Qty: 90 TABLET | Refills: 0 | Status: SHIPPED | OUTPATIENT
Start: 2021-04-07 | End: 2021-05-11 | Stop reason: SDUPTHER

## 2021-05-11 ENCOUNTER — OFFICE VISIT (OUTPATIENT)
Dept: INTERNAL MEDICINE CLINIC | Age: 66
End: 2021-05-11
Payer: MEDICARE

## 2021-05-11 VITALS
BODY MASS INDEX: 43.75 KG/M2 | DIASTOLIC BLOOD PRESSURE: 80 MMHG | OXYGEN SATURATION: 99 % | SYSTOLIC BLOOD PRESSURE: 138 MMHG | WEIGHT: 262.6 LBS | HEART RATE: 86 BPM | TEMPERATURE: 97.2 F | HEIGHT: 65 IN

## 2021-05-11 DIAGNOSIS — F33.41 RECURRENT MAJOR DEPRESSIVE DISORDER, IN PARTIAL REMISSION (HCC): ICD-10-CM

## 2021-05-11 DIAGNOSIS — M46.1 SACROILIITIS (HCC): ICD-10-CM

## 2021-05-11 DIAGNOSIS — M54.40 CHRONIC LOW BACK PAIN WITH SCIATICA, SCIATICA LATERALITY UNSPECIFIED, UNSPECIFIED BACK PAIN LATERALITY: ICD-10-CM

## 2021-05-11 DIAGNOSIS — J45.20 MILD INTERMITTENT ASTHMA WITHOUT COMPLICATION: Primary | ICD-10-CM

## 2021-05-11 DIAGNOSIS — F17.200 CURRENT EVERY DAY SMOKER: ICD-10-CM

## 2021-05-11 DIAGNOSIS — E11.21 TYPE 2 DIABETES MELLITUS WITH DIABETIC NEPHROPATHY, WITHOUT LONG-TERM CURRENT USE OF INSULIN (HCC): ICD-10-CM

## 2021-05-11 DIAGNOSIS — E78.5 HYPERLIPIDEMIA, UNSPECIFIED HYPERLIPIDEMIA TYPE: ICD-10-CM

## 2021-05-11 DIAGNOSIS — E66.01 MORBID OBESITY WITH BMI OF 40.0-44.9, ADULT (HCC): ICD-10-CM

## 2021-05-11 DIAGNOSIS — G89.29 CHRONIC LOW BACK PAIN WITH SCIATICA, SCIATICA LATERALITY UNSPECIFIED, UNSPECIFIED BACK PAIN LATERALITY: ICD-10-CM

## 2021-05-11 DIAGNOSIS — I10 ESSENTIAL HYPERTENSION: ICD-10-CM

## 2021-05-11 DIAGNOSIS — Z00.00 MEDICARE ANNUAL WELLNESS VISIT, SUBSEQUENT: ICD-10-CM

## 2021-05-11 DIAGNOSIS — E66.01 MORBID OBESITY (HCC): ICD-10-CM

## 2021-05-11 PROCEDURE — 93000 ELECTROCARDIOGRAM COMPLETE: CPT | Performed by: INTERNAL MEDICINE

## 2021-05-11 PROCEDURE — G0439 PPPS, SUBSEQ VISIT: HCPCS | Performed by: INTERNAL MEDICINE

## 2021-05-11 PROCEDURE — 99406 BEHAV CHNG SMOKING 3-10 MIN: CPT | Performed by: INTERNAL MEDICINE

## 2021-05-11 PROCEDURE — 99214 OFFICE O/P EST MOD 30 MIN: CPT | Performed by: INTERNAL MEDICINE

## 2021-05-11 RX ORDER — VENLAFAXINE HYDROCHLORIDE 75 MG/1
75 CAPSULE, EXTENDED RELEASE ORAL DAILY
Qty: 90 CAPSULE | Refills: 1 | Status: SHIPPED | OUTPATIENT
Start: 2021-05-11 | End: 2021-10-25

## 2021-05-11 RX ORDER — ATORVASTATIN CALCIUM 10 MG/1
10 TABLET, FILM COATED ORAL DAILY
Qty: 30 TABLET | Refills: 5 | Status: SHIPPED | OUTPATIENT
Start: 2021-05-11 | End: 2022-04-14

## 2021-05-11 RX ORDER — LISINOPRIL 20 MG/1
20 TABLET ORAL DAILY
Qty: 90 TABLET | Refills: 0 | Status: SHIPPED | OUTPATIENT
Start: 2021-05-11 | End: 2021-09-17

## 2021-05-11 RX ORDER — ERGOCALCIFEROL (VITAMIN D2) 1250 MCG
50000 CAPSULE ORAL
COMMUNITY
Start: 2021-03-25 | End: 2021-10-27

## 2021-05-11 RX ORDER — MELOXICAM 15 MG/1
15 TABLET ORAL DAILY
Qty: 90 TABLET | Refills: 3 | Status: SHIPPED | OUTPATIENT
Start: 2021-05-11 | End: 2022-05-13

## 2021-05-11 NOTE — ASSESSMENT & PLAN NOTE
Lab Results   Component Value Date    HGBA1C 6 6 (H) 03/23/2021    her most recent A1c was under control with Amaryl 1 mg    She has not gained or lost weight but tries to avoid sweets

## 2021-05-11 NOTE — PROGRESS NOTES
Assessment/Plan:    Type 2 diabetes mellitus with diabetic nephropathy, without long-term current use of insulin ([facility])    Lab Results   Component Value Date    HGBA1C 6 6 (H) 03/23/2021    her most recent A1c was under control with Amaryl 1 mg  She has not gained or lost weight but tries to avoid sweets    Asthma, mild intermittent   She has had no recent flares of asthma but continues to smoke and she refuses immunization    Sacroiliitis [facility])   Patient continues to complain of chronic low back pain and continues to see specialist for same    Morbid obesity ([facility] [address] )   She continues to be morbidly obese and again was counseled on diet and exercise  At the she has not gained any    Depression   She continues on Effexor 75 mg    Current every day smoker   She continues to smoke and shows no interest in quitting but she was counseled 3-10 minutes on the benefits of and ways to quit smoking    Chronic low back pain   She continues to have low back pain and sees a specialist but is on no narcotic  She does have a nerve stimulator placed in years ago       Diagnoses and all orders for this visit:    Mild intermittent asthma without complication    Essential hypertension  -     lisinopril (ZESTRIL) 20 mg tablet; Take 1 tablet (20 mg total) by mouth daily  -     POCT ECG    Morbid obesity (HCC)    Current every day smoker    Chronic low back pain with sciatica, sciatica laterality unspecified, unspecified back pain laterality    Hyperlipidemia, unspecified hyperlipidemia type  -     atorvastatin (LIPITOR) 10 mg tablet; Take 1 tablet (10 mg total) by mouth daily taks in am    Sacroiliitis ([facility])  -     meloxicam (MOBIC) 15 mg tablet; Take 1 tablet (15 mg total) by mouth daily    Recurrent major depressive disorder, in partial remission ([facility])  -     venlafaxine (EFFEXOR-XR) 75 mg 24 hr capsule;  Take 1 capsule (75 mg total) by mouth daily    Type 2 diabetes mellitus with diabetic nephropathy, without long-term current use of insulin ([facility] )    Medicare annual wellness visit, subsequent    Morbid obesity with BMI of 40 0-44 9, adult ([facility] )    Other orders  -     ergocalciferol (Drisdol) 1 25 MG (32260 UT) capsule; Take 50,000 Units by mouth          Subjective:      Patient ID: Shanna Recinos is a 61 y o  female  Patient's only real complaint is her chronic back pain but she is not interested in having anything invasive done at present  But she also has borderline diabetic although she has managed to keep her numbers for the most part at prediabetic levels    Diabetes  She presents for her follow-up diabetic visit  She has type 2 diabetes mellitus  No MedicAlert identification noted  Her disease course has been stable  There are no hypoglycemic associated symptoms  Pertinent negatives for hypoglycemia include no confusion, dizziness or headaches  There are no diabetic associated symptoms  Pertinent negatives for diabetes include no chest pain, no fatigue, no polydipsia, no polyphagia, no polyuria and no weakness  Diabetic complications include nephropathy (  Mild)  Risk factors for coronary artery disease include diabetes mellitus, dyslipidemia, obesity, hypertension, tobacco exposure and family history  Current diabetic treatment includes diet and oral agent (monotherapy)  She is compliant with treatment most of the time  Her weight is stable  She is following a generally healthy diet  Meal planning includes avoidance of concentrated sweets  She has had a previous visit with a dietitian  She rarely participates in exercise  Her home blood glucose trend is fluctuating minimally  An ACE inhibitor/angiotensin II receptor blocker is being taken  She does not see a podiatrist Eye exam is current  Review of Systems   Constitutional: Negative for chills, fatigue, fever and unexpected weight change     HENT: Negative for congestion, ear pain, hearing loss, postnasal drip, sinus pressure, sore throat, trouble swallowing and voice change  Eyes: Negative for visual disturbance  Respiratory: Negative for cough, chest tightness, shortness of breath and wheezing  Cardiovascular: Negative for chest pain, palpitations and leg swelling  Gastrointestinal: Negative for abdominal distention, abdominal pain, anal bleeding, blood in stool, constipation, diarrhea and nausea  Endocrine: Negative for cold intolerance, polydipsia, polyphagia and polyuria  Genitourinary: Negative for dysuria, flank pain, frequency, hematuria and urgency  Musculoskeletal: Positive for back pain  Negative for arthralgias, gait problem, joint swelling, myalgias and neck pain  Skin: Negative for rash  Allergic/Immunologic: Negative for immunocompromised state  Neurological: Negative for dizziness, syncope, facial asymmetry, weakness, light-headedness, numbness and headaches  Hematological: Negative for adenopathy  Psychiatric/Behavioral: Negative for confusion, sleep disturbance and suicidal ideas  Objective:      /80   Pulse 86   Temp (!) 97 2 °F (36 2 °C)   Ht 5' 5" (1 651 m)   Wt 119 kg (262 lb 9 6 oz)   LMP  (LMP Unknown)   SpO2 99%   BMI 43 70 kg/m²          Physical Exam  Vitals signs reviewed  Constitutional:       General: She is not in acute distress  Appearance: She is well-developed  She is obese  HENT:      Right Ear: External ear normal       Left Ear: External ear normal       Nose: Nose normal       Mouth/Throat:      Pharynx: No oropharyngeal exudate  Eyes:      Extraocular Movements: Extraocular movements intact  Conjunctiva/sclera: Conjunctivae normal       Pupils: Pupils are equal, round, and reactive to light  Neck:      Musculoskeletal: Normal range of motion and neck supple  Thyroid: No thyromegaly  Vascular: No JVD  Comments:  No goiter  Cardiovascular:      Rate and Rhythm: Normal rate and regular rhythm  Heart sounds: Normal heart sounds  No murmur  No gallop  Pulmonary:      Effort: Pulmonary effort is normal  No respiratory distress  Breath sounds: Normal breath sounds  No wheezing or rales  Abdominal:      General: Bowel sounds are normal  There is no distension  Palpations: Abdomen is soft  There is no mass  Tenderness: There is no abdominal tenderness  Musculoskeletal: Normal range of motion  General: No tenderness  Right lower leg: Edema ( trace) present  Left lower leg: Edema present  Lymphadenopathy:      Cervical: No cervical adenopathy  Skin:     General: Skin is warm and dry  Capillary Refill: Capillary refill takes 2 to 3 seconds  Findings: No rash  Neurological:      Mental Status: She is alert and oriented to person, place, and time  Cranial Nerves: No cranial nerve deficit  Coordination: Coordination normal       Gait: Gait abnormal ( wide-based)  Psychiatric:         Mood and Affect: Mood normal          Behavior: Behavior normal          Thought Content:  Thought content normal          Judgment: Judgment normal

## 2021-05-11 NOTE — PROGRESS NOTES
Assessment and Plan:     Problem List Items Addressed This Visit        Endocrine    Type 2 diabetes mellitus with diabetic nephropathy, without long-term current use of insulin (HCC)       Respiratory    Asthma, mild intermittent - Primary       Cardiovascular and Mediastinum    HTN (hypertension)    Relevant Medications    lisinopril (ZESTRIL) 20 mg tablet       Musculoskeletal and Integument    Sacroiliitis (HCC)    Relevant Medications    meloxicam (MOBIC) 15 mg tablet       Other    Chronic low back pain    Morbid obesity (HCC)    Depression    Relevant Medications    venlafaxine (EFFEXOR-XR) 75 mg 24 hr capsule    Current every day smoker      Other Visit Diagnoses     Hyperlipidemia, unspecified hyperlipidemia type        Relevant Medications    atorvastatin (LIPITOR) 10 mg tablet    Medicare annual wellness visit, subsequent        Morbid obesity with BMI of 40 0-44 9, adult ([facility] )            BMI Counseling: Body mass index is 43 7 kg/m²  The BMI is above normal  Nutrition recommendations include decreasing portion sizes, encouraging healthy choices of fruits and vegetables, decreasing fast food intake, consuming healthier snacks, limiting drinks that contain sugar, moderation in carbohydrate intake and reducing intake of saturated and trans fat  Exercise recommendations include exercising 3-5 times per week  Tobacco Cessation Counseling: The patient is sincerely urged to quit consumption of tobacco  She is not ready to quit tobacco      Preventive health issues were discussed with patient, and age appropriate screening tests were ordered as noted in patient's After Visit Summary  Personalized health advice and appropriate referrals for health education or preventive services given if needed, as noted in patient's After Visit Summary       History of Present Illness:     Patient presents for Medicare Annual Wellness visit    Patient Care Team:  Aixa Harding MD as PCP - General Gareth Ash MD Benjamin Granados MD (Pain Medicine)  Sarah Beth Parish DO (Physical Medicine and Rehabilitation)     Problem List:     Patient Active Problem List   Diagnosis    Chronic low back pain    Chronic pain syndrome    Type 2 diabetes mellitus with diabetic nephropathy, without long-term current use of insulin ([address]    Morbid obesity ([address] )    HTN (hypertension)    Depression    Sacroiliitis ([address] )    Lumbar spinal stenosis    Hypovitaminosis D    Asthma, mild intermittent    Current every day smoker    Neurogenic claudication due to lumbar spinal stenosis    Back muscle spasm    Cervical radiculopathy    Spinal cord stimulator status    Lumbar radiculopathy    Lumbar spondylosis      Past Medical and Surgical History:     Past Medical History:   Diagnosis Date    Allergic reaction to bee sting     last assessed - 13Jun2016    Asthma     Chronic narcotic dependence ([address] )     Chronic pain syndrome     Depression     Diabetes mellitus ([address] )     Esophageal reflux     Hyperlipidemia     Hypertension     Lumbar radiculopathy     Lyme disease     last assessed - 28Jun2016    Obesity     Opioid dependence ([address] )     PPD positive     had treatment w/ INH 15 years ago    Vitamin D deficiency      Past Surgical History:   Procedure Laterality Date    APPENDECTOMY      CHOLECYSTECTOMY      JOINT REPLACEMENT      R knee, B/l total hip    AR REVISE/REMOVE SPINAL NEUROSTIM/ Left 12/13/2016    Procedure: REPLACEMENT BUTTOCK SPINAL CORD STIMULATOR IPG;  Surgeon: Marjan Head MD;  Location:  MAIN OR;  Service: Neurosurgery    SHOULDER SURGERY      Resolved - 1992    SPINAL CORD STIMULATOR IMPLANT      spinal stereotaxis stimulation of cord    TOTAL HIP ARTHROPLASTY Bilateral       Family History:     Family History   Problem Relation Age of Onset    Diabetes Mother         Diabetes mellitus    Cancer Mother     Cancer Father     Diabetes Maternal Grandmother     Cancer Paternal Uncle     Brain cancer Family     Breast cancer Family     Cervical cancer Family     Diabetes Family         Diabetes mellitus    Hypertension Family     Ovarian cancer Family     Stroke Family         Stroke complications      Social History:        Social History     Socioeconomic History    Marital status:       Spouse name: None    Number of children: None    Years of education: None    Highest education level: None   Occupational History    None   Social Needs    Financial resource strain: None    Food insecurity     Worry: None     Inability: None    Transportation needs     Medical: None     Non-medical: None   Tobacco Use    Smoking status: Current Every Day Smoker     Packs/day: 1 00     Years: 5 00     Pack years: 5 00    Smokeless tobacco: Never Used   Substance and Sexual Activity    Alcohol use: No     Alcohol/week: 0 0 standard drinks     Frequency: Never     Binge frequency: Never     Comment: Denied history of alcohol use    Drug use: No     Comment: Denied history of drug use    Sexual activity: None   Lifestyle    Physical activity     Days per week: None     Minutes per session: None    Stress: None   Relationships    Social connections     Talks on phone: None     Gets together: None     Attends Caoda[employer] service: None     Active member of club or organization: None     Attends meetings of clubs or organizations: None     Relationship status: None    Intimate partner violence     Fear of current or ex partner: None     Emotionally abused: None     Physically abused: None     Forced sexual activity: None   Other Topics Concern    None   Social History Narrative    None      Medications and Allergies:     Current Outpatient Medications   Medication Sig Dispense Refill    ergocalciferol (Drisdol) 1 25 MG (20368 UT) capsule Take 50,000 Units by mouth      atorvastatin (LIPITOR) 10 mg tablet Take 1 tablet (10 mg total) by mouth daily taks in am 30 tablet 5    Blood Glucose Monitoring Suppl (ONE TOUCH ULTRA MINI) w/Device KIT by Does not apply route daily 1 each 0    Cholecalciferol (VITAMIN D3) 30646 units CAPS Take 1 capsule by mouth once a week  11    EPINEPHrine (EPIPEN) 0 3 mg/0 3 mL SOAJ Inject 0 3 mL (0 3 mg total) into a muscle as needed for anaphylaxis 2 each 5    glimepiride (AMARYL) 1 mg tablet TAKE 1 TABLET BY MOUTH DAILY AT BEDTIME 90 tablet 1    glucose blood (ONE TOUCH ULTRA TEST) test strip Test blood sugar once a day 100 each 5    lisinopril (ZESTRIL) 20 mg tablet Take 1 tablet (20 mg total) by mouth daily 90 tablet 0    meloxicam (MOBIC) 15 mg tablet Take 1 tablet (15 mg total) by mouth daily 90 tablet 3    Nerve Stimulator (STANDARD TENS) ISAEL by Does not apply route 4 (four) times a day as needed (MUSCLE SPASMS) 1 Device 0    tiZANidine (ZANAFLEX) 2 mg tablet Take 1 tablet (2 mg total) by mouth every 8 (eight) hours as needed for muscle spasms 270 tablet 1    topiramate (TOPAMAX) 100 mg tablet TAKE 1 TABLET BY MOUTH TWICE A  tablet 1    venlafaxine (EFFEXOR-XR) 75 mg 24 hr capsule Take 1 capsule (75 mg total) by mouth daily 90 capsule 1     No current facility-administered medications for this visit        Allergies   Allergen Reactions    Bee Venom Anaphylaxis    Other Other (See Comments)     Stainless steel and surgical steel  *Severe blistering*    Acetazolamide     Aleve [Naproxen Sodium] Hives    Augmentin [Amoxicillin-Pot Clavulanate] Hives    Gabapentin     Glyburide     Metformin And Related Hives    Morphine And Related Hives    Motrin [Ibuprofen] Hives    Nortriptyline Hives    Penicillins Hives    Soy Lecithin [Lecithin] Hives    Sulfa Antibiotics Hives    Tradjenta [Linagliptin] Hives    Tramadol       Immunizations:     Immunization History   Administered Date(s) Administered    Influenza Quadrivalent, 6-35 Months IM 11/06/2017    Influenza, recombinant, quadrivalent,injectable, preservative free 10/17/2018, 10/04/2019, 10/27/2020    Influenza, seasonal, injectable 10/24/2014, 12/01/2015, 09/26/2016    Pneumococcal Conjugate 13-Valent 06/20/2017    Pneumococcal Polysaccharide PPV23 12/02/2015    Tdap 04/22/2015      Health Maintenance:         Topic Date Due    HIV Screening  Never done    Cervical Cancer Screening  Never done    MAMMOGRAM  08/12/2014    Colorectal Cancer Screening  02/06/2022    Hepatitis C Screening  Completed         Topic Date Due    COVID-19 Vaccine (1) Never done      Medicare Health Risk Assessment:     /80 (BP Location: Left arm, Patient Position: Sitting, Cuff Size: Standard)   Pulse 86   Temp (!) 97 2 °F (36 2 °C)   Ht 5' 5" (1 651 m)   Wt 119 kg (262 lb 9 6 oz)   LMP  (LMP Unknown)   SpO2 99%   BMI 43 70 kg/m²          Health Risk Assessment:   Patient rates overall health as fair  Patient feels that their physical health rating is same  Patient is satisfied with their life  Eyesight was rated as same  Hearing was rated as same  Patient feels that their emotional and mental health rating is same  Patients states they are never, rarely angry  Patient states they are sometimes unusually tired/fatigued  Pain experienced in the last 7 days has been some  Patient's pain rating has been 6/10  Patient states that she has experienced no weight loss or gain in last 6 months  Depression Screening:   PHQ-2 Score: 0  PHQ-9 Score: 0      Fall Risk Screening: In the past year, patient has experienced: no history of falling in past year      Urinary Incontinence Screening:   Patient has not leaked urine accidently in the last six months  Home Safety:  Patient does not have trouble with stairs inside or outside of their home  Patient has working smoke alarms and has working carbon monoxide detector  Home safety hazards include: none  Nutrition:   Current diet is Diabetic  Medications:   Patient is currently taking over-the-counter supplements   OTC medications include: see medication list  Patient is able to manage medications  Activities of Daily Living (ADLs)/Instrumental Activities of Daily Living (IADLs):   Walk and transfer into and out of bed and chair?: Yes  Dress and groom yourself?: Yes    Bathe or shower yourself?: Yes    Feed yourself? Yes  Do your laundry/housekeeping?: Yes  Manage your money, pay your bills and track your expenses?: Yes  Make your own meals?: Yes    Do your own shopping?: Yes    Previous Hospitalizations:   Any hospitalizations or ED visits within the last 12 months?: No      Advance Care Planning:   Living will: Yes    Advanced directive: Yes      Cognitive Screening:   Provider or family/friend/caregiver concerned regarding cognition?: No    PREVENTIVE SCREENINGS      Cardiovascular Screening:    General: Screening Not Indicated and History Lipid Disorder      Diabetes Screening:     General: Screening Not Indicated and History Diabetes      Colorectal Cancer Screening:     General: Screening Current      Breast Cancer Screening:       Due for: Mammogram        Cervical Cancer Screening:    General: Patient Declines      Osteoporosis Screening:    General: Patient Declines      Abdominal Aortic Aneurysm (AAA) Screening:        General: Screening Not Indicated      Lung Cancer Screening:     General: Screening Not Indicated      Hepatitis C Screening:    General: Screening Current    Screening, Brief Intervention, and Referral to Treatment (SBIRT)    Screening  Typical number of drinks in a day: 0  Typical number of drinks in a week: 0  Interpretation: Low risk drinking behavior  Other Counseling Topics:   Regular weightbearing exercise and calcium and vitamin D intake  Jesus Rose MD  BMI Counseling: Body mass index is 43 7 kg/m²  The BMI is above normal  Exercise recommendations include strength training exercises

## 2021-05-11 NOTE — ASSESSMENT & PLAN NOTE
She continues to smoke and shows no interest in quitting but she was counseled 3-10 minutes on the benefits of and ways to quit smoking

## 2021-05-11 NOTE — PATIENT INSTRUCTIONS
Medicare Preventive Visit Patient Instructions  Thank you for completing your Welcome to Medicare Visit or Medicare Annual Wellness Visit today  Your next wellness visit will be due in one year (5/12/2022)  The screening/preventive services that you may require over the next 5-10 years are detailed below  Some tests may not apply to you based off risk factors and/or age  Screening tests ordered at today's visit but not completed yet may show as past due  Also, please note that scanned in results may not display below  Preventive Screenings:  Service Recommendations Previous Testing/Comments   Colorectal Cancer Screening  * Colonoscopy    * Fecal Occult Blood Test (FOBT)/Fecal Immunochemical Test (FIT)  * Fecal DNA/Cologuard Test  * Flexible Sigmoidoscopy Age: 54-65 years old   Colonoscopy: every 10 years (may be performed more frequently if at higher risk)  OR  FOBT/FIT: every 1 year  OR  Cologuard: every 3 years  OR  Sigmoidoscopy: every 5 years  Screening may be recommended earlier than age 48 if at higher risk for colorectal cancer  Also, an individualized decision between you and your healthcare provider will decide whether screening between the ages of 74-80 would be appropriate  Colonoscopy: 02/06/2012  FOBT/FIT: Not on file  Cologuard: Not on file  Sigmoidoscopy: Not on file          Breast Cancer Screening Age: 36 years old  Frequency: every 1-2 years  Not required if history of left and right mastectomy Mammogram: 08/12/2013        Cervical Cancer Screening Between the ages of 21-29, pap smear recommended once every 3 years  Between the ages of 33-67, can perform pap smear with HPV co-testing every 5 years     Recommendations may differ for women with a history of total hysterectomy, cervical cancer, or abnormal pap smears in past  Pap Smear: Not on file        Hepatitis C Screening Once for adults born between [facility]  More frequently in patients at high risk for Hepatitis C Hep C Antibody: 04/04/2018        Diabetes Screening 1-2 times per year if you're at risk for diabetes or have pre-diabetes Fasting glucose: 145 mg/dL   A1C: 6 6 %        Cholesterol Screening Once every 5 years if you don't have a lipid disorder  May order more often based on risk factors  Lipid panel: 03/23/2021          Other Preventive Screenings Covered by Medicare:  1  Abdominal Aortic Aneurysm (AAA) Screening: covered once if your at risk  You're considered to be at risk if you have a family history of AAA  2  Lung Cancer Screening: covers low dose CT scan once per year if you meet all of the following conditions: (1) Age 50-69; (2) No signs or symptoms of lung cancer; (3) Current smoker or have quit smoking within the last 15 years; (4) You have a tobacco smoking history of at least 30 pack years (packs per day multiplied by number of years you smoked); (5) You get a written order from a healthcare provider  3  Glaucoma Screening: covered annually if you're considered high risk: (1) You have diabetes OR (2) Family history of glaucoma OR (3)  aged 48 and older OR (3)  American aged 72 and older  3  Osteoporosis Screening: covered every 2 years if you meet one of the following conditions: (1) You're estrogen deficient and at risk for osteoporosis based off medical history and other findings; (2) Have a vertebral abnormality; (3) On glucocorticoid therapy for more than 3 months; (4) Have primary hyperparathyroidism; (5) On osteoporosis medications and need to assess response to drug therapy  · Last bone density test (DXA Scan): Not on file  5  HIV Screening: covered annually if you're between the age of 12-76  Also covered annually if you are younger than 13 and older than 72 with risk factors for HIV infection  For pregnant patients, it is covered up to 3 times per pregnancy      Immunizations:  Immunization Recommendations   Influenza Vaccine Annual influenza vaccination during flu season is recommended for all persons aged >= 6 months who do not have contraindications   Pneumococcal Vaccine (Prevnar and Pneumovax)  * Prevnar = PCV13  * Pneumovax = PPSV23   Adults 25-60 years old: 1-3 doses may be recommended based on certain risk factors  Adults 72 years old: Prevnar (PCV13) vaccine recommended followed by Pneumovax (PPSV23) vaccine  If already received PPSV23 since turning 65, then PCV13 recommended at least one year after PPSV23 dose  Hepatitis B Vaccine 3 dose series if at intermediate or high risk (ex: diabetes, end stage renal disease, liver disease)   Tetanus (Td) Vaccine - COST NOT COVERED BY MEDICARE PART B Following completion of primary series, a booster dose should be given every 10 years to maintain immunity against tetanus  Td may also be given as tetanus wound prophylaxis  Tdap Vaccine - COST NOT COVERED BY MEDICARE PART B Recommended at least once for all adults  For pregnant patients, recommended with each pregnancy  Shingles Vaccine (Shingrix) - COST NOT COVERED BY MEDICARE PART B  2 shot series recommended in those aged 48 and above     Health Maintenance Due:      Topic Date Due    HIV Screening  Never done    Cervical Cancer Screening  Never done    MAMMOGRAM  08/12/2014    Colorectal Cancer Screening  02/06/2022    Hepatitis C Screening  Completed     Immunizations Due:      Topic Date Due    COVID-19 Vaccine (1) Never done     Advance Directives   What are advance directives? Advance directives are legal documents that state your wishes and plans for medical care  These plans are made ahead of time in case you lose your ability to make decisions for yourself  Advance directives can apply to any medical decision, such as the treatments you want, and if you want to donate organs  What are the types of advance directives? There are many types of advance directives, and each state has rules about how to use them   You may choose a combination of any of the following:  · Living will: This is a written record of the treatment you want  You can also choose which treatments you do not want, which to limit, and which to stop at a certain time  This includes surgery, medicine, IV fluid, and tube feedings  · Durable power of  for healthcare [facility]): This is a written record that states who you want to make healthcare choices for you when you are unable to make them for yourself  This person, called a proxy, is usually a family member or a friend  You may choose more than 1 proxy  · Do not resuscitate (DNR) order:  A DNR order is used in case your heart stops beating or you stop breathing  It is a request not to have certain forms of treatment, such as CPR  A DNR order may be included in other types of advance directives  · Medical directive: This covers the care that you want if you are in a coma, near death, or unable to make decisions for yourself  You can list the treatments you want for each condition  Treatment may include pain medicine, surgery, blood transfusions, dialysis, IV or tube feedings, and a ventilator (breathing machine)  · Values history: This document has questions about your views, beliefs, and how you feel and think about life  This information can help others choose the care that you would choose  Why are advance directives important? An advance directive helps you control your care  Although spoken wishes may be used, it is better to have your wishes written down  Spoken wishes can be misunderstood, or not followed  Treatments may be given even if you do not want them  An advance directive may make it easier for your family to make difficult choices about your care  Cigarette Smoking and Your Health   Risks to your health if you smoke:  Nicotine and other chemicals found in tobacco damage every cell in your body  Even if you are a light smoker, you have an increased risk for cancer, heart disease, and lung disease   If you are pregnant or have diabetes, smoking increases your risk for complications  Benefits to your health if you stop smoking:   · You decrease respiratory symptoms such as coughing, wheezing, and shortness of breath  · You reduce your risk for cancers of the lung, mouth, throat, kidney, bladder, pancreas, stomach, and cervix  If you already have cancer, you increase the benefits of chemotherapy  You also reduce your risk for cancer returning or a second cancer from developing  · You reduce your risk for heart disease, blood clots, heart attack, and stroke  · You reduce your risk for lung infections, and diseases such as pneumonia, asthma, chronic bronchitis, and emphysema  · Your circulation improves  More oxygen can be delivered to your body  If you have diabetes, you lower your risk for complications, such as kidney, artery, and eye diseases  You also lower your risk for nerve damage  Nerve damage can lead to amputations, poor vision, and blindness  · You improve your body's ability to heal and to fight infections  For more information and support to stop smoking:   · [employer]  Phone: 2- 777 - [phone]  Web Address: [email or web address]  Weight Management   Why it is important to manage your weight:  Being overweight increases your risk of health conditions such as heart disease, high blood pressure, type 2 diabetes, and certain types of cancer  It can also increase your risk for osteoarthritis, sleep apnea, and other respiratory problems  Aim for a slow, steady weight loss  Even a small amount of weight loss can lower your risk of health problems  How to lose weight safely:  A safe and healthy way to lose weight is to eat fewer calories and get regular exercise  You can lose up about 1 pound a week by decreasing the number of calories you eat by 500 calories each day     Healthy meal plan for weight management:  A healthy meal plan includes a variety of foods, contains fewer calories, and helps you stay healthy  A healthy meal plan includes the following:  · Eat whole-grain foods more often  A healthy meal plan should contain fiber  Fiber is the part of grains, fruits, and vegetables that is not broken down by your body  Whole-grain foods are healthy and provide extra fiber in your diet  Some examples of whole-grain foods are whole-wheat breads and pastas, oatmeal, brown rice, and bulgur  · Eat a variety of vegetables every day  Include dark, leafy greens such as spinach, kale, nayeli greens, and mustard greens  Eat yellow and orange vegetables such as carrots, sweet potatoes, and winter squash  · Eat a variety of fruits every day  Choose fresh or canned fruit (canned in its own juice or light syrup) instead of juice  Fruit juice has very little or no fiber  · Eat low-fat dairy foods  Drink fat-free (skim) milk or 1% milk  Eat fat-free yogurt and low-fat cottage cheese  Try low-fat cheeses such as mozzarella and other reduced-fat cheeses  · Choose meat and other protein foods that are low in fat  Choose beans or other legumes such as split peas or lentils  Choose fish, skinless poultry (chicken or turkey), or lean cuts of red meat (beef or pork)  Before you cook meat or poultry, cut off any visible fat  · Use less fat and oil  Try baking foods instead of frying them  Add less fat, such as margarine, sour cream, regular salad dressing and mayonnaise to foods  Eat fewer high-fat foods  Some examples of high-fat foods include french fries, doughnuts, ice cream, and cakes  · Eat fewer sweets  Limit foods and drinks that are high in sugar  This includes candy, cookies, regular soda, and sweetened drinks  Exercise:  Exercise at least 30 minutes per day on most days of the week  Some examples of exercise include walking, biking, dancing, and swimming  You can also fit in more physical activity by taking the stairs instead of the elevator or parking farther away from stores   Ask your healthcare provider about the best exercise plan for you  © Copyright [employer] 2018 Information is for End User's use only and may not be sold, redistributed or otherwise used for commercial purposes  All illustrations and images included in CareNotes® are the copyrighted property of A D A M [employer]  or Sasha Collier    Obesity   AMBULATORY CARE:   Obesity  is when your body mass index (BMI) is greater than 30  Your healthcare provider will use your height and weight to measure your BMI  The risks of obesity include  many health problems, such as injuries or physical disability  You may need tests to check for the following:  · Diabetes    · High blood pressure or high cholesterol    · Heart disease    · Gallbladder or liver disease    · Cancer of the colon, breast, prostate, liver, or kidney    · Sleep apnea    · Arthritis or gout    Seek care immediately if:   · You have a severe headache, confusion, or difficulty speaking  · You have weakness on one side of your body  · You have chest pain, sweating, or shortness of breath  Contact your healthcare provider if:   · You have symptoms of gallbladder or liver disease, such as pain in your upper abdomen  · You have knee or hip pain and discomfort while walking  · You have symptoms of diabetes, such as intense hunger and thirst, and frequent urination  · You have symptoms of sleep apnea, such as snoring or daytime sleepiness  · You have questions or concerns about your condition or care  Treatment for obesity  focuses on helping you lose weight to improve your health  Even a small decrease in BMI can reduce the risk for many health problems  Your healthcare provider will help you set a weight-loss goal   · Lifestyle changes  are the first step in treating obesity  These include making healthy food choices and getting regular physical activity   Your healthcare provider may suggest a weight-loss program that involves coaching, education, and therapy  · Medicine  may help you lose weight when it is used with a healthy diet and physical activity  · Surgery  can help you lose weight if you are very obese and have other health problems  There are several types of weight-loss surgery  Ask your healthcare provider for more information  Be successful losing weight:   · Set small, realistic goals  An example of a small goal is to walk for 20 minutes 5 days a week  Anther goal is to lose 5% of your body weight  · Tell friends, family members, and coworkers about your goals  and ask for their support  Ask a friend to lose weight with you, or join a weight-loss support group  · Identify foods or triggers that may cause you to overeat , and find ways to avoid them  Remove tempting high-calorie foods from your home and workplace  Place a bowl of fresh fruit on your kitchen counter  If stress causes you to eat, then find other ways to cope with stress  · Keep a diary to track what you eat and drink  Also write down how many minutes of physical activity you do each day  Weigh yourself once a week and record it in your diary  Eating changes: You will need to eat 500 to 1,000 fewer calories each day than you currently eat to lose 1 to 2 pounds a week  The following changes will help you cut calories:  · Eat smaller portions  Use small plates, no larger than 9 inches in diameter  Fill your plate half full of fruits and vegetables  Measure your food using measuring cups until you know what a serving size looks like  · Eat 3 meals and 1 or 2 snacks each day  Plan your meals in advance  Gadiel Keys and eat at home most of the time  Eat slowly  Do not skip meals  Skipping meals can lead to overeating later in the day  This can make it harder for you to lose weight  Talk with a dietitian to help you make a meal plan and schedule that is right for you      · Eat fruits and vegetables at every meal   They are low in calories and high in fiber, which makes you feel full  Do not add butter, margarine, or cream sauce to vegetables  Use herbs to season steamed vegetables  · Eat less fat and fewer fried foods  Eat more baked or grilled chicken and fish  These protein sources are lower in calories and fat than red meat  Limit fast food  Dress your salads with olive oil and vinegar instead of bottled dressing  · Limit the amount of sugar you eat  Do not drink sugary beverages  Limit alcohol  Activity changes:  Physical activity is good for your body in many ways  It helps you burn calories and build strong muscles  It decreases stress and depression, and improves your mood  It can also help you sleep better  Talk to your healthcare provider before you begin an exercise program   · Exercise for at least 30 minutes 5 days a week  Start slowly  Set aside time each day for physical activity that you enjoy and that is convenient for you  It is best to do both weight training and an activity that increases your heart rate, such as walking, bicycling, or swimming  · Find ways to be more active  Do yard work and housecleaning  Walk up the stairs instead of using elevators  Spend your leisure time going to events that require walking, such as outdoor festivals or fairs  This extra physical activity can help you lose weight and keep it off  Follow up with your healthcare provider as directed: You may need to meet with a dietitian  Write down your questions so you remember to ask them during your visits  © Copyright [address] Information is for End User's use only and may not be sold, redistributed or otherwise used for commercial purposes  All illustrations and images included in CareNotes® are the copyrighted property of [employer]  or [facility] Marco A Escobar   The above information is an  only  It is not intended as medical advice for individual conditions or treatments   Talk to your doctor, nurse or pharmacist before following any medical regimen to see if it is safe and effective for you  Low Fat Diet   AMBULATORY CARE:   A low-fat diet  is an eating plan that is low in total fat, unhealthy fat, and cholesterol  You may need to follow a low-fat diet if you have trouble digesting or absorbing fat  You may also need to follow this diet if you have high cholesterol  You can also lower your cholesterol by increasing the amount of fiber in your diet  Soluble fiber is a type of fiber that helps to decrease cholesterol levels  Different types of fat in food:   · Limit unhealthy fats  A diet that is high in cholesterol, saturated fat, and trans fat may cause unhealthy cholesterol levels  Unhealthy cholesterol levels increase your risk of heart disease  ? Cholesterol:  Limit intake of cholesterol to less than 200 mg per day  Cholesterol is found in meat, eggs, and dairy  ? Saturated fat:  Limit saturated fat to less than 7% of your total daily calories  Ask your dietitian how many calories you need each day  Saturated fat is found in butter, cheese, ice cream, whole milk, and palm oil  Saturated fat is also found in meat, such as beef, pork, chicken skin, and processed meats  Processed meats include sausage, hot dogs, and bologna  ? Trans fat:  Avoid trans fat as much as possible  Trans fat is used in fried and baked foods  Foods that say trans fat free on the label may still have up to 0 5 grams of trans fat per serving  · Include healthy fats  Replace foods that are high in saturated and trans fat with foods high in healthy fats  This may help to decrease high cholesterol levels  ? Monounsaturated fats: These are found in avocados, nuts, and vegetable oils, such as olive, canola, and sunflower oil  ? Polyunsaturated fats: These can be found in vegetable oils, such as soybean or corn oil  Omega-3 fats can help to decrease the risk of heart disease   Omega-3 fats are found in fish, such as salmon, herring, trout, and tuna  Omega-3 fats can also be found in plant foods, such as walnuts, flaxseed, soybeans, and canola oil  Foods to limit or avoid:   · Grains:      ? Snacks that are made with partially hydrogenated oils, such as chips, regular crackers, and butter-flavored popcorn    ? High-fat baked goods, such as biscuits, croissants, doughnuts, pies, cookies, and pastries    · Dairy:      ? Whole milk, 2% milk, and yogurt and ice cream made with whole milk    ? Half and half creamer, heavy cream, and whipping cream    ? Cheese, cream cheese, and sour cream    · Meats and proteins:      ? High-fat cuts of meat (T-bone steak, regular hamburger, and ribs)    ? Fried meat, poultry (turkey and chicken), and fish    ? Poultry (chicken and turkey) with skin    ? Cold cuts (salami or bologna), hot dogs, mullins, and sausage    ? Whole eggs and egg yolks    · Vegetables and fruits with added fat:      ? Fried vegetables or vegetables in butter or high-fat sauces, such as cream or cheese sauces    ? Fried fruit or fruit served with butter or cream    · Fats:      ? Butter, stick margarine, and shortening    ? Coconut, palm oil, and palm kernel oil    Foods to include:   · Grains:      ? Whole-grain breads, cereals, pasta, and brown rice    ? Low-fat crackers and pretzels    · Vegetables and fruits:      ? Fresh, frozen, or canned vegetables (no salt or low-sodium)    ? Fresh, frozen, dried, or canned fruit (canned in light syrup or fruit juice)    ? Avocado    · Low-fat dairy products:      ? Nonfat (skim) or 1% milk    ? Nonfat or low-fat cheese, yogurt, and cottage cheese    · Meats and proteins:      ? Chicken or turkey with no skin    ? Baked or broiled fish    ? Lean beef and pork (loin, round, extra lean hamburger)    ? Beans and peas, unsalted nuts, soy products    ? Egg whites and substitutes    ? Seeds and nuts    · Fats:      ? Unsaturated oil, such as canola, olive, peanut, soybean, or sunflower oil    ?  Soft or liquid margarine and vegetable oil spread    ? Low-fat salad dressing    Other ways to decrease fat:   · Read food labels before you buy foods  Choose foods that have less than 30% of calories from fat  Choose low-fat or fat-free dairy products  Remember that fat free does not mean calorie free  These foods still contain calories, and too many calories can lead to weight gain  · Trim fat from meat and avoid fried food  Trim all visible fat from meat before you cook it  Remove the skin from poultry  Do not lucas meat, fish, or poultry  Bake, roast, boil, or broil these foods instead  Avoid fried foods  Eat a baked potato instead of [place] fries  Steam vegetables instead of sautéing them in butter  · Add less fat to foods  Use imitation mullins bits on salads and baked potatoes instead of regular mullins bits  Use fat-free or low-fat salad dressings instead of regular dressings  Use low-fat or nonfat butter-flavored topping instead of regular butter or margarine on popcorn and other foods  Ways to decrease fat in recipes:  Replace high-fat ingredients with low-fat or nonfat ones  This may cause baked goods to be drier than usual  You may need to use nonfat cooking spray on pans to prevent food from sticking  You also may need to change the amount of other ingredients, such as water, in the recipe  Try the following:  · Use low-fat or light margarine instead of regular margarine or shortening  · Use lean ground turkey breast or chicken, or lean ground beef (less than 5% fat) instead of hamburger  · Add 1 teaspoon of canola oil to 8 ounces of skim milk instead of using cream or half and half  · Use grated zucchini, carrots, or apples in breads instead of coconut  · Use blenderized, low-fat cottage cheese, plain tofu, or low-fat ricotta cheese instead of cream cheese  · Use 1 egg white and 1 teaspoon of canola oil, or use ¼ cup (2 ounces) of fat-free egg substitute instead of a whole egg  · Replace half of the oil that is called for in a recipe with applesauce when you bake  Use 3 tablespoons of cocoa powder and 1 tablespoon of canola oil instead of a square of baking chocolate  How to increase fiber:  Eat enough high-fiber foods to get 20 to 30 grams of fiber every day  Slowly increase your fiber intake to avoid stomach cramps, gas, and other problems  · Eat 3 ounces of whole-grain foods each day  An ounce is about 1 slice of bread  Eat whole-grain breads, such as whole-wheat bread  Whole wheat, whole-wheat flour, or other whole grains should be listed as the first ingredient on the food label  Replace white flour with whole-grain flour or use half of each in recipes  Whole-grain flour is heavier than white flour, so you may have to add more yeast or baking powder  · Eat a high-fiber cereal for breakfast   Oatmeal is a good source of soluble fiber  Look for cereals that have bran or fiber in the name  Choose whole-grain products, such as brown rice, barley, and whole-wheat pasta  · Eat more beans, peas, and lentils  For example, add beans to soups or salads  Eat at least 5 cups of fruits and vegetables each day  Eat fruits and vegetables with the peel because the peel is high in fiber  © Copyright [address] Information is for End User's use only and may not be sold, redistributed or otherwise used for commercial purposes  All illustrations and images included in CareNotes® are the copyrighted property of A D A M , Inc  or [address] [place] [address]  The above information is an  only  It is not intended as medical advice for individual conditions or treatments  Talk to your doctor, nurse or pharmacist before following any medical regimen to see if it is safe and effective for you  Heart Healthy Diet   AMBULATORY CARE:   A heart healthy diet  is an eating plan low in unhealthy fats and sodium (salt)  The plan is high in healthy fats and fiber   A heart healthy diet helps improve your cholesterol levels and lowers your risk for heart disease and stroke  A dietitian will teach you how to read and understand food labels  Heart healthy diet guidelines to follow:   · Choose foods that contain healthy fats  ? Unsaturated fats  include monounsaturated and polyunsaturated fats  Unsaturated fat is found in foods such as soybean, canola, olive, corn, and safflower oils  It is also found in soft tub margarine that is made with liquid vegetable oil  ? Omega-3 fat  is found in certain fish, such as salmon, tuna, and trout, and in walnuts and flaxseed  Eat fish high in omega-3 fats at least 2 times a week  · Get 20 to 30 grams of fiber each day  Fruits, vegetables, whole-grain foods, and legumes (cooked beans) are good sources of fiber  · Limit or do not have unhealthy fats  ? Cholesterol  is found in animal foods, such as eggs and lobster, and in dairy products made from whole milk  Limit cholesterol to less than 200 mg each day  ? Saturated fat  is found in meats, such as mullins and hamburger  It is also found in chicken or turkey skin, whole milk, and butter  Limit saturated fat to less than 7% of your total daily calories  ? Trans fat  is found in packaged foods, such as potato chips and cookies  It is also in hard margarine, some fried foods, and shortening  Do not eat foods that contain trans fats  · Limit sodium as directed  You may be told to limit sodium to 2,000 to 2,300 mg each day  Choose low-sodium or no-salt-added foods  Add little or no salt to food you prepare  Use herbs and spices in place of salt  Include the following in your heart healthy plan:  Ask your dietitian or healthcare provider how many servings to have from each of the following food groups:  · Grains:      ? Whole-wheat breads, cereals, and pastas, and brown rice    ?  Low-fat, low-sodium crackers and chips    · Vegetables:      ? Broccoli, green beans, green peas, and spinach    ? Collards, kale, and lima beans    ? Carrots, sweet potatoes, tomatoes, and peppers    ? Canned vegetables with no salt added    · Fruits:      ? Bananas, peaches, pears, and pineapple    ? Grapes, raisins, and dates    ? Oranges, tangerines, grapefruit, orange juice, and grapefruit juice    ? Apricots, mangoes, melons, and papaya    ? Raspberries and strawberries    ? Canned fruit with no added sugar    · Low-fat dairy:      ? Nonfat (skim) milk, 1% milk, and low-fat almond, cashew, or soy milks fortified with calcium    ? Low-fat cheese, regular or frozen yogurt, and cottage cheese    · Meats and proteins:      ? Lean cuts of beef and pork (loin, leg, round), skinless chicken and turkey    ? Legumes, soy products, egg whites, or nuts    Limit or do not include the following in your heart healthy plan:   · Unhealthy fats and oils:      ? Whole or 2% milk, cream cheese, sour cream, or cheese    ? High-fat cuts of beef (T-bone steaks, ribs), chicken or turkey with skin, and organ meats such as liver    ? Butter, stick margarine, shortening, and cooking oils such as coconut or palm oil    · Foods and liquids high in sodium:      ? Packaged foods, such as frozen dinners, cookies, macaroni and cheese, and cereals with more than 300 mg of sodium per serving    ? Vegetables with added sodium, such as instant potatoes, vegetables with added sauces, or regular canned vegetables    ? Cured or smoked meats, such as hot dogs, mullins, and sausage    ? High-sodium ketchup, barbecue sauce, salad dressing, pickles, olives, soy sauce, or miso    · Foods and liquids high in sugar:      ? Candy, cake, cookies, pies, or doughnuts    ? Soft drinks (soda), sports drinks, or sweetened tea    ? Canned or dry mixes for cakes, soups, sauces, or gravies    Other healthy heart guidelines:   · Do not smoke  Nicotine and other chemicals in cigarettes and cigars can cause lung and heart damage   Ask your healthcare provider for information if you currently smoke and need help to quit  E-cigarettes or smokeless tobacco still contain nicotine  Talk to your healthcare provider before you use these products  · Limit or do not drink alcohol as directed  Alcohol can damage your heart and raise your blood pressure  Your healthcare provider may give you specific daily and weekly limits  The general recommended limit is 1 drink a day for women 21 or older and for men 72 or older  Do not have more than 3 drinks in a day or 7 in a week  The recommended limit is 2 drinks a day for men 24to 59years of age  Do not have more than 4 drinks in a day or 14 in a week  A drink of alcohol is 12 ounces of beer, 5 ounces of wine, or 1½ ounces of liquor  · Exercise regularly  Exercise can help you maintain a healthy weight and improve your blood pressure and cholesterol levels  Regular exercise can also decrease your risk for heart problems  Ask your healthcare provider about the best exercise plan for you  Do not start an exercise program without asking your healthcare provider  Follow up with your doctor or cardiologist as directed:  Write down your questions so you remember to ask them during your visits  © Copyright [address] [place] [address] Information is for End User's use only and may not be sold, redistributed or otherwise used for commercial purposes  All illustrations and images included in CareNotes® are the copyrighted property of A D A M , Inc  or [address] [place] [address]  The above information is an  only  It is not intended as medical advice for individual conditions or treatments  Talk to your doctor, nurse or pharmacist before following any medical regimen to see if it is safe and effective for you  Calorie Counting Diet   WHAT YOU NEED TO KNOW:   What is a calorie counting diet? It is a meal plan based on counting calories each day to reach a healthy body weight   You will need to eat fewer calories if you are trying to lose weight  Weight loss may decrease your risk for certain health problems or improve your health if you have health problems  Some of these health problems include heart disease, high blood pressure, and diabetes  What foods should I avoid? Your dietitian will tell you if you need to avoid certain foods based on your body weight and health condition  You may need to avoid high-fat foods if you are at risk for or have heart disease  You may need to eat fewer foods from the breads and starches food group if you have diabetes  How many calories are in foods? The following is a list of foods and drinks with the approximate number of calories in each  Check the food label to find the exact number of calories  A dietitian can tell you how many calories you should have from each food group each day  · Carbohydrate:      ? ½ of a 3-inch bagel, 1 slice of bread, or ½ of a hamburger bun or hot dog bun (80)    ? 1 (8-inch) flour tortilla or ½ cup of cooked rice (100)    ? 1 (6-inch) corn tortilla (80)    ? 1 (6-inch) pancake or 1 cup of bran flakes cereal (110)    ? ½ cup of cooked cereal (80)    ? ½ cup of cooked pasta (85)    ? 1 ounce of pretzels (100)    ? 3 cups of air-popped popcorn without butter or oil (80)    · Dairy:      ? 1 cup of skim or 1% milk (90)    ? 1 cup of 2% milk (120)    ? 1 cup of whole milk (160)    ? 1 cup of 2% chocolate milk (220)    ? 1 ounce of low-fat cheese with 3 grams of fat per ounce (70)    ? 1 ounce of cheddar cheese (114)    ? ½ cup of 1% fat cottage cheese (80)    ? 1 cup of plain or sugar-free, fat-free yogurt (90)    · Protein foods:      ? 3 ounces of fish (not breaded or fried) (95)    ? 3 ounces of breaded, fried fish (195)    ? ¾ cup of tuna canned in water (105)    ? 3 ounces of chicken breast without skin (105)    ? 1 fried chicken breast with skin (350)    ? ¼ cup of fat free egg substitute (40)    ? 1 large egg (75)    ?  3 ounces of lean beef or pork (165)    ? 3 ounces of fried pork chop or ham (185)    ? ½ cup of cooked dried beans, such as kidney, delgado, lentils, or navy (115)    ? 3 ounces of bologna or lunch meat (225)    ? 2 links of breakfast sausage (140)    · Vegetables:      ? ½ cup of sliced mushrooms (10)    ? 1 cup of salad greens, such as lettuce, spinach, or carolee (15)    ? ½ cup of steamed asparagus (20)    ? ½ cup of cooked summer squash, zucchini squash, or green or wax beans (25)    ? 1 cup of broccoli or cauliflower florets, or 1 medium tomato (25)    ? 1 large raw carrot or ½ cup of cooked carrots (40)    ? ? of a medium cucumber or 1 stalk of celery (5)    ? 1 small baked potato (160)    ? 1 cup of breaded, fried vegetables (230)    · Fruit:      ? 1 (6-inch) banana (55)     ? ½ of a 4-inch grapefruit (55)    ? 15 grapes (60)    ? 1 medium orange or apple (70)    ? 1 large peach (65)    ? 1 cup of fresh pineapple chunks (75)    ? 1 cup of melon cubes (50)    ? 1¼ cups of whole strawberries (45)    ? ½ cup of fruit canned in juice (55)    ? ½ cup of fruit canned in heavy syrup (110)    ? ? cup of raisins (130)    ? ½ cup of unsweetened fruit juice (60)    ? ½ cup of grape, cranberry, or prune juice (90)    · Fat:      ? 10 peanuts or 2 teaspoons of peanut butter (55)    ? 2 tablespoons of avocado or 1 tablespoon of regular salad dressing (45)    ? 2 slices of mullins (90)    ? 1 teaspoon of oil, such as safflower, canola, corn, or olive oil (45)    ? 2 teaspoons of low-fat margarine, or 1 tablespoon of low-fat mayonnaise (50)    ? 1 teaspoon of regular margarine (40)    ? 1 tablespoon of regular mayonnaise (135)    ? 1 tablespoon of cream cheese or 2 tablespoons of low-fat cream cheese (45)    ? 2 tablespoons of vegetable shortening (215)    · Dessert and sweets:      ? 8 animal crackers or 5 vanilla wafers (80)    ? 1 frozen fruit juice bar (80)    ? ½ cup of ice milk or low-fat frozen yogurt (90)    ? ½ cup of sherbet or sorbet (125)    ?  ½ cup of sugar-free pudding or custard (60)    ? ½ cup of ice cream (140)    ? ½ cup of pudding or custard (175)    ? 1 (2-inch) square chocolate brownie (185)    · Combination foods:      ? Bean burrito made with an 8-inch tortilla, without cheese (275)    ? Chicken breast sandwich with lettuce and tomato (325)    ? 1 cup of chicken noodle soup (60)    ? 1 beef taco (175)    ? Regular hamburger with lettuce and tomato (310)    ? Regular cheeseburger with lettuce and tomato (410)     ? ¼ of a 12-inch cheese pizza (280)    ? Fried fish sandwich with lettuce and tomato (425)    ? Hot dog and bun (275)    ? 1½ cups of macaroni and cheese (310)    ? Taco salad with a fried tortilla shell (870)    · Low-calorie foods:      ? 1 tablespoon of ketchup or 1 tablespoon of fat free sour cream (15)    ? 1 teaspoon of mustard (5)    ? ¼ cup of salsa (20)    ? 1 large dill pickle (15)    ? 1 tablespoon of fat free salad dressing (10)    ? 2 teaspoons of low-sugar, light jam or jelly, or 1 tablespoon of sugar-free syrup (15)    ? 1 sugar-free popsicle (15)    ? 1 cup of club soda, seltzer water, or diet soda (0)    CARE AGREEMENT:   You have the right to help plan your care  Discuss treatment options with your healthcare provider to decide what care you want to receive  You always have the right to refuse treatment  The above information is an  only  It is not intended as medical advice for individual conditions or treatments  Talk to your doctor, nurse or pharmacist before following any medical regimen to see if it is safe and effective for you  © Copyright [address] Information is for End User's use only and may not be sold, redistributed or otherwise used for commercial purposes   All illustrations and images included in CareNotes® are the copyrighted property of [employer]  or [address] [place] [address] [employer]

## 2021-05-11 NOTE — ASSESSMENT & PLAN NOTE
She continues to have low back pain and sees a specialist but is on no narcotic    She does have a nerve stimulator placed in years ago

## 2021-05-11 NOTE — ASSESSMENT & PLAN NOTE
She continues to be morbidly obese and again was counseled on diet and exercise    At the she has not gained any

## 2021-05-26 DIAGNOSIS — Z12.31 VISIT FOR SCREENING MAMMOGRAM: ICD-10-CM

## 2021-08-09 DIAGNOSIS — G89.29 CHRONIC BILATERAL LOW BACK PAIN WITHOUT SCIATICA: ICD-10-CM

## 2021-08-09 DIAGNOSIS — M54.50 CHRONIC BILATERAL LOW BACK PAIN WITHOUT SCIATICA: ICD-10-CM

## 2021-08-09 DIAGNOSIS — G89.4 CHRONIC PAIN SYNDROME: ICD-10-CM

## 2021-08-09 RX ORDER — TOPIRAMATE 100 MG/1
TABLET, FILM COATED ORAL
Qty: 180 TABLET | Refills: 1 | Status: SHIPPED | OUTPATIENT
Start: 2021-08-09 | End: 2022-02-07

## 2021-08-30 ENCOUNTER — OFFICE VISIT (OUTPATIENT)
Dept: PAIN MEDICINE | Facility: CLINIC | Age: 66
End: 2021-08-30
Payer: MEDICARE

## 2021-08-30 VITALS
WEIGHT: 259 LBS | BODY MASS INDEX: 43.15 KG/M2 | HEIGHT: 65 IN | HEART RATE: 71 BPM | DIASTOLIC BLOOD PRESSURE: 82 MMHG | SYSTOLIC BLOOD PRESSURE: 140 MMHG

## 2021-08-30 DIAGNOSIS — G89.4 CHRONIC PAIN SYNDROME: Primary | ICD-10-CM

## 2021-08-30 DIAGNOSIS — M54.16 LUMBAR RADICULOPATHY: ICD-10-CM

## 2021-08-30 DIAGNOSIS — Z96.89 SPINAL CORD STIMULATOR STATUS: ICD-10-CM

## 2021-08-30 DIAGNOSIS — M48.061 SPINAL STENOSIS OF LUMBAR REGION, UNSPECIFIED WHETHER NEUROGENIC CLAUDICATION PRESENT: ICD-10-CM

## 2021-08-30 DIAGNOSIS — M47.816 LUMBAR SPONDYLOSIS: ICD-10-CM

## 2021-08-30 PROCEDURE — 99213 OFFICE O/P EST LOW 20 MIN: CPT | Performed by: ANESTHESIOLOGY

## 2021-08-30 NOTE — PROGRESS NOTES
Assessment  1  Chronic pain syndrome    2  Lumbar radiculopathy    3  Spinal stenosis of lumbar region, unspecified whether neurogenic claudication present    4  Lumbar spondylosis    5  Spinal cord stimulator status        Plan  73-year-old female with a history of chronic pain syndrome, lumbar degenerative disc disease, spondylosis, stenosis, lumbar radiculopathy status post spinal cord stimulator placement with an Abbott device returning for follow-up  The patient is complaining of worsening low back pain radiating into the posterior aspect of the right leg  She denies any recent trauma  The patient continues to take meloxicam 15 mg daily, Topamax 100 mg b i d , and tizanidine 2 mg p r n  with about 25% relief  She last had her spinal cord stimulator reprogrammed months ago  She is reluctant to pursue any surgical intervention / consultation as she is concerned about recovery time  1  I have advised the patient to have her spinal cord stimulator reprogrammed to see if this provides better relief of her current symptoms  2  If spinal cord stimulation fails to provide any relief we may consider an L5-S1 LESI  3  Patient will continue with Topamax, tizanidine, and meloxicam as prescribed  4  Patient will continue with her home exercise program  5  I will follow up the patient in 6 weeks or sooner if needed        My impressions and treatment recommendations were discussed in detail with the patient who verbalized understanding and had no further questions  Discharge instructions were provided  I personally saw and examined the patient and I agree with the above discussed plan of care  No orders of the defined types were placed in this encounter  No orders of the defined types were placed in this encounter        History of Present Illness    Radha Goddard is a 61 y o  female with a history of chronic pain syndrome, lumbar degenerative disc disease, spondylosis, stenosis, lumbar radiculopathy status post spinal cord stimulator placement with an Abbott device returning for follow-up  The patient is complaining of worsening low back pain radiating into the posterior aspect of the right leg  She denies any significant left lower extremity symptoms, bladder bowel incontinence, or saddle anesthesia  She denies any recent trauma  The patient continues to take meloxicam 15 mg daily, Topamax 100 mg b i d , and tizanidine 2 mg p r n  with about 25% relief  She denies any side effects of the medication  The patient rates her pain as 6/10 on the pain is worse in the evening  The pain is constant and described as sharp, pressure-like, and shooting  The pain is worse with standing, walking, and exercise  The pain is alleviated with sitting, relaxation, and lying down  Other than as stated above, the patient denies any interval changes in medications, medical condition, mental condition, symptoms, or allergies since the last office visit  I have personally reviewed and/or updated the patient's past medical history, past surgical history, family history, social history, current medications, allergies, and vital signs today  Review of Systems   Constitutional: Negative for fever and unexpected weight change  HENT: Negative for trouble swallowing  Eyes: Negative for visual disturbance  Respiratory: Negative for shortness of breath and wheezing  Cardiovascular: Negative for chest pain and palpitations  Gastrointestinal: Negative for constipation, diarrhea, nausea and vomiting  Endocrine: Negative for cold intolerance, heat intolerance and polydipsia  Genitourinary: Negative for difficulty urinating and frequency  Musculoskeletal: Positive for gait problem and joint swelling  Negative for arthralgias and myalgias  Pain in extremity   Skin: Negative for rash  Neurological: Negative for dizziness, seizures, syncope, weakness and headaches     Hematological: Does not bruise/bleed easily  Psychiatric/Behavioral: Negative for dysphoric mood  All other systems reviewed and are negative        Patient Active Problem List   Diagnosis    Chronic low back pain    Chronic pain syndrome    Type 2 diabetes mellitus with diabetic nephropathy, without long-term current use of insulin (HCC)    Morbid obesity (HCC)    HTN (hypertension)    Depression    Sacroiliitis (HCC)    Lumbar spinal stenosis    Hypovitaminosis D    Asthma, mild intermittent    Current every day smoker    Neurogenic claudication due to lumbar spinal stenosis    Back muscle spasm    Cervical radiculopathy    Spinal cord stimulator status    Lumbar radiculopathy    Lumbar spondylosis       Past Medical History:   Diagnosis Date    Allergic reaction to bee sting     last assessed - [id number]    Asthma     Chronic narcotic dependence ([address] )     Chronic pain syndrome     Depression     Diabetes mellitus ([facility]     Esophageal reflux     Hyperlipidemia     Hypertension     Lumbar radiculopathy     Lyme disease     last assessed - [id number]    Obesity     Opioid dependence ([facility] [address] )     PPD positive     had treatment w/ INH 15 years ago    Vitamin D deficiency        Past Surgical History:   Procedure Laterality Date    APPENDECTOMY      CHOLECYSTECTOMY      JOINT REPLACEMENT      R knee, B/l total hip    NE REVISE/REMOVE SPINAL NEUROSTIM/ Left 12/13/2016    Procedure: REPLACEMENT BUTTOCK SPINAL CORD STIMULATOR IPG;  Surgeon: Loretta Rodarte MD;  Location:  MAIN OR;  Service: Neurosurgery    SHOULDER SURGERY      Resolved - 1992    SPINAL CORD STIMULATOR IMPLANT      spinal stereotaxis stimulation of cord    TOTAL HIP ARTHROPLASTY Bilateral        Family History   Problem Relation Age of Onset    Diabetes Mother         Diabetes mellitus    Cancer Mother     Cancer Father     Diabetes Maternal Grandmother     Cancer Paternal Uncle     Brain cancer Family     Breast cancer Family     Cervical cancer Family     Diabetes Family         Diabetes mellitus    Hypertension Family     Ovarian cancer Family     Stroke Family         Stroke complications       Social History     Occupational History    Not on file   Tobacco Use    Smoking status: Current Every Day Smoker     Packs/day: 1 00     Years: 5 00     Pack years: 5 00    Smokeless tobacco: Never Used   Substance and Sexual Activity    Alcohol use: No     Alcohol/week: 0 0 standard drinks     Comment: Denied history of alcohol use    Drug use: No     Comment: Denied history of drug use    Sexual activity: Not on file       Current Outpatient Medications on File Prior to Visit   Medication Sig    atorvastatin (LIPITOR) 10 mg tablet Take 1 tablet (10 mg total) by mouth daily taks in am    Blood Glucose Monitoring Suppl (ONE TOUCH ULTRA MINI) w/Device KIT by Does not apply route daily    Cholecalciferol (VITAMIN D3) 63964 units CAPS Take 1 capsule by mouth once a week    EPINEPHrine (EPIPEN) 0 3 mg/0 3 mL SOAJ Inject 0 3 mL (0 3 mg total) into a muscle as needed for anaphylaxis    glimepiride (AMARYL) 1 mg tablet TAKE 1 TABLET BY MOUTH DAILY AT BEDTIME    glucose blood (ONE TOUCH ULTRA TEST) test strip Test blood sugar once a day    lisinopril (ZESTRIL) 20 mg tablet Take 1 tablet (20 mg total) by mouth daily    meloxicam (MOBIC) 15 mg tablet Take 1 tablet (15 mg total) by mouth daily    Nerve Stimulator (STANDARD TENS) ISAEL by Does not apply route 4 (four) times a day as needed (MUSCLE SPASMS)    tiZANidine (ZANAFLEX) 2 mg tablet Take 1 tablet (2 mg total) by mouth every 8 (eight) hours as needed for muscle spasms    topiramate (TOPAMAX) 100 mg tablet TAKE 1 TABLET BY MOUTH TWICE A DAY    venlafaxine (EFFEXOR-XR) 75 mg 24 hr capsule Take 1 capsule (75 mg total) by mouth daily    ergocalciferol (Drisdol) 1 25 MG (72871 UT) capsule Take 50,000 Units by mouth     No current facility-administered medications on file prior to visit  Allergies   Allergen Reactions    Bee Venom Anaphylaxis    Other Other (See Comments)     Stainless steel and surgical steel  *Severe blistering*    Acetazolamide     Aleve [Naproxen Sodium] Hives    Augmentin [Amoxicillin-Pot Clavulanate] Hives    Gabapentin     Glyburide     Metformin And Related Hives    Morphine And Related Hives    Motrin [Ibuprofen] Hives    Nortriptyline Hives    Penicillins Hives    Soy Lecithin [Lecithin] Hives    Sulfa Antibiotics Hives    Tradjenta [Linagliptin] Hives    Tramadol        Physical Exam    /82   Pulse 71   Ht 5' 5" (1 651 m)   Wt 117 kg (259 lb)   LMP  (LMP Unknown)   BMI 43 10 kg/m²     Constitutional: normal, well developed, well nourished, alert, in no distress and non-toxic and no overt pain behavior  Eyes: anicteric  HEENT: grossly intact  Neck: supple, symmetric, trachea midline and no masses   Pulmonary:even and unlabored  Cardiovascular:No edema or pitting edema present  Skin:Normal without rashes or lesions and well hydrated  Psychiatric:Mood and affect appropriate  Neurologic:Cranial Nerves II-XII grossly intact  Musculoskeletal:normalGait  Bilateral lumbar paraspinals tender to palpation ropy in texture  Bilateral SI joints minimally tender to palpation  Bilateral patellar and Achilles reflexes unable to be elicited  No clonus was noted bilaterally  Bilateral lower extremity strength 5/5 in all muscle groups  Sensation intact to light touch in L3 through S1 dermatomes bilaterally  Negative seated straight leg raise bilaterally      Imaging    Imaging reviewed

## 2021-09-17 DIAGNOSIS — I10 ESSENTIAL HYPERTENSION: ICD-10-CM

## 2021-09-17 RX ORDER — LISINOPRIL 20 MG/1
TABLET ORAL
Qty: 90 TABLET | Refills: 0 | Status: SHIPPED | OUTPATIENT
Start: 2021-09-17 | End: 2021-10-15 | Stop reason: SDUPTHER

## 2021-09-20 ENCOUNTER — APPOINTMENT (OUTPATIENT)
Dept: LAB | Facility: IMAGING CENTER | Age: 66
End: 2021-09-20
Payer: MEDICARE

## 2021-09-20 DIAGNOSIS — E55.9 VITAMIN D DEFICIENCY: ICD-10-CM

## 2021-09-20 DIAGNOSIS — M35.3 POLYMYALGIA RHEUMATICA (HCC): ICD-10-CM

## 2021-09-20 LAB
25(OH)D3 SERPL-MCNC: 42.4 NG/ML (ref 30–100)
ALBUMIN SERPL BCP-MCNC: 3.4 G/DL (ref 3.5–5)
ALP SERPL-CCNC: 106 U/L (ref 46–116)
ALT SERPL W P-5'-P-CCNC: 30 U/L (ref 12–78)
ANION GAP SERPL CALCULATED.3IONS-SCNC: 1 MMOL/L (ref 4–13)
AST SERPL W P-5'-P-CCNC: 18 U/L (ref 5–45)
BASOPHILS # BLD AUTO: 0.07 THOUSANDS/ΜL (ref 0–0.1)
BASOPHILS NFR BLD AUTO: 1 % (ref 0–1)
BILIRUB SERPL-MCNC: 0.36 MG/DL (ref 0.2–1)
BUN SERPL-MCNC: 24 MG/DL (ref 5–25)
CALCIUM ALBUM COR SERPL-MCNC: 9.8 MG/DL (ref 8.3–10.1)
CALCIUM SERPL-MCNC: 9.3 MG/DL (ref 8.3–10.1)
CHLORIDE SERPL-SCNC: 114 MMOL/L (ref 100–108)
CO2 SERPL-SCNC: 24 MMOL/L (ref 21–32)
CREAT SERPL-MCNC: 0.99 MG/DL (ref 0.6–1.3)
CRP SERPL QL: 6 MG/L
EOSINOPHIL # BLD AUTO: 0.22 THOUSAND/ΜL (ref 0–0.61)
EOSINOPHIL NFR BLD AUTO: 3 % (ref 0–6)
ERYTHROCYTE [DISTWIDTH] IN BLOOD BY AUTOMATED COUNT: 14 % (ref 11.6–15.1)
ERYTHROCYTE [SEDIMENTATION RATE] IN BLOOD: 28 MM/HOUR (ref 0–29)
GFR SERPL CREATININE-BSD FRML MDRD: 60 ML/MIN/1.73SQ M
GLUCOSE SERPL-MCNC: 145 MG/DL (ref 65–140)
HCT VFR BLD AUTO: 41.8 % (ref 34.8–46.1)
HGB BLD-MCNC: 13.1 G/DL (ref 11.5–15.4)
IMM GRANULOCYTES # BLD AUTO: 0.04 THOUSAND/UL (ref 0–0.2)
IMM GRANULOCYTES NFR BLD AUTO: 1 % (ref 0–2)
LYMPHOCYTES # BLD AUTO: 1.81 THOUSANDS/ΜL (ref 0.6–4.47)
LYMPHOCYTES NFR BLD AUTO: 26 % (ref 14–44)
MCH RBC QN AUTO: 29.9 PG (ref 26.8–34.3)
MCHC RBC AUTO-ENTMCNC: 31.3 G/DL (ref 31.4–37.4)
MCV RBC AUTO: 95 FL (ref 82–98)
MONOCYTES # BLD AUTO: 0.66 THOUSAND/ΜL (ref 0.17–1.22)
MONOCYTES NFR BLD AUTO: 9 % (ref 4–12)
NEUTROPHILS # BLD AUTO: 4.19 THOUSANDS/ΜL (ref 1.85–7.62)
NEUTS SEG NFR BLD AUTO: 60 % (ref 43–75)
NRBC BLD AUTO-RTO: 0 /100 WBCS
PLATELET # BLD AUTO: 175 THOUSANDS/UL (ref 149–390)
PMV BLD AUTO: 11.6 FL (ref 8.9–12.7)
POTASSIUM SERPL-SCNC: 4.2 MMOL/L (ref 3.5–5.3)
PROT SERPL-MCNC: 7.2 G/DL (ref 6.4–8.2)
RBC # BLD AUTO: 4.38 MILLION/UL (ref 3.81–5.12)
SODIUM SERPL-SCNC: 139 MMOL/L (ref 136–145)
WBC # BLD AUTO: 6.99 THOUSAND/UL (ref 4.31–10.16)

## 2021-09-20 PROCEDURE — 82306 VITAMIN D 25 HYDROXY: CPT

## 2021-09-20 PROCEDURE — 85652 RBC SED RATE AUTOMATED: CPT

## 2021-09-20 PROCEDURE — 80053 COMPREHEN METABOLIC PANEL: CPT

## 2021-09-20 PROCEDURE — 85025 COMPLETE CBC W/AUTO DIFF WBC: CPT

## 2021-09-20 PROCEDURE — 36415 COLL VENOUS BLD VENIPUNCTURE: CPT

## 2021-09-20 PROCEDURE — 86140 C-REACTIVE PROTEIN: CPT

## 2021-10-11 ENCOUNTER — OFFICE VISIT (OUTPATIENT)
Dept: PAIN MEDICINE | Facility: CLINIC | Age: 66
End: 2021-10-11
Payer: MEDICARE

## 2021-10-11 VITALS
BODY MASS INDEX: 42.82 KG/M2 | DIASTOLIC BLOOD PRESSURE: 78 MMHG | SYSTOLIC BLOOD PRESSURE: 133 MMHG | HEIGHT: 65 IN | HEART RATE: 70 BPM | WEIGHT: 257 LBS

## 2021-10-11 DIAGNOSIS — M48.062 NEUROGENIC CLAUDICATION DUE TO LUMBAR SPINAL STENOSIS: ICD-10-CM

## 2021-10-11 DIAGNOSIS — G89.4 CHRONIC PAIN SYNDROME: Primary | ICD-10-CM

## 2021-10-11 DIAGNOSIS — M54.40 CHRONIC LOW BACK PAIN WITH SCIATICA, SCIATICA LATERALITY UNSPECIFIED, UNSPECIFIED BACK PAIN LATERALITY: ICD-10-CM

## 2021-10-11 DIAGNOSIS — M46.1 SACROILIITIS (HCC): ICD-10-CM

## 2021-10-11 DIAGNOSIS — M54.16 LUMBAR RADICULOPATHY: ICD-10-CM

## 2021-10-11 DIAGNOSIS — M48.061 SPINAL STENOSIS OF LUMBAR REGION, UNSPECIFIED WHETHER NEUROGENIC CLAUDICATION PRESENT: ICD-10-CM

## 2021-10-11 DIAGNOSIS — M54.12 CERVICAL RADICULOPATHY: ICD-10-CM

## 2021-10-11 DIAGNOSIS — Z96.89 SPINAL CORD STIMULATOR STATUS: ICD-10-CM

## 2021-10-11 DIAGNOSIS — M62.830 BACK MUSCLE SPASM: ICD-10-CM

## 2021-10-11 DIAGNOSIS — M47.816 LUMBAR SPONDYLOSIS: ICD-10-CM

## 2021-10-11 DIAGNOSIS — G89.29 CHRONIC LOW BACK PAIN WITH SCIATICA, SCIATICA LATERALITY UNSPECIFIED, UNSPECIFIED BACK PAIN LATERALITY: ICD-10-CM

## 2021-10-11 PROCEDURE — 99213 OFFICE O/P EST LOW 20 MIN: CPT | Performed by: NURSE PRACTITIONER

## 2021-10-15 ENCOUNTER — OFFICE VISIT (OUTPATIENT)
Dept: INTERNAL MEDICINE CLINIC | Age: 66
End: 2021-10-15
Payer: MEDICARE

## 2021-10-15 VITALS
DIASTOLIC BLOOD PRESSURE: 78 MMHG | BODY MASS INDEX: 43.45 KG/M2 | OXYGEN SATURATION: 96 % | HEIGHT: 65 IN | WEIGHT: 260.8 LBS | SYSTOLIC BLOOD PRESSURE: 140 MMHG | HEART RATE: 69 BPM | TEMPERATURE: 97.5 F

## 2021-10-15 DIAGNOSIS — E66.01 MORBID OBESITY (HCC): ICD-10-CM

## 2021-10-15 DIAGNOSIS — Z23 NEED FOR IMMUNIZATION AGAINST INFLUENZA: ICD-10-CM

## 2021-10-15 DIAGNOSIS — Z28.21 COVID-19 VACCINATION REFUSED: ICD-10-CM

## 2021-10-15 DIAGNOSIS — E11.21 TYPE 2 DIABETES MELLITUS WITH DIABETIC NEPHROPATHY, WITHOUT LONG-TERM CURRENT USE OF INSULIN (HCC): Primary | ICD-10-CM

## 2021-10-15 DIAGNOSIS — J45.20 MILD INTERMITTENT ASTHMA WITHOUT COMPLICATION: ICD-10-CM

## 2021-10-15 DIAGNOSIS — I10 PRIMARY HYPERTENSION: ICD-10-CM

## 2021-10-15 DIAGNOSIS — E11.9 CONTROLLED TYPE 2 DIABETES MELLITUS WITHOUT COMPLICATION, WITHOUT LONG-TERM CURRENT USE OF INSULIN (HCC): ICD-10-CM

## 2021-10-15 DIAGNOSIS — I10 ESSENTIAL HYPERTENSION: ICD-10-CM

## 2021-10-15 DIAGNOSIS — F33.41 RECURRENT MAJOR DEPRESSIVE DISORDER, IN PARTIAL REMISSION (HCC): ICD-10-CM

## 2021-10-15 PROBLEM — E55.9 HYPOVITAMINOSIS D: Status: RESOLVED | Noted: 2018-04-12 | Resolved: 2021-10-15

## 2021-10-15 LAB — SL AMB POCT HEMOGLOBIN AIC: 6.2 (ref ?–6.5)

## 2021-10-15 PROCEDURE — 90682 RIV4 VACC RECOMBINANT DNA IM: CPT | Performed by: INTERNAL MEDICINE

## 2021-10-15 PROCEDURE — 99214 OFFICE O/P EST MOD 30 MIN: CPT | Performed by: INTERNAL MEDICINE

## 2021-10-15 PROCEDURE — 83036 HEMOGLOBIN GLYCOSYLATED A1C: CPT | Performed by: INTERNAL MEDICINE

## 2021-10-15 PROCEDURE — G0008 ADMIN INFLUENZA VIRUS VAC: HCPCS | Performed by: INTERNAL MEDICINE

## 2021-10-15 RX ORDER — GLIMEPIRIDE 1 MG/1
1 TABLET ORAL
Qty: 90 TABLET | Refills: 1 | Status: SHIPPED | OUTPATIENT
Start: 2021-10-15 | End: 2021-10-15

## 2021-10-15 RX ORDER — LISINOPRIL 20 MG/1
20 TABLET ORAL DAILY
Qty: 90 TABLET | Refills: 3 | Status: SHIPPED | OUTPATIENT
Start: 2021-10-15

## 2021-10-23 DIAGNOSIS — F33.41 RECURRENT MAJOR DEPRESSIVE DISORDER, IN PARTIAL REMISSION (HCC): ICD-10-CM

## 2021-10-23 DIAGNOSIS — M62.838 MUSCLE SPASM: ICD-10-CM

## 2021-10-25 RX ORDER — TIZANIDINE 2 MG/1
TABLET ORAL
Qty: 270 TABLET | Refills: 1 | OUTPATIENT
Start: 2021-10-25

## 2021-10-25 RX ORDER — VENLAFAXINE HYDROCHLORIDE 75 MG/1
CAPSULE, EXTENDED RELEASE ORAL
Qty: 90 CAPSULE | Refills: 1 | Status: SHIPPED | OUTPATIENT
Start: 2021-10-25 | End: 2022-04-18

## 2021-10-27 ENCOUNTER — OFFICE VISIT (OUTPATIENT)
Dept: INTERNAL MEDICINE CLINIC | Age: 66
End: 2021-10-27
Payer: MEDICARE

## 2021-10-27 VITALS
HEIGHT: 65 IN | HEART RATE: 72 BPM | OXYGEN SATURATION: 97 % | DIASTOLIC BLOOD PRESSURE: 90 MMHG | WEIGHT: 257 LBS | BODY MASS INDEX: 42.82 KG/M2 | SYSTOLIC BLOOD PRESSURE: 130 MMHG | TEMPERATURE: 96.3 F

## 2021-10-27 DIAGNOSIS — J45.901 ASTHMA EXACERBATION IN COPD (HCC): Primary | ICD-10-CM

## 2021-10-27 DIAGNOSIS — E11.21 TYPE 2 DIABETES MELLITUS WITH DIABETIC NEPHROPATHY, WITHOUT LONG-TERM CURRENT USE OF INSULIN (HCC): ICD-10-CM

## 2021-10-27 DIAGNOSIS — J44.1 ASTHMA EXACERBATION IN COPD (HCC): Primary | ICD-10-CM

## 2021-10-27 PROCEDURE — 99213 OFFICE O/P EST LOW 20 MIN: CPT | Performed by: INTERNAL MEDICINE

## 2021-10-27 PROCEDURE — 94640 AIRWAY INHALATION TREATMENT: CPT | Performed by: INTERNAL MEDICINE

## 2021-10-27 RX ORDER — PREDNISONE 10 MG/1
TABLET ORAL
Qty: 30 TABLET | Refills: 0 | Status: SHIPPED | OUTPATIENT
Start: 2021-10-27 | End: 2022-06-07 | Stop reason: ALTCHOICE

## 2021-10-27 RX ORDER — ALBUTEROL SULFATE 90 UG/1
2 AEROSOL, METERED RESPIRATORY (INHALATION) EVERY 6 HOURS PRN
Qty: 6.7 G | Refills: 5 | Status: SHIPPED | OUTPATIENT
Start: 2021-10-27

## 2021-10-27 RX ORDER — PREDNISONE 20 MG/1
TABLET ORAL
Qty: 18 TABLET | Refills: 0 | Status: CANCELLED | OUTPATIENT
Start: 2021-10-27

## 2021-10-27 RX ORDER — AZITHROMYCIN 250 MG/1
TABLET, FILM COATED ORAL
Qty: 6 TABLET | Refills: 0 | Status: SHIPPED | OUTPATIENT
Start: 2021-10-27 | End: 2021-11-01

## 2021-11-01 RX ORDER — TIZANIDINE 2 MG/1
2 TABLET ORAL EVERY 8 HOURS PRN
Qty: 270 TABLET | Refills: 1 | Status: SHIPPED | OUTPATIENT
Start: 2021-11-01 | End: 2022-07-15 | Stop reason: SDUPTHER

## 2022-01-18 ENCOUNTER — VBI (OUTPATIENT)
Dept: ADMINISTRATIVE | Facility: OTHER | Age: 67
End: 2022-01-18

## 2022-01-21 NOTE — TELEPHONE ENCOUNTER
Have her make another appointment to see Dr Andrés Garcia  She should not be getting muscle spasms this frequently  It looks like she has tried baclofen and tizanidine in the past for muscle spasms  I would have her discontinue those at this time  I sent methocarbamol to her pharmacy  to guide RLE off bed, pt able to perform trunk flexion and use UEs for support/minimum assist (75% patients effort)

## 2022-01-26 ENCOUNTER — VBI (OUTPATIENT)
Dept: ADMINISTRATIVE | Facility: OTHER | Age: 67
End: 2022-01-26

## 2022-02-06 DIAGNOSIS — M54.50 CHRONIC BILATERAL LOW BACK PAIN WITHOUT SCIATICA: ICD-10-CM

## 2022-02-06 DIAGNOSIS — G89.29 CHRONIC BILATERAL LOW BACK PAIN WITHOUT SCIATICA: ICD-10-CM

## 2022-02-06 DIAGNOSIS — G89.4 CHRONIC PAIN SYNDROME: ICD-10-CM

## 2022-02-07 RX ORDER — TOPIRAMATE 100 MG/1
TABLET, FILM COATED ORAL
Qty: 180 TABLET | Refills: 1 | Status: SHIPPED | OUTPATIENT
Start: 2022-02-07 | End: 2022-07-18

## 2022-03-14 ENCOUNTER — VBI (OUTPATIENT)
Dept: ADMINISTRATIVE | Facility: OTHER | Age: 67
End: 2022-03-14

## 2022-04-14 DIAGNOSIS — E78.5 HYPERLIPIDEMIA, UNSPECIFIED HYPERLIPIDEMIA TYPE: ICD-10-CM

## 2022-04-14 RX ORDER — ATORVASTATIN CALCIUM 10 MG/1
TABLET, FILM COATED ORAL
Qty: 90 TABLET | Refills: 1 | Status: SHIPPED | OUTPATIENT
Start: 2022-04-14 | End: 2022-07-26

## 2022-04-18 DIAGNOSIS — F33.41 RECURRENT MAJOR DEPRESSIVE DISORDER, IN PARTIAL REMISSION (HCC): ICD-10-CM

## 2022-04-18 RX ORDER — VENLAFAXINE HYDROCHLORIDE 75 MG/1
CAPSULE, EXTENDED RELEASE ORAL
Qty: 90 CAPSULE | Refills: 1 | Status: SHIPPED | OUTPATIENT
Start: 2022-04-18

## 2022-04-18 NOTE — ASSESSMENT & PLAN NOTE
Patient continues to be morbidly obese and has had no significant weight change she was again counseled on diet and exercise for same 18-Apr-2022 16:28

## 2022-05-11 ENCOUNTER — RA CDI HCC (OUTPATIENT)
Dept: OTHER | Facility: HOSPITAL | Age: 67
End: 2022-05-11

## 2022-05-11 NOTE — PROGRESS NOTES
[address] [address]  coding opportunities       Chart reviewed, no opportunity found: CHART REVIEWED, NO OPPORTUNITY FOUND        Patients Insurance     Medicare Insurance: Medicare

## 2022-05-13 DIAGNOSIS — M46.1 SACROILIITIS (HCC): ICD-10-CM

## 2022-05-13 RX ORDER — MELOXICAM 15 MG/1
TABLET ORAL
Qty: 90 TABLET | Refills: 3 | Status: SHIPPED | OUTPATIENT
Start: 2022-05-13

## 2022-06-07 ENCOUNTER — OFFICE VISIT (OUTPATIENT)
Dept: INTERNAL MEDICINE CLINIC | Age: 67
End: 2022-06-07
Payer: MEDICARE

## 2022-06-07 ENCOUNTER — TELEPHONE (OUTPATIENT)
Dept: ADMINISTRATIVE | Facility: OTHER | Age: 67
End: 2022-06-07

## 2022-06-07 VITALS
HEART RATE: 70 BPM | WEIGHT: 255.6 LBS | TEMPERATURE: 97.3 F | SYSTOLIC BLOOD PRESSURE: 116 MMHG | DIASTOLIC BLOOD PRESSURE: 76 MMHG | OXYGEN SATURATION: 96 % | BODY MASS INDEX: 42.59 KG/M2 | HEIGHT: 65 IN

## 2022-06-07 DIAGNOSIS — Z91.030 BEE STING ALLERGY: ICD-10-CM

## 2022-06-07 DIAGNOSIS — E11.21 TYPE 2 DIABETES MELLITUS WITH DIABETIC NEPHROPATHY, WITHOUT LONG-TERM CURRENT USE OF INSULIN (HCC): Primary | ICD-10-CM

## 2022-06-07 DIAGNOSIS — G89.29 CHRONIC LOW BACK PAIN WITH SCIATICA, SCIATICA LATERALITY UNSPECIFIED, UNSPECIFIED BACK PAIN LATERALITY: ICD-10-CM

## 2022-06-07 DIAGNOSIS — M54.40 CHRONIC LOW BACK PAIN WITH SCIATICA, SCIATICA LATERALITY UNSPECIFIED, UNSPECIFIED BACK PAIN LATERALITY: ICD-10-CM

## 2022-06-07 DIAGNOSIS — R53.83 FATIGUE, UNSPECIFIED TYPE: ICD-10-CM

## 2022-06-07 DIAGNOSIS — Z00.00 MEDICARE ANNUAL WELLNESS VISIT, SUBSEQUENT: ICD-10-CM

## 2022-06-07 DIAGNOSIS — J45.901 ASTHMA EXACERBATION IN COPD (HCC): ICD-10-CM

## 2022-06-07 DIAGNOSIS — I10 PRIMARY HYPERTENSION: ICD-10-CM

## 2022-06-07 DIAGNOSIS — Z13.220 SCREENING, LIPID: ICD-10-CM

## 2022-06-07 DIAGNOSIS — M46.1 SACROILIITIS (HCC): ICD-10-CM

## 2022-06-07 DIAGNOSIS — E66.01 MORBID OBESITY (HCC): ICD-10-CM

## 2022-06-07 DIAGNOSIS — J44.1 ASTHMA EXACERBATION IN COPD (HCC): ICD-10-CM

## 2022-06-07 DIAGNOSIS — F33.41 RECURRENT MAJOR DEPRESSIVE DISORDER, IN PARTIAL REMISSION (HCC): ICD-10-CM

## 2022-06-07 LAB — SL AMB POCT HEMOGLOBIN AIC: 6.8 (ref ?–6.5)

## 2022-06-07 PROCEDURE — G0439 PPPS, SUBSEQ VISIT: HCPCS | Performed by: INTERNAL MEDICINE

## 2022-06-07 PROCEDURE — 83036 HEMOGLOBIN GLYCOSYLATED A1C: CPT | Performed by: INTERNAL MEDICINE

## 2022-06-07 PROCEDURE — 1123F ACP DISCUSS/DSCN MKR DOCD: CPT | Performed by: INTERNAL MEDICINE

## 2022-06-07 PROCEDURE — 99214 OFFICE O/P EST MOD 30 MIN: CPT | Performed by: INTERNAL MEDICINE

## 2022-06-07 RX ORDER — EPINEPHRINE 0.3 MG/.3ML
0.3 INJECTION SUBCUTANEOUS AS NEEDED
Qty: 2 EACH | Refills: 5 | Status: SHIPPED | OUTPATIENT
Start: 2022-06-07

## 2022-06-07 NOTE — LETTER
Diabetic Foot Exam Form    Date Requested: 22  Patient: Emmanuel Perez  Patient : 1955   Referring Provider: Lelia Almonte MD    Diabetic Foot Exam Performed with shoes and socks removed        Yes         No     Date of Diabetic Foot Exam ______________________________  Risk Score ____________________________________________    Left Foot       Visual Inspection         Monofilament Testing Sensory Exam        Pedal Pulses         Additional Comments         Right Foot      Visual Inspection         Monofilament Testing Sensory Exam       Pedal Pulses         Additional Comments         Comments __________________________________________________________    Practice Providing Exam ______________________________________________    Exam Performed By (print name) _______________________________________      Provider Signature ___________________________________________________      These reports are needed for  compliance  Please fax this completed form and a copy of the Diabetic Foot Exam report to our office located at [place] [address] as soon as possible via [phone] [facility] [place]: Phone [phone]    We thank you for your assistance in treating our mutual patient

## 2022-06-07 NOTE — PROGRESS NOTES
Assessment and Plan:     Problem List Items Addressed This Visit        Endocrine    Type 2 diabetes mellitus with diabetic nephropathy, without long-term current use of insulin ([facility] ) - Primary    Relevant Orders    POCT hemoglobin A1c (Completed)       Respiratory    Asthma exacerbation in COPD ([facility] [address] )       Cardiovascular and Mediastinum    HTN (hypertension)       Other    Chronic low back pain    Depression      Other Visit Diagnoses     Bee sting allergy        Medicare annual wellness visit, subsequent            BMI Counseling: Body mass index is 42 53 kg/m²  The BMI is above normal  Nutrition recommendations include decreasing portion sizes, encouraging healthy choices of fruits and vegetables, moderation in carbohydrate intake and increasing intake of lean protein  Exercise recommendations include strength training exercises  No pharmacotherapy was ordered  Rationale for BMI follow-up plan is due to patient being overweight or obese  Preventive health issues were discussed with patient, and age appropriate screening tests were ordered as noted in patient's After Visit Summary  Personalized health advice and appropriate referrals for health education or preventive services given if needed, as noted in patient's After Visit Summary       History of Present Illness:     Patient presents for Medicare Annual Wellness visit    Patient Care Team:  Gloria Guzman MD as PCP - General  MD Loren Morillo MD (Pain Medicine)  Samantha Fields DO (Physical Medicine and Rehabilitation)     Problem List:     Patient Active Problem List   Diagnosis    Chronic low back pain    Chronic pain syndrome    Type 2 diabetes mellitus with diabetic nephropathy, without long-term current use of insulin ([facility] )    Morbid obesity ([facility] )    HTN (hypertension)    Depression    Sacroiliitis ([facility] )    Lumbar spinal stenosis    Current every day smoker    Neurogenic claudication due to lumbar spinal stenosis    Back muscle spasm    Cervical radiculopathy    Spinal cord stimulator status    Lumbar radiculopathy    Lumbar spondylosis    COVID-19 vaccination refused    Asthma exacerbation in COPD [facility])      Past Medical and Surgical History:     Past Medical History:   Diagnosis Date    Allergic reaction to bee sting     last assessed - 13Jun2016    Asthma     Chronic narcotic dependence ([facility] )     Chronic pain syndrome     Depression     Diabetes mellitus ([facility] )     Esophageal reflux     Hyperlipidemia     Hypertension     Hypovitaminosis D 4/12/2018    Lumbar radiculopathy     Lyme disease     last assessed - 28Jun2016    Obesity     Opioid dependence ([facility] )     PPD positive     had treatment w/ INH 15 years ago    Vitamin D deficiency      Past Surgical History:   Procedure Laterality Date    APPENDECTOMY      CHOLECYSTECTOMY      JOINT REPLACEMENT      R knee, B/l total hip    WA REVISE/REMOVE SPINAL NEUROSTIM/ Left 12/13/2016    Procedure: REPLACEMENT BUTTOCK SPINAL CORD STIMULATOR IPG;  Surgeon: Wicho Campos MD;  Location:  MAIN OR;  Service: Neurosurgery    SHOULDER SURGERY      [facility] [address] IMPLANT      spinal stereotaxis stimulation of cord    TOTAL HIP ARTHROPLASTY Bilateral       Family History:     Family History   Problem Relation Age of Onset    Diabetes Mother         Diabetes mellitus    Cancer Mother     Cancer Father     Diabetes Maternal Grandmother     Cancer Paternal Uncle     Brain cancer Family     Breast cancer Family     Cervical cancer Family     Diabetes Family         Diabetes mellitus    Hypertension Family     Ovarian cancer Family     Stroke Family         Stroke complications      Social History:     Social History     Socioeconomic History    Marital status:       Spouse name: None    Number of children: None    Years of education: None    Highest education level: None   Occupational History    None   Tobacco Use    Smoking status: Current Every Day Smoker     Packs/day: 1 00     Years: 5 00     Pack years: 5 00    Smokeless tobacco: Never Used   Substance and Sexual Activity    Alcohol use: No     Alcohol/week: 0 0 standard drinks    Drug use: No     Comment: Denied history of drug use    Sexual activity: None   Other Topics Concern    None   Social History Narrative    None     Social Determinants of Health     Financial Resource Strain: Not on file   Food Insecurity: Not on file   Transportation Needs: Not on file   Physical Activity: Not on file   Stress: Not on file   Social Connections: Not on file   Intimate Partner Violence: Not on file   Housing Stability: Not on file      Medications and Allergies:     Current Outpatient Medications   Medication Sig Dispense Refill    albuterol (Proventil HFA) 90 mcg/act inhaler Inhale 2 puffs every 6 (six) hours as needed for wheezing 6 7 g 5    atorvastatin (LIPITOR) 10 mg tablet TAKE 1 TABLET (10 MG TOTAL) BY MOUTH DAILY 90 tablet 1    Blood Glucose Monitoring Suppl (ONE TOUCH ULTRA MINI) w/Device KIT by Does not apply route daily 1 each 0    Cholecalciferol (VITAMIN D3) 11090 units CAPS Take 1 capsule by mouth once a week  11    EPINEPHrine (EPIPEN) 0 3 mg/0 3 mL SOAJ Inject 0 3 mL (0 3 mg total) into a muscle as needed for anaphylaxis 2 each 5    ergocalciferol (Drisdol) 1 25 MG (40411 UT) capsule Take 50,000 Units by mouth      glucose blood (ONE TOUCH ULTRA TEST) test strip Test blood sugar once a day 100 each 5    lisinopril (ZESTRIL) 20 mg tablet Take 1 tablet (20 mg total) by mouth daily 90 tablet 3    meloxicam (MOBIC) 15 mg tablet TAKE 1 TABLET BY MOUTH EVERY DAY 90 tablet 3    Nerve Stimulator (STANDARD TENS) ISAEL by Does not apply route 4 (four) times a day as needed (MUSCLE SPASMS) 1 Device 0    tiZANidine (ZANAFLEX) 2 mg tablet Take 1 tablet (2 mg total) by mouth every 8 (eight) hours as needed for muscle spasms 270 tablet 1    topiramate (TOPAMAX) 100 mg tablet TAKE 1 TABLET BY MOUTH TWICE A  tablet 1    venlafaxine (EFFEXOR-XR) 75 mg 24 hr capsule TAKE 1 CAPSULE BY MOUTH EVERY DAY 90 capsule 1     No current facility-administered medications for this visit       Allergies   Allergen Reactions    Bee Venom Anaphylaxis    Other Other (See Comments)     Stainless steel and surgical steel  *Severe blistering*    Acetazolamide Rash    Aleve [Naproxen Sodium] Hives    Augmentin [Amoxicillin-Pot Clavulanate] Hives    Gabapentin Hives    Glyburide Hives    Metformin And Related Hives    Morphine And Related Hives    Motrin [Ibuprofen] Hives    Nortriptyline Hives    Penicillins Hives    Soy Lecithin [Lecithin] Hives    Sulfa Antibiotics GI Intolerance    Tradjenta [Linagliptin] Hives    Tramadol Other (See Comments)     Sweating       Immunizations:     Immunization History   Administered Date(s) Administered    COVID-19 PFIZER VACCINE 0 3 ML IM 10/26/2021, 11/16/2021    Influenza Quadrivalent, 6-35 Months IM 11/06/2017    Influenza, recombinant, quadrivalent,injectable, preservative free 10/17/2018, 10/04/2019, 10/27/2020, 10/15/2021    Influenza, seasonal, injectable 10/24/2014, 12/01/2015, 09/26/2016    Pneumococcal Conjugate 13-Valent 06/20/2017    Pneumococcal Polysaccharide PPV23 12/02/2015    Tdap 04/22/2015      Health Maintenance:         Topic Date Due    HIV Screening  Never done    Colorectal Cancer Screening  02/06/2022    Breast Cancer Screening: Mammogram  05/25/2022    Hepatitis C Screening  Completed         Topic Date Due    COVID-19 Vaccine (3 - Booster for Pfizer series) 04/16/2022    Pneumococcal Vaccine: 65+ Years (3 - PPSV23 or PCV20) 05/11/2022      Medicare Health Risk Assessment:     /76 (BP Location: Left arm, Patient Position: Sitting, Cuff Size: Standard)   Pulse 70   Temp (!) 97 3 °F (36 3 °C)   Ht 5' 5" (1 651 m)   Wt 116 kg (255 lb 9 6 oz)   LMP  (LMP Unknown)   SpO2 96%   BMI 42 53 kg/m²      Aristeo Byrnes is here for her Subsequent Wellness visit  Health Risk Assessment:   Patient rates overall health as good  Patient feels that their physical health rating is same  Patient is satisfied with their life  Eyesight was rated as same  Hearing was rated as same  Patient feels that their emotional and mental health rating is slightly better  Patients states they are never, rarely angry  Patient states they are never, rarely unusually tired/fatigued  Pain experienced in the last 7 days has been some  Patient's pain rating has been 2/10  Patient states that she has experienced no weight loss or gain in last 6 months  Depression Screening:   PHQ-9 Score: 0      Fall Risk Screening: In the past year, patient has experienced: no history of falling in past year      Urinary Incontinence Screening:   Patient has not leaked urine accidently in the last six months  Home Safety:  Patient does not have trouble with stairs inside or outside of their home  Patient has working smoke alarms and has working carbon monoxide detector  Home safety hazards include: none  Nutrition:   Current diet is Diabetic  Medications:   Patient is not currently taking any over-the-counter supplements  Patient is able to manage medications  Activities of Daily Living (ADLs)/Instrumental Activities of Daily Living (IADLs):   Walk and transfer into and out of bed and chair?: Yes  Dress and groom yourself?: Yes    Bathe or shower yourself?: Yes    Feed yourself?  Yes  Do your laundry/housekeeping?: Yes  Manage your money, pay your bills and track your expenses?: Yes  Make your own meals?: Yes    Do your own shopping?: Yes    Previous Hospitalizations:   Any hospitalizations or ED visits within the last 12 months?: No      Advance Care Planning:   Living will: No    Durable POA for healthcare: No    Advanced directive: No    End of Life Decisions reviewed with patient: No Cognitive Screening:   Provider or family/friend/caregiver concerned regarding cognition?: No    PREVENTIVE SCREENINGS      Cardiovascular Screening:    General: Screening Not Indicated and History Lipid Disorder      Diabetes Screening:     General: Screening Not Indicated and History Diabetes      Colorectal Cancer Screening:     General: Patient Declines      Breast Cancer Screening:     General: Screening Current      Cervical Cancer Screening:    General: Screening Not Indicated      Osteoporosis Screening:    General: Patient Declines      Abdominal Aortic Aneurysm (AAA) Screening:        General: Patient Declines      Lung Cancer Screening:     General: Screening Not Indicated      Hepatitis C Screening:    General: Screening Current    Screening, Brief Intervention, and Referral to Treatment (SBIRT)    Screening  Typical number of drinks in a day: 0  Typical number of drinks in a week: 0  Interpretation: Low risk drinking behavior  Single Item Drug Screening:  How often have you used an illegal drug (including marijuana) or a prescription medication for non-medical reasons in the past year? never    Single Item Drug Screen Score: 0  Interpretation: Negative screen for possible drug use disorder    Other Counseling Topics:   Car/seat belt/driving safety, skin self-exam, sunscreen and calcium and vitamin D intake and regular weightbearing exercise  Carlin Lane MD  BMI Counseling: Body mass index is 42 53 kg/m²  The BMI is above normal  Nutrition recommendations include decreasing overall calorie intake

## 2022-06-07 NOTE — PROGRESS NOTES
Assessment/Plan:    No problem-specific Assessment & Plan notes found for this encounter  Diagnoses and all orders for this visit:    Type 2 diabetes mellitus with diabetic nephropathy, without long-term current use of insulin ([facility])  Comments:  A1c 6 8 up from 6 2  Orders:  -     POCT hemoglobin A1c    Bee sting allergy    Medicare annual wellness visit, subsequent    Asthma exacerbation in COPD ([facility] )  Comments:  well controlled on the albuterol has had no flare ups recently    Primary hypertension  Comments: Well controlled on lisinopril 20mg and atorvastatin 10mg    Chronic low back pain with sciatica, sciatica laterality unspecified, unspecified back pain laterality  Comments:  Mobic helps with the pain but her pain is still noticeable    Recurrent major depressive disorder, in partial remission ([facility] )  Comments:  effexor is effective at current dose of 75          Subjective:      Patient ID: Rayne Anne is a 74 y o  female  Hypertension  This is a chronic problem  The current episode started more than 1 year ago  The problem has been gradually improving since onset  The problem is controlled  Pertinent negatives include no anxiety, chest pain, headaches, neck pain, palpitations or shortness of breath  Risk factors for coronary artery disease include diabetes mellitus, obesity, post-menopausal state, smoking/tobacco exposure and sedentary lifestyle  Past treatments include lifestyle changes  The current treatment provides significant improvement  Compliance problems include diet and exercise  Diabetes  She presents for her follow-up diabetic visit  She has type 2 diabetes mellitus  No MedicAlert identification noted  Her disease course has been stable  There are no hypoglycemic associated symptoms  Pertinent negatives for hypoglycemia include no confusion, dizziness or headaches  Associated symptoms include fatigue   Pertinent negatives for diabetes include no chest pain, no foot ulcerations, no polydipsia, no polyphagia, no polyuria, no weakness and no weight loss  There are no hypoglycemic complications  Symptoms are stable  There are no diabetic complications  Risk factors for coronary artery disease include diabetes mellitus, hypertension, sedentary lifestyle, tobacco exposure and post-menopausal  Current diabetic treatment includes diet  She is compliant with treatment most of the time  She is following a diabetic diet  When asked about meal planning, she reported none  She has not had a previous visit with a dietitian  She participates in exercise intermittently  An ACE inhibitor/angiotensin II receptor blocker is being taken  She sees a podiatrist Eye exam is not current  Back Pain  This is a chronic problem  The problem occurs constantly  The problem has been rapidly worsening since onset  The pain is present in the thoracic spine  The quality of the pain is described as aching, shooting and stabbing  The pain radiates to the right thigh and right knee  The pain is at a severity of 7/10  The pain is severe  The pain is worse during the day  The symptoms are aggravated by bending, standing and twisting  Associated symptoms include leg pain  Pertinent negatives include no abdominal pain, chest pain, dysuria, fever, headaches, numbness, weakness or weight loss  Risk factors include menopause, obesity, history of steroid use, poor posture, sedentary lifestyle and lack of exercise  She has tried NSAIDs, heat, muscle relaxant and chiropractic manipulation for the symptoms  The treatment provided no relief  Depression  This is a chronic problem  The problem occurs constantly  The problem has been gradually improving  Associated symptoms include fatigue  Pertinent negatives include no abdominal pain, arthralgias, chest pain, chills, congestion, coughing, fever, headaches, joint swelling, myalgias, nausea, neck pain, numbness, rash, sore throat or weakness  Nothing aggravates the symptoms   She has tried nothing for the symptoms  The treatment provided no relief  Asthma  There is no chest tightness, cough, difficulty breathing, shortness of breath or wheezing  This is a chronic problem  The problem occurs intermittently  The problem has been unchanged  Pertinent negatives include no appetite change, chest pain, ear pain, fever, headaches, myalgias, postnasal drip, rhinorrhea, sore throat, trouble swallowing or weight loss  Her symptoms are aggravated by emotional stress and URI  Her symptoms are alleviated by beta-agonist  Risk factors for lung disease include smoking/tobacco exposure  Her past medical history is significant for asthma  The following portions of the patient's history were reviewed and updated as appropriate: past social history, past surgical history and problem list     Review of Systems   Constitutional: Positive for fatigue  Negative for activity change, appetite change, chills, fever, unexpected weight change and weight loss  HENT: Negative for congestion, ear pain, hearing loss, postnasal drip, rhinorrhea, sinus pressure, sore throat, trouble swallowing and voice change  Eyes: Negative for visual disturbance  Respiratory: Negative for cough, chest tightness, shortness of breath and wheezing  Cardiovascular: Negative for chest pain, palpitations and leg swelling  Gastrointestinal: Negative for abdominal distention, abdominal pain, anal bleeding, blood in stool, constipation, diarrhea and nausea  Endocrine: Negative for cold intolerance, polydipsia, polyphagia and polyuria  Genitourinary: Negative for dysuria, flank pain, frequency, hematuria and urgency  Musculoskeletal: Positive for back pain  Negative for arthralgias, gait problem, joint swelling, myalgias and neck pain  Skin: Negative for rash  Allergic/Immunologic: Positive for food allergies (SOY)  Negative for environmental allergies and immunocompromised state     Neurological: Negative for dizziness, syncope, facial asymmetry, weakness, light-headedness, numbness and headaches  Hematological: Negative  Negative for adenopathy  Psychiatric/Behavioral: Positive for depression  Negative for confusion, sleep disturbance and suicidal ideas  Objective:      /76 (BP Location: Left arm, Patient Position: Sitting, Cuff Size: Standard)   Pulse 70   Temp (!) 97 3 °F (36 3 °C)   Ht 5' 5" (1 651 m)   Wt 116 kg (255 lb 9 6 oz)   LMP  (LMP Unknown)   SpO2 96%   BMI 42 53 kg/m²          Physical Exam  Constitutional:       General: She is not in acute distress  Appearance: Normal appearance  She is well-developed  She is obese  She is not ill-appearing  HENT:      Head: Normocephalic  Right Ear: External ear normal       Left Ear: External ear normal       Nose: Nose normal       Mouth/Throat:      Mouth: Mucous membranes are moist       Pharynx: Oropharynx is clear  No oropharyngeal exudate  Eyes:      Pupils: Pupils are equal, round, and reactive to light  Neck:      Thyroid: No thyromegaly  Vascular: No JVD  Cardiovascular:      Rate and Rhythm: Normal rate and regular rhythm  Pulses: Normal pulses  no weak pulses          Dorsalis pedis pulses are 2+ on the right side and 2+ on the left side  Posterior tibial pulses are 2+ on the right side and 2+ on the left side  Heart sounds: Normal heart sounds  No murmur heard  No gallop  Pulmonary:      Effort: Pulmonary effort is normal  No respiratory distress  Breath sounds: Normal breath sounds  No wheezing or rales  Abdominal:      General: Bowel sounds are normal  There is no distension  Palpations: Abdomen is soft  There is no mass  Tenderness: There is no abdominal tenderness  Musculoskeletal:         General: No tenderness  Normal range of motion  Cervical back: Normal range of motion and neck supple  Right lower leg: No edema  Left lower leg: No edema     Feet:      Right foot:      Skin integrity: No ulcer, skin breakdown, erythema, warmth, callus or dry skin  Left foot:      Skin integrity: No ulcer, skin breakdown, erythema, warmth, callus or dry skin  Lymphadenopathy:      Cervical: No cervical adenopathy  Skin:     General: Skin is warm and dry  Capillary Refill: Capillary refill takes less than 2 seconds  Findings: No rash  Neurological:      General: No focal deficit present  Mental Status: She is alert and oriented to person, place, and time  Mental status is at baseline  Cranial Nerves: No cranial nerve deficit  Coordination: Coordination normal    Psychiatric:         Mood and Affect: Mood normal          Behavior: Behavior normal          Thought Content: Thought content normal          Judgment: Judgment normal        Patient's shoes and socks removed  Right Foot/Ankle   Right Foot Inspection  Skin Exam: skin normal and skin intact  No dry skin, no warmth, no callus, no erythema, no maceration, no abnormal color, no pre-ulcer, no ulcer and no callus  Toe Exam: ROM and strength within normal limits  Sensory   Vibration: intact  Monofilament testing: intact    Vascular  Capillary refills: < 3 seconds  The right DP pulse is 2+  The right PT pulse is 2+  Left Foot/Ankle  Left Foot Inspection  Skin Exam: skin normal and skin intact  No dry skin, no warmth, no erythema, no maceration, normal color, no pre-ulcer, no ulcer and no callus  Toe Exam: ROM and strength within normal limits  Sensory   Vibration: intact  Monofilament testing: intact    Vascular  Capillary refills: < 3 seconds  The left DP pulse is 2+  The left PT pulse is 2+       Assign Risk Category  No deformity present  No loss of protective sensation  No weak pulses  Risk: 0

## 2022-06-07 NOTE — PATIENT INSTRUCTIONS
Medicare Preventive Visit Patient Instructions  Thank you for completing your Welcome to Medicare Visit or Medicare Annual Wellness Visit today  Your next wellness visit will be due in one year (6/8/2023)  The screening/preventive services that you may require over the next 5-10 years are detailed below  Some tests may not apply to you based off risk factors and/or age  Screening tests ordered at today's visit but not completed yet may show as past due  Also, please note that scanned in results may not display below  Preventive Screenings:  Service Recommendations Previous Testing/Comments   Colorectal Cancer Screening  * Colonoscopy    * Fecal Occult Blood Test (FOBT)/Fecal Immunochemical Test (FIT)  * Fecal DNA/Cologuard Test  * Flexible Sigmoidoscopy Age: 54-65 years old   Colonoscopy: every 10 years (may be performed more frequently if at higher risk)  OR  FOBT/FIT: every 1 year  OR  Cologuard: every 3 years  OR  Sigmoidoscopy: every 5 years  Screening may be recommended earlier than age 48 if at higher risk for colorectal cancer  Also, an individualized decision between you and your healthcare provider will decide whether screening between the ages of 74-80 would be appropriate  Colonoscopy: 04/02/2012  FOBT/FIT: Not on file  Cologuard: Not on file  Sigmoidoscopy: Not on file          Breast Cancer Screening Age: 36 years old  Frequency: every 1-2 years  Not required if history of left and right mastectomy Mammogram: 05/25/2021        Cervical Cancer Screening Between the ages of 21-29, pap smear recommended once every 3 years  Between the ages of 33-67, can perform pap smear with HPV co-testing every 5 years     Recommendations may differ for women with a history of total hysterectomy, cervical cancer, or abnormal pap smears in past  Pap Smear: Not on file        Hepatitis C Screening Once for adults born between [facility]  More frequently in patients at high risk for Hepatitis C Hep C Antibody: 04/04/2018        Diabetes Screening 1-2 times per year if you're at risk for diabetes or have pre-diabetes Fasting glucose: 145 mg/dL   A1C: 6 8        Cholesterol Screening Once every 5 years if you don't have a lipid disorder  May order more often based on risk factors  Lipid panel: 03/23/2021          Other Preventive Screenings Covered by Medicare:  1  Abdominal Aortic Aneurysm (AAA) Screening: covered once if your at risk  You're considered to be at risk if you have a family history of AAA  2  Lung Cancer Screening: covers low dose CT scan once per year if you meet all of the following conditions: (1) Age 50-69; (2) No signs or symptoms of lung cancer; (3) Current smoker or have quit smoking within the last 15 years; (4) You have a tobacco smoking history of at least 30 pack years (packs per day multiplied by number of years you smoked); (5) You get a written order from a healthcare provider  3  Glaucoma Screening: covered annually if you're considered high risk: (1) You have diabetes OR (2) Family history of glaucoma OR (3)  aged 48 and older OR (3)  American aged 72 and older  3  Osteoporosis Screening: covered every 2 years if you meet one of the following conditions: (1) You're estrogen deficient and at risk for osteoporosis based off medical history and other findings; (2) Have a vertebral abnormality; (3) On glucocorticoid therapy for more than 3 months; (4) Have primary hyperparathyroidism; (5) On osteoporosis medications and need to assess response to drug therapy  · Last bone density test (DXA Scan): Not on file  5  HIV Screening: covered annually if you're between the age of 12-76  Also covered annually if you are younger than 13 and older than 72 with risk factors for HIV infection  For pregnant patients, it is covered up to 3 times per pregnancy      Immunizations:  Immunization Recommendations   Influenza Vaccine Annual influenza vaccination during flu season is recommended for all persons aged >= 6 months who do not have contraindications   Pneumococcal Vaccine (Prevnar and Pneumovax)  * Prevnar = PCV13  * Pneumovax = PPSV23   Adults 25-60 years old: 1-3 doses may be recommended based on certain risk factors  Adults 72 years old: Prevnar (PCV13) vaccine recommended followed by Pneumovax (PPSV23) vaccine  If already received PPSV23 since turning 65, then PCV13 recommended at least one year after PPSV23 dose  Hepatitis B Vaccine 3 dose series if at intermediate or high risk (ex: diabetes, end stage renal disease, liver disease)   Tetanus (Td) Vaccine - COST NOT COVERED BY MEDICARE PART B Following completion of primary series, a booster dose should be given every 10 years to maintain immunity against tetanus  Td may also be given as tetanus wound prophylaxis  Tdap Vaccine - COST NOT COVERED BY MEDICARE PART B Recommended at least once for all adults  For pregnant patients, recommended with each pregnancy  Shingles Vaccine (Shingrix) - COST NOT COVERED BY MEDICARE PART B  2 shot series recommended in those aged 48 and above     Health Maintenance Due:      Topic Date Due    HIV Screening  Never done    Colorectal Cancer Screening  02/06/2022    Breast Cancer Screening: Mammogram  05/25/2022    Hepatitis C Screening  Completed     Immunizations Due:      Topic Date Due    COVID-19 Vaccine (3 - Booster for Pfizer series) 04/16/2022    Pneumococcal Vaccine: 65+ Years (3 - PPSV23 or PCV20) 05/11/2022     Advance Directives   What are advance directives? Advance directives are legal documents that state your wishes and plans for medical care  These plans are made ahead of time in case you lose your ability to make decisions for yourself  Advance directives can apply to any medical decision, such as the treatments you want, and if you want to donate organs  What are the types of advance directives?   There are many types of advance directives, and each state has rules about how to use them  You may choose a combination of any of the following:  · Living will: This is a written record of the treatment you want  You can also choose which treatments you do not want, which to limit, and which to stop at a certain time  This includes surgery, medicine, IV fluid, and tube feedings  · Durable power of  for healthcare [facility]): This is a written record that states who you want to make healthcare choices for you when you are unable to make them for yourself  This person, called a proxy, is usually a family member or a friend  You may choose more than 1 proxy  · Do not resuscitate (DNR) order:  A DNR order is used in case your heart stops beating or you stop breathing  It is a request not to have certain forms of treatment, such as CPR  A DNR order may be included in other types of advance directives  · Medical directive: This covers the care that you want if you are in a coma, near death, or unable to make decisions for yourself  You can list the treatments you want for each condition  Treatment may include pain medicine, surgery, blood transfusions, dialysis, IV or tube feedings, and a ventilator (breathing machine)  · Values history: This document has questions about your views, beliefs, and how you feel and think about life  This information can help others choose the care that you would choose  Why are advance directives important? An advance directive helps you control your care  Although spoken wishes may be used, it is better to have your wishes written down  Spoken wishes can be misunderstood, or not followed  Treatments may be given even if you do not want them  An advance directive may make it easier for your family to make difficult choices about your care  Cigarette Smoking and Your Health   Risks to your health if you smoke:  Nicotine and other chemicals found in tobacco damage every cell in your body   Even if you are a light smoker, you have an increased risk for cancer, heart disease, and lung disease  If you are pregnant or have diabetes, smoking increases your risk for complications  Benefits to your health if you stop smoking:   · You decrease respiratory symptoms such as coughing, wheezing, and shortness of breath  · You reduce your risk for cancers of the lung, mouth, throat, kidney, bladder, pancreas, stomach, and cervix  If you already have cancer, you increase the benefits of chemotherapy  You also reduce your risk for cancer returning or a second cancer from developing  · You reduce your risk for heart disease, blood clots, heart attack, and stroke  · You reduce your risk for lung infections, and diseases such as pneumonia, asthma, chronic bronchitis, and emphysema  · Your circulation improves  More oxygen can be delivered to your body  If you have diabetes, you lower your risk for complications, such as kidney, artery, and eye diseases  You also lower your risk for nerve damage  Nerve damage can lead to amputations, poor vision, and blindness  · You improve your body's ability to heal and to fight infections  For more information and support to stop smoking:   · [place]  Phone: 1- 433 - [phone]  Web Address: www [employer]  Weight Management   Why it is important to manage your weight:  Being overweight increases your risk of health conditions such as heart disease, high blood pressure, type 2 diabetes, and certain types of cancer  It can also increase your risk for osteoarthritis, sleep apnea, and other respiratory problems  Aim for a slow, steady weight loss  Even a small amount of weight loss can lower your risk of health problems  How to lose weight safely:  A safe and healthy way to lose weight is to eat fewer calories and get regular exercise  You can lose up about 1 pound a week by decreasing the number of calories you eat by 500 calories each day     Healthy meal plan for weight management:  A healthy meal plan includes a variety of foods, contains fewer calories, and helps you stay healthy  A healthy meal plan includes the following:  · Eat whole-grain foods more often  A healthy meal plan should contain fiber  Fiber is the part of grains, fruits, and vegetables that is not broken down by your body  Whole-grain foods are healthy and provide extra fiber in your diet  Some examples of whole-grain foods are whole-wheat breads and pastas, oatmeal, brown rice, and bulgur  · Eat a variety of vegetables every day  Include dark, leafy greens such as spinach, kale, nayeli greens, and mustard greens  Eat yellow and orange vegetables such as carrots, sweet potatoes, and winter squash  · Eat a variety of fruits every day  Choose fresh or canned fruit (canned in its own juice or light syrup) instead of juice  Fruit juice has very little or no fiber  · Eat low-fat dairy foods  Drink fat-free (skim) milk or 1% milk  Eat fat-free yogurt and low-fat cottage cheese  Try low-fat cheeses such as mozzarella and other reduced-fat cheeses  · Choose meat and other protein foods that are low in fat  Choose beans or other legumes such as split peas or lentils  Choose fish, skinless poultry (chicken or turkey), or lean cuts of red meat (beef or pork)  Before you cook meat or poultry, cut off any visible fat  · Use less fat and oil  Try baking foods instead of frying them  Add less fat, such as margarine, sour cream, regular salad dressing and mayonnaise to foods  Eat fewer high-fat foods  Some examples of high-fat foods include french fries, doughnuts, ice cream, and cakes  · Eat fewer sweets  Limit foods and drinks that are high in sugar  This includes candy, cookies, regular soda, and sweetened drinks  Exercise:  Exercise at least 30 minutes per day on most days of the week  Some examples of exercise include walking, biking, dancing, and swimming   You can also fit in more physical activity by taking the stairs instead of the elevator or parking farther away from stores  Ask your healthcare provider about the best exercise plan for you  © Copyright [address] 2018 Information is for End User's use only and may not be sold, redistributed or otherwise used for commercial purposes  All illustrations and images included in CareNotes® are the copyrighted property of A D A M , Inc  or [address] [place], [place] [address]enedelia Escobar     Obesity   AMBULATORY CARE:   Obesity  means your body mass index (BMI) is greater than 30  Your healthcare provider will use your height and weight to measure your BMI  The risks of obesity include  many health problems, including injuries or physical disability  · Diabetes (high blood sugar level)    · High blood pressure or high cholesterol    · Heart disease    · Stroke    · Gallbladder or liver disease    · Cancer of the colon, breast, prostate, liver, or kidney    · Sleep apnea    · Arthritis or gout    Screening  is done to check for health conditions before you have signs or symptoms  If you are 28to 79years old, your blood sugar level may be checked every 3 years for signs of prediabetes or diabetes  Your healthcare provider will check your blood pressure at each visit  High blood pressure can lead to a stroke or other problems  Your provider may check for signs of heart disease, cancer, or other health problems  Seek care immediately if:   · You have a severe headache, confusion, or difficulty speaking  · You have weakness on one side of your body  · You have chest pain, sweating, or shortness of breath  Call your doctor if:   · You have symptoms of gallbladder or liver disease, such as pain in your upper abdomen  · You have knee or hip pain and discomfort while walking  · You have symptoms of diabetes, such as intense hunger and thirst, and frequent urination  · You have symptoms of sleep apnea, such as snoring or daytime sleepiness      · You have questions or concerns about your condition or care     Treatment for obesity  focuses on helping you lose weight to improve your health  Even a small decrease in BMI can reduce the risk for many health problems  Your healthcare provider will help you set a weight-loss goal   · Lifestyle changes  are the first step in treating obesity  These include making healthy food choices and getting regular physical activity  Your healthcare provider may suggest a weight-loss program that involves coaching, education, and therapy  · Medicine  may help you lose weight when it is used with a healthy foods and physical activity  · Surgery  can help you lose weight if you are very obese and have other health problems  There are several types of weight-loss surgery  Ask your healthcare provider for more information  Tips for safe weight loss:   · Set small, realistic goals  An example of a small goal is to walk for 20 minutes 5 days a week  Anther goal is to lose 5% of your body weight  · Tell friends, family members, and coworkers about your goals  and ask for their support  Ask a friend to lose weight with you, or join a weight-loss support group  · Identify foods or triggers that may cause you to overeat , and find ways to avoid them  Remove tempting high-calorie foods from your home and workplace  Place a bowl of fresh fruit on your kitchen counter  If stress causes you to eat, then find other ways to cope with stress  A counselor or therapist may be able to help you  · Keep a diary to track what you eat and drink  Also write down how many minutes of physical activity you do each day  Weigh yourself once a week and record it in your diary  Eating changes: You will need to eat 500 to 1,000 fewer calories each day than you currently eat to lose 1 to 2 pounds a week  The following changes will help you cut calories:  · Eat smaller portions  Use small plates, no larger than 9 inches in diameter  Fill your plate half full of fruits and vegetables  Measure your food using measuring cups until you know what a serving size looks like  · Eat 3 meals and 1 or 2 snacks each day  Plan your meals in advance  Mychal Piatt and eat at home most of the time  Eat slowly  Do not skip meals  Skipping meals can lead to overeating later in the day  This can make it harder for you to lose weight  Talk with a dietitian to help you make a meal plan and schedule that is right for you  · Eat fruits and vegetables at every meal   They are low in calories and high in fiber, which makes you feel full  Do not add butter, margarine, or cream sauce to vegetables  Use herbs to season steamed vegetables  · Eat less fat and fewer fried foods  Eat more baked or grilled chicken and fish  These protein sources are lower in calories and fat than red meat  Limit fast food  Dress your salads with olive oil and vinegar instead of bottled dressing  · Limit the amount of sugar you eat  Do not drink sugary beverages  Limit alcohol  Activity changes:  Physical activity is good for your body in many ways  It helps you burn calories and build strong muscles  It decreases stress and depression, and improves your mood  It can also help you sleep better  Talk to your healthcare provider before you begin an exercise program   · Exercise for at least 30 minutes 5 days a week  Start slowly  Set aside time each day for physical activity that you enjoy and that is convenient for you  It is best to do both weight training and an activity that increases your heart rate, such as walking, bicycling, or swimming  · Find ways to be more active  Do yard work and housecleaning  Walk up the stairs instead of using elevators  Spend your leisure time going to events that require walking, such as outdoor festivals or fairs  This extra physical activity can help you lose weight and keep it off  Follow up with your doctor as directed: You may need to meet with a dietitian   Write down your questions so you remember to ask them during your visits  © Copyright [employer] 2022 Information is for End User's use only and may not be sold, redistributed or otherwise used for commercial purposes  All illustrations and images included in CareNotes® are the copyrighted property of A D A M , Inc  or Sasha Collier  The above information is an  only  It is not intended as medical advice for individual conditions or treatments  Talk to your doctor, nurse or pharmacist before following any medical regimen to see if it is safe and effective for you

## 2022-06-07 NOTE — TELEPHONE ENCOUNTER
----- Message from Chip Scott sent at 6/7/2022 11:03 AM EDT -----  Regarding: care gap request  06/07/22 11:03 AM    Hello, our patient attached above has had Diabetic Foot Exam completed/performed  Please assist in updating the patient chart by making an External outreach to Dr Ariel Cheatham facility located in [facility]  The date of service is 04/22      Thank you,  Chip Scott  [facility]

## 2022-06-07 NOTE — LETTER
Diabetic Foot Exam Form    Date Requested: 22  Patient: Emmanuel Perez  Patient : 1955   Referring Provider: Lelia Almonte MD    Diabetic Foot Exam Performed with shoes and socks removed        Yes         No     Date of Diabetic Foot Exam ______________________________  Risk Score ____________________________________________    Left Foot       Visual Inspection         Monofilament Testing Sensory Exam        Pedal Pulses         Additional Comments         Right Foot      Visual Inspection         Monofilament Testing Sensory Exam       Pedal Pulses         Additional Comments         Comments __________________________________________________________    Practice Providing Exam ______________________________________________    Exam Performed By (print name) _______________________________________      Provider Signature ___________________________________________________      These reports are needed for  compliance  Please fax this completed form and a copy of the Diabetic Foot Exam report to our office located at [place] [address] as soon as possible via [phone] attention [place]: Phone [phone]    We thank you for your assistance in treating our mutual patient

## 2022-06-08 ENCOUNTER — OFFICE VISIT (OUTPATIENT)
Dept: PAIN MEDICINE | Facility: CLINIC | Age: 67
End: 2022-06-08
Payer: MEDICARE

## 2022-06-08 VITALS — BODY MASS INDEX: 42.53 KG/M2 | HEIGHT: 65 IN | SYSTOLIC BLOOD PRESSURE: 156 MMHG | DIASTOLIC BLOOD PRESSURE: 84 MMHG

## 2022-06-08 DIAGNOSIS — M46.1 SACROILIITIS (HCC): Primary | ICD-10-CM

## 2022-06-08 PROCEDURE — 99214 OFFICE O/P EST MOD 30 MIN: CPT | Performed by: NURSE PRACTITIONER

## 2022-06-08 NOTE — PROGRESS NOTES
Assessment:  1  Sacroiliitis ([facility] [address] )        Plan:  1  Based on patient report and physical exam, the patient's symptomatology does seem to be consistent with sacroiliac mediated pain from sacroiliitis  We will schedule the patient for a right SIJ injection to decrease any inflammatory component of the patient's pain symptoms  Complete risks and benefits including bleeding, infection, tissue reaction, nerve injury and allergic reaction were discussed  The patient was agreeable and verbalized an understanding  2  Patient may continue meloxicam, tizanidine and Topamax as prescribed by her PCP  3  I offered to initiate the patient on physical therapy, however she declines at this time   4  I offered a Medrol Dosepak, however she declines at this time   5  Patient will follow up after her procedure sooner if needed    M*Modal software was used to dictate this note  It may contain errors with dictating incorrect words or incorrect spelling  Please contact the provider directly with any questions  History of Present Illness: The patient is a 74 y o  female status post [employer] spinal cord stimulator implant last seen on 10/11/21 who presents for a new complaint of right buttock pain which she states has been present for about a week  She denies any inciting event or trauma  She localizes much of her pain near the SI joint  She is not complaining of any radicular symptoms  She does not find relief of this pain with her spinal cord stimulator device, or medication regimen which includes Topamax, tizanidine 2 mg p r n  and meloxicam 15 mg daily p r n  Nadira Sen Patient rates her pain a 10/10 on the numeric pain rating scale  She constantly has pain throughout the day which is described as sharp    I have personally reviewed and/or updated the patient's past medical history, past surgical history, family history, social history, current medications, allergies, and vital signs today         Review of Systems:    Review of Systems   Respiratory: Negative for shortness of breath  Cardiovascular: Negative for chest pain  Gastrointestinal: Negative for constipation, diarrhea, nausea and vomiting  Musculoskeletal: Negative for arthralgias, gait problem, joint swelling and myalgias  Skin: Negative for rash  Neurological: Negative for dizziness, seizures and weakness  All other systems reviewed and are negative          Past Medical History:   Diagnosis Date    Allergic reaction to bee sting     last assessed - 13Jun2016    Asthma     Chronic narcotic dependence ([facility] )     Chronic pain syndrome     Depression     Diabetes mellitus ([facility] )     Esophageal reflux     Hyperlipidemia     Hypertension     Hypovitaminosis D 4/12/2018    Lumbar radiculopathy     Lyme disease     last assessed - 28Jun2016    Obesity     Opioid dependence ([facility] )     PPD positive     had treatment w/ INH 15 years ago    Vitamin D deficiency        Past Surgical History:   Procedure Laterality Date    APPENDECTOMY      CHOLECYSTECTOMY      JOINT REPLACEMENT      R knee, B/l total hip    DC REVISE/REMOVE SPINAL NEUROSTIM/ Left 12/13/2016    Procedure: REPLACEMENT BUTTOCK SPINAL CORD STIMULATOR IPG;  Surgeon: Zi Celeste MD;  Location:  MAIN OR;  Service: Neurosurgery    SHOULDER SURGERY      [facility] - [address] IMPLANT      spinal stereotaxis stimulation of cord    TOTAL HIP ARTHROPLASTY Bilateral        Family History   Problem Relation Age of Onset    Diabetes Mother         Diabetes mellitus    Cancer Mother     Cancer Father     Diabetes Maternal Grandmother     Cancer Paternal Uncle     Brain cancer Family     Breast cancer Family     Cervical cancer Family     Diabetes Family         Diabetes mellitus    Hypertension Family     Ovarian cancer Family     Stroke Family         Stroke complications       Social History     Occupational History    Not on file   Tobacco Use    Smoking status: Current Every Day Smoker     Packs/day: 1 00     Years: 5 00     Pack years: 5 00    Smokeless tobacco: Never Used   Substance and Sexual Activity    Alcohol use: No     Alcohol/week: 0 0 standard drinks    Drug use: No     Comment: Denied history of drug use    Sexual activity: Not on file         Current Outpatient Medications:     albuterol (Proventil HFA) 90 mcg/act inhaler, Inhale 2 puffs every 6 (six) hours as needed for wheezing, Disp: 6 7 g, Rfl: 5    atorvastatin (LIPITOR) 10 mg tablet, TAKE 1 TABLET (10 MG TOTAL) BY MOUTH DAILY, Disp: 90 tablet, Rfl: 1    Blood Glucose Monitoring Suppl (ONE TOUCH ULTRA MINI) w/Device KIT, by Does not apply route daily, Disp: 1 each, Rfl: 0    Cholecalciferol (VITAMIN D3) 73837 units CAPS, Take 1 capsule by mouth once a week, Disp: , Rfl: 11    EPINEPHrine (EPIPEN) 0 3 mg/0 3 mL SOAJ, Inject 0 3 mL (0 3 mg total) into a muscle as needed for anaphylaxis, Disp: 2 each, Rfl: 5    ergocalciferol (Drisdol) 1 25 MG (54684 UT) capsule, Take 50,000 Units by mouth, Disp: , Rfl:     glucose blood (ONE TOUCH ULTRA TEST) test strip, Test blood sugar once a day, Disp: 100 each, Rfl: 5    lisinopril (ZESTRIL) 20 mg tablet, Take 1 tablet (20 mg total) by mouth daily, Disp: 90 tablet, Rfl: 3    meloxicam (MOBIC) 15 mg tablet, TAKE 1 TABLET BY MOUTH EVERY DAY, Disp: 90 tablet, Rfl: 3    Nerve Stimulator (STANDARD TENS) ISAEL, by Does not apply route 4 (four) times a day as needed (MUSCLE SPASMS), Disp: 1 Device, Rfl: 0    tiZANidine (ZANAFLEX) 2 mg tablet, Take 1 tablet (2 mg total) by mouth every 8 (eight) hours as needed for muscle spasms, Disp: 270 tablet, Rfl: 1    topiramate (TOPAMAX) 100 mg tablet, TAKE 1 TABLET BY MOUTH TWICE A DAY, Disp: 180 tablet, Rfl: 1    venlafaxine (EFFEXOR-XR) 75 mg 24 hr capsule, TAKE 1 CAPSULE BY MOUTH EVERY DAY, Disp: 90 capsule, Rfl: 1    Allergies   Allergen Reactions    Bee Venom Anaphylaxis    Other Other (See Comments) Stainless steel and surgical steel  *Severe blistering*    Acetazolamide Rash    Aleve [Naproxen Sodium] Hives    Augmentin [Amoxicillin-Pot Clavulanate] Hives    Gabapentin Hives    Glyburide Hives    Metformin And Related Hives    Morphine And Related Hives    Motrin [Ibuprofen] Hives    Nortriptyline Hives    Penicillins Hives    Soy Lecithin [Lecithin] Hives    Sulfa Antibiotics GI Intolerance    Tradjenta [Linagliptin] Hives    Tramadol Other (See Comments)     Sweating        Physical Exam:    Ht 5' 5" (1 651 m)   LMP  (LMP Unknown)   BMI 42 53 kg/m²     Constitutional:normal, well developed, well nourished, alert, in no distress and non-toxic and no overt pain behavior  Eyes:anicteric  HEENT:grossly intact  Neck:supple, symmetric, trachea midline and no masses   Pulmonary:even and unlabored  Cardiovascular:No edema or pitting edema present  Skin:Normal without rashes or lesions and well hydrated  Psychiatric:Mood and affect appropriate  Neurologic:Cranial Nerves II-XII grossly intact  Musculoskeletal:antalgic gait  Bilateral lower extremity strength 5/5 in all muscle groups  Negative straight leg raise bilaterally    Positive Juan M's, [place] finger, an AP compression test on the right      Imaging  FL spine and pain procedure    (Results Pending)         Orders Placed This Encounter   Procedures    FL spine and pain procedure

## 2022-06-08 NOTE — TELEPHONE ENCOUNTER
Upon review of the In Basket request and the patient's chart, initial outreach has been made via fax, please see Contacts section for details       Thank you  Daphney Mariscal MA

## 2022-06-14 NOTE — TELEPHONE ENCOUNTER
As a follow-up, a second attempt has been made for outreach via fax, please see Contacts section for details      Thank you  Amanda Barone MA

## 2022-06-15 ENCOUNTER — HOSPITAL ENCOUNTER (OUTPATIENT)
Dept: RADIOLOGY | Facility: CLINIC | Age: 67
Discharge: HOME | End: 2022-06-15
Attending: ANESTHESIOLOGY | Admitting: ANESTHESIOLOGY
Payer: MEDICARE

## 2022-06-15 VITALS
HEART RATE: 75 BPM | OXYGEN SATURATION: 97 % | SYSTOLIC BLOOD PRESSURE: 119 MMHG | DIASTOLIC BLOOD PRESSURE: 70 MMHG | TEMPERATURE: 97.1 F | RESPIRATION RATE: 20 BRPM

## 2022-06-15 DIAGNOSIS — M46.1 SACROILIITIS (HCC): ICD-10-CM

## 2022-06-15 PROCEDURE — A9585 GADOBUTROL INJECTION: HCPCS | Performed by: ANESTHESIOLOGY

## 2022-06-15 PROCEDURE — 27096 INJECT SACROILIAC JOINT: CPT | Performed by: ANESTHESIOLOGY

## 2022-06-15 RX ORDER — METHYLPREDNISOLONE ACETATE 40 MG/ML
40 INJECTION, SUSPENSION INTRA-ARTICULAR; INTRALESIONAL; INTRAMUSCULAR; PARENTERAL; SOFT TISSUE ONCE
Status: COMPLETED | OUTPATIENT
Start: 2022-06-15 | End: 2022-06-15

## 2022-06-15 RX ORDER — BUPIVACAINE HCL/PF 2.5 MG/ML
30 VIAL (ML) INJECTION ONCE
Status: COMPLETED | OUTPATIENT
Start: 2022-06-15 | End: 2022-06-15

## 2022-06-15 RX ORDER — LIDOCAINE HYDROCHLORIDE 10 MG/ML
5 INJECTION, SOLUTION EPIDURAL; INFILTRATION; INTRACAUDAL; PERINEURAL ONCE
Status: COMPLETED | OUTPATIENT
Start: 2022-06-15 | End: 2022-06-15

## 2022-06-15 RX ADMIN — BUPIVACAINE HYDROCHLORIDE 1.5 ML: 2.5 INJECTION, SOLUTION EPIDURAL; INFILTRATION; INTRACAUDAL at 15:06

## 2022-06-15 RX ADMIN — LIDOCAINE HYDROCHLORIDE 2.5 ML: 10 INJECTION, SOLUTION EPIDURAL; INFILTRATION; INTRACAUDAL; PERINEURAL at 15:06

## 2022-06-15 RX ADMIN — METHYLPREDNISOLONE ACETATE 40 MG: 40 INJECTION, SUSPENSION INTRA-ARTICULAR; INTRALESIONAL; INTRAMUSCULAR; SOFT TISSUE at 15:06

## 2022-06-15 RX ADMIN — GADOBUTROL 0.5 ML: 604.72 INJECTION INTRAVENOUS at 15:06

## 2022-06-15 NOTE — H&P
History of Present Illness: The patient is a 74 y o  female who presents with complaints of right-sided low back and buttock pain      Patient Active Problem List   Diagnosis    Chronic low back pain    Chronic pain syndrome    Type 2 diabetes mellitus with diabetic nephropathy, without long-term current use of insulin (HCC)    Morbid obesity (HCC)    HTN (hypertension)    Depression    Sacroiliitis (HCC)    Lumbar spinal stenosis    Current every day smoker    Neurogenic claudication due to lumbar spinal stenosis    Back muscle spasm    Cervical radiculopathy    Spinal cord stimulator status    Lumbar radiculopathy    Lumbar spondylosis    COVID-19 vaccination refused    Asthma exacerbation in COPD [facility])       Past Medical History:   Diagnosis Date    Allergic reaction to bee sting     last assessed - 13Jun2016    Asthma     Chronic narcotic dependence ([address] )     Chronic pain syndrome     Depression     Diabetes mellitus ([facility] [address] [facility]     Esophageal reflux     Hyperlipidemia     Hypertension     Hypovitaminosis D 4/12/2018    Lumbar radiculopathy     Lyme disease     last assessed - 28Jun2016    Obesity     Opioid dependence ([facility] [address] )     PPD positive     had treatment w/ INH 15 years ago    Vitamin D deficiency        Past Surgical History:   Procedure Laterality Date    APPENDECTOMY      CHOLECYSTECTOMY      JOINT REPLACEMENT      R knee, B/l total hip    LA REVISE/REMOVE SPINAL NEUROSTIM/ Left 12/13/2016    Procedure: REPLACEMENT BUTTOCK SPINAL CORD STIMULATOR IPG;  Surgeon: Eduardo Candelario MD;  Location:  MAIN OR;  Service: Neurosurgery    SHOULDER SURGERY      Resolved - 1992    SPINAL CORD STIMULATOR IMPLANT      spinal stereotaxis stimulation of cord    TOTAL HIP ARTHROPLASTY Bilateral          Current Outpatient Medications:     albuterol (Proventil HFA) 90 mcg/act inhaler, Inhale 2 puffs every 6 (six) hours as needed for wheezing, Disp: 6 7 g, Rfl: 5    atorvastatin (LIPITOR) 10 mg tablet, TAKE 1 TABLET (10 MG TOTAL) BY MOUTH DAILY, Disp: 90 tablet, Rfl: 1    Blood Glucose Monitoring Suppl (ONE TOUCH ULTRA MINI) w/Device KIT, by Does not apply route daily, Disp: 1 each, Rfl: 0    Cholecalciferol (VITAMIN D3) 66229 units CAPS, Take 1 capsule by mouth once a week, Disp: , Rfl: 11    EPINEPHrine (EPIPEN) 0 3 mg/0 3 mL SOAJ, Inject 0 3 mL (0 3 mg total) into a muscle as needed for anaphylaxis, Disp: 2 each, Rfl: 5    ergocalciferol (Drisdol) 1 25 MG (62158 UT) capsule, Take 50,000 Units by mouth, Disp: , Rfl:     glucose blood (ONE TOUCH ULTRA TEST) test strip, Test blood sugar once a day, Disp: 100 each, Rfl: 5    lisinopril (ZESTRIL) 20 mg tablet, Take 1 tablet (20 mg total) by mouth daily, Disp: 90 tablet, Rfl: 3    meloxicam (MOBIC) 15 mg tablet, TAKE 1 TABLET BY MOUTH EVERY DAY, Disp: 90 tablet, Rfl: 3    Nerve Stimulator (STANDARD TENS) ISAEL, by Does not apply route 4 (four) times a day as needed (MUSCLE SPASMS), Disp: 1 Device, Rfl: 0    tiZANidine (ZANAFLEX) 2 mg tablet, Take 1 tablet (2 mg total) by mouth every 8 (eight) hours as needed for muscle spasms, Disp: 270 tablet, Rfl: 1    topiramate (TOPAMAX) 100 mg tablet, TAKE 1 TABLET BY MOUTH TWICE A DAY, Disp: 180 tablet, Rfl: 1    venlafaxine (EFFEXOR-XR) 75 mg 24 hr capsule, TAKE 1 CAPSULE BY MOUTH EVERY DAY, Disp: 90 capsule, Rfl: 1    Current Facility-Administered Medications:     bupivacaine (PF) (MARCAINE) 0 25 % injection 30 mL, 30 mL, Intra-articular, Once, Richard Stone, DO    Gadobutrol injection (SINGLE-DOSE) SOLN 0 5 mL, 0 5 mL, Intravenous, Once, Richard Stone, DO    lidocaine (PF) (XYLOCAINE-MPF) 1 % injection 5 mL, 5 mL, Other, Once, Albertina Stone, DO    methylPREDNISolone acetate (DEPO-MEDROL) injection 40 mg, 40 mg, Intra-articular, Once, Richard Stone, DO    Allergies   Allergen Reactions    Bee Venom Anaphylaxis    Other Other (See Comments)     Stainless steel and surgical steel  *Severe blistering*    Acetazolamide Rash    Aleve [Naproxen Sodium] Hives    Augmentin [Amoxicillin-Pot Clavulanate] Hives    Gabapentin Hives    Glyburide Hives    Metformin And Related Hives    Morphine And Related Hives    Motrin [Ibuprofen] Hives    Nortriptyline Hives    Penicillins Hives    Soy Lecithin [Lecithin] Hives    Sulfa Antibiotics GI Intolerance    Tradjenta [Linagliptin] Hives    Tramadol Other (See Comments)     Sweating        Physical Exam:   Vitals:    06/15/22 1449   BP: 136/78   Pulse: 77   Resp: 20   Temp: (!) 97 1 °F (36 2 °C)   SpO2: 97%     General: Awake, Alert, Oriented x 3, Mood and affect appropriate  Respiratory: Respirations even and unlabored  Cardiovascular: Peripheral pulses intact; no edema  Musculoskeletal Exam:  Tenderness to palpation over right SI joint    ASA Score: 3    Patient/Chart Verification  Patient ID Verified: Verbal  ID Band Applied: No  Consents Confirmed: Procedural, To be obtained in the Pre-Procedure area  Interval H&P(within 24 hr) Complete (required for Outpatients and Surgery Admit only): To be obtained in the Pre-Procedure area  Allergies Reviewed: Yes  Anticoag/NSAID held?: NA  Currently on antibiotics?: No    Assessment:   1   Sacroiliitis ([facility] [address] )        Plan: Right SIJ injection

## 2022-06-15 NOTE — DISCHARGE INSTRUCTIONS
Steroid Joint Injection   WHAT YOU NEED TO KNOW:   A steroid joint injection is a procedure to inject steroid medicine into a joint  Steroid medicine decreases pain and inflammation  The injection may also contain an anesthetic (numbing medicine) to decrease pain  It may be done to treat conditions such as arthritis, gout, or carpal tunnel syndrome  The injections may be given in your knee, ankle, shoulder, elbow, wrist, ankle or sacroiliac joint  Do not apply heat to any area that is numb  If you have discomfort or soreness at the injection site, you may apply ice today, 20 minutes on and 20 minutes off  Tomorrow you may use ice or warm, moist heat  Do not apply ice or heat directly to the skin  You may have an increase or change in the discomfort for 36-48 hours after your treatment  Apply ice and continue with any pain medicine you have been prescribed  Do not do anything strenuous today  You may shower, but no tub baths or hot tubs today  You may resume your normal activities tomorrow, but do not overdo it  Resume normal activities slowly when you are feeling better  If you experience redness, drainage or swelling at the injection site, or if you develop a fever above 100 degrees, please call [facility] at [phone] or go to the Emergency Room  Continue to take all routine medicines prescribed by your primary care physician unless otherwise instructed by our staff  Most blood thinners should be started again according to your regularly scheduled dosing  If you have any questions, please give our office a call  As no general anesthesia was used in today's procedure, you should not experience any side effects related to anesthesia  If you have a problem specifically related to your procedure, please call our office at [phone]  Problems not related to your procedure should be directed to your primary care physician

## 2022-06-21 NOTE — TELEPHONE ENCOUNTER
As a final attempt, a third outreach has been made via telephone call  Please see Contacts section for details  This encounter will be closed and completed by end of day  Should we receive the requested information because of previous outreach attempts, the requested patient's chart will be updated appropriately       Thank you  Diana Serna MA

## 2022-06-22 ENCOUNTER — TELEPHONE (OUTPATIENT)
Dept: PAIN MEDICINE | Facility: CLINIC | Age: 67
End: 2022-06-22

## 2022-06-24 NOTE — TELEPHONE ENCOUNTER
"2nd attempt call, unable to leave msg to call back with % of improvement and pain level. \"no routes found\"  "

## 2022-07-15 ENCOUNTER — OFFICE VISIT (OUTPATIENT)
Dept: PAIN MEDICINE | Facility: CLINIC | Age: 67
End: 2022-07-15
Payer: MEDICARE

## 2022-07-15 VITALS
WEIGHT: 262 LBS | HEIGHT: 65 IN | SYSTOLIC BLOOD PRESSURE: 159 MMHG | HEART RATE: 72 BPM | BODY MASS INDEX: 43.65 KG/M2 | DIASTOLIC BLOOD PRESSURE: 82 MMHG

## 2022-07-15 DIAGNOSIS — M46.1 SACROILIITIS (HCC): ICD-10-CM

## 2022-07-15 DIAGNOSIS — M62.838 MUSCLE SPASM: ICD-10-CM

## 2022-07-15 DIAGNOSIS — Z96.89 SPINAL CORD STIMULATOR STATUS: ICD-10-CM

## 2022-07-15 DIAGNOSIS — G89.4 CHRONIC PAIN SYNDROME: Primary | ICD-10-CM

## 2022-07-15 DIAGNOSIS — M54.12 CERVICAL RADICULOPATHY: ICD-10-CM

## 2022-07-15 PROCEDURE — 99214 OFFICE O/P EST MOD 30 MIN: CPT | Performed by: NURSE PRACTITIONER

## 2022-07-15 RX ORDER — TIZANIDINE 2 MG/1
2 TABLET ORAL EVERY 8 HOURS PRN
Qty: 270 TABLET | Refills: 1 | Status: SHIPPED | OUTPATIENT
Start: 2022-07-15

## 2022-07-15 NOTE — PROGRESS NOTES
Assessment:  1  Chronic pain syndrome    2  Muscle spasm    3  Sacroiliitis (HCC)    4  Cervical radiculopathy    5  Spinal cord stimulator status        Plan:  1  Will schedule the patient for a repeat right cervical epidural steroid injection to address the inflammatory component of the patient's pain  Complete risks and benefits including bleeding, infection, tissue reaction, nerve injury and allergic reaction were discussed  The patient was agreeable and verbalized an understanding  2  We can repeat right SI joint injection p r n     I discussed with the patient that since there has been moderate to significant improvement in the pain symptoms, we will hold off on any repeat injections at this point in time  However, I reviewed with the patient that if their symptoms should return or worsen,  they should call our office to schedule to discuss repeating the injection  3  Patient may continue tizanidine as prescribed  This medication was refilled today  4  Patient may continue meloxicam and Topamax as prescribed by her PCP   5  Patient will follow up after her procedure sooner if needed    History of Present Illness: The patient is a 74 y o  female with history of [employer] spinal cord stimulator implant last seen on 3/4/22 who presents for a follow up office visit in regards to chronic right-sided low back pain secondary to sacroiliitis  The patient is status post right SI joint injection on Cecy 15, 2022 and reports an ongoing 50% relief of her back pain which is ongoing at this time  She is complaining of neck pain with radicular complaints into the right upper extremity  She has had this in the past with great relief from cervical epidural steroid injection for at least a year and a half  She would like to repeat this injection at this time  The patient currently rates her pain a 5/10 on the numerical pain rating scale    She constantly has pain which is described as dull aching    Current pain medications includes meloxicam 15 mg daily p r n  Topamax 100 mg b i d  p r n , and tizanidine 2 mg p r n  myofascial pain:     I have personally reviewed and/or updated the patient's past medical history, past surgical history, family history, social history, current medications, allergies, and vital signs today  Review of Systems:    Review of Systems   Respiratory: Negative for shortness of breath  Cardiovascular: Negative for chest pain  Gastrointestinal: Negative for constipation, diarrhea, nausea and vomiting  Musculoskeletal: Negative for arthralgias, gait problem, joint swelling and myalgias  Skin: Negative for rash  Neurological: Negative for dizziness, seizures and weakness  All other systems reviewed and are negative          Past Medical History:   Diagnosis Date    Allergic reaction to bee sting     last assessed - 13Jun2016    Asthma     Chronic narcotic dependence ([facility] )     Chronic pain syndrome     Depression     Diabetes mellitus ([facility] )     Esophageal reflux     Hyperlipidemia     Hypertension     Hypovitaminosis D 4/12/2018    Lumbar radiculopathy     Lyme disease     last assessed - 28Jun2016    Obesity     Opioid dependence ([facility] )     PPD positive     had treatment w/ INH 15 years ago    Vitamin D deficiency        Past Surgical History:   Procedure Laterality Date    APPENDECTOMY      CHOLECYSTECTOMY      JOINT REPLACEMENT      R knee, B/l total hip    SC REVISE/REMOVE SPINAL NEUROSTIM/ Left 12/13/2016    Procedure: REPLACEMENT BUTTOCK SPINAL CORD STIMULATOR IPG;  Surgeon: Suzy Esposito MD;  Location:  MAIN OR;  Service: Neurosurgery    SHOULDER SURGERY      Resolved - 1992    SPINAL CORD STIMULATOR IMPLANT      spinal stereotaxis stimulation of cord    TOTAL HIP ARTHROPLASTY Bilateral        Family History   Problem Relation Age of Onset    Diabetes Mother         Diabetes mellitus    Cancer Mother     Cancer Father     Diabetes Maternal Grandmother     Cancer Paternal Uncle     Brain cancer Family     Breast cancer Family     Cervical cancer Family     Diabetes Family         Diabetes mellitus    Hypertension Family     Ovarian cancer Family     Stroke Family         Stroke complications       Social History     Occupational History    Not on file   Tobacco Use    Smoking status: Current Every Day Smoker     Packs/day: 1 00     Years: 5 00     Pack years: 5 00    Smokeless tobacco: Never Used   Substance and Sexual Activity    Alcohol use: No     Alcohol/week: 0 0 standard drinks    Drug use: No     Comment: Denied history of drug use    Sexual activity: Not on file         Current Outpatient Medications:     albuterol (Proventil HFA) 90 mcg/act inhaler, Inhale 2 puffs every 6 (six) hours as needed for wheezing, Disp: 6 7 g, Rfl: 5    atorvastatin (LIPITOR) 10 mg tablet, TAKE 1 TABLET (10 MG TOTAL) BY MOUTH DAILY, Disp: 90 tablet, Rfl: 1    Blood Glucose Monitoring Suppl (ONE TOUCH ULTRA MINI) w/Device KIT, by Does not apply route daily, Disp: 1 each, Rfl: 0    Cholecalciferol (VITAMIN D3) 78088 units CAPS, Take 1 capsule by mouth once a week, Disp: , Rfl: 11    EPINEPHrine (EPIPEN) 0 3 mg/0 3 mL SOAJ, Inject 0 3 mL (0 3 mg total) into a muscle as needed for anaphylaxis, Disp: 2 each, Rfl: 5    glucose blood (ONE TOUCH ULTRA TEST) test strip, Test blood sugar once a day, Disp: 100 each, Rfl: 5    lisinopril (ZESTRIL) 20 mg tablet, Take 1 tablet (20 mg total) by mouth daily, Disp: 90 tablet, Rfl: 3    meloxicam (MOBIC) 15 mg tablet, TAKE 1 TABLET BY MOUTH EVERY DAY, Disp: 90 tablet, Rfl: 3    Nerve Stimulator (STANDARD TENS) ISAEL, by Does not apply route 4 (four) times a day as needed (MUSCLE SPASMS), Disp: 1 Device, Rfl: 0    tiZANidine (ZANAFLEX) 2 mg tablet, Take 1 tablet (2 mg total) by mouth every 8 (eight) hours as needed for muscle spasms, Disp: 270 tablet, Rfl: 1    topiramate (TOPAMAX) 100 mg tablet, TAKE 1 TABLET BY MOUTH TWICE A DAY, Disp: 180 tablet, Rfl: 1    venlafaxine (EFFEXOR-XR) 75 mg 24 hr capsule, TAKE 1 CAPSULE BY MOUTH EVERY DAY, Disp: 90 capsule, Rfl: 1    ergocalciferol (Drisdol) 1 25 MG (80710 UT) capsule, Take 50,000 Units by mouth, Disp: , Rfl:     Allergies   Allergen Reactions    Bee Venom Anaphylaxis    Other Other (See Comments)     Stainless steel and surgical steel  *Severe blistering*    Acetazolamide Rash    Aleve [Naproxen Sodium] Hives    Augmentin [Amoxicillin-Pot Clavulanate] Hives    Gabapentin Hives    Glyburide Hives    Metformin And Related Hives    Morphine And Related Hives    Motrin [Ibuprofen] Hives    Nortriptyline Hives    Penicillins Hives    Soy Lecithin [Lecithin] Hives    Sulfa Antibiotics GI Intolerance    Tradjenta [Linagliptin] Hives    Tramadol Other (See Comments)     Sweating        Physical Exam:    /82   Pulse 72   Ht 5' 5" (1 651 m)   Wt 119 kg (262 lb)   LMP  (LMP Unknown)   BMI 43 60 kg/m²     Constitutional:normal, well developed, well nourished, alert, in no distress and non-toxic and no overt pain behavior  Eyes:anicteric  HEENT:grossly intact  Neck:supple, symmetric, trachea midline and no masses   Pulmonary:even and unlabored  Cardiovascular:No edema or pitting edema present  Skin:Normal without rashes or lesions and well hydrated  Psychiatric:Mood and affect appropriate  Neurologic:Cranial Nerves II-XII grossly intact  Musculoskeletal:normal gait      Imaging  FL spine and pain procedure    (Results Pending)     CT CERVICAL SPINE - WITHOUT CONTRAST   INDICATION: Neck pain with radiculopathy in right hand numbness  M54 2: Cervicalgia   M54 12: Radiculopathy, cervical region   COMPARISON: None  TECHNIQUE: CT examination of the cervical spine was performed without intravenous contrast  Contiguous axial images were obtained  Sagittal and coronal reconstructions were performed     Radiation dose length product (DLP) for this visit: 679 mGy-cm   This examination, like all CT scans performed in the [facility], was performed utilizing techniques to minimize radiation dose exposure, including the use of iterative   reconstruction and automated exposure control  IMAGE QUALITY: Diagnostic  FINDINGS:   ALIGNMENT: There is straightening with slight reversal the normal cervical lordosis apex C5-6  VERTEBRAL BODIES: No fracture  DEGENERATIVE CHANGES:   C3-4: Right-sided uncinate osteophyte results in mild right foraminal narrowing with mild facet hypertrophy noted  C4-5: No significant central or foraminal narrowing  C5-6: Degenerative disc osteophyte complex with marginal osteophytes and facet hypertrophy combined result in mild central and mild bilateral foraminal narrowing  C6-7: Mild facet degenerative change  Beam hardening artifact limits evaluation of the central canal and foramina  C7-T1: Facet degenerative change  Hardening artifact limits the evaluation of the central canal and foramina  PREVERTEBRAL AND PARASPINAL SOFT TISSUES: Unremarkable  THORACIC INLET: Normal    IMPRESSION:   Spondylotic degenerative changes mid cervical spine result in mild osseous foraminal narrowing and mild central stenosis particularly C5-6         Orders Placed This Encounter   Procedures    FL spine and pain procedure

## 2022-07-17 DIAGNOSIS — G89.29 CHRONIC BILATERAL LOW BACK PAIN WITHOUT SCIATICA: ICD-10-CM

## 2022-07-17 DIAGNOSIS — M54.50 CHRONIC BILATERAL LOW BACK PAIN WITHOUT SCIATICA: ICD-10-CM

## 2022-07-17 DIAGNOSIS — G89.4 CHRONIC PAIN SYNDROME: ICD-10-CM

## 2022-07-18 ENCOUNTER — TELEPHONE (OUTPATIENT)
Dept: RADIOLOGY | Facility: CLINIC | Age: 67
End: 2022-07-18

## 2022-07-18 RX ORDER — TOPIRAMATE 100 MG/1
TABLET, FILM COATED ORAL
Qty: 180 TABLET | Refills: 1 | Status: SHIPPED | OUTPATIENT
Start: 2022-07-18

## 2022-07-26 DIAGNOSIS — E78.5 HYPERLIPIDEMIA, UNSPECIFIED HYPERLIPIDEMIA TYPE: ICD-10-CM

## 2022-07-26 RX ORDER — ATORVASTATIN CALCIUM 10 MG/1
TABLET, FILM COATED ORAL
Qty: 90 TABLET | Refills: 1 | Status: SHIPPED | OUTPATIENT
Start: 2022-07-26 | End: 2022-10-10 | Stop reason: SDUPTHER

## 2022-08-01 DIAGNOSIS — E11.9 TYPE 2 DIABETES MELLITUS WITHOUT COMPLICATION, WITHOUT LONG-TERM CURRENT USE OF INSULIN (HCC): ICD-10-CM

## 2022-08-22 ENCOUNTER — TELEPHONE (OUTPATIENT)
Dept: PAIN MEDICINE | Facility: CLINIC | Age: 67
End: 2022-08-22

## 2022-08-22 DIAGNOSIS — R07.81 RIB PAIN: Primary | ICD-10-CM

## 2022-08-22 RX ORDER — METHYLPREDNISOLONE 4 MG/1
TABLET ORAL
Qty: 1 EACH | Refills: 0 | Status: SHIPPED | OUTPATIENT
Start: 2022-08-22 | End: 2022-10-10

## 2022-08-22 NOTE — TELEPHONE ENCOUNTER
Patient is experiencing chronic low back & left leg spasms  She is requesting a TPI asap & next available in schedule is next month   Please reach out to her at # [phone] with options, thx

## 2022-08-22 NOTE — TELEPHONE ENCOUNTER
The patient's rib pain is new  I would have to evaluate her in the office before deciding if interventional therapy is indicated

## 2022-08-22 NOTE — TELEPHONE ENCOUNTER
CLARK see task below    Already sent to [facility], pt c/b insisting I send to you, Lucita Thomas  Pt asking for TPI today  I advised pt that we do not have any avail to do TPI today and that if her pain is increasing greatly w/ no relief from any prescibed/OTC therapy to seek ER or Urgent care for a more immediate work up  Pt states she would rather not seek emergency care at either offered  Pt tearful and states she is unable to walk even to the bathroom  Please advise

## 2022-08-22 NOTE — TELEPHONE ENCOUNTER
S/w pt who denies a fall or trauma to affected area  Pt states she "worked too long in the garden and over did it " Rn re-advised pt if pain is not tolerable to seek medical care at either an ER or and urgent care for further work up  Pt agreeable  Pt would be agreeable to steroid dose pack if recommended and can be sent to pharmacy on file in [place]

## 2022-08-22 NOTE — TELEPHONE ENCOUNTER
S/w pt who states having muscle spasm in LB & LLE post gardening 8/19  Pt states taking prescribed med (Topamax, zanaflex, meloxicam) on schd w/ no relief, using heat & relaxation  Pt states spinal stim only helps on RLE and was reprogrammed 2-3mo ago, not helping LLE  Pt states having difficulty ambulating due to pain and spasm  Pt reqst TPI  LOV 7/15/2022 w/ [address]  Last R SIJ 6/15/22 PHYLLIS    Please advise

## 2022-08-22 NOTE — TELEPHONE ENCOUNTER
S/w pt and advised to reach out to [address] for re-programming to attempt to capture LLE  Pt agreeable  Pt states she is also having Left rib pain post gardening which she thinks is why she is having diffic ambulating w/ LLE  Pt taking all prescribed meds w/ no relief  Please advise

## 2022-08-25 NOTE — TELEPHONE ENCOUNTER
S/w pt  Pt is on day #4 of steroid pack  Pt advised that she may get relief of the steroid for up to 2 weeks after last dose  The sharpness has subsided but she still has difficulty bending down  Advised pt to give it more time  Pt verbalized understanding and will CB with any further issues

## 2022-08-25 NOTE — TELEPHONE ENCOUNTER
Pt called stating that some of pain has gone away after taking predisone ,but she still has a 6/10 pain level     cb [phone]

## 2022-09-21 ENCOUNTER — APPOINTMENT (OUTPATIENT)
Dept: LAB | Facility: IMAGING CENTER | Age: 67
End: 2022-09-21
Payer: MEDICARE

## 2022-09-21 DIAGNOSIS — M35.3 POLYMYALGIA RHEUMATICA (HCC): ICD-10-CM

## 2022-09-21 LAB
ALBUMIN SERPL BCP-MCNC: 3.4 G/DL (ref 3.5–5)
ALP SERPL-CCNC: 114 U/L (ref 46–116)
ALT SERPL W P-5'-P-CCNC: 30 U/L (ref 12–78)
ANION GAP SERPL CALCULATED.3IONS-SCNC: 4 MMOL/L (ref 4–13)
AST SERPL W P-5'-P-CCNC: 16 U/L (ref 5–45)
BASOPHILS # BLD AUTO: 0.09 THOUSANDS/ΜL (ref 0–0.1)
BASOPHILS NFR BLD AUTO: 1 % (ref 0–1)
BILIRUB SERPL-MCNC: 0.33 MG/DL (ref 0.2–1)
BUN SERPL-MCNC: 18 MG/DL (ref 5–25)
CALCIUM ALBUM COR SERPL-MCNC: 10.1 MG/DL (ref 8.3–10.1)
CALCIUM SERPL-MCNC: 9.6 MG/DL (ref 8.3–10.1)
CHLORIDE SERPL-SCNC: 113 MMOL/L (ref 96–108)
CO2 SERPL-SCNC: 24 MMOL/L (ref 21–32)
CREAT SERPL-MCNC: 1.15 MG/DL (ref 0.6–1.3)
CRP SERPL QL: 5.6 MG/L
EOSINOPHIL # BLD AUTO: 0.32 THOUSAND/ΜL (ref 0–0.61)
EOSINOPHIL NFR BLD AUTO: 4 % (ref 0–6)
ERYTHROCYTE [DISTWIDTH] IN BLOOD BY AUTOMATED COUNT: 14.3 % (ref 11.6–15.1)
ERYTHROCYTE [SEDIMENTATION RATE] IN BLOOD: 32 MM/HOUR (ref 0–29)
GFR SERPL CREATININE-BSD FRML MDRD: 50 ML/MIN/1.73SQ M
GLUCOSE P FAST SERPL-MCNC: 170 MG/DL (ref 65–99)
HCT VFR BLD AUTO: 41.7 % (ref 34.8–46.1)
HGB BLD-MCNC: 13.1 G/DL (ref 11.5–15.4)
IMM GRANULOCYTES # BLD AUTO: 0.02 THOUSAND/UL (ref 0–0.2)
IMM GRANULOCYTES NFR BLD AUTO: 0 % (ref 0–2)
LYMPHOCYTES # BLD AUTO: 1.84 THOUSANDS/ΜL (ref 0.6–4.47)
LYMPHOCYTES NFR BLD AUTO: 24 % (ref 14–44)
MCH RBC QN AUTO: 29.8 PG (ref 26.8–34.3)
MCHC RBC AUTO-ENTMCNC: 31.4 G/DL (ref 31.4–37.4)
MCV RBC AUTO: 95 FL (ref 82–98)
MONOCYTES # BLD AUTO: 0.73 THOUSAND/ΜL (ref 0.17–1.22)
MONOCYTES NFR BLD AUTO: 10 % (ref 4–12)
NEUTROPHILS # BLD AUTO: 4.68 THOUSANDS/ΜL (ref 1.85–7.62)
NEUTS SEG NFR BLD AUTO: 61 % (ref 43–75)
NRBC BLD AUTO-RTO: 0 /100 WBCS
PLATELET # BLD AUTO: 197 THOUSANDS/UL (ref 149–390)
PMV BLD AUTO: 11.2 FL (ref 8.9–12.7)
POTASSIUM SERPL-SCNC: 4.2 MMOL/L (ref 3.5–5.3)
PROT SERPL-MCNC: 7.3 G/DL (ref 6.4–8.4)
RBC # BLD AUTO: 4.39 MILLION/UL (ref 3.81–5.12)
SODIUM SERPL-SCNC: 141 MMOL/L (ref 135–147)
WBC # BLD AUTO: 7.68 THOUSAND/UL (ref 4.31–10.16)

## 2022-09-21 PROCEDURE — 80053 COMPREHEN METABOLIC PANEL: CPT

## 2022-09-21 PROCEDURE — 85025 COMPLETE CBC W/AUTO DIFF WBC: CPT

## 2022-09-21 PROCEDURE — 36415 COLL VENOUS BLD VENIPUNCTURE: CPT

## 2022-09-21 PROCEDURE — 86140 C-REACTIVE PROTEIN: CPT

## 2022-09-21 PROCEDURE — 85652 RBC SED RATE AUTOMATED: CPT

## 2022-10-03 ENCOUNTER — RA CDI HCC (OUTPATIENT)
Dept: OTHER | Facility: HOSPITAL | Age: 67
End: 2022-10-03

## 2022-10-10 ENCOUNTER — OFFICE VISIT (OUTPATIENT)
Dept: INTERNAL MEDICINE CLINIC | Age: 67
End: 2022-10-10
Payer: MEDICARE

## 2022-10-10 VITALS
DIASTOLIC BLOOD PRESSURE: 70 MMHG | HEIGHT: 65 IN | TEMPERATURE: 97.1 F | OXYGEN SATURATION: 97 % | WEIGHT: 270 LBS | HEART RATE: 74 BPM | BODY MASS INDEX: 44.98 KG/M2 | SYSTOLIC BLOOD PRESSURE: 124 MMHG

## 2022-10-10 DIAGNOSIS — F17.200 CURRENT EVERY DAY SMOKER: ICD-10-CM

## 2022-10-10 DIAGNOSIS — M54.16 LUMBAR RADICULOPATHY: ICD-10-CM

## 2022-10-10 DIAGNOSIS — Z23 NEED FOR IMMUNIZATION AGAINST INFLUENZA: Primary | ICD-10-CM

## 2022-10-10 DIAGNOSIS — I10 ESSENTIAL HYPERTENSION: ICD-10-CM

## 2022-10-10 DIAGNOSIS — E11.21 TYPE 2 DIABETES MELLITUS WITH DIABETIC NEPHROPATHY, WITHOUT LONG-TERM CURRENT USE OF INSULIN (HCC): ICD-10-CM

## 2022-10-10 DIAGNOSIS — N18.30 STAGE 3 CHRONIC KIDNEY DISEASE, UNSPECIFIED WHETHER STAGE 3A OR 3B CKD (HCC): ICD-10-CM

## 2022-10-10 DIAGNOSIS — I10 PRIMARY HYPERTENSION: ICD-10-CM

## 2022-10-10 DIAGNOSIS — E78.5 HYPERLIPIDEMIA, UNSPECIFIED HYPERLIPIDEMIA TYPE: ICD-10-CM

## 2022-10-10 DIAGNOSIS — M46.1 SACROILIITIS (HCC): ICD-10-CM

## 2022-10-10 DIAGNOSIS — J01.90 SUBACUTE SINUSITIS, UNSPECIFIED LOCATION: ICD-10-CM

## 2022-10-10 DIAGNOSIS — F33.41 RECURRENT MAJOR DEPRESSIVE DISORDER, IN PARTIAL REMISSION (HCC): ICD-10-CM

## 2022-10-10 LAB — SL AMB POCT HEMOGLOBIN AIC: 7.7 (ref ?–6.5)

## 2022-10-10 PROCEDURE — 83036 HEMOGLOBIN GLYCOSYLATED A1C: CPT | Performed by: INTERNAL MEDICINE

## 2022-10-10 PROCEDURE — 90662 IIV NO PRSV INCREASED AG IM: CPT | Performed by: INTERNAL MEDICINE

## 2022-10-10 PROCEDURE — G0008 ADMIN INFLUENZA VIRUS VAC: HCPCS | Performed by: INTERNAL MEDICINE

## 2022-10-10 PROCEDURE — 99214 OFFICE O/P EST MOD 30 MIN: CPT | Performed by: INTERNAL MEDICINE

## 2022-10-10 RX ORDER — VENLAFAXINE HYDROCHLORIDE 75 MG/1
75 CAPSULE, EXTENDED RELEASE ORAL DAILY
Qty: 90 CAPSULE | Refills: 1 | Status: SHIPPED | OUTPATIENT
Start: 2022-10-10

## 2022-10-10 RX ORDER — GLIMEPIRIDE 1 MG/1
1 TABLET ORAL
Qty: 30 TABLET | Refills: 5 | Status: SHIPPED | OUTPATIENT
Start: 2022-10-10

## 2022-10-10 RX ORDER — ATORVASTATIN CALCIUM 10 MG/1
10 TABLET, FILM COATED ORAL DAILY
Qty: 90 TABLET | Refills: 1 | Status: SHIPPED | OUTPATIENT
Start: 2022-10-10

## 2022-10-10 RX ORDER — MELOXICAM 15 MG/1
15 TABLET ORAL DAILY
Qty: 90 TABLET | Refills: 3 | Status: SHIPPED | OUTPATIENT
Start: 2022-10-10

## 2022-10-10 RX ORDER — AZITHROMYCIN 250 MG/1
TABLET, FILM COATED ORAL
Qty: 6 TABLET | Refills: 0 | Status: SHIPPED | OUTPATIENT
Start: 2022-10-10 | End: 2022-10-15

## 2022-10-10 RX ORDER — LISINOPRIL 20 MG/1
20 TABLET ORAL DAILY
Qty: 90 TABLET | Refills: 3 | Status: SHIPPED | OUTPATIENT
Start: 2022-10-10

## 2022-10-10 NOTE — ASSESSMENT & PLAN NOTE
She admits that she is not following her diet and has gained weight    Lab Results   Component Value Date    HGBA1C 7 7 (A) 10/10/2022    HGBA1C elevated from last visit    Blood sugar checks done at home 1x week

## 2022-10-10 NOTE — PATIENT INSTRUCTIONS
Diabetic Hyperglycemia   AMBULATORY CARE:   Diabetic hyperglycemia  is a blood glucose (sugar) level that is higher than your diabetes care team provider recommends  You may not have any signs and symptoms  You may have increased thirst and urinate more often than usual   Call your local emergency number (911 in the [address]) for any of the following: You have a seizure  You begin to breathe fast or are short of breath  You become weak and confused  Seek care immediately if:   Your blood sugar level is over 240 mg/dL and  you have ketones in your urine  Your breath smells fruity  You have nausea and are vomiting  You have symptoms of dehydration, such as dark yellow urine, dry mouth and lips, and dry skin  Call your care team provider if:   You continue to have higher blood sugar levels than your care team provider recommends  You have questions or concerns about your condition or care  Why it is important to manage diabetic hyperglycemia:  Over time, hyperglycemia can damage your nerves, blood vessels, tissues, and organs  Damage to arteries may increase your risk for heart attack and stroke  Nerve damage may also lead to other heart, stomach, and nerve problems  If diabetic hyperglycemia is not controlled, it can lead to diabetic ketoacidosis (DKA) or hyperglycemic hyperosmolar state (HHS)  These are serious conditions that can become life-threatening  Medicines:   Medicines , such as insulin and diabetes pills, decrease blood sugar levels  Take your medicine as directed  Contact your healthcare provider if you think your medicine is not helping or if you have side effects  Tell him or her if you are allergic to any medicine  Keep a list of the medicines, vitamins, and herbs you take  Include the amounts, and when and why you take them  Bring the list or the pill bottles to follow-up visits  Carry your medicine list with you in case of an emergency      Manage diabetic hyperglycemia:   If you take diabetes medicine or insulin, take it as directed  Missed or wrong doses can cause your blood sugar to go up  Tell your care team provider if you continue to have trouble managing your blood sugar  He or she may change the type, amount, or timing of your diabetes medicine or insulin  If you do not take diabetes medicine or insulin, you may need to start  Work with your care team provider to develop a sick day plan  Illness can cause your blood sugar to rise  A sick day plan helps you control your blood sugar level when you are sick  Prevent diabetic hyperglycemia:   Check your blood sugar levels regularly  Ask your care team provider how often to check your blood sugar and what your levels should be  Follow your meal plan  Your blood sugar can go up if you eat a large meal or you eat more carbohydrates than recommended  Work with a dietitian to develop a meal plan that is right for you  Exercise as directed  Physical activity, such as exercise, can help lower your blood sugar when it is high  It can also keep your blood sugar levels steady over time  Be active for at least 30 minutes, 5 days a week  Include muscle strengthening activities 2 days each week  Do not sit for longer than 30 minutes at a time  Work with your care team provider to create an activity plan  Children should get at least 60 minutes of physical activity each day  Check your ketones before exercise  if your blood sugar level is above 240 mg/dL  Do not exercise if you have ketones in your urine  because your blood sugar level may rise even more  Ask your healthcare provider how to lower your blood sugar when you have ketones  Follow up with your care team provider as directed: Your care team provider may refer you to a dietitian  He or she can help you manage your blood sugar levels  Write down your questions so you remember to ask them during your visits     © Copyright [address] 2022 Information is for End User's use only and may not be sold, redistributed or otherwise used for commercial purposes  All illustrations and images included in CareNotes® are the copyrighted property of A D [employer] M [employer]  or Sasha Collier  The above information is an  only  It is not intended as medical advice for individual conditions or treatments  Talk to your doctor, nurse or pharmacist before following any medical regimen to see if it is safe and effective for you

## 2022-10-10 NOTE — PROGRESS NOTES
Name: Lynsey Paulino      : 1955      MRN: [id number]  Encounter Provider: Natalie Dimas MD  Encounter Date: 10/10/2022   Encounter department: [address] [place] [address]     1  Need for immunization against influenza  -     influenza vaccine, high-dose, PF 0 7 mL (FLUZONE HIGH-DOSE)    2  Type 2 diabetes mellitus with diabetic nephropathy, without long-term current use of insulin ([facility] )  Assessment & Plan:  She admits that she is not following her diet and has gained weight  Lab Results   Component Value Date    HGBA1C 7 7 (A) 10/10/2022    HGBA1C elevated from last visit    Blood sugar checks done at home 1x week    Orders:  -     POCT hemoglobin A1c  -     glimepiride (AMARYL) 1 mg tablet; Take 1 tablet (1 mg total) by mouth daily with breakfast  -     Microalbumin / creatinine urine ratio    3  Primary hypertension  Assessment & Plan:  wellcontrolled w/Lisinopril      4  Lumbar radiculopathy  Assessment & Plan:  Well controlled w/tinzanidine and Topiramate      5  Current every day smoker  Assessment & Plan:  Smokes 1 ppd, refuses tobacco cessation      6  Essential hypertension  -     lisinopril (ZESTRIL) 20 mg tablet; Take 1 tablet (20 mg total) by mouth daily    7  Sacroiliitis ([facility] )  Assessment & Plan:  She continues to have chronic low back pain but still does not really try to lose weight    Orders:  -     meloxicam (MOBIC) 15 mg tablet; Take 1 tablet (15 mg total) by mouth daily    8  Recurrent major depressive disorder, in partial remission ([facility] )  Assessment & Plan:  She still appears moderately depressed but does not take any medication for it anymore and really isn't interested    Orders:  -     venlafaxine (EFFEXOR-XR) 75 mg 24 hr capsule; Take 1 capsule (75 mg total) by mouth daily    9  Hyperlipidemia, unspecified hyperlipidemia type  -     atorvastatin (LIPITOR) 10 mg tablet; Take 1 tablet (10 mg total) by mouth daily    10   Stage 3 chronic kidney disease, unspecified whether stage 3a or 3b CKD [facility])  Assessment & Plan:  Lab Results   Component Value Date    EGFR 50 09/21/2022    EGFR 60 09/20/2021    EGFR 47 03/23/2021    CREATININE 1 15 09/21/2022    CREATININE 0 99 09/20/2021    CREATININE 1 23 03/23/2021   Her GFR fluctuates and she has not gone for her microalbumin  She really should be on Farxiga but I doubt she will take it due to cost or because she thinks she has a side effect  11  Subacute sinusitis, unspecified location  Assessment & Plan:  Once the gland complains of URI symptoms with pressure and yellow discharge from her sinuses  Due to her multiple allergic reactions will treat her with a Z-Jimi    Orders:  -     azithromycin (ZITHROMAX) 250 mg tablet; Take 2 tablets today then 1 tablet daily x 4 days    BMI Counseling: Body mass index is 44 93 kg/m²  The BMI is above normal  Nutrition recommendations include decreasing portion sizes, encouraging healthy choices of fruits and vegetables, moderation in carbohydrate intake and reducing intake of cholesterol  Exercise recommendations include exercising 3-5 times per week and strength training exercises  No pharmacotherapy was ordered  Rationale for BMI follow-up plan is due to patient being overweight or obese  Falls Plan of Care: balance, strength, and gait training instructions were provided  Medications that increase falls were reviewed  Tobacco Cessation Counseling: Tobacco cessation counseling was provided  The patient is sincerely urged to quit consumption of tobacco  She is not ready to quit tobacco  Medication options and side effects of medication not discussed  Patient agreed to medication  Subjective      Patient remains or quitting smoking obese diabetic and has obstructive sleep apnea but she is really not interested in losing weight or quitting smoking    Diabetes  She presents for her follow-up diabetic visit  She has type 2 diabetes mellitus   No MedicAlert identification noted  Her disease course has been fluctuating  There are no hypoglycemic associated symptoms  Pertinent negatives for hypoglycemia include no seizures  Associated symptoms include fatigue and polyuria  Pertinent negatives for diabetes include no chest pain, no foot ulcerations and no weight loss  Symptoms are stable  Diabetic complications include nephropathy  Risk factors for coronary artery disease include diabetes mellitus, dyslipidemia, family history, hypertension, obesity, post-menopausal, sedentary lifestyle and tobacco exposure  Current diabetic treatment includes diet and oral agent (monotherapy)  She is compliant with treatment some of the time  She is following a low salt, low fat/cholesterol and diabetic diet  When asked about meal planning, she reported none  She has had a previous visit with a dietitian  She never participates in exercise  Her home blood glucose trend is increasing steadily  An ACE inhibitor/angiotensin II receptor blocker is being taken  She does not see a podiatrist Eye exam is not current  Review of Systems   Constitutional: Positive for fatigue  Negative for chills, fever and weight loss  HENT: Positive for congestion and sinus pressure  Negative for ear pain and sore throat  Eyes: Negative for pain and visual disturbance  Respiratory: Positive for cough  Negative for shortness of breath  Cardiovascular: Negative for chest pain and palpitations  Gastrointestinal: Negative for abdominal pain and vomiting  Endocrine: Positive for polyuria  Genitourinary: Negative for dysuria and hematuria  Musculoskeletal: Positive for back pain  Negative for arthralgias  Skin: Negative for color change and rash  Allergic/Immunologic: Negative  Neurological: Negative for seizures and syncope  Hematological: Negative  Psychiatric/Behavioral: Negative  All other systems reviewed and are negative        Current Outpatient Medications on File Prior to Visit   Medication Sig   • glucose blood (ONE TOUCH ULTRA TEST) test strip Test blood sugar once a day   • albuterol (Proventil HFA) 90 mcg/act inhaler Inhale 2 puffs every 6 (six) hours as needed for wheezing   • Blood Glucose Monitoring Suppl (ONE TOUCH ULTRA MINI) w/Device KIT by Does not apply route daily   • Cholecalciferol (VITAMIN D3) 82827 units CAPS Take 1 capsule by mouth once a week   • EPINEPHrine (EPIPEN) 0 3 mg/0 3 mL SOAJ Inject 0 3 mL (0 3 mg total) into a muscle as needed for anaphylaxis   • ergocalciferol (Drisdol) 1 25 MG (12933 UT) capsule Take 50,000 Units by mouth   • Nerve Stimulator (STANDARD TENS) ISAEL by Does not apply route 4 (four) times a day as needed (MUSCLE SPASMS)   • tiZANidine (ZANAFLEX) 2 mg tablet Take 1 tablet (2 mg total) by mouth every 8 (eight) hours as needed for muscle spasms   • topiramate (TOPAMAX) 100 mg tablet TAKE 1 TABLET BY MOUTH TWICE A DAY   • [DISCONTINUED] atorvastatin (LIPITOR) 10 mg tablet TAKE 1 TABLET BY MOUTH EVERY DAY   • [DISCONTINUED] lisinopril (ZESTRIL) 20 mg tablet Take 1 tablet (20 mg total) by mouth daily   • [DISCONTINUED] meloxicam (MOBIC) 15 mg tablet TAKE 1 TABLET BY MOUTH EVERY DAY   • [DISCONTINUED] methylPREDNISolone 4 MG tablet therapy pack Use as directed on package   • [DISCONTINUED] venlafaxine (EFFEXOR-XR) 75 mg 24 hr capsule TAKE 1 CAPSULE BY MOUTH EVERY DAY       Objective     /70 (BP Location: Left arm, Patient Position: Sitting, Cuff Size: Standard)   Pulse 74   Temp (!) 97 1 °F (36 2 °C)   Ht 5' 5" (1 651 m)   Wt 122 kg (270 lb)   LMP  (LMP Unknown)   SpO2 97%   BMI 44 93 kg/m²     Physical Exam  Constitutional:       General: She is not in acute distress  Appearance: She is well-developed  HENT:      Right Ear: Tympanic membrane and external ear normal       Left Ear: Tympanic membrane and external ear normal       Nose: Congestion present  Mouth/Throat:      Pharynx: No oropharyngeal exudate     Eyes: Pupils: Pupils are equal, round, and reactive to light  Neck:      Thyroid: No thyromegaly  Vascular: No JVD  Cardiovascular:      Rate and Rhythm: Normal rate and regular rhythm  Heart sounds: Normal heart sounds  No murmur heard  No gallop  Pulmonary:      Effort: Pulmonary effort is normal  No respiratory distress  Breath sounds: Normal breath sounds  No wheezing or rales  Abdominal:      General: Bowel sounds are normal  There is no distension  Palpations: Abdomen is soft  There is no mass  Tenderness: There is no abdominal tenderness  Musculoskeletal:         General: No tenderness  Normal range of motion  Cervical back: Normal range of motion and neck supple  Lymphadenopathy:      Cervical: No cervical adenopathy  Skin:     General: Skin is warm and dry  Findings: No rash  Neurological:      Mental Status: She is alert and oriented to person, place, and time  Cranial Nerves: No cranial nerve deficit  Coordination: Coordination normal    Psychiatric:         Behavior: Behavior normal          Thought Content:  Thought content normal          Judgment: Judgment normal        Herminia Jimenez MD

## 2022-10-10 NOTE — ASSESSMENT & PLAN NOTE
She still appears moderately depressed but does not take any medication for it anymore and really isn't interested

## 2022-10-10 NOTE — ASSESSMENT & PLAN NOTE
Once the gland complains of URI symptoms with pressure and yellow discharge from her sinuses    Due to her multiple allergic reactions will treat her with a Z-Jimi

## 2022-10-10 NOTE — ASSESSMENT & PLAN NOTE
Lab Results   Component Value Date    EGFR 50 09/21/2022    EGFR 60 09/20/2021    EGFR 47 03/23/2021    CREATININE 1 15 09/21/2022    CREATININE 0 99 09/20/2021    CREATININE 1 23 03/23/2021   Her GFR fluctuates and she has not gone for her microalbumin  She really should be on Farxiga but I doubt she will take it due to cost or because she thinks she has a side effect

## 2022-11-01 DIAGNOSIS — E11.21 TYPE 2 DIABETES MELLITUS WITH DIABETIC NEPHROPATHY, WITHOUT LONG-TERM CURRENT USE OF INSULIN (HCC): ICD-10-CM

## 2022-11-01 RX ORDER — GLIMEPIRIDE 1 MG/1
TABLET ORAL
Qty: 90 TABLET | Refills: 2 | Status: SHIPPED | OUTPATIENT
Start: 2022-11-01

## 2022-12-06 ENCOUNTER — RA CDI HCC (OUTPATIENT)
Dept: OTHER | Facility: HOSPITAL | Age: 67
End: 2022-12-06

## 2022-12-06 NOTE — PROGRESS NOTES
[facility]  coding opportunities          Chart Reviewed number of suggestions sent to Provider: 2   E11 22  E11 65    Patients Insurance     Medicare Insurance: [employer]

## 2022-12-09 PROBLEM — J01.90 SUBACUTE SINUSITIS: Status: RESOLVED | Noted: 2019-02-27 | Resolved: 2022-12-09

## 2022-12-19 NOTE — TELEPHONE ENCOUNTER
Attempted to reach pt  LVMMOM with c/b #, office hours and location  Oncology Treatment Followup    Reason for Visit:  Melchor is a 63 year old gentleman with a diagnosis of tonsillar cancer who presents today for consideration of ongoing chemotherapy. He is due for D8 Cisplatin, given concurrently with XRT.     Nursing Note and documentation reviewed: Yes    HPI:    When last seen, patient was doing well and toelrating chemo with modest side effects. Last week, we was due for D15 chemo. He unfortunately wasn't feeling well, and developed a 101.5 fever. Was sent to ED from radiation therapy. Was diagnosed with RSV pneumonia and thrush. Started on Diflucan. Out of precaution, also started on Abx.     He presents today, due for D22 chemo. ***    Oncologic History:   05/2021 First noticed left neck mass  08/16/2022 Seen by PCP, with complaints of left growing neck mass. CT ordered.   08/23/2022 CT soft tissue neck mass: 3.8 cm probable everardo mass in the left jugulodigastric chain. Adjacent also enlarged 2.3 cm long axis node. Differential considerations favor  metastatic disease. A mucosal mass is questioned at the roof of the left tonsillar pillar.  09/13/2022 Evaluated by Dr. Houston at [facility] who recommended perry endoscopy with biopsy of the left tonsil  09/22/2022 Underwent Panendoscopy with path showing poorly differentiated squamous carcinoma, p16 positive.   10/04/2022 Seen again by Dr. Houston who recommended PET scan and referral to radiation oncology and medical oncology for discussion regarding concurrent chemoRT.   10/26/2022 Met in consultation with Dr. Restrepo of [facility], who at the time, states his radiotherapy plan would be 70 Gy delivered in 35 fractions of 2 Gy each. Ipsilateral vs bilateral neck coverage is TBD by results of PET CT.   11/2/2022 PET scan IMPRESSION: Large left anterior cervical chain lymph node metastasis measuring 4.0 x 3.5 cm in dimension. There are a few mildly enlarged adjacent left anterior cervical chain lymph nodes with uptake not significantly  greater than that of background uptake. No evidence of distant metastatic disease otherwise.  11/03/2022 Met in consultation with Dr. Pruitt of [facility] who agreed with the the plan of concurrent chemorads, utilizing Cisplatin 40 mg weekly.   11/11/2022 Planned port placement  11/16/2022 Planned Rad Onc simulation  11/28/2022 Commenced XRT treatments  11/29/2022 C1 Cisplatin    Treatment Goal: Curative    Planned TX: Weekly Cisplatin 40 mg/m2 given concurrently with radiation therapy  Current Cycle: C1D22  *C1D15 held due to penumonia      Previous treatment:  N/A    Past Medical History:   Diagnosis Date     Malignant neoplasm of tonsil (H) 10/04/2022    Per [facility] Chart     Oropharyngeal cancer (H) 09/13/2022    Per [facility] Chart     GLENDA (obstructive sleep apnea) 09/22/2022    Per [facility] chart       Past Surgical History:   Procedure Laterality Date     INSERT PORT VASCULAR ACCESS N/A 11/11/2022    Procedure: Port-a-cath placement;  Surgeon: Teodoro Avila MD;  Location: [facility] OR     Panendoscopy, biopsy left palate  09/22/2022    [facility]-Dr. Jamey Houston       Family History   Problem Relation Age of Onset     Lung Cancer Mother      Cancer Mother      Diabetes Father      Colon Cancer Maternal Grandmother      Hyperlipidemia No family hx of      Coronary Artery Disease No family hx of      Hypertension No family hx of      Breast Cancer No family hx of      Prostate Cancer No family hx of      Depression No family hx of      Anxiety Disorder No family hx of      Mental Illness No family hx of      Anesthesia Reaction No family hx of      Asthma No family hx of        Social History     Socioeconomic History     Marital status:      Spouse name: Sandra     Number of children: 2     Years of education: 16     Highest education level: Not on file   Occupational History     Employer: [employer]     Occupation: [employer]   Tobacco Use     Smoking status: Never     Smokeless tobacco: Former     Types: Chew      Quit date: 1995   Vaping Use     Vaping Use: Never used   Substance and Sexual Activity     Alcohol use: Not Currently     Drug use: No     Comment: Drug use: No     Sexual activity: Yes     Partners: Female   Other Topics Concern     Parent/sibling w/ CABG, MI or angioplasty before 65F 55M? Not Asked   Social History Narrative     Not on file     Social Determinants of Health     Financial Resource Strain: Not on file   Food Insecurity: Not on file   Transportation Needs: Not on file   Physical Activity: Not on file   Stress: Not on file   Social Connections: Not on file   Intimate Partner Violence: Not on file   Housing Stability: Not on file       Current Outpatient Medications   Medication     cephALEXin (KEFLEX) 500 MG capsule     dexamethasone (DECADRON) 4 MG tablet     doxycycline monohydrate (ADOXA) 75 MG tablet     fluconazole (DIFLUCAN) 100 MG tablet     magic mouthwash (ENTER INGREDIENTS IN COMMENTS) suspension     magic mouthwash (ENTER INGREDIENTS IN COMMENTS) suspension     magnesium oxide (MAG-OX) 400 MG tablet     NONFORMULARY     nystatin (MYCOSTATIN) 813188 UNIT/ML suspension     ondansetron (ZOFRAN) 8 MG tablet     prochlorperazine (COMPAZINE) 10 MG tablet     UNKNOWN TO PATIENT     zinc gluconate 50 MG tablet     No current facility-administered medications for this visit.        Allergies   Allergen Reactions     Codeine Other (See Comments)     Anxiety     Penicillins Anxiety     Pt states his sister and father are allergic, but he doesn't believe he is. 11/11/2022       Review Of Systems:  A complete review of systems is negative except for the above mentioned items in the interval history.     Physical Exam:***  There were no vitals taken for this visit.  GENERAL APPEARANCE: Healthy, alert and in no acute distress.  HEENT: Eyes appear normal without scleral icterus. Extraocular movements intact.  NECK:   Supple with normal range of motion. Errythema or radiation field.   LYMPHATICS:  Approximately 6 x 4.5 cm left cervical node.   RESP: Lungs clear to auscultation bilaterally, respirations regular and easy.  CARDIOVASCULAR: Regular rate and rhythm. Normal S1, S2; no murmur, gallop, or rub.  ABDOMEN: Soft, non-tender, non-distended. Bowel sounds auscultated all 4 quadrants. No palpable organomegaly or masses.  MUSCULOSKELETAL: Extremities without gross deformities noted. No edema of bilateral lower extremities.  SKIN: No suspicious lesions or rashes.  NEURO: Alert and oriented x 3.  Gait steady.  PSYCHIATRIC: Mentation and affect appear normal.  Mood appropriate.    Laboratory:  No results found for any visits on 12/20/22.    Imaging Studies:    None this visit    ASSESSMENT/PLAN:    1. 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 Undergoing concurrent chemorads with utilization of Cisplatin. ***    2. Constipation ***    I encouraged patient to call with any questions or concerns.    *** minutes spent in the patient's encounter today with time spent in review of the chart along with in chart preparation.  Time was spent in review of the treamtent plan.  Time was also spent obtaining a review of systems and performing a physical exam.  Time was spent in review of all planned medications for treatment with the patient and questions answered.     [facility]  Medical Oncology

## 2023-02-07 DIAGNOSIS — G89.29 CHRONIC BILATERAL LOW BACK PAIN WITHOUT SCIATICA: ICD-10-CM

## 2023-02-07 DIAGNOSIS — G89.4 CHRONIC PAIN SYNDROME: ICD-10-CM

## 2023-02-07 DIAGNOSIS — M54.50 CHRONIC BILATERAL LOW BACK PAIN WITHOUT SCIATICA: ICD-10-CM

## 2023-02-07 RX ORDER — TOPIRAMATE 100 MG/1
TABLET, FILM COATED ORAL
Qty: 180 TABLET | Refills: 1 | Status: SHIPPED | OUTPATIENT
Start: 2023-02-07

## 2023-02-09 ENCOUNTER — OFFICE VISIT (OUTPATIENT)
Dept: PAIN MEDICINE | Facility: CLINIC | Age: 68
End: 2023-02-09

## 2023-02-09 VITALS
HEART RATE: 82 BPM | HEIGHT: 65 IN | WEIGHT: 270 LBS | DIASTOLIC BLOOD PRESSURE: 81 MMHG | BODY MASS INDEX: 44.98 KG/M2 | SYSTOLIC BLOOD PRESSURE: 151 MMHG

## 2023-02-09 DIAGNOSIS — M79.18 MYOFASCIAL PAIN SYNDROME: ICD-10-CM

## 2023-02-09 DIAGNOSIS — Z96.89 SPINAL CORD STIMULATOR STATUS: ICD-10-CM

## 2023-02-09 DIAGNOSIS — M54.12 CERVICAL RADICULOPATHY: ICD-10-CM

## 2023-02-09 DIAGNOSIS — G89.4 CHRONIC PAIN SYNDROME: Primary | ICD-10-CM

## 2023-02-09 DIAGNOSIS — M47.816 LUMBAR SPONDYLOSIS: ICD-10-CM

## 2023-02-09 DIAGNOSIS — M46.1 SACROILIITIS (HCC): ICD-10-CM

## 2023-02-09 DIAGNOSIS — M54.16 LUMBAR RADICULOPATHY: ICD-10-CM

## 2023-02-09 DIAGNOSIS — M48.061 SPINAL STENOSIS OF LUMBAR REGION, UNSPECIFIED WHETHER NEUROGENIC CLAUDICATION PRESENT: ICD-10-CM

## 2023-02-09 RX ORDER — TIZANIDINE 4 MG/1
4 TABLET ORAL EVERY 8 HOURS PRN
Qty: 90 TABLET | Refills: 1 | Status: SHIPPED | OUTPATIENT
Start: 2023-02-09

## 2023-02-09 NOTE — PROGRESS NOTES
Assessment:  1  Chronic pain syndrome    2  Lumbar radiculopathy    3  Cervical radiculopathy    4  Lumbar spondylosis    5  Spinal stenosis of lumbar region, unspecified whether neurogenic claudication present    6  Spinal cord stimulator status    7  Myofascial pain syndrome    8  Sacroiliitis ([address] )        Plan:  1  Based on patient report and physical exam, the patient's symptomatology does seem to be consistent with sacroiliac mediated pain from sacroiliitis  We will schedule the patient for a bilateral SIJ injection to decrease any inflammatory component of the patient's pain symptoms  Complete risks and benefits including bleeding, infection, tissue reaction, nerve injury and allergic reaction were discussed  The patient was agreeable and verbalized an understanding  2  I will order an updated CT lumbar spine  Patient is having worsening radicular complaints andweakness in the lower extremities causing falls  Last CT lumbar spine from 2020 showed multilevel moderate to severe stenosis  3  I will increase tizanidine to 4 mg every 8 hours as needed myofascial pain  4 patient may continue Topamax and meloxicam as prescribed  5  Patient was encouraged to reach out to [facility] For reprogramming of her spinal cord stimulator device  6  Patient will follow-up after her procedure or sooner if needed    History of Present Illness: The patient is a 74 y o  female with a history of diabetes and  [employer] spinal cord stimulator implant last seen on 07/15/2022 who presents for a follow up office visit in regards to chronic low back pain  Patient also complains of worsening numbness in her left foot and subjective weakness in the left lower extremity    Last CT of the lumbar spine from February 2020 revealed relatively stable but severe multilevel spondylosis and osteoarthritis resulting in various degrees of canal, lateral recess and foraminal stenosis exacerbated by pre-existing moderate degree of congenital stenosis  She had undergone numerous epidural steroid injections without relief  She is also had a lumbar RFA in the past without relief  He does get relief with SI joint injections  She has a [employer] spinal cord stimulator device which had been managing her lower extremity symptoms, however she feels that it is worsening at this time  She had a fall today because she states her left lower extremity "gave out " She landed on her left knee  Patient rates her pain a 10 out of 10 on the numeric pain rating scale  She cons he has pain which is described as sharp  He is managing her pain with Topamax 100 mg twice daily as needed, tizanidine 2 mg every 8 hours as needed and meloxicam 15 mg daily as needed without relief  She has tried and failed duloxetine, gabapentin, and pregabalin in the past     I have personally reviewed and/or updated the patient's past medical history, past surgical history, family history, social history, current medications, allergies, and vital signs today  Review of Systems:    Review of Systems   Respiratory: Negative for shortness of breath  Cardiovascular: Negative for chest pain  Gastrointestinal: Negative for constipation, diarrhea, nausea and vomiting  Musculoskeletal: Negative for arthralgias, gait problem, joint swelling and myalgias  Skin: Negative for rash  Neurological: Negative for dizziness, seizures and weakness  All other systems reviewed and are negative          Past Medical History:   Diagnosis Date   • Allergic reaction to bee sting     last assessed - 13Jun2016   • Asthma    • Chronic narcotic dependence ([facility] [place] [facility] )    • Chronic pain syndrome    • Depression    • Diabetes mellitus ([facility] [place] [facility] )    • Esophageal reflux    • Hyperlipidemia    • Hypertension    • Hypovitaminosis D 4/12/2018   • Lumbar radiculopathy    • Lyme disease     last assessed - 28Jun2016   • Obesity    • Opioid dependence ([facility] [place] [address] )    • PPD positive     had treatment w/ INH 15 years ago • Vitamin D deficiency        Past Surgical History:   Procedure Laterality Date   • APPENDECTOMY     • CHOLECYSTECTOMY     • JOINT REPLACEMENT      R knee, B/l total hip   • IN REVISE/REMOVE SPINAL NEUROSTIM/ Left 12/13/2016    Procedure: REPLACEMENT BUTTOCK SPINAL CORD STIMULATOR IPG;  Surgeon: Jeremy Lopez MD;  Location: [facility];  Service: Neurosurgery   • SHOULDER SURGERY      Resolved - 1992   • SPINAL CORD STIMULATOR IMPLANT      spinal stereotaxis stimulation of cord   • TOTAL HIP ARTHROPLASTY Bilateral        Family History   Problem Relation Age of Onset   • Diabetes Mother         Diabetes mellitus   • Cancer Mother    • Cancer Father    • Diabetes Maternal Grandmother    • Cancer Paternal Uncle    • Brain cancer Family    • Breast cancer Family    • Cervical cancer Family    • Diabetes Family         Diabetes mellitus   • Hypertension Family    • Ovarian cancer Family    • Stroke Family         Stroke complications       Social History     Occupational History   • Not on file   Tobacco Use   • Smoking status: Every Day     Packs/day: 1 00     Years: 5 00     Pack years: 5 00     Types: Cigarettes   • Smokeless tobacco: Never   Substance and Sexual Activity   • Alcohol use: No     Alcohol/week: 0 0 standard drinks   • Drug use: No     Comment: Denied history of drug use   • Sexual activity: Not on file         Current Outpatient Medications:   •  atorvastatin (LIPITOR) 10 mg tablet, Take 1 tablet (10 mg total) by mouth daily, Disp: 90 tablet, Rfl: 1  •  Blood Glucose Monitoring Suppl (ONE TOUCH ULTRA MINI) w/Device KIT, by Does not apply route daily, Disp: 1 each, Rfl: 0  •  Cholecalciferol (VITAMIN D3) 52930 units CAPS, Take 1 capsule by mouth once a week, Disp: , Rfl: 11  •  glimepiride (AMARYL) 1 mg tablet, TAKE 1 TABLET BY MOUTH DAILY WITH BREAKFAST , Disp: 90 tablet, Rfl: 2  •  glucose blood (ONE TOUCH ULTRA TEST) test strip, Test blood sugar once a day, Disp: 100 each, Rfl: 5  • lisinopril (ZESTRIL) 20 mg tablet, Take 1 tablet (20 mg total) by mouth daily, Disp: 90 tablet, Rfl: 3  •  meloxicam (MOBIC) 15 mg tablet, Take 1 tablet (15 mg total) by mouth daily, Disp: 90 tablet, Rfl: 3  •  tiZANidine (ZANAFLEX) 4 mg tablet, Take 1 tablet (4 mg total) by mouth every 8 (eight) hours as needed for muscle spasms, Disp: 90 tablet, Rfl: 1  •  topiramate (TOPAMAX) 100 mg tablet, TAKE 1 TABLET BY MOUTH TWICE A DAY, Disp: 180 tablet, Rfl: 1  •  venlafaxine (EFFEXOR-XR) 75 mg 24 hr capsule, Take 1 capsule (75 mg total) by mouth daily, Disp: 90 capsule, Rfl: 1  •  albuterol (Proventil HFA) 90 mcg/act inhaler, Inhale 2 puffs every 6 (six) hours as needed for wheezing, Disp: 6 7 g, Rfl: 5  •  EPINEPHrine (EPIPEN) 0 3 mg/0 3 mL SOAJ, Inject 0 3 mL (0 3 mg total) into a muscle as needed for anaphylaxis, Disp: 2 each, Rfl: 5  •  ergocalciferol (Drisdol) 1 25 MG (79994 UT) capsule, Take 50,000 Units by mouth, Disp: , Rfl:   •  Nerve Stimulator (STANDARD TENS) ISAEL, by Does not apply route 4 (four) times a day as needed (MUSCLE SPASMS), Disp: 1 Device, Rfl: 0    Allergies   Allergen Reactions   • Bee Venom Anaphylaxis   • Other Other (See Comments)     Stainless steel and surgical steel  *Severe blistering*   • Acetazolamide Rash   • Aleve [Naproxen Sodium] Hives   • Augmentin [Amoxicillin-Pot Clavulanate] Hives   • Gabapentin Hives   • Glyburide Hives   • Metformin And Related Hives   • Morphine And Related Hives   • Motrin [Ibuprofen] Hives   • Nortriptyline Hives   • Penicillins Hives   • Soy Lecithin [Lecithin] Hives   • Sulfa Antibiotics GI Intolerance   • Tradjenta [Linagliptin] Hives   • Tramadol Other (See Comments)     Sweating        Physical Exam:    /81   Pulse 82   Ht 5' 5" (1 651 m)   Wt 122 kg (270 lb)   LMP  (LMP Unknown)   BMI 44 93 kg/m²     Constitutional:normal, well developed, well nourished, alert, in no distress and non-toxic and no overt pain behavior  Eyes:anicteric  HEENT:grossly intact  Neck:supple, symmetric, trachea midline and no masses   Pulmonary:even and unlabored  Cardiovascular:No edema or pitting edema present  Skin:Normal without rashes or lesions and well hydrated  Psychiatric:Mood and affect appropriate  Neurologic:Cranial Nerves II-XII grossly intact  Musculoskeletal:Antalgic gait  Bilateral SI joints tender to palpation  Bilateral lumbar paraspinal musculature tender to palpation  Left lower extremity strength 4 out of 5 in all muscle groups  Right lower extremity strength 5 out of 5 in all muscle groups  Sensation diminished to light touch in all dermatomes of the left foot but intact otherwise  Negative straight leg raise bilaterally    Positive Juan M's, AP compression and Gaenslen's test bilaterally      Imaging  CT lumbar spine without contrast    (Results Pending)   FL spine and pain procedure    (Results Pending)         Orders Placed This Encounter   Procedures   • CT lumbar spine without contrast   • FL spine and pain procedure

## 2023-02-10 DIAGNOSIS — E78.5 HYPERLIPIDEMIA, UNSPECIFIED HYPERLIPIDEMIA TYPE: ICD-10-CM

## 2023-02-13 RX ORDER — ATORVASTATIN CALCIUM 10 MG/1
TABLET, FILM COATED ORAL
Qty: 90 TABLET | Refills: 1 | Status: SHIPPED | OUTPATIENT
Start: 2023-02-13

## 2023-02-20 ENCOUNTER — HOSPITAL ENCOUNTER (OUTPATIENT)
Dept: RADIOLOGY | Facility: CLINIC | Age: 68
Discharge: HOME | End: 2023-02-20

## 2023-02-20 VITALS
TEMPERATURE: 96.8 F | OXYGEN SATURATION: 99 % | HEART RATE: 73 BPM | RESPIRATION RATE: 18 BRPM | SYSTOLIC BLOOD PRESSURE: 157 MMHG | DIASTOLIC BLOOD PRESSURE: 92 MMHG

## 2023-02-20 DIAGNOSIS — M46.1 SACROILIITIS (HCC): ICD-10-CM

## 2023-02-20 RX ORDER — BUPIVACAINE HCL/PF 2.5 MG/ML
4 VIAL (ML) INJECTION ONCE
Status: COMPLETED | OUTPATIENT
Start: 2023-02-20 | End: 2023-02-20

## 2023-02-20 RX ORDER — METHYLPREDNISOLONE ACETATE 80 MG/ML
80 INJECTION, SUSPENSION INTRA-ARTICULAR; INTRALESIONAL; INTRAMUSCULAR; PARENTERAL; SOFT TISSUE ONCE
Status: COMPLETED | OUTPATIENT
Start: 2023-02-20 | End: 2023-02-20

## 2023-02-20 RX ORDER — LIDOCAINE HYDROCHLORIDE 10 MG/ML
5 INJECTION, SOLUTION EPIDURAL; INFILTRATION; INTRACAUDAL; PERINEURAL ONCE
Status: COMPLETED | OUTPATIENT
Start: 2023-02-20 | End: 2023-02-20

## 2023-02-20 RX ADMIN — LIDOCAINE HYDROCHLORIDE 5 ML: 10 INJECTION, SOLUTION EPIDURAL; INFILTRATION; INTRACAUDAL; PERINEURAL at 14:32

## 2023-02-20 RX ADMIN — IOHEXOL 1 ML: 300 INJECTION, SOLUTION INTRAVENOUS at 14:32

## 2023-02-20 RX ADMIN — BUPIVACAINE HYDROCHLORIDE 4 ML: 2.5 INJECTION, SOLUTION EPIDURAL; INFILTRATION; INTRACAUDAL at 14:32

## 2023-02-20 RX ADMIN — METHYLPREDNISOLONE ACETATE 80 MG: 80 INJECTION, SUSPENSION INTRA-ARTICULAR; INTRALESIONAL; INTRAMUSCULAR; SOFT TISSUE at 14:32

## 2023-02-20 NOTE — DISCHARGE INSTRUCTIONS
Steroid Joint Injection   WHAT YOU NEED TO KNOW:   A steroid joint injection is a procedure to inject steroid medicine into a joint  Steroid medicine decreases pain and inflammation  The injection may also contain an anesthetic (numbing medicine) to decrease pain  It may be done to treat conditions such as arthritis, gout, or carpal tunnel syndrome  The injections may be given in your knee, ankle, shoulder, elbow, wrist, ankle or sacroiliac joint  Do not apply heat to any area that is numb  If you have discomfort or soreness at the injection site, you may apply ice today, 20 minutes on and 20 minutes off  Tomorrow you may use ice or warm, moist heat  Do not apply ice or heat directly to the skin  You may have an increase or change in the discomfort for 36-48 hours after your treatment  Apply ice and continue with any pain medicine you have been prescribed  Do not do anything strenuous today  You may shower, but no tub baths or hot tubs today  You may resume your normal activities tomorrow, but do not “overdo it”  Resume normal activities slowly when you are feeling better  If you experience redness, drainage or swelling at the injection site, or if you develop a fever above 100 degrees, please call [facility] at [phone] or go to the Emergency Room  Continue to take all routine medicines prescribed by your primary care physician unless otherwise instructed by our staff  Most blood thinners should be started again according to your regularly scheduled dosing  If you have any questions, please give our office a call  As no general anesthesia was used in today's procedure, you should not experience any side effects related to anesthesia  If you are diabetic, the steroids used in today's injection may temporarily increase your blood sugar levels after the first few days after your injection   Please keep a close eye on your sugars and alert the doctor who manages your diabetes if your sugars are significantly high from your baseline or you are symptomatic  If you have a problem specifically related to your procedure, please call our office at [phone]  Problems not related to your procedure should be directed to your primary care physician

## 2023-02-20 NOTE — H&P
History of Present Illness: The patient is a 74 y o  female who presents with complaints of low back and buttock pain      Past Medical History:   Diagnosis Date   • Allergic reaction to bee sting     last assessed - [id number]   • Asthma    • Chronic narcotic dependence ([facility] [address] )    • Chronic pain syndrome    • Depression    • Diabetes mellitus ([facility] [address] )    • Esophageal reflux    • Hyperlipidemia    • Hypertension    • Hypovitaminosis D 4/12/2018   • Lumbar radiculopathy    • Lyme disease     last assessed - 28Jun2016   • Obesity    • Opioid dependence ([facility] [address] )    • PPD positive     had treatment w/ INH 15 years ago   • Vitamin D deficiency        Past Surgical History:   Procedure Laterality Date   • APPENDECTOMY     • CHOLECYSTECTOMY     • JOINT REPLACEMENT      R knee, B/l total hip   • [place] REVJ/RMVL IMPLANTED SPINAL NEUROSTIM GENERATOR Left 12/13/2016    Procedure: REPLACEMENT BUTTOCK SPINAL CORD STIMULATOR IPG;  Surgeon: Abram Macdonald MD;  Location:  MAIN OR;  Service: Neurosurgery   • SHOULDER SURGERY      Resolved - 1992   • SPINAL CORD STIMULATOR IMPLANT      spinal stereotaxis stimulation of cord   • TOTAL HIP ARTHROPLASTY Bilateral          Current Outpatient Medications:   •  albuterol (Proventil HFA) 90 mcg/act inhaler, Inhale 2 puffs every 6 (six) hours as needed for wheezing, Disp: 6 7 g, Rfl: 5  •  atorvastatin (LIPITOR) 10 mg tablet, TAKE 1 TABLET BY MOUTH EVERY DAY, Disp: 90 tablet, Rfl: 1  •  Blood Glucose Monitoring Suppl (ONE TOUCH ULTRA MINI) w/Device KIT, by Does not apply route daily, Disp: 1 each, Rfl: 0  •  Cholecalciferol (VITAMIN D3) 08972 units CAPS, Take 1 capsule by mouth once a week, Disp: , Rfl: 11  •  EPINEPHrine (EPIPEN) 0 3 mg/0 3 mL SOAJ, Inject 0 3 mL (0 3 mg total) into a muscle as needed for anaphylaxis, Disp: 2 each, Rfl: 5  •  ergocalciferol (Drisdol) 1 25 MG (92922 UT) capsule, Take 50,000 Units by mouth, Disp: , Rfl:   •  glimepiride (AMARYL) 1 mg tablet, TAKE 1 TABLET BY MOUTH DAILY WITH BREAKFAST , Disp: 90 tablet, Rfl: 2  •  glucose blood (ONE TOUCH ULTRA TEST) test strip, Test blood sugar once a day, Disp: 100 each, Rfl: 5  •  lisinopril (ZESTRIL) 20 mg tablet, Take 1 tablet (20 mg total) by mouth daily, Disp: 90 tablet, Rfl: 3  •  meloxicam (MOBIC) 15 mg tablet, Take 1 tablet (15 mg total) by mouth daily, Disp: 90 tablet, Rfl: 3  •  Nerve Stimulator (STANDARD TENS) ISAEL, by Does not apply route 4 (four) times a day as needed (MUSCLE SPASMS), Disp: 1 Device, Rfl: 0  •  tiZANidine (ZANAFLEX) 4 mg tablet, Take 1 tablet (4 mg total) by mouth every 8 (eight) hours as needed for muscle spasms, Disp: 90 tablet, Rfl: 1  •  topiramate (TOPAMAX) 100 mg tablet, TAKE 1 TABLET BY MOUTH TWICE A DAY, Disp: 180 tablet, Rfl: 1  •  venlafaxine (EFFEXOR-XR) 75 mg 24 hr capsule, Take 1 capsule (75 mg total) by mouth daily, Disp: 90 capsule, Rfl: 1    Allergies   Allergen Reactions   • Bee Venom Anaphylaxis   • Other Other (See Comments)     Stainless steel and surgical steel  *Severe blistering*   • Acetazolamide Rash   • Aleve [Naproxen Sodium] Hives   • Augmentin [Amoxicillin-Pot Clavulanate] Hives   • Gabapentin Hives   • Glyburide Hives   • Metformin And Related Hives   • Morphine And Related Hives   • Motrin [Ibuprofen] Hives   • Nortriptyline Hives   • Penicillins Hives   • Soy Lecithin [Lecithin] Hives   • Sulfa Antibiotics GI Intolerance   • Tradjenta [Linagliptin] Hives   • Tramadol Other (See Comments)     Sweating        Physical Exam:   Vitals:    02/20/23 1420   BP: 166/91   Pulse: 87   Resp: 18   Temp: (!) 96 8 °F (36 °C)   SpO2: 99%     General: Awake, Alert, Oriented x 3, Mood and affect appropriate  Respiratory: Respirations even and unlabored  Cardiovascular: Peripheral pulses intact; no edema  Musculoskeletal Exam: Tenderness to palpation over bilateral SI joints    ASA Score: 3    Patient/Chart Verification  Patient ID Verified: Verbal  ID Band Applied: No  Consents Confirmed: Procedural, To be obtained in the Pre-Procedure area  Interval H&P(within 24 hr) Complete (required for Outpatients and Surgery Admit only): To be obtained in the Pre-Procedure area  Allergies Reviewed: Yes  Anticoag/NSAID held?: NA  Currently on antibiotics?: No  Pregnancy denied?: NA    Assessment:   1   Sacroiliitis (HCC)        Plan: B/L SIJ injections

## 2023-02-27 ENCOUNTER — TELEPHONE (OUTPATIENT)
Dept: PAIN MEDICINE | Facility: CLINIC | Age: 68
End: 2023-02-27

## 2023-03-02 LAB
LEFT EYE DIABETIC RETINOPATHY: NORMAL
RIGHT EYE DIABETIC RETINOPATHY: NORMAL

## 2023-04-05 NOTE — PROGRESS NOTES
Assessment:  1  Chronic pain syndrome    2  Myofascial pain syndrome    3  Lumbar radiculopathy    4  Lumbar spondylosis    5  Spinal stenosis of lumbar region with neurogenic claudication    6  Spinal cord stimulator status        Plan:  1  Patient was encouraged to complete CT lumbar spine  2  Patient may continue tizanidine as prescribed  This medication was refilled today  3  Patient may continue Topamax and meloxicam as prescribed  4  Continue to follow with [facility] for periodic reprogramming of her spinal cord stimulator device as needed  5  Follow-up after imaging or sooner if needed    History of Present Illness: The patient is a 74 y o  female with a history of diabetes and at [facility] spinal cord stimulator last seen on 02/09/2023 who presents for a follow up office visit in regards to chronic low back pain that radiates into the posterior aspect of her lower extremities with associated paresthesias and subjective weakness  She denies bowel or bladder incontinence or saddle anesthesia  She is status post bilateral SI joint injections on February 20, 2023 with some improvement of her low back pain, however she does continue to complain of lower extremity symptoms  She has undergone numerous epidural steroid injections and lumbar RFA without relief  She does use her spinal cord stimulator device which manages her lower extremity symptoms to a degree  She rates her pain an 8 out of 10 on the numeric pain rating scale  She constantly has pain which is described as numbness, pressure-like and pins-and-needles  Last office visit, an order for CT lumbar spine was given to the patient however the patient never scheduled    Current pain medications includes: Tizanidine 4 mg every 8 hours as needed myofascial pain, Topamax 100 mg twice daily and meloxicam 15 mg daily as needed with minimal relief    She has tried and failed duloxetine, gabapentin and pregabalin in the past    I have personally reviewed and/or updated the patient's past medical history, past surgical history, family history, social history, current medications, allergies, and vital signs today  Review of Systems:    Review of Systems   Respiratory: Negative for shortness of breath  Cardiovascular: Negative for chest pain  Gastrointestinal: Negative for constipation, diarrhea, nausea and vomiting  Musculoskeletal: Positive for gait problem  Negative for arthralgias, joint swelling and myalgias  Skin: Negative for rash  Neurological: Negative for dizziness, seizures and weakness  All other systems reviewed and are negative          Past Medical History:   Diagnosis Date   • Allergic reaction to bee sting     last assessed - [id number]   • Asthma    • Chronic narcotic dependence [facility] [address] )    • Chronic pain syndrome    • Depression    [facility]    • Esophageal reflux    • Hyperlipidemia    • Hypertension    • Hypovitaminosis D 4/12/2018   • Lumbar radiculopathy    • Lyme disease     last assessed - 28Jun2016   • Obesity    • [facility]    • PPD positive     had treatment w/ INH 15 years ago   • Vitamin D deficiency        Past Surgical History:   Procedure Laterality Date   • APPENDECTOMY     • CHOLECYSTECTOMY     • JOINT REPLACEMENT      R knee, B/l total hip   • [place] REVJ/RMVL IMPLANTED SPINAL NEUROSTIM GENERATOR Left 12/13/2016    Procedure: REPLACEMENT BUTTOCK SPINAL CORD STIMULATOR IPG;  Surgeon: Griffin Holm MD;  Location: [facility] OR;  Service: Neurosurgery   • SHOULDER SURGERY      Resolved - 1992   • SPINAL CORD STIMULATOR IMPLANT      spinal stereotaxis stimulation of cord   • TOTAL HIP ARTHROPLASTY Bilateral        Family History   Problem Relation Age of Onset   • Diabetes Mother         Diabetes mellitus   • Cancer Mother    • Cancer Father    • Diabetes Maternal Grandmother    • Cancer Paternal Uncle    • Brain cancer Family    • Breast cancer Family    • Cervical cancer Family    • Diabetes Family         Diabetes mellitus   • Hypertension Family    • Ovarian cancer Family    • Stroke Family         Stroke complications       Social History     Occupational History   • Not on file   Tobacco Use   • Smoking status: Every Day     Packs/day: 1 00     Years: 5 00     Pack years: 5 00     Types: Cigarettes   • Smokeless tobacco: Never   Substance and Sexual Activity   • Alcohol use: No     Alcohol/week: 0 0 standard drinks   • Drug use: No     Comment: Denied history of drug use   • Sexual activity: Not on file         Current Outpatient Medications:   •  tiZANidine (ZANAFLEX) 4 mg tablet, Take 1 tablet (4 mg total) by mouth every 8 (eight) hours as needed for muscle spasms, Disp: 90 tablet, Rfl: 1  •  albuterol (Proventil HFA) 90 mcg/act inhaler, Inhale 2 puffs every 6 (six) hours as needed for wheezing, Disp: 6 7 g, Rfl: 5  •  atorvastatin (LIPITOR) 10 mg tablet, TAKE 1 TABLET BY MOUTH EVERY DAY, Disp: 90 tablet, Rfl: 1  •  Blood Glucose Monitoring Suppl (ONE TOUCH ULTRA MINI) w/Device KIT, by Does not apply route daily, Disp: 1 each, Rfl: 0  •  Cholecalciferol (VITAMIN D3) 94372 units CAPS, Take 1 capsule by mouth once a week, Disp: , Rfl: 11  •  EPINEPHrine (EPIPEN) 0 3 mg/0 3 mL SOAJ, Inject 0 3 mL (0 3 mg total) into a muscle as needed for anaphylaxis, Disp: 2 each, Rfl: 5  •  ergocalciferol (Drisdol) 1 25 MG (37358 UT) capsule, Take 50,000 Units by mouth, Disp: , Rfl:   •  glimepiride (AMARYL) 1 mg tablet, TAKE 1 TABLET BY MOUTH DAILY WITH BREAKFAST , Disp: 90 tablet, Rfl: 2  •  glucose blood (ONE TOUCH ULTRA TEST) test strip, Test blood sugar once a day, Disp: 100 each, Rfl: 5  •  lisinopril (ZESTRIL) 20 mg tablet, Take 1 tablet (20 mg total) by mouth daily, Disp: 90 tablet, Rfl: 3  •  meloxicam (MOBIC) 15 mg tablet, Take 1 tablet (15 mg total) by mouth daily, Disp: 90 tablet, Rfl: 3  •  Nerve Stimulator (STANDARD TENS) ISAEL, by Does not apply route 4 (four) times a day as needed (MUSCLE "SPASMS), Disp: 1 Device, Rfl: 0  •  topiramate (TOPAMAX) 100 mg tablet, TAKE 1 TABLET BY MOUTH TWICE A DAY, Disp: 180 tablet, Rfl: 1  •  venlafaxine (EFFEXOR-XR) 75 mg 24 hr capsule, Take 1 capsule (75 mg total) by mouth daily, Disp: 90 capsule, Rfl: 1    Allergies   Allergen Reactions   • Bee Venom Anaphylaxis   • Other Other (See Comments)     Stainless steel and surgical steel  *Severe blistering*   • Acetazolamide Rash   • Aleve [Naproxen Sodium] Hives   • Augmentin [Amoxicillin-Pot Clavulanate] Hives   • Gabapentin Hives   • Glyburide Hives   • Metformin And Related Hives   • Morphine And Related Hives   • Motrin [Ibuprofen] Hives   • Nortriptyline Hives   • Penicillins Hives   • Soy Lecithin [Lecithin] Hives   • Sulfa Antibiotics GI Intolerance   • Tradjenta [Linagliptin] Hives   • Tramadol Other (See Comments)     Sweating        Physical Exam:    BP (!) 175/78   Pulse 73   Ht 5' 5\" (1 651 m)   Wt 122 kg (270 lb)   LMP  (LMP Unknown)   BMI 44 93 kg/m²     Constitutional:normal, well developed, well nourished, alert, in no distress and non-toxic and no overt pain behavior  Eyes:anicteric  HEENT:grossly intact  Neck:supple, symmetric, trachea midline and no masses   Pulmonary:even and unlabored  Cardiovascular:No edema or pitting edema present  Skin:Normal without rashes or lesions and well hydrated  Psychiatric:Mood and affect appropriate  Neurologic:Cranial Nerves II-XII grossly intact  Musculoskeletal:antalgic gait, ambulates with a cane      Imaging  No orders to display         No orders of the defined types were placed in this encounter      " oral

## 2023-04-06 ENCOUNTER — OFFICE VISIT (OUTPATIENT)
Dept: PAIN MEDICINE | Facility: CLINIC | Age: 68
End: 2023-04-06

## 2023-04-06 VITALS
HEIGHT: 65 IN | WEIGHT: 270 LBS | SYSTOLIC BLOOD PRESSURE: 175 MMHG | DIASTOLIC BLOOD PRESSURE: 78 MMHG | BODY MASS INDEX: 44.98 KG/M2 | HEART RATE: 73 BPM

## 2023-04-06 DIAGNOSIS — M47.816 LUMBAR SPONDYLOSIS: ICD-10-CM

## 2023-04-06 DIAGNOSIS — Z96.89 SPINAL CORD STIMULATOR STATUS: ICD-10-CM

## 2023-04-06 DIAGNOSIS — M79.18 MYOFASCIAL PAIN SYNDROME: ICD-10-CM

## 2023-04-06 DIAGNOSIS — G89.4 CHRONIC PAIN SYNDROME: Primary | ICD-10-CM

## 2023-04-06 DIAGNOSIS — M48.062 SPINAL STENOSIS OF LUMBAR REGION WITH NEUROGENIC CLAUDICATION: ICD-10-CM

## 2023-04-06 DIAGNOSIS — M54.16 LUMBAR RADICULOPATHY: ICD-10-CM

## 2023-04-06 RX ORDER — TIZANIDINE 4 MG/1
4 TABLET ORAL EVERY 8 HOURS PRN
Qty: 90 TABLET | Refills: 1 | Status: SHIPPED | OUTPATIENT
Start: 2023-04-06

## 2023-04-06 RX ORDER — TIZANIDINE 2 MG/1
4 TABLET ORAL EVERY 8 HOURS PRN
Qty: 180 TABLET | Refills: 1 | Status: SHIPPED | OUTPATIENT
Start: 2023-04-06 | End: 2023-04-06

## 2023-05-05 DIAGNOSIS — F33.41 RECURRENT MAJOR DEPRESSIVE DISORDER, IN PARTIAL REMISSION (HCC): ICD-10-CM

## 2023-05-08 RX ORDER — VENLAFAXINE HYDROCHLORIDE 75 MG/1
CAPSULE, EXTENDED RELEASE ORAL
Qty: 30 CAPSULE | Refills: 1 | Status: SHIPPED | OUTPATIENT
Start: 2023-05-08

## 2023-05-10 ENCOUNTER — HOSPITAL ENCOUNTER (OUTPATIENT)
Dept: RADIOLOGY | Facility: IMAGING CENTER | Age: 68
Discharge: HOME | End: 2023-05-10

## 2023-05-10 DIAGNOSIS — M54.16 LUMBAR RADICULOPATHY: ICD-10-CM

## 2023-05-12 ENCOUNTER — APPOINTMENT (OUTPATIENT)
Dept: LAB | Facility: IMAGING CENTER | Age: 68
End: 2023-05-12

## 2023-05-12 DIAGNOSIS — R53.83 FATIGUE, UNSPECIFIED TYPE: ICD-10-CM

## 2023-05-12 DIAGNOSIS — I10 PRIMARY HYPERTENSION: ICD-10-CM

## 2023-05-12 DIAGNOSIS — Z13.220 SCREENING, LIPID: ICD-10-CM

## 2023-05-12 LAB
ALBUMIN SERPL BCP-MCNC: 3.8 G/DL (ref 3.5–5)
ALP SERPL-CCNC: 110 U/L (ref 46–116)
ALT SERPL W P-5'-P-CCNC: 27 U/L (ref 12–78)
ANION GAP SERPL CALCULATED.3IONS-SCNC: 3 MMOL/L (ref 4–13)
AST SERPL W P-5'-P-CCNC: 23 U/L (ref 5–45)
BASOPHILS # BLD AUTO: 0.12 THOUSANDS/ÂΜL (ref 0–0.1)
BASOPHILS NFR BLD AUTO: 2 % (ref 0–1)
BILIRUB SERPL-MCNC: 0.35 MG/DL (ref 0.2–1)
BUN SERPL-MCNC: 21 MG/DL (ref 5–25)
CALCIUM SERPL-MCNC: 9.7 MG/DL (ref 8.3–10.1)
CHLORIDE SERPL-SCNC: 112 MMOL/L (ref 96–108)
CHOLEST SERPL-MCNC: 185 MG/DL
CO2 SERPL-SCNC: 24 MMOL/L (ref 21–32)
CREAT SERPL-MCNC: 1.04 MG/DL (ref 0.6–1.3)
CREAT UR-MCNC: 105 MG/DL
EOSINOPHIL # BLD AUTO: 0.31 THOUSAND/ÂΜL (ref 0–0.61)
EOSINOPHIL NFR BLD AUTO: 4 % (ref 0–6)
ERYTHROCYTE [DISTWIDTH] IN BLOOD BY AUTOMATED COUNT: 14.2 % (ref 11.6–15.1)
GFR SERPL CREATININE-BSD FRML MDRD: 56 ML/MIN/1.73SQ M
GLUCOSE P FAST SERPL-MCNC: 135 MG/DL (ref 65–99)
HCT VFR BLD AUTO: 42.2 % (ref 34.8–46.1)
HDLC SERPL-MCNC: 64 MG/DL
HGB BLD-MCNC: 13.3 G/DL (ref 11.5–15.4)
IMM GRANULOCYTES # BLD AUTO: 0.04 THOUSAND/UL (ref 0–0.2)
IMM GRANULOCYTES NFR BLD AUTO: 1 % (ref 0–2)
LDLC SERPL CALC-MCNC: 97 MG/DL (ref 0–100)
LYMPHOCYTES # BLD AUTO: 1.65 THOUSANDS/ÂΜL (ref 0.6–4.47)
LYMPHOCYTES NFR BLD AUTO: 23 % (ref 14–44)
MCH RBC QN AUTO: 29.8 PG (ref 26.8–34.3)
MCHC RBC AUTO-ENTMCNC: 31.5 G/DL (ref 31.4–37.4)
MCV RBC AUTO: 95 FL (ref 82–98)
MICROALBUMIN UR-MCNC: 20.1 MG/L (ref 0–20)
MICROALBUMIN/CREAT 24H UR: 19 MG/G CREATININE (ref 0–30)
MONOCYTES # BLD AUTO: 0.66 THOUSAND/ÂΜL (ref 0.17–1.22)
MONOCYTES NFR BLD AUTO: 9 % (ref 4–12)
NEUTROPHILS # BLD AUTO: 4.48 THOUSANDS/ÂΜL (ref 1.85–7.62)
NEUTS SEG NFR BLD AUTO: 61 % (ref 43–75)
NONHDLC SERPL-MCNC: 121 MG/DL
NRBC BLD AUTO-RTO: 0 /100 WBCS
PLATELET # BLD AUTO: 201 THOUSANDS/UL (ref 149–390)
PMV BLD AUTO: 10.9 FL (ref 8.9–12.7)
POTASSIUM SERPL-SCNC: 4.3 MMOL/L (ref 3.5–5.3)
PROT SERPL-MCNC: 7.8 G/DL (ref 6.4–8.4)
RBC # BLD AUTO: 4.46 MILLION/UL (ref 3.81–5.12)
SODIUM SERPL-SCNC: 139 MMOL/L (ref 135–147)
TRIGL SERPL-MCNC: 121 MG/DL
WBC # BLD AUTO: 7.26 THOUSAND/UL (ref 4.31–10.16)

## 2023-05-15 DIAGNOSIS — M48.04 THORACIC SPINAL STENOSIS: Primary | ICD-10-CM

## 2023-05-16 ENCOUNTER — OFFICE VISIT (OUTPATIENT)
Dept: INTERNAL MEDICINE CLINIC | Facility: OTHER | Age: 68
End: 2023-05-16

## 2023-05-16 VITALS
HEART RATE: 69 BPM | TEMPERATURE: 98.4 F | HEIGHT: 65 IN | WEIGHT: 269.2 LBS | SYSTOLIC BLOOD PRESSURE: 138 MMHG | OXYGEN SATURATION: 96 % | DIASTOLIC BLOOD PRESSURE: 70 MMHG | BODY MASS INDEX: 44.85 KG/M2

## 2023-05-16 DIAGNOSIS — F32.4 MAJOR DEPRESSIVE DISORDER IN PARTIAL REMISSION, UNSPECIFIED WHETHER RECURRENT (HCC): ICD-10-CM

## 2023-05-16 DIAGNOSIS — Z12.11 ENCOUNTER FOR COLORECTAL CANCER SCREENING: ICD-10-CM

## 2023-05-16 DIAGNOSIS — F33.41 RECURRENT MAJOR DEPRESSIVE DISORDER, IN PARTIAL REMISSION (HCC): ICD-10-CM

## 2023-05-16 DIAGNOSIS — Z78.0 POSTMENOPAUSAL: ICD-10-CM

## 2023-05-16 DIAGNOSIS — J45.901 ASTHMA EXACERBATION IN COPD (HCC): ICD-10-CM

## 2023-05-16 DIAGNOSIS — E78.49 OTHER HYPERLIPIDEMIA: ICD-10-CM

## 2023-05-16 DIAGNOSIS — Z91.030 BEE STING ALLERGY: ICD-10-CM

## 2023-05-16 DIAGNOSIS — Z12.31 SCREENING MAMMOGRAM FOR BREAST CANCER: ICD-10-CM

## 2023-05-16 DIAGNOSIS — Z12.12 ENCOUNTER FOR COLORECTAL CANCER SCREENING: ICD-10-CM

## 2023-05-16 DIAGNOSIS — Z13.820 SCREENING FOR OSTEOPOROSIS: Primary | ICD-10-CM

## 2023-05-16 DIAGNOSIS — E66.01 MORBID OBESITY (HCC): ICD-10-CM

## 2023-05-16 DIAGNOSIS — M06.09 RHEUMATOID ARTHRITIS OF MULTIPLE SITES WITH NEGATIVE RHEUMATOID FACTOR (HCC): ICD-10-CM

## 2023-05-16 DIAGNOSIS — M54.16 LUMBAR RADICULOPATHY: ICD-10-CM

## 2023-05-16 DIAGNOSIS — E55.9 VITAMIN D DEFICIENCY: ICD-10-CM

## 2023-05-16 DIAGNOSIS — E11.21 TYPE 2 DIABETES MELLITUS WITH DIABETIC NEPHROPATHY, WITHOUT LONG-TERM CURRENT USE OF INSULIN (HCC): ICD-10-CM

## 2023-05-16 DIAGNOSIS — J44.1 ASTHMA EXACERBATION IN COPD (HCC): ICD-10-CM

## 2023-05-16 DIAGNOSIS — I10 PRIMARY HYPERTENSION: ICD-10-CM

## 2023-05-16 DIAGNOSIS — N18.30 STAGE 3 CHRONIC KIDNEY DISEASE, UNSPECIFIED WHETHER STAGE 3A OR 3B CKD (HCC): ICD-10-CM

## 2023-05-16 DIAGNOSIS — Z72.0 TOBACCO ABUSE: ICD-10-CM

## 2023-05-16 PROBLEM — F17.200 CURRENT EVERY DAY SMOKER: Status: RESOLVED | Noted: 2018-05-16 | Resolved: 2023-05-16

## 2023-05-16 PROBLEM — M54.50 CHRONIC LOW BACK PAIN: Status: RESOLVED | Noted: 2017-11-26 | Resolved: 2023-05-16

## 2023-05-16 PROBLEM — M62.830 BACK MUSCLE SPASM: Status: RESOLVED | Noted: 2018-09-13 | Resolved: 2023-05-16

## 2023-05-16 PROBLEM — M48.061 LUMBAR SPINAL STENOSIS: Status: RESOLVED | Noted: 2018-02-16 | Resolved: 2023-05-16

## 2023-05-16 PROBLEM — G89.29 CHRONIC LOW BACK PAIN: Status: RESOLVED | Noted: 2017-11-26 | Resolved: 2023-05-16

## 2023-05-16 PROBLEM — G89.4 CHRONIC PAIN SYNDROME: Status: RESOLVED | Noted: 2017-11-26 | Resolved: 2023-05-16

## 2023-05-16 LAB — SL AMB POCT HEMOGLOBIN AIC: 6.4 (ref ?–6.5)

## 2023-05-16 RX ORDER — EPINEPHRINE 0.3 MG/.3ML
0.3 INJECTION SUBCUTANEOUS AS NEEDED
Qty: 2 EACH | Refills: 5 | Status: SHIPPED | OUTPATIENT
Start: 2023-05-16

## 2023-05-16 NOTE — ASSESSMENT & PLAN NOTE
Lab Results   Component Value Date    EGFR 56 05/12/2023    EGFR 50 09/21/2022    EGFR 60 09/20/2021    CREATININE 1 04 05/12/2023    CREATININE 1 15 09/21/2022    CREATININE 0 99 09/20/2021   Increase water intake   Avoid nephrotoxic medications

## 2023-05-16 NOTE — ASSESSMENT & PLAN NOTE
Lab Results   Component Value Date    HGBA1C 7 7 (A) 10/10/2022   Well controlled  Continue on Glimepiride  Patient is to continue to work on diet and exercise  Limit sugars and carbohydrate intake  Avoid soda, juice, sweets, cookies, desserts, pasta, bread    Eat more whole grains, exercised 30 min of cardio at least 3 times a week  Also recommended daily foot exams to check for sores, and recommended yearly eye exams

## 2023-05-16 NOTE — ASSESSMENT & PLAN NOTE
Controlled on Lipitor  Recommend healthy lifestyle choices for your cholesterol  Low fat/low cholesterol diet  Limit/avoid red meat  Eat more lean meat - chicken breast, ground turkey, fish  Exercise 30 mins at least 5 times a week as tolerated

## 2023-05-16 NOTE — ASSESSMENT & PLAN NOTE
Well controlled with lisinopril  Continue current regimen -   Continue to monitor blood pressure at home  Goal BP is < 130/80  Contact our office for consistent elevations  Recommend low sodium diet  Exercise 30 minutes 5 times a week as tolerated    Recommend yearly eye exam

## 2023-05-16 NOTE — PROGRESS NOTES
Diabetic Foot Exam    Patient's shoes and socks removed  Right Foot/Ankle   Right Foot Inspection  Skin Exam: skin normal, skin intact and dry skin  No warmth, no callus, no erythema, no maceration, no abnormal color, no pre-ulcer, no ulcer and no callus  Toe Exam: ROM and strength within normal limits  Sensory   Monofilament testing: diminished    Left Foot/Ankle  Left Foot Inspection  Skin Exam: skin normal, skin intact and dry skin  No warmth, no erythema, no maceration, normal color, no pre-ulcer, no ulcer and no callus  Toe Exam: ROM and strength within normal limits       Sensory   Monofilament testing: intact    Assign Risk Category  No deformity present

## 2023-05-16 NOTE — PROGRESS NOTES
Assessment/Plan:    Type 2 diabetes mellitus with diabetic nephropathy, without long-term current use of insulin ([facility])    Lab Results   Component Value Date    HGBA1C 7 7 (A) 10/10/2022   Well controlled  Continue on Glimepiride  Patient is to continue to work on diet and exercise  Limit sugars and carbohydrate intake  Avoid soda, juice, sweets, cookies, desserts, pasta, bread    Eat more whole grains, exercised 30 min of cardio at least 3 times a week  Also recommended daily foot exams to check for sores, and recommended yearly eye exams  Asthma exacerbation in COPD ([facility] )  Well controlled on albuterol     HTN (hypertension)  Well controlled with lisinopril  Continue current regimen -   Continue to monitor blood pressure at home  Goal BP is < 130/80  Contact our office for consistent elevations  Recommend low sodium diet  Exercise 30 minutes 5 times a week as tolerated  Recommend yearly eye exam        Lumbar radiculopathy  Continues to follow pain management  Controlled with tizanidine and Topamax    Stage 3 chronic kidney disease, unspecified whether stage 3a or 3b CKD ([place] [address] )  Lab Results   Component Value Date    EGFR 56 05/12/2023    EGFR 50 09/21/2022    EGFR 60 09/20/2021    CREATININE 1 04 05/12/2023    CREATININE 1 15 09/21/2022    CREATININE 0 99 09/20/2021   Increase water intake   Avoid nephrotoxic medications     Rheumatoid arthritis of multiple sites with negative rheumatoid factor ([place] [address] )  Currently follows rheumatology     Depression  Well-controlled on Effexor    Other hyperlipidemia  Controlled on Lipitor  Recommend healthy lifestyle choices for your cholesterol  Low fat/low cholesterol diet  Limit/avoid red meat  Eat more lean meat - chicken breast, ground turkey, fish  Exercise 30 mins at least 5 times a week as tolerated  Tobacco abuse  Encouraged smoking cessation       BMI Counseling: Body mass index is 44 8 kg/m²   The BMI is above normal  Nutrition recommendations include decreasing portion sizes, encouraging healthy choices of fruits and vegetables, decreasing fast food intake, consuming healthier snacks, limiting drinks that contain sugar, moderation in carbohydrate intake, increasing intake of lean protein, reducing intake of saturated and trans fat and reducing intake of cholesterol  Exercise recommendations include moderate physical activity 150 minutes/week and exercising 3-5 times per week  Rationale for BMI follow-up plan is due to patient being overweight or obese  Tobacco Cessation Counseling: Tobacco cessation counseling was provided  The patient is sincerely urged to quit consumption of tobacco  She is not ready to quit tobacco  Medication options and side effects of medication discussed  Patient refused medication  Diagnoses and all orders for this visit:    Screening for osteoporosis  -     DXA bone density spine hip and pelvis; Future  -     Vitamin D 25 hydroxy; Future    Encounter for colorectal cancer screening  -     Cologuard    Bee sting allergy  -     EPINEPHrine (EPIPEN) 0 3 mg/0 3 mL SOAJ; Inject 0 3 mL (0 3 mg total) into a muscle as needed for anaphylaxis    Type 2 diabetes mellitus with diabetic nephropathy, without long-term current use of insulin ([facility])  -     POCT hemoglobin A1c  -     CBC and differential; Future  -     Comprehensive metabolic panel; Future  -     Hemoglobin A1C; Future  -     Lipid panel; Future    Screening mammogram for breast cancer  -     Mammo screening bilateral w 3d & cad; Future    Recurrent major depressive disorder, in partial remission ([facility] )    Asthma exacerbation in COPD ([facility] )    Morbid obesity ([facility] )    Stage 3 chronic kidney disease, unspecified whether stage 3a or 3b CKD ([facility] )    Postmenopausal  -     DXA bone density spine hip and pelvis; Future  -     Vitamin D 25 hydroxy; Future    Vitamin D deficiency  -     Vitamin D 25 hydroxy;  Future    Primary hypertension    Lumbar radiculopathy    Rheumatoid arthritis of multiple sites with negative rheumatoid factor (HCC)    Major depressive disorder in partial remission, unspecified whether recurrent ([address] )    Other hyperlipidemia    Tobacco abuse          Subjective:      Patient ID: Israel Duron is a 79 y o  female  Presents today to establish care with our practice  Type 2 diabetes mellitus- -140s, hemoglobin A1c 6 4, well-controlled on glimepiride, up-to-date with eye exam, foot exam done while in office today     HTN- well controlled on lisinopril, denies side effects with medication    Depression-patient reports she is currently well controlled on Effexor    Hyperlipidemia-well-controlled on Lipitor, ASCVD 27 5%, denies side effects with this medication    Lumbar spinal stenosis-currently follows pain management, has spinal stimulator, had recent MRI due to increased falls, recently started using a cane for ambulation     Asthma exacerbation in COPD - controlled with albuterol as needed, no recent excerbations     Polymyalgia rheumatica-currently follows rheumatology with [facility]    Tobacco abuse- one pack a day for about 10 years, has tried chanix in the past with the side effect vomiting         The following portions of the patient's history were reviewed and updated as appropriate: allergies, current medications, past family history, past medical history, past social history, past surgical history and problem list     Review of Systems   Constitutional: Negative for activity change, appetite change, chills, diaphoresis and fever  HENT: Negative for congestion, ear discharge, ear pain, postnasal drip, rhinorrhea, sinus pressure, sinus pain and sore throat  Eyes: Negative for pain, discharge, itching and visual disturbance  Respiratory: Negative for cough, chest tightness, shortness of breath and wheezing  Cardiovascular: Negative for chest pain, palpitations and leg swelling  Gastrointestinal: Negative for abdominal pain, constipation, diarrhea, nausea and vomiting  Endocrine: Negative for polydipsia, polyphagia and polyuria  Genitourinary: Negative for difficulty urinating, dysuria and urgency  Musculoskeletal: Positive for back pain  Negative for arthralgias and neck pain  Skin: Negative for rash and wound  Neurological: Negative for dizziness, weakness, numbness and headaches           Past Medical History:   Diagnosis Date   • Allergic reaction to bee sting     last assessed - 13Jun2016   • Asthma    • Chronic narcotic dependence ([place] [address] )    • Chronic pain syndrome    • Depression    • Diabetes mellitus ([place] [address] )    • Esophageal reflux    • Hyperlipidemia    • Hypertension    • Hypovitaminosis D 4/12/2018   • Lumbar radiculopathy    • Lyme disease     last assessed - 28Jun2016   • Obesity    • Opioid dependence ([place] [address] )    • PPD positive     had treatment w/ INH 15 years ago   • Vitamin D deficiency          Current Outpatient Medications:   •  albuterol (Proventil HFA) 90 mcg/act inhaler, Inhale 2 puffs every 6 (six) hours as needed for wheezing, Disp: 6 7 g, Rfl: 5  •  atorvastatin (LIPITOR) 10 mg tablet, TAKE 1 TABLET BY MOUTH EVERY DAY, Disp: 90 tablet, Rfl: 1  •  Blood Glucose Monitoring Suppl (ONE TOUCH ULTRA MINI) w/Device KIT, by Does not apply route daily, Disp: 1 each, Rfl: 0  •  Cholecalciferol (VITAMIN D3) 33144 units CAPS, Take 1 capsule by mouth once a week, Disp: , Rfl: 11  •  EPINEPHrine (EPIPEN) 0 3 mg/0 3 mL SOAJ, Inject 0 3 mL (0 3 mg total) into a muscle as needed for anaphylaxis, Disp: 2 each, Rfl: 5  •  glimepiride (AMARYL) 1 mg tablet, TAKE 1 TABLET BY MOUTH DAILY WITH BREAKFAST , Disp: 90 tablet, Rfl: 2  •  glucose blood (ONE TOUCH ULTRA TEST) test strip, Test blood sugar once a day, Disp: 100 each, Rfl: 5  •  lisinopril (ZESTRIL) 20 mg tablet, Take 1 tablet (20 mg total) by mouth daily, Disp: 90 tablet, Rfl: 3  •  meloxicam (MOBIC) 15 mg tablet, Take 1 tablet (15 mg total) by mouth daily, Disp: 90 tablet, Rfl: 3  •  Nerve Stimulator (STANDARD TENS) ISAEL, by Does not apply route 4 (four) times a day as needed (MUSCLE SPASMS), Disp: 1 Device, Rfl: 0  •  tiZANidine (ZANAFLEX) 4 mg tablet, Take 1 tablet (4 mg total) by mouth every 8 (eight) hours as needed for muscle spasms, Disp: 90 tablet, Rfl: 1  •  topiramate (TOPAMAX) 100 mg tablet, TAKE 1 TABLET BY MOUTH TWICE A DAY, Disp: 180 tablet, Rfl: 1  •  venlafaxine (EFFEXOR-XR) 75 mg 24 hr capsule, TAKE 1 CAPSULE BY MOUTH EVERY DAY, Disp: 30 capsule, Rfl: 1  •  ergocalciferol (Drisdol) 1 25 MG (92561 UT) capsule, Take 50,000 Units by mouth, Disp: , Rfl:     Allergies   Allergen Reactions   • Bee Venom Anaphylaxis   • Other Other (See Comments)     Stainless steel and surgical steel  *Severe blistering*   • Morphine And Related Hives, Diarrhea and Vomiting   • Acetazolamide Rash   • Aleve [Naproxen Sodium] Hives   • Augmentin [Amoxicillin-Pot Clavulanate] Hives   • Gabapentin Hives   • Glyburide Hives   • Metformin And Related Hives   • Motrin [Ibuprofen] Hives   • Nortriptyline Hives   • Penicillins Hives   • Soy Lecithin [Lecithin] Hives   • Sulfa Antibiotics GI Intolerance   • Tradjenta [Linagliptin] Hives   • Tramadol Other (See Comments)     Sweating        Social History   Past Surgical History:   Procedure Laterality Date   • APPENDECTOMY     • CHOLECYSTECTOMY     • JOINT REPLACEMENT      R knee, B/l total hip   • GA REVJ/RMVL IMPLANTED SPINAL NEUROSTIM GENERATOR Left 12/13/2016    Procedure: REPLACEMENT BUTTOCK SPINAL CORD STIMULATOR IPG;  Surgeon: Mitch Castrejon MD;  Location:  MAIN OR;  Service: Neurosurgery   • SHOULDER SURGERY      Resolved - 1992   • SPINAL CORD STIMULATOR IMPLANT      spinal stereotaxis stimulation of cord   • TOTAL HIP ARTHROPLASTY Bilateral      Family History   Problem Relation Age of Onset   • Diabetes Mother         Diabetes mellitus   • Cancer Mother    • Cancer Father    • "Diabetes Maternal Grandmother    • Cancer Paternal Uncle    • Brain cancer Family    • Breast cancer Family    • Cervical cancer Family    • Diabetes Family         Diabetes mellitus   • Hypertension Family    • Ovarian cancer Family    • Stroke Family         Stroke complications       Objective:  /70 (BP Location: Left arm, Patient Position: Sitting, Cuff Size: Standard)   Pulse 69   Temp 98 4 °F (36 9 °C) (Temporal)   Ht 5' 5\" (1 651 m)   Wt 122 kg (269 lb 3 2 oz)   LMP  (LMP Unknown)   SpO2 96%   BMI 44 80 kg/m²     Recent Results (from the past 1344 hour(s))   Microalbumin / creatinine urine ratio    Collection Time: 05/12/23  8:47 AM   Result Value Ref Range    Creatinine, Ur 105 0 mg/dL    Albumin,U,Random 20 1 (H) 0 0 - 20 0 mg/L    Albumin Creat Ratio 19 0 - 30 mg/g creatinine   CBC and differential    Collection Time: 05/12/23  8:47 AM   Result Value Ref Range    WBC 7 26 4 31 - 10 16 Thousand/uL    RBC 4 46 3 81 - 5 12 Million/uL    Hemoglobin 13 3 11 5 - 15 4 g/dL    Hematocrit 42 2 34 8 - 46 1 %    MCV 95 82 - 98 fL    MCH 29 8 26 8 - 34 3 pg    MCHC 31 5 31 4 - 37 4 g/dL    RDW 14 2 11 6 - 15 1 %    MPV 10 9 8 9 - 12 7 fL    Platelets 207 049 - 635 Thousands/uL    nRBC 0 /100 WBCs    Neutrophils Relative 61 43 - 75 %    Immat GRANS % 1 0 - 2 %    Lymphocytes Relative 23 14 - 44 %    Monocytes Relative 9 4 - 12 %    Eosinophils Relative 4 0 - 6 %    Basophils Relative 2 (H) 0 - 1 %    Neutrophils Absolute 4 48 1 85 - 7 62 Thousands/µL    Immature Grans Absolute 0 04 0 00 - 0 20 Thousand/uL    Lymphocytes Absolute 1 65 0 60 - 4 47 Thousands/µL    Monocytes Absolute 0 66 0 17 - 1 22 Thousand/µL    Eosinophils Absolute 0 31 0 00 - 0 61 Thousand/µL    Basophils Absolute 0 12 (H) 0 00 - 0 10 Thousands/µL   Lipid panel    Collection Time: 05/12/23  8:47 AM   Result Value Ref Range    Cholesterol 185 See Comment mg/dL    Triglycerides 121 See Comment mg/dL    HDL, Direct 64 >=50 mg/dL    LDL " Calculated 97 0 - 100 mg/dL    Non-HDL-Chol (CHOL-HDL) 121 mg/dl   Comprehensive metabolic panel    Collection Time: 05/12/23  8:47 AM   Result Value Ref Range    Sodium 139 135 - 147 mmol/L    Potassium 4 3 3 5 - 5 3 mmol/L    Chloride 112 (H) 96 - 108 mmol/L    CO2 24 21 - 32 mmol/L    ANION GAP 3 (L) 4 - 13 mmol/L    BUN 21 5 - 25 mg/dL    Creatinine 1 04 0 60 - 1 30 mg/dL    Glucose, Fasting 135 (H) 65 - 99 mg/dL    Calcium 9 7 8 3 - 10 1 mg/dL    AST 23 5 - 45 U/L    ALT 27 12 - 78 U/L    Alkaline Phosphatase 110 46 - 116 U/L    Total Protein 7 8 6 4 - 8 4 g/dL    Albumin 3 8 3 5 - 5 0 g/dL    Total Bilirubin 0 35 0 20 - 1 00 mg/dL    eGFR 56 ml/min/1 73sq m            Physical Exam  Constitutional:       General: She is not in acute distress  Appearance: She is well-developed  She is not diaphoretic  HENT:      Head: Normocephalic and atraumatic  Right Ear: External ear normal       Left Ear: External ear normal       Nose: Nose normal       Mouth/Throat:      Pharynx: No oropharyngeal exudate  Eyes:      General:         Right eye: No discharge  Left eye: No discharge  Conjunctiva/sclera: Conjunctivae normal       Pupils: Pupils are equal, round, and reactive to light  Neck:      Thyroid: No thyromegaly  Cardiovascular:      Rate and Rhythm: Normal rate and regular rhythm  Heart sounds: Normal heart sounds  No murmur heard  No friction rub  No gallop  Pulmonary:      Effort: Pulmonary effort is normal  No respiratory distress  Breath sounds: Normal breath sounds  No stridor  No wheezing or rales  Abdominal:      General: Bowel sounds are normal  There is no distension  Palpations: Abdomen is soft  Tenderness: There is no abdominal tenderness  Musculoskeletal:      Cervical back: Normal range of motion and neck supple  Comments: Ambulates with cane   Lymphadenopathy:      Cervical: No cervical adenopathy  Skin:     General: Skin is warm and dry  Findings: No erythema or rash  Neurological:      Mental Status: She is alert and oriented to person, place, and time  Psychiatric:         Behavior: Behavior normal          Thought Content:  Thought content normal          Judgment: Judgment normal

## 2023-05-17 ENCOUNTER — TELEPHONE (OUTPATIENT)
Dept: ADMINISTRATIVE | Facility: OTHER | Age: 68
End: 2023-05-17

## 2023-05-17 NOTE — LETTER
Diabetic Eye Exam Form    Date Requested: 23  Patient: Frederick Tomas  Patient : 1955   Referring Provider: Bria Kaufman MD      DIABETIC Eye Exam Date _______________________________      Type of Exam MUST be documented for Diabetic Eye Exams  Please CHECK ONE  Retinal Exam       Dilated Retinal Exam       OCT       Optomap-Iris Exam      Fundus Photography       Left Eye - Please check Retinopathy or No Retinopathy        Exam did show retinopathy    Exam did not show retinopathy       Right Eye - Please check Retinopathy or No Retinopathy       Exam did show retinopathy    Exam did not show retinopathy       Comments __________________________________________________________    Practice Providing Exam ______________________________________________    Exam Performed By (print name) _______________________________________      Provider Signature ___________________________________________________      These reports are needed for  compliance  Please fax this completed form and a copy of the Diabetic Eye Exam report to our office located at [place] [address] as soon as possible via Fax [phone] mitchell Lebron Hash: Phone [phone]  We thank you for your assistance in treating our mutual patient

## 2023-05-17 NOTE — TELEPHONE ENCOUNTER
Upon review of the In Basket request and the patient's chart, initial outreach has been made via fax to facility  Please see Contacts section for details       Thank you  Signa Red

## 2023-05-17 NOTE — TELEPHONE ENCOUNTER
----- Message from Chichi Mcdaniel RN sent at 5/16/2023  2:41 PM EDT -----  05/16/23 2:41 PM    Dariusz, our patient Tj Hay has had Diabetic Eye Exam completed/performed  Please assist in updating the patient chart by making an External outreach to Dr Tatyana Moseley in [place]  The date of service is 3/2/2023      Thank you,  Zeeshan Manjarrez, JEEVAN  [address] [place] [address] PRIMARY CARE Caren Andrade

## 2023-05-18 NOTE — TELEPHONE ENCOUNTER
Upon review of the In Basket request we were able to locate, review, and update the patient chart as requested for Diabetic Eye Exam     Any additional questions or concerns should be emailed to the Practice Liaisons via the appropriate education email address, please do not reply via In Basket      Thank you  Elizabeth Loera

## 2023-05-19 ENCOUNTER — OFFICE VISIT (OUTPATIENT)
Dept: PAIN MEDICINE | Facility: CLINIC | Age: 68
End: 2023-05-19

## 2023-05-19 DIAGNOSIS — F11.20 UNCOMPLICATED OPIOID DEPENDENCE (HCC): ICD-10-CM

## 2023-05-19 DIAGNOSIS — Z96.89 SPINAL CORD STIMULATOR STATUS: ICD-10-CM

## 2023-05-19 DIAGNOSIS — G89.4 CHRONIC PAIN SYNDROME: Primary | ICD-10-CM

## 2023-05-19 DIAGNOSIS — M48.062 NEUROGENIC CLAUDICATION DUE TO LUMBAR SPINAL STENOSIS: ICD-10-CM

## 2023-05-19 DIAGNOSIS — M54.16 LUMBAR RADICULOPATHY: ICD-10-CM

## 2023-05-19 DIAGNOSIS — M54.12 CERVICAL RADICULOPATHY: ICD-10-CM

## 2023-05-19 DIAGNOSIS — M47.816 LUMBAR SPONDYLOSIS: ICD-10-CM

## 2023-05-19 DIAGNOSIS — M46.1 SACROILIITIS (HCC): ICD-10-CM

## 2023-05-19 DIAGNOSIS — Z79.891 LONG-TERM CURRENT USE OF OPIATE ANALGESIC: ICD-10-CM

## 2023-05-19 NOTE — PROGRESS NOTES
Assessment:  1  Chronic pain syndrome    2  Cervical radiculopathy    3  Lumbar radiculopathy    4  Sacroiliitis ([address] )    5  Lumbar spondylosis    6  Spinal cord stimulator status    7  Neurogenic claudication due to lumbar spinal stenosis    8  Uncomplicated opioid dependence ([address] )    9  Long-term current use of opiate analgesic        Plan:  1  While the patient was in the office today, I discussed with the patient that as per our medication management office protocol, we do not prescribe any opioid medications before we obtain a baseline drug screen from every patient  The patient was agreeable and a baseline drug screen was collected today  We will discuss the official results at the patient's next office visit  Depending on UDS results, may consider [id number] Prescription Drug Monitoring Program report was reviewed and was appropriate     A urine drug screen was collected at today's office visit as part of our medication management protocol  The point of care testing results were appropriate for what was being prescribed  The specimen will be sent for confirmatory testing  The drug screen is medically necessary because the patient is either dependent on opioid medication or is being considered for opioid medication therapy and the results could impact ongoing or future treatment  The drug screen is to evaluate for the presences or absence of prescribed, non-prescribed, and/or illicit drugs/substances  2  Patient was encouraged to schedule CT thoracic spine  3  Patient was encouraged to schedule orthopedic surgery evaluation  4  Patient may continue Topamax and meloxicam as prescribed  5  Continue to follow with [facility] for periodic reprogramming of spinal cord stimulator device as needed  6  Follow-up in 2 weeks or sooner if needed    History of Present Illness:     The patient is a 79 y o  female with a history of diabetes and [place] spinal cord stimulator last seen on 04/06/2023 who presents for a follow up office visit in regards to chronic back pain that radiates into the posterior aspect of the bilateral lower extremities with associated paresthesias and subjective weakness  she does endorse falling often lately secondary to weakness in her legs  He denies any bowel incontinence  She does endorse some bladder incontinence  She has failed bilateral SI joint injections, epidural steroid injections and lumbar RFA  Her spinal cord stimulator device provides minimal relief of her lower extremity symptoms  Updated CT of the lumbar spine reveals multilevel degenerative disc disease and arthritis which causes various degrees of central and foraminal stenosis on the severe level from L1 to to L3-4 where there is compression of the thecal sac at L3-4  Radiology did recommend a surgical consultation and also recommended dedicated imaging to the thoracic spine as there was severe bilateral foraminal stenosis seen at T12-L1 as well  She is currently managing her pain with tizanidine 4 mg every 8 hours as needed, meloxicam 15 mg daily as needed and Topamax 100 mg twice daily with 30% improvement of her pain without side effects  She has tried and failed duloxetine, gabapentin and pregabalin in the past     The patient rates her pain an 8 out of 10 on the numeric pain rating scale  She constantly has pain which is described as sharp and pins-and-needles    Last Urine Drug Screen: 5/19/23    I have personally reviewed and/or updated the patient's past medical history, past surgical history, family history, social history, current medications, allergies, and vital signs today  Review of Systems:    Review of Systems   Respiratory: Negative for shortness of breath  Cardiovascular: Negative for chest pain  Gastrointestinal: Negative for constipation, diarrhea, nausea and vomiting  Musculoskeletal: Negative for arthralgias, gait problem, joint swelling and myalgias     Skin: Negative for rash  Neurological: Negative for dizziness, seizures and weakness  All other systems reviewed and are negative          Past Medical History:   Diagnosis Date   • Allergic reaction to bee sting     last assessed - [id number]   • Asthma    • Chronic narcotic dependence ([facility] )    • Chronic pain syndrome    • Depression    • Diabetes mellitus ([facility] )    • Esophageal reflux    • Hyperlipidemia    • Hypertension    • Hypovitaminosis D 4/12/2018   • Lumbar radiculopathy    • Lyme disease     last assessed - [id number]   • Obesity    • Opioid dependence ([facility] )    • PPD positive     had treatment w/ [facility] 15 years ago   • Vitamin D deficiency        Past Surgical History:   Procedure Laterality Date   • APPENDECTOMY     • CHOLECYSTECTOMY     • JOINT REPLACEMENT      R knee, B/l total hip   • MT REVJ/RMVL IMPLANTED SPINAL NEUROSTIM GENERATOR Left 12/13/2016    Procedure: REPLACEMENT BUTTOCK SPINAL CORD STIMULATOR IPG;  Surgeon: Curt Blunt MD;  Location:  MAIN OR;  Service: Neurosurgery   • SHOULDER SURGERY      Resolved - 1992   • SPINAL CORD STIMULATOR IMPLANT      spinal stereotaxis stimulation of cord   • TOTAL HIP ARTHROPLASTY Bilateral        Family History   Problem Relation Age of Onset   • Diabetes Mother         Diabetes mellitus   • Cancer Mother    • Cancer Father    • Diabetes Maternal Grandmother    • Cancer Paternal Uncle    • Brain cancer Family    • Breast cancer Family    • Cervical cancer Family    • Diabetes Family         Diabetes mellitus   • Hypertension Family    • Ovarian cancer Family    • Stroke Family         Stroke complications       Social History     Occupational History   • Not on file   Tobacco Use   • Smoking status: Every Day     Packs/day: 1 00     Years: 10 00     Pack years: 10 00     Types: Cigarettes     Start date: 2013   • Smokeless tobacco: Never   Vaping Use   • Vaping Use: Never used   Substance and Sexual Activity   • Alcohol use: No     Alcohol/week: 0 0 standard drinks • Drug use: No     Comment: Denied history of drug use   • Sexual activity: Not on file         Current Outpatient Medications:   •  albuterol (Proventil HFA) 90 mcg/act inhaler, Inhale 2 puffs every 6 (six) hours as needed for wheezing, Disp: 6 7 g, Rfl: 5  •  atorvastatin (LIPITOR) 10 mg tablet, TAKE 1 TABLET BY MOUTH EVERY DAY, Disp: 90 tablet, Rfl: 1  •  Blood Glucose Monitoring Suppl (ONE TOUCH ULTRA MINI) w/Device KIT, by Does not apply route daily, Disp: 1 each, Rfl: 0  •  Cholecalciferol (VITAMIN D3) 17292 units CAPS, Take 1 capsule by mouth once a week, Disp: , Rfl: 11  •  EPINEPHrine (EPIPEN) 0 3 mg/0 3 mL SOAJ, Inject 0 3 mL (0 3 mg total) into a muscle as needed for anaphylaxis, Disp: 2 each, Rfl: 5  •  glimepiride (AMARYL) 1 mg tablet, TAKE 1 TABLET BY MOUTH DAILY WITH BREAKFAST , Disp: 90 tablet, Rfl: 2  •  glucose blood (ONE TOUCH ULTRA TEST) test strip, Test blood sugar once a day, Disp: 100 each, Rfl: 5  •  lisinopril (ZESTRIL) 20 mg tablet, Take 1 tablet (20 mg total) by mouth daily, Disp: 90 tablet, Rfl: 3  •  meloxicam (MOBIC) 15 mg tablet, Take 1 tablet (15 mg total) by mouth daily, Disp: 90 tablet, Rfl: 3  •  Nerve Stimulator (STANDARD TENS) ISAEL, by Does not apply route 4 (four) times a day as needed (MUSCLE SPASMS), Disp: 1 Device, Rfl: 0  •  tiZANidine (ZANAFLEX) 4 mg tablet, Take 1 tablet (4 mg total) by mouth every 8 (eight) hours as needed for muscle spasms, Disp: 90 tablet, Rfl: 1  •  topiramate (TOPAMAX) 100 mg tablet, TAKE 1 TABLET BY MOUTH TWICE A DAY, Disp: 180 tablet, Rfl: 1  •  venlafaxine (EFFEXOR-XR) 75 mg 24 hr capsule, TAKE 1 CAPSULE BY MOUTH EVERY DAY, Disp: 30 capsule, Rfl: 1  •  ergocalciferol (Drisdol) 1 25 MG (01179 UT) capsule, Take 50,000 Units by mouth, Disp: , Rfl:     Allergies   Allergen Reactions   • Bee Venom Anaphylaxis   • Other Other (See Comments)     Stainless steel and surgical steel  *Severe blistering*   • Morphine And Related Hives, Diarrhea and Vomiting • Acetazolamide Rash   • Aleve [Naproxen Sodium] Hives   • Augmentin [Amoxicillin-Pot Clavulanate] Hives   • Gabapentin Hives   • Glyburide Hives   • Metformin And Related Hives   • Motrin [Ibuprofen] Hives   • Nortriptyline Hives   • Penicillins Hives   • Soy Lecithin [Lecithin] Hives   • Sulfa Antibiotics GI Intolerance   • Tradjenta [Linagliptin] Hives   • Tramadol Other (See Comments)     Sweating        Physical Exam:    LMP  (LMP Unknown)     Constitutional:normal, well developed, well nourished, alert, in no distress and non-toxic and no overt pain behavior  Eyes:anicteric  HEENT:grossly intact  Neck:supple, symmetric, trachea midline and no masses   Pulmonary:even and unlabored  Cardiovascular:No edema or pitting edema present  Skin:Normal without rashes or lesions and well hydrated  Psychiatric:Mood and affect appropriate  Neurologic:Cranial Nerves II-XII grossly intact  Musculoskeletal:antalgic and ambulates with cane  positive seated straight leg raise bilaterally      Imaging  No orders to display       CT LUMBAR SPINE   INDICATION: Progressive lumbar radiculopathy and lower extremity weakness  COMPARISON: CT lumbar spine study of November 26, 2017  TECHNIQUE: Contiguous axial images through the lumbar spine were obtained  Sagittal and coronal reconstructions were performed  Rotational 3D reformatted images of the lumbar spine were created at the imaging workstation and sent to PACS  IV Contrast: None   Radiation dose length product (DLP) for this visit: 858 mGy-cm   This examination, like all CT scans performed in the [facility], was performed utilizing techniques to minimize radiation dose exposure, including the use of iterative   reconstruction and automated exposure control  IMAGE QUALITY: Diagnostic  FINDINGS:   ALIGNMENT: There are 5 lumbar type vertebral bodies  Normal alignment of the lumbar spine  No spondylolysis or spondylolisthesis  VERTEBRAE: No fracture   No lytic or blastic lesion  Mild diffuse osteopenia  DEGENERATIVE CHANGES: There is moderate congenital spinal stenosis throughout the visualized thoracolumbar spine with superimposed degenerative changes as discussed below  S1 superior endplate degenerative sclerosis  Scattered intervertebral disc gas noted at L1-L2 with posterior disc space narrowing  Degenerative disc gas at L5-S1  Lower Thoracic spine: Degenerative disc of gas and the space narrowing at T11-T12 with an inferior endplate Schmorl's node deformity at T11  Additional findings of marked facet arthropathy and buckling of the calcified ligamentum flavum at this level   with a mild disc bulge results in moderate to severe narrowing of the spinal canal in the transverse dimension  There is severe bilateral foraminal stenosis  Schmorl's node deformities are noted at the T12-L1 level with facet arthropathy and mild spinal stenosis  There is severe bilateral foraminal stenosis  L1-2: Posterior disc base narrowing, intervertebral disc [place], bilateral facet arthropathy and buckling of the calcified ligamentum flavum  There is a disc bulge, eccentric to the right  Severe spinal stenosis is noted at this level with severe   bilateral foraminal stenosis  The degenerative changes have progressed since the 2020 study  L2-3: Relatively preserved disc height  Tiny Schmorl's node deformities  Left far lateral bridging osteophytosis  A circumferential disc bulge is noted with moderate bilateral facet arthropathy and buckling of the calcified ligamentum flavum resulting in   moderate to severe spinal stenosis  There is moderate to severe bilateral foraminal stenosis  L3-4: Preserved disc height with far lateral osteophytosis, right greater than left  A circumferential disc bulge is noted with severe bilateral facet arthropathy and buckling of the calcified ligamentum flavum   There is severe spinal stenosis and   circumferential effacement/compression of the thecal sac  L4-5: Mild disc space narrowing and minimal far lateral osteophytosis  Circumferential disc bulge with severe facet arthropathy and suggestion of buckling of the ligamentum flavum  There is suggestion of moderate spinal stenosis and bilateral foraminal   narrowing  There is mild progression of spinal canal and foraminal stenosis at this level  L5-S1: Relatively preserved disc height  Intravertebral discal gas is new when compared to the prior study  Right greater than left far lateral osteophytosis  Moderate bilateral facet arthropathy  Mild spinal stenosis and bilateral foraminal   encroachment  No significant interval change  PARASPINAL SOFT TISSUES: Surgical clips from prior cholecystectomy  MISCELLANEOUS: Spinal stimulator device with the lead tips identified at the approximate T9 level  The generator pack for the stimulator is projecting over the left gluteal soft tissues  Bilateral total hip arthroplasties and cholecystectomy clips in the right upper quadrant  IMPRESSION:   Partially visualized spinal stimulator device with the generator in the left gluteal region and lead electrodes identified at the approximate T9 level  Congenital and progressive superimposed degenerative disease throughout the lumbar spine with multiple levels of severe spinal canal and foraminal stenosis as discussed above  Moderate to severe spinal canal and bilateral foraminal stenosis in the visualized lower thoracic spine at T11-T12  Consider dedicated MR imaging of the thoracic spine to assess the distal thoracic cord and conus if there the spinal stimulator device is MR conditional and meets MR eligibility criteria  Consultation with the spine surgical service is recommended for the above-mentioned findings     I personally discussed this study with Aria Moffett on 5/15/2023 11:43 AM      Orders Placed This Encounter   Procedures   • MM ALL_Prescribed Meds and Special Instructions   • GERARD DT_Alprazolam Definitive Test   • MM DT_Amphetamine Definitive Test   • MM DT_Aripiprazole Definitive Test   • MM DT_Bath Salts Definitive Test   • MM DT_Buprenorphine Definitive Test   • MM DT_Butalbital Definitive Test   • MM DT_Clonazepam Definitive Test   • MM DT_Clozapine Definitive Test   • MM DT_Cocaine Definitive Test   • MM DT_Codeine Definitive Test   • MM DT_Desipramine Definitive Test   • MM DT_Dextromethorphan Definitive Test   • MM Diazepam Definitive Test   • MM DT_Ethyl Glucuronide/Ethyl Sulfate Definitive Test   • MM DT_Fentanyl Definitive Test   • MM DT_Haloperidol Definitive Test   • MM DT_Heroin Definitive Test   • MM DT_Hydrocodone Definitive Test   • MM DT_Hydromorphone Definitive Test   • MM DT_Imipramine Definitive Test   • MM DT_Kratom Definitive Test   • MM DT_Levorphanol Definitive Test   • MM Lorazepam Definitive Test   • MM DT_MDMA Definitive Test   • MM DT_Meperidine Definitive Test   • MM DT_Methadone Definitive Test   • MM DT_Methamphetamine Definitive Test   • MM DT_Morphine Definitive Test   • MM DT_Olanzapine Definitive Test   • MM DT_Oxazepam Definitive Test   • MM DT_Oxycodone Definitive Test   • MM DT_Oxymorphone Definitive Test   • MM DT_Phencyclidine Definitive Test   • MM DT_Phenobarbital Definitive Test   • MM DT_Phentermine Definitive Test   • MM DT_Quetiapine Definitive Test   • MM DT_Risperidone Definitive Test   • MM DT_Secobarbital Definitive Test   • MM DT_Spice Definitive Test   • MM DT_Tapentadol Definitive Test   • MM DT_Temazapam Definitive Test   • MM DT_THC Definitive Test   • MM DT_Tramadol Definitive Test   • MM DT_Methylphenidate Definitive Test

## 2023-05-26 ENCOUNTER — HOSPITAL ENCOUNTER (OUTPATIENT)
Dept: RADIOLOGY | Facility: IMAGING CENTER | Age: 68
End: 2023-05-26

## 2023-05-26 DIAGNOSIS — M48.04 THORACIC SPINAL STENOSIS: ICD-10-CM

## 2023-06-01 NOTE — PROGRESS NOTES
Patient is here to provide a baseline UDS was not only evaluated examined by the advanced practitioner

## 2023-06-02 ENCOUNTER — RA CDI HCC (OUTPATIENT)
Dept: OTHER | Facility: HOSPITAL | Age: 68
End: 2023-06-02

## 2023-06-02 ENCOUNTER — OFFICE VISIT (OUTPATIENT)
Dept: PAIN MEDICINE | Facility: CLINIC | Age: 68
End: 2023-06-02

## 2023-06-02 VITALS
HEIGHT: 65 IN | DIASTOLIC BLOOD PRESSURE: 66 MMHG | BODY MASS INDEX: 44.48 KG/M2 | HEART RATE: 65 BPM | SYSTOLIC BLOOD PRESSURE: 167 MMHG | WEIGHT: 267 LBS

## 2023-06-02 DIAGNOSIS — Z79.891 LONG-TERM CURRENT USE OF OPIATE ANALGESIC: ICD-10-CM

## 2023-06-02 DIAGNOSIS — F11.20 UNCOMPLICATED OPIOID DEPENDENCE (HCC): Primary | ICD-10-CM

## 2023-06-02 DIAGNOSIS — G89.4 CHRONIC PAIN SYNDROME: ICD-10-CM

## 2023-06-02 NOTE — PROGRESS NOTES
[address]  coding opportunities          Chart Reviewed number of suggestions sent to Provider: 1   [id number]    Patients Insurance     Medicare Insurance: [employer]

## 2023-06-06 ENCOUNTER — TELEPHONE (OUTPATIENT)
Dept: PAIN MEDICINE | Facility: CLINIC | Age: 68
End: 2023-06-06

## 2023-06-06 DIAGNOSIS — M54.16 LUMBAR RADICULOPATHY: Primary | ICD-10-CM

## 2023-06-06 NOTE — TELEPHONE ENCOUNTER
Caller: ernesto    Doctor: Clifford Clarke     Reason for call: states when are we calling in her medication?     Call back#: [phone]

## 2023-06-06 NOTE — TELEPHONE ENCOUNTER
S/w pt who had UDS done on 6/2 and is resulted in Epic  Pt requests narcotic script be called in to pharmacy  Advised will notify [facility] and she will be contacted

## 2023-06-07 NOTE — TELEPHONE ENCOUNTER
It appears that when we contacted the lab, the last UDS specimen provided by the patient was damaged during transit therefore canceled  Please apologize to the patient since this is the second time we attempted to collect a UDS with an issue  Please see if the patient can come in yet again on frinday 6/9/23 to simply provide another UDS as well as a 2 week follow up  If she is amenable to this, I will provide her 2 weeks worth of Norco 5/325mg BID PRN pain  Please let me know  Thanks

## 2023-06-07 NOTE — TELEPHONE ENCOUNTER
S/W pt and advised of the same, pt stated she can come in after 11 am on Friday for a UDS  Nurse advised pt to schedule 2 week OVS on Friday while in office for UDS  Poor connection on phone  Pt appreciative of call

## 2023-06-08 ENCOUNTER — TELEPHONE (OUTPATIENT)
Dept: PAIN MEDICINE | Facility: CLINIC | Age: 68
End: 2023-06-08

## 2023-06-08 RX ORDER — HYDROCODONE BITARTRATE AND ACETAMINOPHEN 5; 325 MG/1; MG/1
1 TABLET ORAL 2 TIMES DAILY PRN
Qty: 28 TABLET | Refills: 0 | Status: SHIPPED | OUTPATIENT
Start: 2023-06-08

## 2023-06-08 NOTE — TELEPHONE ENCOUNTER
Norco 5/325mg 1 tab PO BID PRN pain #28 tablets for the next 2 weeks sent to [facility] in [place] on file

## 2023-06-09 ENCOUNTER — OFFICE VISIT (OUTPATIENT)
Dept: PAIN MEDICINE | Facility: CLINIC | Age: 68
End: 2023-06-09
Payer: MEDICARE

## 2023-06-09 DIAGNOSIS — Z79.891 LONG-TERM CURRENT USE OF OPIATE ANALGESIC: ICD-10-CM

## 2023-06-09 DIAGNOSIS — G89.4 CHRONIC PAIN SYNDROME: ICD-10-CM

## 2023-06-09 DIAGNOSIS — F11.20 UNCOMPLICATED OPIOID DEPENDENCE (HCC): Primary | ICD-10-CM

## 2023-06-09 PROCEDURE — 80305 DRUG TEST PRSMV DIR OPT OBS: CPT | Performed by: NURSE PRACTITIONER

## 2023-06-09 PROCEDURE — 99212 OFFICE O/P EST SF 10 MIN: CPT | Performed by: NURSE PRACTITIONER

## 2023-06-09 NOTE — TELEPHONE ENCOUNTER
Caller: Gregg Adams    Doctor: Dr Gray Toribio     Reason for call: Patient calling back stating pharmacy on file is correct      Call back#: [phone]

## 2023-06-11 LAB
6MAM UR QL CFM: NEGATIVE NG/ML
7AMINOCLONAZEPAM UR QL CFM: NEGATIVE NG/ML
A-OH ALPRAZ UR QL CFM: NEGATIVE NG/ML
AMPHET UR QL CFM: NEGATIVE NG/ML
AMPHET UR QL CFM: NEGATIVE NG/ML
BUPRENORPHINE UR QL CFM: NEGATIVE NG/ML
BUTALBITAL UR QL CFM: NEGATIVE NG/ML
BZE UR QL CFM: NEGATIVE NG/ML
CODEINE UR QL CFM: NEGATIVE NG/ML
DESIPRAMINE UR QL CFM: NEGATIVE NG/ML
DESIPRAMINE UR QL CFM: NEGATIVE NG/ML
EDDP UR QL CFM: NEGATIVE NG/ML
ETHYL GLUCURONIDE UR QL CFM: NEGATIVE NG/ML
ETHYL SULFATE UR QL SCN: NEGATIVE NG/ML
EUTYLONE UR QL: NEGATIVE NG/ML
FENTANYL UR QL CFM: NEGATIVE NG/ML
GLIADIN IGG SER IA-ACNC: NEGATIVE NG/ML
GLUCOSE 30M P 50 G LAC PO SERPL-MCNC: NEGATIVE NG/ML
HYDROCODONE UR QL CFM: ABNORMAL NG/ML
HYDROCODONE UR QL CFM: ABNORMAL NG/ML
HYDROMORPHONE UR QL CFM: ABNORMAL NG/ML
IMIPRAMINE UR QL CFM: NEGATIVE NG/ML
LORAZEPAM UR QL CFM: NEGATIVE NG/ML
MDMA UR QL CFM: NEGATIVE NG/ML
ME-PHENIDATE UR QL CFM: NEGATIVE NG/ML
MEPERIDINE UR QL CFM: NEGATIVE NG/ML
METHADONE UR QL CFM: NEGATIVE NG/ML
METHAMPHET UR QL CFM: NEGATIVE NG/ML
MORPHINE UR QL CFM: NEGATIVE NG/ML
MORPHINE UR QL CFM: NEGATIVE NG/ML
NORBUPRENORPHINE UR QL CFM: NEGATIVE NG/ML
NORDIAZEPAM UR QL CFM: NEGATIVE NG/ML
NORFENTANYL UR QL CFM: NEGATIVE NG/ML
NORHYDROCODONE UR QL CFM: ABNORMAL NG/ML
NORHYDROCODONE UR QL CFM: ABNORMAL NG/ML
NORMEPERIDINE UR QL CFM: NEGATIVE NG/ML
NOROXYCODONE UR QL CFM: NEGATIVE NG/ML
OLANZAPINE QUANTIFICATION: NEGATIVE NG/ML
OPC-3373 QUANTIFICATION: NEGATIVE
OXAZEPAM UR QL CFM: NEGATIVE NG/ML
OXYCODONE UR QL CFM: NEGATIVE NG/ML
OXYMORPHONE UR QL CFM: NEGATIVE NG/ML
OXYMORPHONE UR QL CFM: NEGATIVE NG/ML
PARA-FLUOROFENTANYL QUANTIFICATION: NORMAL NG/ML
PCP UR QL CFM: NEGATIVE NG/ML
PHENOBARB UR QL CFM: NEGATIVE NG/ML
RESULT ALL_PRESCRIBED MEDS AND SPECIAL INSTRUCTIONS: NORMAL
SECOBARBITAL UR QL CFM: NEGATIVE NG/ML
SL AMB 4-ANPP QUANTIFICATION: NORMAL NG/ML
SL AMB 5F-ADB-M7 METABOLITE QUANTIFICATION: NEGATIVE NG/ML
SL AMB 7-OH-MITRAGYNINE (KRATOM ALKALOID) QUANTIFICATION: NEGATIVE NG/ML
SL AMB AB-FUBINACA-M3 METABOLITE QUANTIFICATION: NEGATIVE NG/ML
SL AMB ACETYL FENTANYL QUANTIFICATION: NORMAL NG/ML
SL AMB ACETYL NORFENTANYL QUANTIFICATION: NORMAL NG/ML
SL AMB ACRYL FENTANYL QUANTIFICATION: NORMAL NG/ML
SL AMB CARFENTANIL QUANTIFICATION: NORMAL NG/ML
SL AMB CLOZAPINE QUANTIFICATION: NEGATIVE NG/ML
SL AMB CTHC (MARIJUANA METABOLITE) QUANTIFICATION: NEGATIVE NG/ML
SL AMB DEXTROMETHORPHAN QUANTIFICATION: NEGATIVE NG/ML
SL AMB DEXTRORPHAN (DEXTROMETHORPHAN METABOLITE) QUANT: NEGATIVE NG/ML
SL AMB DEXTRORPHAN (DEXTROMETHORPHAN METABOLITE) QUANT: NEGATIVE NG/ML
SL AMB HALOPERIDOL  QUANTIFICATION: NEGATIVE NG/ML
SL AMB HALOPERIDOL METABOLITE QUANTIFICATION: NEGATIVE NG/ML
SL AMB HYDROXYRISPERIDONE QUANTIFICATION: NEGATIVE NG/ML
SL AMB JWH018 METABOLITE QUANTIFICATION: NEGATIVE NG/ML
SL AMB JWH073 METABOLITE QUANTIFICATION: NEGATIVE NG/ML
SL AMB MDMB-FUBINACA-M1 METABOLITE QUANTIFICATION: NEGATIVE NG/ML
SL AMB METHYLONE QUANTIFICATION: NEGATIVE NG/ML
SL AMB N-DESMETHYL-TRAMADOL QUANTIFICATION: NEGATIVE NG/ML
SL AMB N-DESMETHYLCLOZAPINE QUANTIFICATION: NEGATIVE NG/ML
SL AMB NORQUETIAPINE QUANTIFICATION: NEGATIVE NG/ML
SL AMB PHENTERMINE QUANTIFICATION: NEGATIVE NG/ML
SL AMB QUETIAPINE QUANTIFICATION: NEGATIVE NG/ML
SL AMB RCS4 METABOLITE QUANTIFICATION: NEGATIVE NG/ML
SL AMB RISPERIDONE QUANTIFICATION: NEGATIVE NG/ML
SL AMB RITALINIC ACID QUANTIFICATION: NEGATIVE NG/ML
SPECIMEN DRAWN SERPL: NEGATIVE NG/ML
TAPENTADOL UR QL CFM: NEGATIVE NG/ML
TEMAZEPAM UR QL CFM: NEGATIVE NG/ML
TEMAZEPAM UR QL CFM: NEGATIVE NG/ML
TRAMADOL UR QL CFM: NEGATIVE NG/ML
URATE/CREAT 24H UR: NEGATIVE NG/ML

## 2023-06-13 ENCOUNTER — OFFICE VISIT (OUTPATIENT)
Dept: INTERNAL MEDICINE CLINIC | Facility: OTHER | Age: 68
End: 2023-06-13
Payer: MEDICARE

## 2023-06-13 VITALS
OXYGEN SATURATION: 97 % | HEART RATE: 75 BPM | TEMPERATURE: 98 F | BODY MASS INDEX: 45.58 KG/M2 | HEIGHT: 64 IN | SYSTOLIC BLOOD PRESSURE: 130 MMHG | DIASTOLIC BLOOD PRESSURE: 68 MMHG | WEIGHT: 267 LBS

## 2023-06-13 DIAGNOSIS — Z00.00 MEDICARE ANNUAL WELLNESS VISIT, SUBSEQUENT: ICD-10-CM

## 2023-06-13 DIAGNOSIS — E11.21 TYPE 2 DIABETES MELLITUS WITH DIABETIC NEPHROPATHY, WITHOUT LONG-TERM CURRENT USE OF INSULIN (HCC): ICD-10-CM

## 2023-06-13 DIAGNOSIS — E66.01 MORBID OBESITY (HCC): ICD-10-CM

## 2023-06-13 DIAGNOSIS — E55.9 VITAMIN D DEFICIENCY: ICD-10-CM

## 2023-06-13 DIAGNOSIS — I10 PRIMARY HYPERTENSION: Primary | ICD-10-CM

## 2023-06-13 PROCEDURE — G0439 PPPS, SUBSEQ VISIT: HCPCS | Performed by: NURSE PRACTITIONER

## 2023-06-13 NOTE — PROGRESS NOTES
Diabetic Foot Exam    Patient's shoes and socks removed  Right Foot/Ankle   Right Foot Inspection  Skin Exam: skin normal and skin intact  No dry skin, no warmth, no callus, no erythema, no maceration, no abnormal color, no pre-ulcer, no ulcer and no callus  Toe Exam: ROM and strength within normal limits  Sensory   Monofilament testing: diminished    Vascular  Capillary refills: < 3 seconds  The right DP pulse is 2+  The right PT pulse is 2+  Left Foot/Ankle  Left Foot Inspection  Skin Exam: skin normal and skin intact  No dry skin, no warmth, no erythema, no maceration, normal color, no pre-ulcer, no ulcer and no callus  Toe Exam: ROM and strength within normal limits  Sensory   Monofilament testing: intact    Vascular  Capillary refills: < 3 seconds  The left DP pulse is 2+  The left PT pulse is 2+  Assign Risk Category  No deformity present  Loss of protective sensation  No weak pulses  Risk: 1       Assessment and Plan:     Problem List Items Addressed This Visit        Endocrine    Type 2 diabetes mellitus with diabetic nephropathy, without long-term current use of insulin (HCC)    Relevant Orders    CBC and differential    Comprehensive metabolic panel    Hemoglobin A1C    Lipid panel       Cardiovascular and Mediastinum    HTN (hypertension) - Primary       Other    Morbid obesity ([address] )    Medicare annual wellness visit, subsequent   Other Visit Diagnoses     Vitamin D deficiency        Relevant Orders    Vitamin D 25 hydroxy           Preventive health issues were discussed with patient, and age appropriate screening tests were ordered as noted in patient's After Visit Summary  Personalized health advice and appropriate referrals for health education or preventive services given if needed, as noted in patient's After Visit Summary       History of Present Illness:     Patient presents for a Medicare Wellness Visit    HPI   Patient Care Team:  Aman Garcia as PCP - General (Family Medicine)  MD Mi Mcclendon MD (Pain Medicine)  Doug Willams DO (Physical Medicine and Rehabilitation)     Review of Systems:     Review of Systems     Problem List:     Patient Active Problem List   Diagnosis   • Chronic pain syndrome   • Type 2 diabetes mellitus with diabetic nephropathy, without long-term current use of insulin ([facility] )   • Morbid obesity ([facility] )   • HTN (hypertension)   • Depression   • Sacroiliitis ([facility] )   • Neurogenic claudication due to lumbar spinal stenosis   • Cervical radiculopathy   • Spinal cord stimulator status   • Lumbar radiculopathy   • Lumbar spondylosis   • COVID-19 vaccination refused   • Asthma exacerbation in COPD ([facility] )   • Stage 3 chronic kidney disease, unspecified whether stage 3a or 3b CKD ([facility] )   • Other hyperlipidemia   • Recurrent major depressive disorder, in partial remission ([facility] )   • Tobacco abuse   • Rheumatoid arthritis of multiple sites with negative rheumatoid factor ([place] [address] )   • Medicare annual wellness visit, subsequent      Past Medical and Surgical History:     Past Medical History:   Diagnosis Date   • Allergic reaction to bee sting     last assessed - 13Jun2016   • Asthma    • Chronic narcotic dependence ([facility] )    • Chronic pain syndrome    • Depression    • Diabetes mellitus ([facility] )    • Esophageal reflux    • Hyperlipidemia    • Hypertension    • Hypovitaminosis D 4/12/2018   • Lumbar radiculopathy    • Lyme disease     last assessed - 28Jun2016   • Obesity    • Opioid dependence ([facility] )    • PPD positive     had treatment w/ INH 15 years ago   • Vitamin D deficiency      Past Surgical History:   Procedure Laterality Date   • APPENDECTOMY     • CHOLECYSTECTOMY     • JOINT REPLACEMENT      R knee, B/l total hip   • DC REVJ/RMVL IMPLANTED SPINAL NEUROSTIM GENERATOR Left 12/13/2016    Procedure: REPLACEMENT BUTTOCK SPINAL CORD STIMULATOR IPG;  Surgeon: Antonietta Duran MD;  Location:  MAIN OR;  Service: Neurosurgery   • SHOULDER SURGERY      Resolved - 1992   • SPINAL CORD STIMULATOR IMPLANT      spinal stereotaxis stimulation of cord   • TOTAL HIP ARTHROPLASTY Bilateral       Family History:     Family History   Problem Relation Age of Onset   • Diabetes Mother         Diabetes mellitus   • Cancer Mother    • Cancer Father    • Diabetes Maternal Grandmother    • Cancer Paternal Uncle    • Brain cancer Family    • Breast cancer Family    • Cervical cancer Family    • Diabetes Family         Diabetes mellitus   • Hypertension Family    • Ovarian cancer Family    • Stroke Family         Stroke complications      Social History:     Social History     Socioeconomic History   • Marital status:      Spouse name: None   • Number of children: None   • Years of education: None   • Highest education level: None   Occupational History   • None   Tobacco Use   • Smoking status: Every Day     Packs/day: 1 00     Years: 10 00     Total pack years: 10 00     Types: Cigarettes     Start date: 2013   • Smokeless tobacco: Never   Vaping Use   • Vaping Use: Never used   Substance and Sexual Activity   • Alcohol use: No     Alcohol/week: 0 0 standard drinks of alcohol   • Drug use: No     Comment: Denied history of drug use   • Sexual activity: None   Other Topics Concern   • None   Social History Narrative   • None     Social Determinants of Health     Financial Resource Strain: Low Risk  (6/13/2023)    Overall Financial Resource Strain (CARDIA)    • Difficulty of Paying Living Expenses: Not very hard   Food Insecurity: Not on file   Transportation Needs: No Transportation Needs (6/13/2023)    PRAPARE - Transportation    • Lack of Transportation (Medical): No    • Lack of Transportation (Non-Medical):  No   Physical Activity: Not on file   Stress: Not on file   Social Connections: Not on file   Intimate Partner Violence: Not on file   Housing Stability: Not on file      Medications and Allergies:     Current Outpatient Medications   Medication Sig Dispense Refill   • albuterol (Proventil HFA) 90 mcg/act inhaler Inhale 2 puffs every 6 (six) hours as needed for wheezing 6 7 g 5   • atorvastatin (LIPITOR) 10 mg tablet TAKE 1 TABLET BY MOUTH EVERY DAY 90 tablet 1   • Blood Glucose Monitoring Suppl (ONE TOUCH ULTRA MINI) w/Device KIT by Does not apply route daily 1 each 0   • Cholecalciferol (VITAMIN D3) 45594 units CAPS Take 1 capsule by mouth once a week  11   • EPINEPHrine (EPIPEN) 0 3 mg/0 3 mL SOAJ Inject 0 3 mL (0 3 mg total) into a muscle as needed for anaphylaxis 2 each 5   • glimepiride (AMARYL) 1 mg tablet TAKE 1 TABLET BY MOUTH DAILY WITH BREAKFAST  90 tablet 2   • glucose blood (ONE TOUCH ULTRA TEST) test strip Test blood sugar once a day 100 each 5   • HYDROcodone-acetaminophen (Norco) 5-325 mg per tablet Take 1 tablet by mouth 2 (two) times a day as needed for pain Max Daily Amount: 2 tablets 28 tablet 0   • lisinopril (ZESTRIL) 20 mg tablet Take 1 tablet (20 mg total) by mouth daily 90 tablet 3   • meloxicam (MOBIC) 15 mg tablet Take 1 tablet (15 mg total) by mouth daily 90 tablet 3   • Nerve Stimulator (STANDARD TENS) ISAEL by Does not apply route 4 (four) times a day as needed (MUSCLE SPASMS) 1 Device 0   • tiZANidine (ZANAFLEX) 4 mg tablet Take 1 tablet (4 mg total) by mouth every 8 (eight) hours as needed for muscle spasms 90 tablet 1   • topiramate (TOPAMAX) 100 mg tablet TAKE 1 TABLET BY MOUTH TWICE A  tablet 1   • venlafaxine (EFFEXOR-XR) 75 mg 24 hr capsule TAKE 1 CAPSULE BY MOUTH EVERY DAY 30 capsule 1   • ergocalciferol (Drisdol) 1 25 MG (99207 UT) capsule Take 50,000 Units by mouth       No current facility-administered medications for this visit       Allergies   Allergen Reactions   • Bee Venom Anaphylaxis   • Other Other (See Comments)     Stainless steel and surgical steel  *Severe blistering*   • Morphine And Related Hives, Diarrhea and Vomiting   • Acetazolamide Rash   • Aleve [Naproxen Sodium] Hives   • Augmentin [Amoxicillin-Pot Clavulanate] Hives   • Gabapentin Hives   • Glyburide Hives   • Metformin And Related Hives   • Motrin [Ibuprofen] Hives   • Nortriptyline Hives   • Penicillins Hives   • Soy Lecithin [Lecithin] Hives   • Sulfa Antibiotics GI Intolerance   • Tradjenta [Linagliptin] Hives   • Tramadol Other (See Comments)     Sweating       Immunizations:     Immunization History   Administered Date(s) Administered   • COVID-19 PFIZER VACCINE 0 3 ML IM 10/26/2021, 11/16/2021   • INFLUENZA 10/10/2022   • Influenza Quadrivalent, 6-35 Months IM 11/06/2017   • Influenza, high dose seasonal 0 7 mL 10/10/2022   • Influenza, recombinant, quadrivalent,injectable, preservative free 10/17/2018, 10/04/2019, 10/27/2020, 10/15/2021   • Influenza, seasonal, injectable 10/24/2014, 12/01/2015, 09/26/2016   • Pneumococcal Conjugate 13-Valent 06/20/2017   • Pneumococcal Polysaccharide PPV23 12/02/2015   • Tdap 04/22/2015      Health Maintenance:         Topic Date Due   • Colorectal Cancer Screening  02/06/2022   • Breast Cancer Screening: Mammogram  10/10/2023 (Originally 5/25/2022)   • Hepatitis C Screening  Completed         Topic Date Due   • COVID-19 Vaccine (3 - Pfizer series) 01/11/2022   • Pneumococcal Vaccine: 65+ Years (3 - PPSV23 if available, else PCV20) 05/11/2022      Medicare Screening Tests and Risk Assessments:     Susanne Hurtado is here for her Subsequent Wellness visit  Last Medicare Wellness visit information reviewed, patient interviewed and updates made to the record today  Health Risk Assessment:   Patient rates overall health as good  Patient feels that their physical health rating is same  Patient is satisfied with their life  Eyesight was rated as same  Hearing was rated as same  Patient feels that their emotional and mental health rating is same  Patients states they are never, rarely angry  Patient states they are never, rarely unusually tired/fatigued   Pain experienced in the last 7 days has been some  Patient's pain rating has been 5/10  Patient states that she has experienced no weight loss or gain in last 6 months  Depression Screening:   PHQ-9 Score: 0      Fall Risk Screening: In the past year, patient has experienced: history of falling in past year    Number of falls: 2 or more  Injured during fall?: No    Feels unsteady when standing or walking?: Yes    Worried about falling?: Yes      Urinary Incontinence Screening:   Patient has leaked urine accidently in the last six months  Home Safety:  Patient has trouble with stairs inside or outside of their home  Patient has working smoke alarms and has working carbon monoxide detector  Home safety hazards include: none  Nutrition:   Current diet is Regular  Low sodium    Medications:   Patient is not currently taking any over-the-counter supplements  Patient is able to manage medications  Activities of Daily Living (ADLs)/Instrumental Activities of Daily Living (IADLs):   Walk and transfer into and out of bed and chair?: Yes  Dress and groom yourself?: Yes    Bathe or shower yourself?: Yes    Feed yourself?  Yes  Do your laundry/housekeeping?: Yes  Manage your money, pay your bills and track your expenses?: Yes  Make your own meals?: Yes    Do your own shopping?: Yes    Previous Hospitalizations:   Any hospitalizations or ED visits within the last 12 months?: No      Advance Care Planning:   Living will: No    Durable POA for healthcare: No    Advanced directive: No    Advanced directive counseling given: Yes    Five wishes given: Yes      Cognitive Screening:   Provider or family/friend/caregiver concerned regarding cognition?: No    PREVENTIVE SCREENINGS      Cardiovascular Screening:    General: Screening Not Indicated, History Lipid Disorder and Screening Current      Diabetes Screening:     General: Screening Not Indicated, History Diabetes and Screening Current      Colorectal Cancer Screening:     General: Risks and Benefits Discussed    Due for: Cologuard      Breast Cancer Screening:     General: Risks and Benefits Discussed    Due for: Mammogram        Cervical Cancer Screening:    General: Screening Not Indicated      Osteoporosis Screening:    General: Risks and Benefits Discussed    Due for: Bone Density CT Axial      Abdominal Aortic Aneurysm (AAA) Screening:        General: Screening Not Indicated      Lung Cancer Screening:     General: Screening Not Indicated      Hepatitis C Screening:    General: Screening Current    Screening, Brief Intervention, and Referral to Treatment (SBIRT)    Screening  Typical number of drinks in a day: 0  Typical number of drinks in a week: 0  Interpretation: Low risk drinking behavior  AUDIT-C Screenin) How often did you have a drink containing alcohol in the past year? never  2) How many drinks did you have on a typical day when you were drinking in the past year? 0  3) How often did you have 6 or more drinks on one occasion in the past year? never    AUDIT-C Score: 0  Interpretation: Score 0-2 (female): Negative screen for alcohol misuse    Single Item Drug Screening:  How often have you used an illegal drug (including marijuana) or a prescription medication for non-medical reasons in the past year? never    Single Item Drug Screen Score: 0  Interpretation: Negative screen for possible drug use disorder    Review of Current Opioid Use  Opioid Risk Tool (ORT) Score: 0  Opioid Risk Tool (ORT) Interpretation: Score 0-3: Low risk for opioid misuse    Other Counseling Topics:   Car/seat belt/driving safety, skin self-exam, sunscreen and regular weightbearing exercise and calcium and vitamin D intake  Encouraged Shingles vaccination   UTD PNA and flu vaccination     Scheduled for DEXA and Mammogram   She will be doing cologuard kit     No results found       Physical Exam:     /68 (BP Location: Left arm, Patient Position: Sitting, Cuff Size: Standard)   Pulse 75   Temp 98 °F (36 7 °C) "(Temporal)   Ht 5' 4\" (1 626 m)   Wt 121 kg (267 lb)   LMP  (LMP Unknown)   SpO2 97% Comment: roomair  BMI 45 83 kg/m²     Physical Exam  Cardiovascular:      Pulses: no weak pulses          Dorsalis pedis pulses are 2+ on the right side and 2+ on the left side  Posterior tibial pulses are 2+ on the right side and 2+ on the left side  Musculoskeletal:        Feet:    Feet:      Right foot:      Skin integrity: No ulcer, skin breakdown, erythema, warmth, callus or dry skin  Left foot:      Skin integrity: No ulcer, skin breakdown, erythema, warmth, callus or dry skin            Cruz [place], CRNP  "

## 2023-06-21 NOTE — PROGRESS NOTES
Assessment:  1  Chronic pain syndrome    2  Lumbar radiculopathy    3  Myofascial pain syndrome    4  Lumbar spondylosis    5  Spinal cord stimulator status    6  Long-term current use of opiate analgesic    7  Uncomplicated opioid dependence ([address] )        Plan:  1  Patient may continue Norco 5/325 mg however I will decrease to 1 tablet daily as needed pain  She prefers to take 1/2 tablet twice a day as needed which is also fine  The patient was given a 2 month supply of prescriptions with a Do Not Fill date(s) of July 6, 2023 and August 4, 2023    While the patient was in the office today an opioid contract was thoroughly reviewed and signed by the patient  The patient was given adequate time to ask questions in regards to the contract and a signed copy was sent home for their records  The patient also completed a BECKS depression inventory and SOAPP-R today, as part of our yearly opioid management program     [place] Prescription Drug Monitoring Program report was reviewed and was appropriate     There are risks associated with opioid medications, including dependence, addiction and tolerance  The patient understands and agrees to use these medications only as prescribed  Potential side effects of the medications include, but are not limited to, constipation, drowsiness, addiction, impaired judgment and risk of fatal overdose if not taken as prescribed  The patient was warned against driving while taking sedation medications  Sharing medications is a felony  At this point in time, the patient is showing no signs of addiction, abuse, diversion or suicidal ideation  2   Patient was encouraged to reach out to orthopedic surgery to schedule her appointment  3  Patient may continue meloxicam and tizanidine as prescribed  Tizanidine was refilled today  4  Continue with home exercise program  5  Follow-up in 8 weeks or sooner if needed    History of Present Illness:     The patient is a 79 y o  female with a history of diabetes and [employer] spinal cord stimulator device last seen on 5/19/2023 who presents for a follow up office visit in regards to chronic lumbosacral back pain that radiates into the bilateral lower extremities, left greater than right with associated paresthesias and subjective weaknes  She denies bowel or bladder incontinence or saddle anesthesia  She has failed bilateral SI joint injections, epidural steroid injections, and lumbar RFA  Her spinal cord stimulator device provides minimal relief of her lower extremity symptoms  CT of the lumbar spine reveals multilevel degenerative disc disease and arthritis which causes various degrees of central and foraminal stenosis on the severe level from L1 to to L3-4 where there is compression of the thecal sac at L3-4  Radiology did recommend a surgical consultation, which a referral was provided however not yet scheduled  CT of the thoracic spine reveals a level facet hypertrophy resulting in mild to moderate foraminal narrowing with marked canalstenosis at T12-L1  Patient was given a short course of Norco 5/325 mg which provides 75% improvement of her pain  She usually only takes a half a tablet once a day as a full tablet causes too much sedation  The patient rates her pain an 8 out of 10 on the numeric pain rating scale  She constantly has pain which is described as pressure-like    Pain Contract Signed: 6/22/2023  Last Urine Drug Screen: 6/9/2023  Diane/ASHLEY 6/22/2023  Norco per pt  6/21/2023      I have personally reviewed and/or updated the patient's past medical history, past surgical history, family history, social history, current medications, allergies, and vital signs today  Review of Systems:    Review of Systems   Respiratory: Negative for shortness of breath  Cardiovascular: Negative for chest pain  Gastrointestinal: Negative for constipation, diarrhea, nausea and vomiting     Musculoskeletal: Negative for arthralgias, gait problem, joint swelling and myalgias  Skin: Negative for rash  Neurological: Negative for dizziness, seizures and weakness  All other systems reviewed and are negative          Past Medical History:   Diagnosis Date   • Allergic reaction to bee sting     last assessed - [id number]   • Asthma    • [facility] )    • Chronic pain syndrome    • Depression    • Diabetes mellitus ([facility] )    • Esophageal reflux    • Hyperlipidemia    • Hypertension    • Hypovitaminosis D 4/12/2018   • Lumbar radiculopathy    • Lyme disease     last assessed - 28Jun2016   • Obesity    • Opioid dependence ([facility] )    • PPD positive     had treatment w/ INH 15 years ago   • Vitamin D deficiency        Past Surgical History:   Procedure Laterality Date   • APPENDECTOMY     • CHOLECYSTECTOMY     • JOINT REPLACEMENT      R knee, B/l total hip   • [place] REVJ/RMVL IMPLANTED SPINAL NEUROSTIM GENERATOR Left 12/13/2016    Procedure: REPLACEMENT BUTTOCK SPINAL CORD STIMULATOR IPG;  Surgeon: Ryder Rain MD;  Location: [facility] OR;  Service: Neurosurgery   • SHOULDER SURGERY      Resolved - 1992   • SPINAL CORD STIMULATOR IMPLANT      spinal stereotaxis stimulation of cord   • TOTAL HIP ARTHROPLASTY Bilateral        Family History   Problem Relation Age of Onset   • Diabetes Mother         Diabetes mellitus   • Cancer Mother    • Cancer Father    • Diabetes Maternal Grandmother    • Cancer Paternal Uncle    • Brain cancer Family    • Breast cancer Family    • Cervical cancer Family    • Diabetes Family         Diabetes mellitus   • Hypertension Family    • Ovarian cancer Family    • Stroke Family         Stroke complications       Social History     Occupational History   • Not on file   Tobacco Use   • Smoking status: Every Day     Packs/day: 1 00     Years: 10 00     Total pack years: 10 00     Types: Cigarettes     Start date: 2013   • Smokeless tobacco: Never   Vaping Use   • Vaping Use: Never used   Substance and Sexual Activity   • Alcohol use: No     Alcohol/week: 0 0 standard drinks of alcohol   • Drug use: No     Comment: Denied history of drug use   • Sexual activity: Not on file         Current Outpatient Medications:   •  albuterol (Proventil HFA) 90 mcg/act inhaler, Inhale 2 puffs every 6 (six) hours as needed for wheezing, Disp: 6 7 g, Rfl: 5  •  atorvastatin (LIPITOR) 10 mg tablet, TAKE 1 TABLET BY MOUTH EVERY DAY, Disp: 90 tablet, Rfl: 1  •  Blood Glucose Monitoring Suppl (ONE TOUCH ULTRA MINI) w/Device KIT, by Does not apply route daily, Disp: 1 each, Rfl: 0  •  Cholecalciferol (VITAMIN D3) 79496 units CAPS, Take 1 capsule by mouth once a week, Disp: , Rfl: 11  •  EPINEPHrine (EPIPEN) 0 3 mg/0 3 mL SOAJ, Inject 0 3 mL (0 3 mg total) into a muscle as needed for anaphylaxis, Disp: 2 each, Rfl: 5  •  glimepiride (AMARYL) 1 mg tablet, TAKE 1 TABLET BY MOUTH DAILY WITH BREAKFAST , Disp: 90 tablet, Rfl: 2  •  glucose blood (ONE TOUCH ULTRA TEST) test strip, Test blood sugar once a day, Disp: 100 each, Rfl: 5  •  [START ON 7/6/2023] HYDROcodone-acetaminophen (Norco) 5-325 mg per tablet, Take 1 tablet by mouth daily as needed for pain Max Daily Amount: 1 tablet Do not start before July 6, 2023 , Disp: 30 tablet, Rfl: 0  •  [START ON 8/4/2023] HYDROcodone-acetaminophen (Norco) 5-325 mg per tablet, Take 1 tablet by mouth daily as needed for pain Max Daily Amount: 1 tablet Do not start before August 4, 2023 , Disp: 30 tablet, Rfl: 0  •  lisinopril (ZESTRIL) 20 mg tablet, Take 1 tablet (20 mg total) by mouth daily, Disp: 90 tablet, Rfl: 3  •  meloxicam (MOBIC) 15 mg tablet, Take 1 tablet (15 mg total) by mouth daily, Disp: 90 tablet, Rfl: 3  •  tiZANidine (ZANAFLEX) 4 mg tablet, Take 1 tablet (4 mg total) by mouth every 8 (eight) hours as needed for muscle spasms, Disp: 90 tablet, Rfl: 1  •  topiramate (TOPAMAX) 100 mg tablet, TAKE 1 TABLET BY MOUTH TWICE A DAY, Disp: 180 tablet, Rfl: 1  •  venlafaxine (EFFEXOR-XR) 75 mg "24 hr capsule, TAKE 1 CAPSULE BY MOUTH EVERY DAY, Disp: 30 capsule, Rfl: 1  •  ergocalciferol (Drisdol) 1 25 MG (04453 UT) capsule, Take 50,000 Units by mouth, Disp: , Rfl:   •  Nerve Stimulator (STANDARD TENS) ISAEL, by Does not apply route 4 (four) times a day as needed (MUSCLE SPASMS), Disp: 1 Device, Rfl: 0    Allergies   Allergen Reactions   • Bee Venom Anaphylaxis   • Other Other (See Comments)     Stainless steel and surgical steel  *Severe blistering*   • Morphine And Related Hives, Diarrhea and Vomiting   • Acetazolamide Rash   • Aleve [Naproxen Sodium] Hives   • Augmentin [Amoxicillin-Pot Clavulanate] Hives   • Gabapentin Hives   • Glyburide Hives   • Metformin And Related Hives   • Motrin [Ibuprofen] Hives   • Nortriptyline Hives   • Penicillins Hives   • Soy Lecithin [Lecithin] Hives   • Sulfa Antibiotics GI Intolerance   • Tradjenta [Linagliptin] Hives   • Tramadol Other (See Comments)     Sweating        Physical Exam:    /80   Pulse 78   Ht 5' 5\" (1 651 m)   Wt 121 kg (267 lb)   LMP  (LMP Unknown)   BMI 44 43 kg/m²     Constitutional:normal, well developed, well nourished, alert, in no distress and non-toxic and no overt pain behavior  Eyes:anicteric  HEENT:grossly intact  Neck:supple, symmetric, trachea midline and no masses   Pulmonary:even and unlabored  Cardiovascular:No edema or pitting edema present  Skin:Normal without rashes or lesions and well hydrated  Psychiatric:Mood and affect appropriate  Neurologic:Cranial Nerves II-XII grossly intact  Musculoskeletal:ambulates with cane      Imaging  No orders to display         No orders of the defined types were placed in this encounter      "

## 2023-06-22 ENCOUNTER — OFFICE VISIT (OUTPATIENT)
Dept: PAIN MEDICINE | Facility: CLINIC | Age: 68
End: 2023-06-22
Payer: MEDICARE

## 2023-06-22 VITALS
WEIGHT: 267 LBS | HEIGHT: 65 IN | BODY MASS INDEX: 44.48 KG/M2 | HEART RATE: 78 BPM | DIASTOLIC BLOOD PRESSURE: 80 MMHG | SYSTOLIC BLOOD PRESSURE: 154 MMHG

## 2023-06-22 DIAGNOSIS — M54.16 LUMBAR RADICULOPATHY: ICD-10-CM

## 2023-06-22 DIAGNOSIS — M47.816 LUMBAR SPONDYLOSIS: ICD-10-CM

## 2023-06-22 DIAGNOSIS — Z79.891 LONG-TERM CURRENT USE OF OPIATE ANALGESIC: ICD-10-CM

## 2023-06-22 DIAGNOSIS — G89.4 CHRONIC PAIN SYNDROME: Primary | ICD-10-CM

## 2023-06-22 DIAGNOSIS — F11.20 UNCOMPLICATED OPIOID DEPENDENCE (HCC): ICD-10-CM

## 2023-06-22 DIAGNOSIS — Z96.89 SPINAL CORD STIMULATOR STATUS: ICD-10-CM

## 2023-06-22 DIAGNOSIS — M79.18 MYOFASCIAL PAIN SYNDROME: ICD-10-CM

## 2023-06-22 PROCEDURE — 99214 OFFICE O/P EST MOD 30 MIN: CPT | Performed by: NURSE PRACTITIONER

## 2023-06-22 RX ORDER — HYDROCODONE BITARTRATE AND ACETAMINOPHEN 5; 325 MG/1; MG/1
1 TABLET ORAL DAILY PRN
Qty: 30 TABLET | Refills: 0 | Status: SHIPPED | OUTPATIENT
Start: 2023-08-04

## 2023-06-22 RX ORDER — HYDROCODONE BITARTRATE AND ACETAMINOPHEN 5; 325 MG/1; MG/1
1 TABLET ORAL DAILY PRN
Qty: 30 TABLET | Refills: 0 | Status: SHIPPED | OUTPATIENT
Start: 2023-07-06

## 2023-06-22 RX ORDER — TIZANIDINE 4 MG/1
4 TABLET ORAL EVERY 8 HOURS PRN
Qty: 90 TABLET | Refills: 1 | Status: SHIPPED | OUTPATIENT
Start: 2023-06-22

## 2023-06-22 NOTE — PATIENT INSTRUCTIONS

## 2023-07-06 DIAGNOSIS — F33.41 RECURRENT MAJOR DEPRESSIVE DISORDER, IN PARTIAL REMISSION (HCC): ICD-10-CM

## 2023-07-06 RX ORDER — VENLAFAXINE HYDROCHLORIDE 75 MG/1
75 CAPSULE, EXTENDED RELEASE ORAL DAILY
Qty: 90 CAPSULE | Refills: 0
Start: 2023-07-06

## 2023-08-07 DIAGNOSIS — M54.50 CHRONIC BILATERAL LOW BACK PAIN WITHOUT SCIATICA: ICD-10-CM

## 2023-08-07 DIAGNOSIS — E11.21 TYPE 2 DIABETES MELLITUS WITH DIABETIC NEPHROPATHY, WITHOUT LONG-TERM CURRENT USE OF INSULIN (HCC): ICD-10-CM

## 2023-08-07 DIAGNOSIS — G89.29 CHRONIC BILATERAL LOW BACK PAIN WITHOUT SCIATICA: ICD-10-CM

## 2023-08-07 DIAGNOSIS — G89.4 CHRONIC PAIN SYNDROME: ICD-10-CM

## 2023-08-07 RX ORDER — GLIMEPIRIDE 1 MG/1
1 TABLET ORAL
Qty: 90 TABLET | Refills: 0 | Status: SHIPPED | OUTPATIENT
Start: 2023-08-07

## 2023-08-07 RX ORDER — TOPIRAMATE 100 MG/1
TABLET, FILM COATED ORAL
Qty: 180 TABLET | Refills: 1 | Status: SHIPPED | OUTPATIENT
Start: 2023-08-07

## 2023-08-11 ENCOUNTER — HOSPITAL ENCOUNTER (OUTPATIENT)
Dept: RADIOLOGY | Facility: HOSPITAL | Age: 68
Discharge: HOME | End: 2023-08-11
Attending: ORTHOPAEDIC SURGERY
Payer: MEDICARE

## 2023-08-11 ENCOUNTER — OFFICE VISIT (OUTPATIENT)
Dept: OBGYN CLINIC | Facility: HOSPITAL | Age: 68
End: 2023-08-11
Payer: MEDICARE

## 2023-08-11 VITALS — HEIGHT: 65 IN | WEIGHT: 266.76 LBS | BODY MASS INDEX: 44.44 KG/M2

## 2023-08-11 DIAGNOSIS — R52 PAIN: ICD-10-CM

## 2023-08-11 DIAGNOSIS — M48.04 THORACIC SPINAL STENOSIS: ICD-10-CM

## 2023-08-11 DIAGNOSIS — R52 PAIN: Primary | ICD-10-CM

## 2023-08-11 DIAGNOSIS — M54.16 LUMBAR RADICULOPATHY: ICD-10-CM

## 2023-08-11 DIAGNOSIS — M48.062 SPINAL STENOSIS OF LUMBAR REGION WITH NEUROGENIC CLAUDICATION: ICD-10-CM

## 2023-08-11 DIAGNOSIS — E66.9 OBESITY, UNSPECIFIED CLASSIFICATION, UNSPECIFIED OBESITY TYPE, UNSPECIFIED WHETHER SERIOUS COMORBIDITY PRESENT: ICD-10-CM

## 2023-08-11 PROCEDURE — 72110 X-RAY EXAM L-2 SPINE 4/>VWS: CPT

## 2023-08-11 PROCEDURE — 99214 OFFICE O/P EST MOD 30 MIN: CPT | Performed by: ORTHOPAEDIC SURGERY

## 2023-08-11 NOTE — PROGRESS NOTES
NAME: Ella Tyson  : 1955  PCP: MARVIN Kendall      Chief Complaint: back and lower extremity pain    HPI:  Ella Tyson is a 79 y.o. female presenting for initial visit with chief complaint of back and lower extremity pain. Has a spinal cord stimulator that has provided relief. Mary Perez describes ongoing low back/gluteal pain with radiation into her right > left lower extremity pain, worsening over past few years. Pain radiates into posterior aspect of leg into bottom of foot into big toe. Also with numbness/tingling. Worse with prolonged standing and ambulation. Only able to walk a few feet until worsening back pain, numbness/tingling and pain into lower extremities. Also notes back/gluteal region gets tired and heavy with prolonged ambulation. Positive shopping cart sign. She has tried multiple interventional spine procedures with decreasing efficacy. She has been using cane over past few months for ambulation due to increased pain and symptoms. Denies any harpreet trauma. Denies fever or chills. Denies any bladder or bowel changes. PMH diabetes. PMH bilateral hip replacement and right knee replacement. Smokes about 1 ppd. Conservative therapy includes the following:   Norco, zanaflex, and meloxicam with some relief   Spine & pain (Dr. Sondra Henry). - Has had multiple SI joint injections, epidural steroid injections, and lumbar RFA without significant improvement   Has gone to physical therapy in the past. Maintains at-home exercise program.  These therapeutic modalities were ineffective. 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 Patient is able to function independently and perform ADLs, but notes difficulty due to being unable to walk far.     FAMILY HISTORY   Family History   Problem Relation Age of Onset   • Diabetes Mother         Diabetes mellitus   • Cancer Mother    • Cancer Father    • Diabetes Maternal Grandmother    • Cancer Paternal Uncle    • Brain cancer Family    • Breast cancer Family    • Cervical cancer Family    • Diabetes Family         Diabetes mellitus   • Hypertension Family    • Ovarian cancer Family    • Stroke Family         Stroke complications       PAST MEDICAL HISTORY:   Past Medical History:   Diagnosis Date   • Allergic reaction to bee sting     last assessed - 13Jun2016   • Asthma    • Chronic narcotic dependence ([address])    • Chronic pain syndrome    • Depression    • Diabetes mellitus ([address])    • Esophageal reflux    • Hyperlipidemia    • Hypertension    • Hypovitaminosis D 4/12/2018   • Lumbar radiculopathy    • Lyme disease     last assessed - 28Jun2016   • Obesity    • Opioid dependence ([address])    • PPD positive     had treatment w/ INH 15 years ago   • Vitamin D deficiency        MEDICATIONS:  Current Outpatient Medications   Medication Sig Dispense Refill   • albuterol (Proventil HFA) 90 mcg/act inhaler Inhale 2 puffs every 6 (six) hours as needed for wheezing 6.7 g 5   • atorvastatin (LIPITOR) 10 mg tablet TAKE 1 TABLET BY MOUTH EVERY DAY 90 tablet 1   • Blood Glucose Monitoring Suppl (ONE TOUCH ULTRA MINI) w/Device KIT by Does not apply route daily 1 each 0   • Cholecalciferol (VITAMIN D3) 77512 units CAPS Take 1 capsule by mouth once a week  11   • EPINEPHrine (EPIPEN) 0.3 mg/0.3 mL SOAJ Inject 0.3 mL (0.3 mg total) into a muscle as needed for anaphylaxis 2 each 5   • glimepiride (AMARYL) 1 mg tablet TAKE 1 TABLET BY MOUTH EVERY DAY WITH BREAKFAST 90 tablet 0   • glucose blood (ONE TOUCH ULTRA TEST) test strip Test blood sugar once a day 100 each 5   • HYDROcodone-acetaminophen (Norco) 5-325 mg per tablet Take 1 tablet by mouth daily as needed for pain Max Daily Amount: 1 tablet Do not start before July 6, 2023. 30 tablet 0   • lisinopril (ZESTRIL) 20 mg tablet Take 1 tablet (20 mg total) by mouth daily 90 tablet 3   • meloxicam (MOBIC) 15 mg tablet Take 1 tablet (15 mg total) by mouth daily 90 tablet 3   • Nerve Stimulator (STANDARD TENS) ISAEL by Does not apply route 4 (four) times a day as needed (MUSCLE SPASMS) 1 Device 0   • tiZANidine (ZANAFLEX) 4 mg tablet Take 1 tablet (4 mg total) by mouth every 8 (eight) hours as needed for muscle spasms 90 tablet 1   • topiramate (TOPAMAX) 100 mg tablet TAKE 1 TABLET BY MOUTH TWICE A  tablet 1   • venlafaxine (EFFEXOR-XR) 75 mg 24 hr capsule Take 1 capsule (75 mg total) by mouth daily 90 capsule 0   • ergocalciferol (Drisdol) 1.25 MG (27058 UT) capsule Take 50,000 Units by mouth     • HYDROcodone-acetaminophen (Norco) 5-325 mg per tablet Take 1 tablet by mouth daily as needed for pain Max Daily Amount: 1 tablet Do not start before August 4, 2023. (Patient not taking: Reported on 8/11/2023) 30 tablet 0     No current facility-administered medications for this visit. PAST SURGICAL HISTORY:  Past Surgical History:   Procedure Laterality Date   • APPENDECTOMY     • CHOLECYSTECTOMY     • JOINT REPLACEMENT      R knee, B/l total hip   • [place] REVJ/RMVL IMPLANTED SPINAL NEUROSTIM GENERATOR Left 12/13/2016    Procedure: REPLACEMENT BUTTOCK SPINAL CORD STIMULATOR IPG;  Surgeon: Pawan Mari MD;  Location:  MAIN OR;  Service: Neurosurgery   • SHOULDER SURGERY      Resolved - 1992   • SPINAL CORD STIMULATOR IMPLANT      spinal stereotaxis stimulation of cord   • TOTAL HIP ARTHROPLASTY Bilateral        SOCIAL HISTORY:  Social History     Socioeconomic History   • Marital status:      Spouse name: Not on file   • Number of children: Not on file   • Years of education: Not on file   • Highest education level: Not on file   Occupational History   • Not on file   Tobacco Use   • Smoking status: Every Day     Packs/day: 1.00     Years: 10.00     Total pack years: 10.00     Types: Cigarettes     Start date: 2013   • Smokeless tobacco: Never   Vaping Use   • Vaping Use: Never used   Substance and Sexual Activity   • Alcohol use:  No Alcohol/week: 0.0 standard drinks of alcohol   • Drug use: No     Comment: Denied history of drug use   • Sexual activity: Not on file   Other Topics Concern   • Not on file   Social History Narrative   • Not on file     Social Determinants of Health     Financial Resource Strain: Low Risk  (6/13/2023)    Overall Financial Resource Strain (CARDIA)    • Difficulty of Paying Living Expenses: Not very hard   Food Insecurity: Not on file   Transportation Needs: No Transportation Needs (6/13/2023)    PRAPARE - Transportation    • Lack of Transportation (Medical): No    • Lack of Transportation (Non-Medical):  No   Physical Activity: Not on file   Stress: Not on file   Social Connections: Not on file   Intimate Partner Violence: Not on file   Housing Stability: Not on file       ALLERGIES:  Allergies   Allergen Reactions   • Bee Venom Anaphylaxis   • Other Other (See Comments)     Stainless steel and surgical steel  *Severe blistering*   • Morphine And Related Hives, Diarrhea and Vomiting   • Acetazolamide Rash   • Aleve [Naproxen Sodium] Hives   • Augmentin [Amoxicillin-Pot Clavulanate] Hives   • Gabapentin Hives   • Glyburide Hives   • Metformin And Related Hives   • Motrin [Ibuprofen] Hives   • Nortriptyline Hives   • Penicillins Hives   • Soy Lecithin [Lecithin] Hives   • Sulfa Antibiotics GI Intolerance   • Tradjenta [Linagliptin] Hives   • Tramadol Other (See Comments)     Sweating      ROS:   Constitutional:  No fever, chills, night sweats, loss of appetite   HEENT No no sore throat, difficulty swallowing   Cardiovascular:  No chest pain, palpitations, BLE edema, ROSA   Respiratory:  No SOB, coughing, chest congestion   Gastrointestinal:  No nausea, vomiting, abdominal pain   Genitourinary:  No dysuria, hematuria, urinary urgency/frequency   Musculoskeletal:  See HPI   Skin:  No rash, erythema, edema   Neurologic:  See HPI   Psychiatric Illness:  No depression, anxiety, mood disorder, substance abuse disorder PHYSICAL EXAM:  Ht 5' 5" (1.651 m)   Wt 121 kg (266 lb 12.1 oz)   LMP  (LMP Unknown)   BMI 44.39 kg/m²           General:  Well-developed,appears well, no acute distress   Respiratory:  No SOB, no abnormal effort (use of accessory muscles). GI / Abdominal:  Soft. No abdominal masses or tenderness. Nondistended. Gait & balance No evidence of myelopathic gait. Lumbar spine range of motion:  -Forward flexion to 90  -Extension to neutral  -Lateral bend 45 right, 45 left  -Rotation 45 right, 45 left  There is no point tenderness with palpation along the posterior cervical, thoracic, lumbar spine. Neurologic:    Lower Extremity Motor Function   Right  Left    Iliopsoas  5/5  5/5    Quadriceps 5/5 5/5   Tibialis anterior  4/5  5/5    EHL  4/5  5/5    Gastroc. muscle  5/5  5/5    Heel rise  5/5  5/5    Toe rise  5/5  5/5      Sensory: light touch is intact to bilateral upper and lower extremities     Reflexes:    Right Left   Biceps 2+ 2+   Triceps 2+ 2+   Brachioradialis 2+ 2+   Patellar 1+ 1+   Achilles 1+ 1+   Babinski neg neg     Other tests:  Straight Leg Raise: negative  Coyle's: negative  Clonus: negative  Kolton SI: negative  JOHN SI: negative  Greater troch: no tenderness   Internal/external hip ROM: intact, no pain   Flexion/extension knee ROM: intact, no pain     IMAGING: I have personally reviewed the images and these are my findings:  Lumbar spine xray from 8/11/2023: Degenerative changes in lumbar spine with loss of disc space height most notably at L5-S1; end plate sclerosis; facet arthrosis; osteophyte formation; no apparent spondylolisthesis; no obvious dynamic instability on flexion/extension radiographs      ASSESSMENT / Medical Decision Making (MDM):  79 y.o. female with history of low back and bilateral lower extremity pain. Ambulatory dysfunction. No incontinence of bowel/bladder, no fever, no chills, no night sweats.     Physical exam showing mild right lower extremity weakness    Imaging reviewed as above. Findings most notable for lumbar spondylosis    Impression of lumbar degenerative disease    Plan: The above clinical, physical and imaging findings were reviewed with the patient. Lv Ibanez  has a constellation of findings consistent with lumbar radiculopathy, neurogenic claudication in setting lumbar degenerative disease. Lv Ibanez describes ongoing low back and bilateral lower extremity pain with ambulatory dysfunction over past few years. She has tried oral medications, physical therapy and interventional procedures with decreasing efficacy. Briefly discussed role of operative intervention, however patient will need to stop smoking and reduce BMI prior to further surgical discussion due to increased risk of complications. Patient counseled on the deleterious effects of smoking/tobacco on overall health as well as the musculoskeletal system. Patient notes she is not interested in quitting at this time. Discussed importance of maintaining a healthy weight/BMI and how increased weight can directly impact the normal biomechanics and load on the spine resulting in increased pain/symptoms. Discussed healthy lifestyle options, weight maintenance vs reduction. Referral placed for weight management for further evaluation and treatment. Also discussed CT lumbar myelogram to further evaluate neural elements given patient has spinal cord stimulator and is unable to undergo MRI. However, will defer at this time as it would not change current management. Continue with medications as previously prescribed if providing pain/symptom relief. Patient instructed to return to office/ER sooner if symptoms are not improving, getting worse, or new worrisome/neurologic symptoms arise.

## 2023-08-12 PROBLEM — Z00.00 MEDICARE ANNUAL WELLNESS VISIT, SUBSEQUENT: Status: RESOLVED | Noted: 2023-06-13 | Resolved: 2023-08-12

## 2023-08-16 DIAGNOSIS — E11.9 TYPE 2 DIABETES MELLITUS WITHOUT COMPLICATION, WITHOUT LONG-TERM CURRENT USE OF INSULIN (HCC): ICD-10-CM

## 2023-08-16 PROBLEM — F11.20 UNCOMPLICATED OPIOID DEPENDENCE (HCC): Status: ACTIVE | Noted: 2023-08-16

## 2023-08-16 PROBLEM — Z79.891 LONG-TERM CURRENT USE OF OPIATE ANALGESIC: Status: ACTIVE | Noted: 2023-08-16

## 2023-08-16 RX ORDER — BLOOD SUGAR DIAGNOSTIC
STRIP MISCELLANEOUS
Qty: 100 STRIP | Refills: 5 | Status: SHIPPED | OUTPATIENT
Start: 2023-08-16

## 2023-08-16 NOTE — PROGRESS NOTES
Assessment:  1. Chronic pain syndrome    2. Sacroiliitis ([address])    3. Lumbar radiculopathy    4. Cervical radiculopathy    5. Lumbar spondylosis    6. Neurogenic claudication due to lumbar spinal stenosis    7. Spinal cord stimulator status    8. Long-term current use of opiate analgesic    9. Uncomplicated opioid dependence ([address])    10. Myofascial pain syndrome        Plan:  1. May continue Norco 5/325 mg 1 tablet daily as needed pain as prescribed. The patient was given a 3 month supply of prescriptions with a Do Not Fill date(s) of September 9, 2023, October 8, 2023 and November 6, 2023    [address] Prescription Drug Monitoring Program report was reviewed and was appropriate     There are risks associated with opioid medications, including dependence, addiction and tolerance. The patient understands and agrees to use these medications only as prescribed. Potential side effects of the medications include, but are not limited to, constipation, drowsiness, addiction, impaired judgment and risk of fatal overdose if not taken as prescribed. The patient was warned against driving while taking sedation medications. Sharing medications is a felony. At this point in time, the patient is showing no signs of addiction, abuse, diversion or suicidal ideation. The patient was not picked for a pill count today, but did bring their medications as required. 2.  Patient may continue tizanidine and meloxicam as prescribed. Tizanidine was refilled today  3. Continue with weight management and smoking cessation as advised by orthopedic surgery  4. Continue with home exercise program  5. Follow-up in 4 months or sooner if needed    History of Present Illness:     The patient is a 79 y.o. female with a history of diabetes and see Bro spinal cord stimulator device last seen on 06/22/2023 who presents for a follow up office visit in regards to chronic lumbosacral back pain with radiculopathy into the bilateral lower extremities. She denies bowel or bladder incontinence or saddle anesthesia. She has failed bilateral SI joint injections, epidural steroid injections, and lumbar RFA. Her spinal cord stimulator device provides minimal relief of her lower extremity symptoms    Patient was recently evaluated by orthopedic surgery, and patient states that she needs to lose weight for BMI of less than 40 and stop smoking in order to be a candidate for surgery. She states she would then need a CT myelogram before proceeding. She rates her pain an 8 out of 10 on the numeric pain rating scale. Constant has pain which is described as throbbing, pressure-like, numbness and pins-and-needles    Current pain medications includes: Norco 5/325 mg 1 tablet daily as needed pain, meloxicam 15 mg daily as needed and tizanidine 4 mg as needed. The patient reports that this regimen is providing 75% pain relief. The patient is reporting no side effects from this pain medication regimen. Pain Contract Signed: 6/22/2023  Last Urine Drug Screen: 6/9/2023  Diane/ASHLEY 6/22/2023  [place] per pt. 8/16/2023    I have personally reviewed and/or updated the patient's past medical history, past surgical history, family history, social history, current medications, allergies, and vital signs today. Review of Systems:    Review of Systems   Respiratory: Negative for shortness of breath. Cardiovascular: Negative for chest pain. Gastrointestinal: Negative for constipation, diarrhea, nausea and vomiting. Musculoskeletal: Positive for gait problem. Negative for arthralgias, joint swelling and myalgias. Skin: Negative for rash. Neurological: Negative for dizziness, seizures and weakness. All other systems reviewed and are negative.         Past Medical History:   Diagnosis Date   • Allergic reaction to bee sting     last assessed - [id number]   • Asthma    • Chronic narcotic dependence ([address])    • Chronic pain syndrome    • Depression    • Diabetes mellitus ([address])    • Esophageal reflux    • Hyperlipidemia    • Hypertension    • Hypovitaminosis D 4/12/2018   • Lumbar radiculopathy    • Lyme disease     last assessed - [id number]   • Obesity    • Opioid dependence ([address])    • PPD positive     had treatment w/ [facility] 15 years ago   • Vitamin D deficiency        Past Surgical History:   Procedure Laterality Date   • APPENDECTOMY     • CHOLECYSTECTOMY     • JOINT REPLACEMENT      R knee, B/l total hip   • MI REVJ/RMVL IMPLANTED SPINAL NEUROSTIM GENERATOR Left 12/13/2016    Procedure: REPLACEMENT BUTTOCK SPINAL CORD STIMULATOR IPG;  Surgeon: Maria G Santo MD;  Location: [facility];  Service: Neurosurgery   • SHOULDER SURGERY      Resolved - 1992   • SPINAL CORD STIMULATOR IMPLANT      spinal stereotaxis stimulation of cord   • TOTAL HIP ARTHROPLASTY Bilateral        Family History   Problem Relation Age of Onset   • Diabetes Mother         Diabetes mellitus   • Cancer Mother    • Cancer Father    • Diabetes Maternal Grandmother    • Cancer Paternal Uncle    • Brain cancer Family    • Breast cancer Family    • Cervical cancer Family    • Diabetes Family         Diabetes mellitus   • Hypertension Family    • Ovarian cancer Family    • Stroke Family         Stroke complications       Social History     Occupational History   • Not on file   Tobacco Use   • Smoking status: Every Day     Packs/day: 1.00     Years: 10.00     Total pack years: 10.00     Types: Cigarettes     Start date: 2013   • Smokeless tobacco: Never   Vaping Use   • Vaping Use: Never used   Substance and Sexual Activity   • Alcohol use: No     Alcohol/week: 0.0 standard drinks of alcohol   • Drug use: No     Comment: Denied history of drug use   • Sexual activity: Not on file         Current Outpatient Medications:   •  albuterol (Proventil HFA) 90 mcg/act inhaler, Inhale 2 puffs every 6 (six) hours as needed for wheezing, Disp: 6.7 g, Rfl: 5  •  atorvastatin (LIPITOR) 10 mg tablet, TAKE 1 TABLET BY MOUTH EVERY DAY, Disp: 90 tablet, Rfl: 1  •  Blood Glucose Monitoring Suppl (ONE TOUCH ULTRA MINI) w/Device KIT, by Does not apply route daily, Disp: 1 each, Rfl: 0  •  [START ON 11/6/2023] HYDROcodone-acetaminophen (Norco) 5-325 mg per tablet, Take 1 tablet by mouth daily as needed for pain Max Daily Amount: 1 tablet Do not start before November 6, 2023., Disp: 30 tablet, Rfl: 0  •  [START ON 10/8/2023] HYDROcodone-acetaminophen (Norco) 5-325 mg per tablet, Take 1 tablet by mouth daily as needed for pain Max Daily Amount: 1 tablet Do not start before October 8, 2023., Disp: 30 tablet, Rfl: 0  •  [START ON 9/9/2023] HYDROcodone-acetaminophen (Norco) 5-325 mg per tablet, Take 1 tablet by mouth daily as needed for pain Max Daily Amount: 1 tablet Do not start before September 9, 2023., Disp: 30 tablet, Rfl: 0  •  lisinopril (ZESTRIL) 20 mg tablet, Take 1 tablet (20 mg total) by mouth daily, Disp: 90 tablet, Rfl: 3  •  meloxicam (MOBIC) 15 mg tablet, Take 1 tablet (15 mg total) by mouth daily, Disp: 90 tablet, Rfl: 3  •  OneTouch Ultra test strip, USE TO TEST BLOOD SUGAR ONCE A DAY, Disp: 100 strip, Rfl: 5  •  tiZANidine (ZANAFLEX) 4 mg tablet, Take 1 tablet (4 mg total) by mouth every 8 (eight) hours as needed for muscle spasms, Disp: 90 tablet, Rfl: 3  •  topiramate (TOPAMAX) 100 mg tablet, TAKE 1 TABLET BY MOUTH TWICE A DAY, Disp: 180 tablet, Rfl: 1  •  venlafaxine (EFFEXOR-XR) 75 mg 24 hr capsule, Take 1 capsule (75 mg total) by mouth daily, Disp: 90 capsule, Rfl: 0  •  Cholecalciferol (VITAMIN D3) 94279 units CAPS, Take 1 capsule by mouth once a week, Disp: , Rfl: 11  •  EPINEPHrine (EPIPEN) 0.3 mg/0.3 mL SOAJ, Inject 0.3 mL (0.3 mg total) into a muscle as needed for anaphylaxis, Disp: 2 each, Rfl: 5  •  ergocalciferol (Drisdol) 1.25 MG (12474 UT) capsule, Take 50,000 Units by mouth, Disp: , Rfl:   •  glimepiride (AMARYL) 1 mg tablet, TAKE 1 TABLET BY MOUTH EVERY DAY WITH BREAKFAST, Disp: 90 tablet, Rfl: 0  •  Nerve Stimulator (STANDARD TENS) ISAEL, by Does not apply route 4 (four) times a day as needed (MUSCLE SPASMS), Disp: 1 Device, Rfl: 0    Allergies   Allergen Reactions   • Bee Venom Anaphylaxis   • Other Other (See Comments)     Stainless steel and surgical steel  *Severe blistering*   • Morphine And Related Hives, Diarrhea and Vomiting   • Acetazolamide Rash   • Aleve [Naproxen Sodium] Hives   • Augmentin [Amoxicillin-Pot Clavulanate] Hives   • Gabapentin Hives   • Glyburide Hives   • Metformin And Related Hives   • Motrin [Ibuprofen] Hives   • Nortriptyline Hives   • Penicillins Hives   • Soy Lecithin [Lecithin] Hives   • Sulfa Antibiotics GI Intolerance   • Tradjenta [Linagliptin] Hives   • Tramadol Other (See Comments)     Sweating        Physical Exam:    /83   Pulse 79   Ht 5' 5" (1.651 m)   Wt 121 kg (267 lb)   LMP  (LMP Unknown)   BMI 44.43 kg/m²     Constitutional:normal, well developed, well nourished, alert, in no distress and non-toxic and no overt pain behavior. Eyes:anicteric  HEENT:grossly intact  Neck:supple, symmetric, trachea midline and no masses   Pulmonary:even and unlabored  Cardiovascular:No edema or pitting edema present  Skin:Normal without rashes or lesions and well hydrated  Psychiatric:Mood and affect appropriate  Neurologic:Cranial Nerves II-XII grossly intact  Musculoskeletal:ambulates with cane      Imaging  No orders to display         No orders of the defined types were placed in this encounter.

## 2023-08-17 ENCOUNTER — OFFICE VISIT (OUTPATIENT)
Dept: PAIN MEDICINE | Facility: CLINIC | Age: 68
End: 2023-08-17
Payer: MEDICARE

## 2023-08-17 VITALS
HEART RATE: 79 BPM | WEIGHT: 267 LBS | BODY MASS INDEX: 44.48 KG/M2 | DIASTOLIC BLOOD PRESSURE: 83 MMHG | SYSTOLIC BLOOD PRESSURE: 158 MMHG | HEIGHT: 65 IN

## 2023-08-17 DIAGNOSIS — M46.1 SACROILIITIS (HCC): ICD-10-CM

## 2023-08-17 DIAGNOSIS — G89.4 CHRONIC PAIN SYNDROME: Primary | ICD-10-CM

## 2023-08-17 DIAGNOSIS — M54.16 LUMBAR RADICULOPATHY: ICD-10-CM

## 2023-08-17 DIAGNOSIS — M54.12 CERVICAL RADICULOPATHY: ICD-10-CM

## 2023-08-17 DIAGNOSIS — M79.18 MYOFASCIAL PAIN SYNDROME: ICD-10-CM

## 2023-08-17 DIAGNOSIS — Z79.891 LONG-TERM CURRENT USE OF OPIATE ANALGESIC: ICD-10-CM

## 2023-08-17 DIAGNOSIS — Z96.89 SPINAL CORD STIMULATOR STATUS: ICD-10-CM

## 2023-08-17 DIAGNOSIS — F11.20 UNCOMPLICATED OPIOID DEPENDENCE (HCC): ICD-10-CM

## 2023-08-17 DIAGNOSIS — M47.816 LUMBAR SPONDYLOSIS: ICD-10-CM

## 2023-08-17 DIAGNOSIS — M48.062 NEUROGENIC CLAUDICATION DUE TO LUMBAR SPINAL STENOSIS: ICD-10-CM

## 2023-08-17 PROCEDURE — 99214 OFFICE O/P EST MOD 30 MIN: CPT | Performed by: NURSE PRACTITIONER

## 2023-08-17 RX ORDER — HYDROCODONE BITARTRATE AND ACETAMINOPHEN 5; 325 MG/1; MG/1
1 TABLET ORAL DAILY PRN
Qty: 30 TABLET | Refills: 0 | Status: SHIPPED | OUTPATIENT
Start: 2023-10-08

## 2023-08-17 RX ORDER — HYDROCODONE BITARTRATE AND ACETAMINOPHEN 5; 325 MG/1; MG/1
1 TABLET ORAL DAILY PRN
Qty: 30 TABLET | Refills: 0 | Status: SHIPPED | OUTPATIENT
Start: 2023-11-06

## 2023-08-17 RX ORDER — TIZANIDINE 4 MG/1
4 TABLET ORAL EVERY 8 HOURS PRN
Qty: 90 TABLET | Refills: 3 | Status: SHIPPED | OUTPATIENT
Start: 2023-08-17

## 2023-08-17 RX ORDER — HYDROCODONE BITARTRATE AND ACETAMINOPHEN 5; 325 MG/1; MG/1
1 TABLET ORAL DAILY PRN
Qty: 30 TABLET | Refills: 0 | Status: SHIPPED | OUTPATIENT
Start: 2023-09-09

## 2023-08-17 NOTE — PATIENT INSTRUCTIONS
Opioid Safety   WHAT YOU NEED TO KNOW:   An opioid medicine is used to treat pain. Examples are oxycodone, morphine, fentanyl, or codeine. Pain control and management may help you rest, heal, and return to your daily activities. You and your family will receive information about how to manage your pain at home. The instructions will include what to do if you have side effects as your pain is managed. You will get information on how to handle opioid medicine safely. You will also get suggestions on how to control pain without opioids. It is important to follow all instructions so your pain is managed effectively. DISCHARGE INSTRUCTIONS:   Call your local emergency number (911 in the ), or have someone else call if:   You have a seizure. You cannot be woken. You have trouble staying awake and your breathing is slow or shallow. Your speech is slurred, or you are confused. You are dizzy or stumble when you walk. Call your doctor, or have someone close to you call if:   You are extremely drowsy, or you have trouble staying awake or speaking. You have pale or clammy skin. You have blue fingernails or lips. Your heartbeat is slower than normal.    You cannot stop vomiting. You have questions or concerns about your condition or care. Use opioids safely:   Take prescribed opioids exactly as directed. Opioids come with directions based on the kind and how it is given. Talk to your healthcare provider or a pharmacist if you have any questions. Do not take more than the recommended amount. Too much can cause a life-threatening overdose. Do not continue to take it after your pain stops. You may develop tolerance. This means you keep needing higher doses to get the same effect. You may also develop opioid use disorder. This means you are not able to control your opioid use. Do not give opioids to others or take opioids that belong to someone else.   The kind or amount one person takes may not be right for another. The person you share them with may also be taking medicines that do not mix with opioids. He or she may drink alcohol or use other drugs that can cause life-threatening problems when mixed with opioids. Do not mix opioids with other medicines or alcohol. The combination can cause an overdose, or cause you to stop breathing. Alcohol, sleeping pills, and medicines such as antihistamines can make you sleepy. A combination with opioids can lead to a coma. Do not drive or operate heavy machinery after you use an opioid. You may feel drowsy or have trouble concentrating. You can injure yourself or others if you drive or use heavy machinery when you are not alert. Your provider or pharmacist can tell you how long to wait after a dose before you do these activities. Talk to your healthcare provider if you have any side effects. Side effects include nausea, sleepiness, itching, and trouble thinking clearly. Your provider may need to make changes to the kind or amount of opioid you are taking. He or she can also help you find ways to prevent or relieve side effects. Manage constipation:  Constipation is the most common side effect of opioid medicine. Constipation is when you have hard, dry bowel movements, or you go longer than usual between bowel movements. Tell your healthcare provider about all changes in your bowel movements while you are taking opioids. He or she may recommend laxative medicine to help you have a bowel movement. He or she may also change the kind of opioid you are taking, or change when you take it. The following are more ways you can prevent or relieve constipation:  Drink liquids as directed. You may need to drink extra liquids to help soften and move your bowels. Ask how much liquid to drink each day and which liquids are best for you. Eat high-fiber foods. This may help decrease constipation by adding bulk to your bowel movements.  High-fiber foods include fruits, vegetables, whole-grain breads and cereals, and beans. Your healthcare provider or dietitian can help you create a high-fiber meal plan. Your provider may also recommend a fiber supplement if you cannot get enough fiber from food. Exercise regularly. Regular physical activity can help stimulate your intestines. Walking is a good exercise to prevent or relieve constipation. Ask which exercises are best for you. Schedule a time each day to have a bowel movement. This may help train your body to have regular bowel movements. Bend forward while you are on the toilet to help move the bowel movement out. Sit on the toilet for at least 10 minutes, even if you do not have a bowel movement. Store opioids safely:   Store opioids where others cannot easily get them. Keep them in a locked cabinet or secure area. Do not  keep them in a purse or other bag you carry with you. A person may be looking for something else and find the opioids. Make sure opioids are stored out of the reach of children. A child can easily overdose on opioids. Opioids may look like candy to a small child. The best way to dispose of opioids: The laws vary by country and area. In the [facility], the best way is to return the opioids through a take-back program. This program is offered by the [employer] The following are options for using the program:  Take the opioids to a [facility] collection site. The site is often a law enforcement center. Call your local law enforcement center for scheduled take-back days in your area. You will be given information on where to go if the collection site is in a different location. Take the opioids to an approved pharmacy or hospital.  A pharmacy or hospital may be set up as a collection site. You will need to ask if it is a [employer] collection site if you were not directed there.  A pharmacy or doctor's office may not be able to take back opioids unless it is a [employer] site.    Use a mail-back system. This means you are given containers to put the opioids into. You will then mail them in the containers. Use a take-back drop box. This is a place to leave the opioids at any time. People and animals will not be able to get into the box. Your local law enforcement agency can tell you where to find a drop box in your area. Other safe ways to dispose of opioids: The medicine may come with disposal instructions. The instructions may vary depending on the brand of medicine you are using. Instructions may come in a Medication Guide, but not every medicine has one. You may instead get instructions from your pharmacy or doctor. Follow instructions carefully. The following are general guidelines to follow:  Find out if you can flush the opioid. Some opioids can be flushed down the toilet or poured into the sink. You will need to contact authorities in your area to see if this is an option for you. The FDA also offers a list of medicines that are safe to flush down the toilet. You can check the list if you cannot get the information for your local area. Ask your waste management company about rules for putting opioids in the trash. The company will be able to give you specific directions. Scratch out personal information on the original medicine label so it cannot be read. Then put it in the trash. Do not label the trash or put any information on it about the opioids. It should look like regular household trash so no one is tempted to look for the opioids. Keep the trash out of the reach of children and animals. Always make sure trash is secure. Talk to officials if you live in a facility. If you live in a nursing home or assisted living center, talk to an official. The person will know the rules for your area. Other ways to manage pain:   Ask your healthcare provider about non-opioid medicines to control pain.   Some medicines may even work better than opioids, depending on the cause of your pain. Nonprescription medicines include NSAIDs (such as ibuprofen) and acetaminophen. Prescription medicines include muscle relaxers, antidepressants, and steroids. Pain may be managed without any medicines. Some ways to relieve pain include massage, aromatherapy, or meditation. Physical or occupational therapy may also help. For more information:   Drug Enforcement Administration  [address] , [address]  Phone: 2- 869 - [phone]  Web Address: [employer]/drug_disposal/    [address] and Drug Administration  [address] , [address]  Phone: 7- 543 - 439-3353  Web Address: http://[employer]/  Follow up with your doctor or pain specialist as directed: You may need to have your dose adjusted. Your doctor or pain specialist can also help you find ways to manage pain without opioids. Write down your questions so you remember to ask them during your visits. © Copyright [employer] 2022 Information is for End User's use only and may not be sold, redistributed or otherwise used for commercial purposes. The above information is an  only. It is not intended as medical advice for individual conditions or treatments. Talk to your doctor, nurse or pharmacist before following any medical regimen to see if it is safe and effective for you.

## 2023-08-29 ENCOUNTER — HOSPITAL ENCOUNTER (OUTPATIENT)
Dept: RADIOLOGY | Facility: IMAGING CENTER | Age: 68
Discharge: HOME | End: 2023-08-29
Payer: MEDICARE

## 2023-08-29 VITALS — WEIGHT: 265 LBS | BODY MASS INDEX: 46.95 KG/M2 | HEIGHT: 63 IN

## 2023-08-29 DIAGNOSIS — Z13.820 SCREENING FOR OSTEOPOROSIS: ICD-10-CM

## 2023-08-29 DIAGNOSIS — Z12.31 SCREENING MAMMOGRAM FOR BREAST CANCER: ICD-10-CM

## 2023-08-29 DIAGNOSIS — Z78.0 POSTMENOPAUSAL: ICD-10-CM

## 2023-08-29 PROCEDURE — 77063 BREAST TOMOSYNTHESIS BI: CPT

## 2023-08-29 PROCEDURE — 77080 DXA BONE DENSITY AXIAL: CPT

## 2023-08-29 PROCEDURE — 77067 SCR MAMMO BI INCL CAD: CPT

## 2023-09-23 LAB — COLOGUARD RESULT REPORTABLE: NEGATIVE

## 2023-10-06 DIAGNOSIS — E78.5 HYPERLIPIDEMIA, UNSPECIFIED HYPERLIPIDEMIA TYPE: ICD-10-CM

## 2023-10-06 RX ORDER — ATORVASTATIN CALCIUM 10 MG/1
TABLET, FILM COATED ORAL
Qty: 90 TABLET | Refills: 1 | Status: SHIPPED | OUTPATIENT
Start: 2023-10-06

## 2023-10-08 DIAGNOSIS — F33.41 RECURRENT MAJOR DEPRESSIVE DISORDER, IN PARTIAL REMISSION (HCC): ICD-10-CM

## 2023-10-09 RX ORDER — VENLAFAXINE HYDROCHLORIDE 75 MG/1
75 CAPSULE, EXTENDED RELEASE ORAL DAILY
Qty: 90 CAPSULE | Refills: 0 | Status: SHIPPED | OUTPATIENT
Start: 2023-10-09

## 2023-10-14 DIAGNOSIS — I10 ESSENTIAL HYPERTENSION: ICD-10-CM

## 2023-10-16 RX ORDER — LISINOPRIL 20 MG/1
20 TABLET ORAL DAILY
Qty: 90 TABLET | Refills: 3 | Status: SHIPPED | OUTPATIENT
Start: 2023-10-16

## 2023-11-15 DIAGNOSIS — M46.1 SACROILIITIS (HCC): ICD-10-CM

## 2023-11-15 DIAGNOSIS — E11.21 TYPE 2 DIABETES MELLITUS WITH DIABETIC NEPHROPATHY, WITHOUT LONG-TERM CURRENT USE OF INSULIN (HCC): ICD-10-CM

## 2023-11-15 RX ORDER — MELOXICAM 15 MG/1
15 TABLET ORAL DAILY
Qty: 90 TABLET | Refills: 1 | Status: SHIPPED | OUTPATIENT
Start: 2023-11-15

## 2023-11-15 RX ORDER — GLIMEPIRIDE 1 MG/1
1 TABLET ORAL
Qty: 90 TABLET | Refills: 1 | Status: SHIPPED | OUTPATIENT
Start: 2023-11-15

## 2023-12-04 ENCOUNTER — TELEPHONE (OUTPATIENT)
Age: 68
End: 2023-12-04

## 2023-12-04 NOTE — TELEPHONE ENCOUNTER
Caller: Carly Lara    Doctor: dr Adina Matias    Reason for call: pt asking if she can schedule another procedure. Pt advised she is having sever pain down both of her sciatic nerves and she is barely able to walk.     Call back#: [phone]

## 2023-12-04 NOTE — TELEPHONE ENCOUNTER
S/W pt, pt requesting repeat injection but unable to tell if her pain is the same pain she had prior. Last injection was BL SIJ 2/20/23. Nurse advised pt she has an upcoming [facility] 12/8/23 with [facility] to do an exam and will advise on best injection to perform if appropriate. Pt verbalized understanding and appreciative of call.

## 2023-12-06 ENCOUNTER — OFFICE VISIT (OUTPATIENT)
Dept: INTERNAL MEDICINE CLINIC | Facility: OTHER | Age: 68
End: 2023-12-06
Payer: MEDICARE

## 2023-12-06 VITALS
HEART RATE: 73 BPM | TEMPERATURE: 97.8 F | WEIGHT: 274.8 LBS | HEIGHT: 63 IN | OXYGEN SATURATION: 98 % | BODY MASS INDEX: 48.69 KG/M2 | SYSTOLIC BLOOD PRESSURE: 118 MMHG | DIASTOLIC BLOOD PRESSURE: 70 MMHG

## 2023-12-06 DIAGNOSIS — E11.21 TYPE 2 DIABETES MELLITUS WITH DIABETIC NEPHROPATHY, WITHOUT LONG-TERM CURRENT USE OF INSULIN (HCC): ICD-10-CM

## 2023-12-06 DIAGNOSIS — Z23 ENCOUNTER FOR IMMUNIZATION: Primary | ICD-10-CM

## 2023-12-06 DIAGNOSIS — Z72.0 TOBACCO ABUSE: ICD-10-CM

## 2023-12-06 DIAGNOSIS — N18.30 STAGE 3 CHRONIC KIDNEY DISEASE, UNSPECIFIED WHETHER STAGE 3A OR 3B CKD (HCC): ICD-10-CM

## 2023-12-06 DIAGNOSIS — I10 PRIMARY HYPERTENSION: ICD-10-CM

## 2023-12-06 DIAGNOSIS — Z87.891 PERSONAL HISTORY OF NICOTINE DEPENDENCE: ICD-10-CM

## 2023-12-06 DIAGNOSIS — Z12.2 SCREENING FOR LUNG CANCER: ICD-10-CM

## 2023-12-06 DIAGNOSIS — J45.901 ASTHMA EXACERBATION IN COPD: ICD-10-CM

## 2023-12-06 DIAGNOSIS — M06.09 RHEUMATOID ARTHRITIS OF MULTIPLE SITES WITH NEGATIVE RHEUMATOID FACTOR (HCC): ICD-10-CM

## 2023-12-06 DIAGNOSIS — J44.1 ASTHMA EXACERBATION IN COPD: ICD-10-CM

## 2023-12-06 DIAGNOSIS — F33.41 RECURRENT MAJOR DEPRESSIVE DISORDER, IN PARTIAL REMISSION (HCC): ICD-10-CM

## 2023-12-06 DIAGNOSIS — M54.16 LUMBAR RADICULOPATHY: ICD-10-CM

## 2023-12-06 DIAGNOSIS — E78.49 OTHER HYPERLIPIDEMIA: ICD-10-CM

## 2023-12-06 PROCEDURE — G0008 ADMIN INFLUENZA VIRUS VAC: HCPCS

## 2023-12-06 PROCEDURE — 90662 IIV NO PRSV INCREASED AG IM: CPT

## 2023-12-06 PROCEDURE — 99214 OFFICE O/P EST MOD 30 MIN: CPT | Performed by: NURSE PRACTITIONER

## 2023-12-06 RX ORDER — NICOTINE 21 MG/24HR
1 PATCH, TRANSDERMAL 24 HOURS TRANSDERMAL EVERY 24 HOURS
Qty: 42 PATCH | Refills: 0 | Status: SHIPPED | OUTPATIENT
Start: 2023-12-06

## 2023-12-06 RX ORDER — NICOTINE 21 MG/24HR
1 PATCH, TRANSDERMAL 24 HOURS TRANSDERMAL EVERY 24 HOURS
Qty: 14 PATCH | Refills: 0 | Status: SHIPPED | OUTPATIENT
Start: 2023-12-06

## 2023-12-06 NOTE — ASSESSMENT & PLAN NOTE
Well controlled with lisinopril. Continue current regimen -   Continue to monitor blood pressure at home. Goal BP is < 130/80. Contact our office for consistent elevations. Recommend low sodium diet. Exercise 30 minutes 5 times a week as tolerated.   Recommend yearly eye exam.

## 2023-12-06 NOTE — ASSESSMENT & PLAN NOTE
Encouraged smoking cessation   Script given for Lung CT screening   Agreeable to start Nicotine patches

## 2023-12-06 NOTE — PROGRESS NOTES
Assessment/Plan:    Type 2 diabetes mellitus with diabetic nephropathy, without long-term current use of insulin ([address])    Lab Results   Component Value Date    HGBA1C 6.4 05/16/2023   Well controlled. Continue on Glimepiride. Patient is to continue to work on diet and exercise. Limit sugars and carbohydrate intake. Avoid soda, juice, sweets, cookies, desserts, pasta, bread. Eat more whole grains, exercised 30 min of cardio at least 3 times a week. Also recommended daily foot exams to check for sores, and recommended yearly eye exams. Asthma exacerbation in COPD ([address])  Well controlled on albuterol     HTN (hypertension)  Well controlled with lisinopril. Continue current regimen -   Continue to monitor blood pressure at home. Goal BP is < 130/80. Contact our office for consistent elevations. Recommend low sodium diet. Exercise 30 minutes 5 times a week as tolerated. Recommend yearly eye exam.       Lumbar radiculopathy  Continues to follow pain management  Controlled with tizanidine and Topamax    Rheumatoid arthritis of multiple sites with negative rheumatoid factor ([address] )  Currently follows rheumatology     Stage 3 chronic kidney disease, unspecified whether stage 3a or 3b CKD ([address] )  Lab Results   Component Value Date    EGFR 56 05/12/2023    EGFR 50 09/21/2022    EGFR 60 09/20/2021    CREATININE 1.04 05/12/2023    CREATININE 1.15 09/21/2022    CREATININE 0.99 09/20/2021   Encouraged good oral hydration   Avoid nephrotoxins     Tobacco abuse  Encouraged smoking cessation   Script given for Lung CT screening   Agreeable to start Nicotine patches     Recurrent major depressive disorder, in partial remission ([address])  -controlled with Effexor    Other hyperlipidemia  -Controlled on Lipitor  -Due for lipid panel   -ASCVD 20.9%        Falls Plan of Care: balance, strength, and gait training instructions were provided. Tobacco Cessation Counseling: Tobacco cessation counseling was provided.  The patient is sincerely urged to quit consumption of tobacco. She is ready to quit tobacco. Medication options and side effects of medication discussed. Patient agreed to medication. Nicotine patch was prescribed. Lung Cancer Screening Shared Decision Making: I discussed with her that she is a candidate for lung cancer CT screening. The following Shared Decision-Making points were covered:  Benefits of screening were discussed, including the rates of reduction in death from lung cancer and other causes. Harms of screening were reviewed, including false positive tests, radiation exposure levels, risks of invasive procedures, risks of complications of screening, and risk of overdiagnosis. I counseled on the importance of adherence to annual lung cancer LDCT screening, impact of co-morbidities, and ability or willingness to undergo diagnosis and treatment. I counseled on the importance of maintaining abstinence as a former smoker or was counseled on the importance of smoking cessation if a current smoker    Review of Eligibility Criteria: She meets all of the criteria for Lung Cancer Screening.   - She is 79 y.o.   - She has 20 pack year tobacco history and is a current smoker or has quit within the past 15 years  - She presents no signs or symptoms of lung cancer    After discussion, the patient decided to elect lung cancer screening.            Diagnoses and all orders for this visit:    Encounter for immunization  -     influenza vaccine, high-dose, PF 0.7 mL (FLUZONE HIGH-DOSE)    Tobacco abuse  -     CT lung screening program; Future  -     nicotine (NICODERM CQ) 21 mg/24 hr TD 24 hr patch; Place 1 patch on the skin over 24 hours every 24 hours  -     nicotine (NICODERM CQ) 14 mg/24hr TD 24 hr patch; Place 1 patch on the skin over 24 hours every 24 hours  -     nicotine (NICODERM CQ) 7 mg/24hr TD 24 hr patch; Place 1 patch on the skin over 24 hours every 24 hours    Screening for lung cancer  -     CT lung screening program; Future    Personal history of nicotine dependence  -     CT lung screening program; Future    Type 2 diabetes mellitus with diabetic nephropathy, without long-term current use of insulin (HCC)  -     CBC and differential; Future  -     Comprehensive metabolic panel; Future  -     Hemoglobin A1C; Future  -     Lipid panel; Future  -     Albumin / creatinine urine ratio; Future    Asthma exacerbation in COPD     Primary hypertension    Lumbar radiculopathy    Rheumatoid arthritis of multiple sites with negative rheumatoid factor (HCC)    Stage 3 chronic kidney disease, unspecified whether stage 3a or 3b CKD (HCC)    Recurrent major depressive disorder, in partial remission ([address])    Other hyperlipidemia          Subjective:      Patient ID: Katharine Kang is a 79 y.o. female. Presents today For follow up. She reports that she will get fasting blood work tomorrow.      Type 2 diabetes mellitus- -140s, hemoglobin A1c 6.4, well-controlled on glimepiride, up-to-date with eye exam     HTN- well controlled on lisinopril, denies side effects with medication    Depression-patient reports she is currently well controlled on Effexor    Hyperlipidemia-well-controlled on Lipitor, ASCVD 20.9%, denies side effects with this medication    Lumbar spinal stenosis-currently follows pain management, has spinal stimulator, had recent MRI due to increased falls, recently started using a cane for ambulation, was working with PT but does not feel like it is helping     Asthma exacerbation in COPD - controlled with albuterol as needed, no recent excerbations     Polymyalgia rheumatica-currently follows rheumatology with [facility]    Tobacco abuse- one pack a day for about 10 years, has tried chanix in the past with the side effect vomiting           The following portions of the patient's history were reviewed and updated as appropriate: allergies, current medications, past family history, past medical history, past social history, past surgical history, and problem list.    Review of Systems   Constitutional:  Negative for activity change, appetite change, chills, diaphoresis and fever. HENT:  Negative for congestion, ear discharge, ear pain, postnasal drip, rhinorrhea, sinus pressure, sinus pain and sore throat. Eyes:  Negative for pain, discharge, itching and visual disturbance. Respiratory:  Negative for cough, chest tightness, shortness of breath and wheezing. Cardiovascular:  Negative for chest pain, palpitations and leg swelling. Gastrointestinal:  Negative for abdominal pain, constipation, diarrhea, nausea and vomiting. Endocrine: Negative for polydipsia, polyphagia and polyuria. Genitourinary:  Negative for difficulty urinating, dysuria and urgency. Musculoskeletal:  Positive for back pain. Negative for arthralgias and neck pain. Skin:  Negative for rash and wound. Neurological:  Negative for dizziness, weakness, numbness and headaches.          Past Medical History:   Diagnosis Date    Allergic reaction to bee sting     last assessed - 13Jun2016    Asthma     Chronic narcotic dependence ([address])     Chronic pain syndrome     Depression     Diabetes mellitus ([address])     Esophageal reflux     Hyperlipidemia     Hypertension     Hypovitaminosis D 4/12/2018    Lumbar radiculopathy     Lyme disease     last assessed - 28Jun2016    Obesity     Opioid dependence ([address])     PPD positive     had treatment w/ INH 15 years ago    Vitamin D deficiency          Current Outpatient Medications:     albuterol (Proventil HFA) 90 mcg/act inhaler, Inhale 2 puffs every 6 (six) hours as needed for wheezing, Disp: 6.7 g, Rfl: 5    atorvastatin (LIPITOR) 10 mg tablet, TAKE 1 TABLET BY MOUTH EVERY DAY, Disp: 90 tablet, Rfl: 1    Blood Glucose Monitoring Suppl (ONE TOUCH ULTRA MINI) w/Device KIT, by Does not apply route daily, Disp: 1 each, Rfl: 0    Cholecalciferol (VITAMIN D3) 67692 units CAPS, Take 1 capsule by mouth once a week, Disp: , Rfl: 11    EPINEPHrine (EPIPEN) 0.3 mg/0.3 mL SOAJ, Inject 0.3 mL (0.3 mg total) into a muscle as needed for anaphylaxis, Disp: 2 each, Rfl: 5    glimepiride (AMARYL) 1 mg tablet, Take 1 tablet (1 mg total) by mouth daily with breakfast, Disp: 90 tablet, Rfl: 1    HYDROcodone-acetaminophen (Norco) 5-325 mg per tablet, Take 1 tablet by mouth daily as needed for pain Max Daily Amount: 1 tablet Do not start before November 6, 2023., Disp: 30 tablet, Rfl: 0    lisinopril (ZESTRIL) 20 mg tablet, TAKE 1 TABLET BY MOUTH EVERY DAY, Disp: 90 tablet, Rfl: 3    meloxicam (MOBIC) 15 mg tablet, Take 1 tablet (15 mg total) by mouth daily, Disp: 90 tablet, Rfl: 1    Nerve Stimulator (STANDARD TENS) ISAEL, by Does not apply route 4 (four) times a day as needed (MUSCLE SPASMS), Disp: 1 Device, Rfl: 0    nicotine (NICODERM CQ) 14 mg/24hr TD 24 hr patch, Place 1 patch on the skin over 24 hours every 24 hours, Disp: 14 patch, Rfl: 0    nicotine (NICODERM CQ) 21 mg/24 hr TD 24 hr patch, Place 1 patch on the skin over 24 hours every 24 hours, Disp: 42 patch, Rfl: 0    nicotine (NICODERM CQ) 7 mg/24hr TD 24 hr patch, Place 1 patch on the skin over 24 hours every 24 hours, Disp: 14 patch, Rfl: 0    OneTouch Ultra test strip, USE TO TEST BLOOD SUGAR ONCE A DAY, Disp: 100 strip, Rfl: 5    tiZANidine (ZANAFLEX) 4 mg tablet, Take 1 tablet (4 mg total) by mouth every 8 (eight) hours as needed for muscle spasms, Disp: 90 tablet, Rfl: 3    topiramate (TOPAMAX) 100 mg tablet, TAKE 1 TABLET BY MOUTH TWICE A DAY, Disp: 180 tablet, Rfl: 1    venlafaxine (EFFEXOR-XR) 75 mg 24 hr capsule, TAKE 1 CAPSULE BY MOUTH EVERY DAY, Disp: 90 capsule, Rfl: 0    ergocalciferol (Drisdol) 1.25 MG (96565 UT) capsule, Take 50,000 Units by mouth (Patient not taking: Reported on 12/6/2023), Disp: , Rfl:     Allergies   Allergen Reactions    Bee Venom Anaphylaxis    Other Other (See Comments)     Stainless steel and surgical steel  *Severe blistering*    Morphine And Related Hives, Diarrhea and Vomiting    Acetazolamide Rash    Aleve [Naproxen Sodium] Hives    Augmentin [Amoxicillin-Pot Clavulanate] Hives    Gabapentin Hives    Glyburide Hives    Metformin And Related Hives    Motrin [Ibuprofen] Hives    Nortriptyline Hives    Penicillins Hives    Soy Lecithin [Lecithin] Hives    Sulfa Antibiotics GI Intolerance    Tradjenta [Linagliptin] Hives    Tramadol Other (See Comments)     Sweating        Social History   Past Surgical History:   Procedure Laterality Date    APPENDECTOMY      CHOLECYSTECTOMY      JOINT REPLACEMENT      R knee, B/l total hip    IA REVJ/RMVL IMPLANTED SPINAL NEUROSTIM GENERATOR Left 12/13/2016    Procedure: REPLACEMENT BUTTOCK SPINAL CORD STIMULATOR IPG;  Surgeon: Migue Gaming MD;  Location:  MAIN OR;  Service: Neurosurgery    SHOULDER SURGERY      Resolved - 1992    SPINAL CORD STIMULATOR IMPLANT      spinal stereotaxis stimulation of cord    TOTAL HIP ARTHROPLASTY Bilateral      Family History   Problem Relation Age of Onset    Diabetes Mother         Diabetes mellitus    Cancer Mother 61        uterine    Cancer Father 54        brain    Cervical cancer Sister 54    No Known Problems Sister     No Known Problems Sister     No Known Problems Daughter     Diabetes Maternal Grandmother     No Known Problems Maternal Grandfather     No Known Problems Paternal Grandmother     No Known Problems Paternal Grandfather     Breast cancer Paternal Uncle 54    Cancer Paternal Uncle 66    Brain cancer Family     Breast cancer Family     Cervical cancer Family     Diabetes Family         Diabetes mellitus    Hypertension Family     Ovarian cancer Family     Stroke Family         Stroke complications    No Known Problems Brother     No Known Problems Maternal Aunt     No Known Problems Maternal Aunt     No Known Problems Maternal Aunt        Objective:  /70   Pulse 73   Temp 97.8 °F (36.6 °C) (Tympanic)   Ht 5' 3" (1.6 m)   Wt 125 kg (274 lb 12.8 oz)   LMP  (LMP Unknown)   SpO2 98% Comment: ra  BMI 48.68 kg/m²     No results found for this or any previous visit (from the past 1344 hour(s)). Physical Exam  Constitutional:       General: She is not in acute distress. Appearance: She is well-developed. She is not diaphoretic. HENT:      Head: Normocephalic and atraumatic. Right Ear: External ear normal.      Left Ear: External ear normal.      Nose: Nose normal.      Mouth/Throat:      Mouth: Mucous membranes are moist.      Pharynx: No oropharyngeal exudate or posterior oropharyngeal erythema. Eyes:      General:         Right eye: No discharge. Left eye: No discharge. Conjunctiva/sclera: Conjunctivae normal.      Pupils: Pupils are equal, round, and reactive to light. Neck:      Thyroid: No thyromegaly. Cardiovascular:      Rate and Rhythm: Normal rate and regular rhythm. Heart sounds: Normal heart sounds. No murmur heard. No friction rub. No gallop. Pulmonary:      Effort: Pulmonary effort is normal. No respiratory distress. Breath sounds: Normal breath sounds. No stridor. No wheezing or rales. Abdominal:      General: Bowel sounds are normal. There is no distension. Palpations: Abdomen is soft. Tenderness: There is no abdominal tenderness. Musculoskeletal:      Cervical back: Normal range of motion and neck supple. Comments: Ambulates with cane    Lymphadenopathy:      Cervical: No cervical adenopathy. Skin:     General: Skin is warm and dry. Findings: No erythema or rash. Neurological:      Mental Status: She is alert and oriented to person, place, and time. Psychiatric:         Behavior: Behavior normal.         Thought Content:  Thought content normal.         Judgment: Judgment normal.

## 2023-12-06 NOTE — ASSESSMENT & PLAN NOTE
Lab Results   Component Value Date    HGBA1C 6.4 05/16/2023   Well controlled. Continue on Glimepiride. Patient is to continue to work on diet and exercise. Limit sugars and carbohydrate intake. Avoid soda, juice, sweets, cookies, desserts, pasta, bread. Eat more whole grains, exercised 30 min of cardio at least 3 times a week. Also recommended daily foot exams to check for sores, and recommended yearly eye exams.

## 2023-12-07 NOTE — PROGRESS NOTES
Assessment:  1. Chronic pain syndrome    2. Uncomplicated opioid dependence ([address])    3. Long-term current use of opiate analgesic    4. Sacroiliitis ([address])    5. Myofascial pain syndrome    6. Lumbar radiculopathy        Plan:  I will increase Norco 5/325 mg to 1 tablet 2-3 times a day as needed for pain #75 tablets for 30 days. Patient was provided with a 1 month RX today. [place] Prescription Drug Monitoring Program report was reviewed and was appropriate     A urine drug screen was collected at today's office visit as part of our medication management protocol. The point of care testing results were appropriate for what was being prescribed. The specimen will be sent for confirmatory testing. The drug screen is medically necessary because the patient is either dependent on opioid medication or is being considered for opioid medication therapy and the results could impact ongoing or future treatment. The drug screen is to evaluate for the presences or absence of prescribed, non-prescribed, and/or illicit drugs/substances. There are risks associated with opioid medications, including dependence, addiction and tolerance. The patient understands and agrees to use these medications only as prescribed. Potential side effects of the medications include, but are not limited to, constipation, drowsiness, addiction, impaired judgment and risk of fatal overdose if not taken as prescribed. The patient was warned against driving while taking sedation medications. Sharing medications is a felony. At this point in time, the patient is showing no signs of addiction, abuse, diversion or suicidal ideation. 2.  Based on patient report and physical exam, the patient's symptomatology does seem to be consistent with sacroiliac mediated pain from sacroiliitis. We will schedule the patient for a bilateral SIJ injection to decrease any inflammatory component of the patient's pain symptoms.   Complete risks and benefits including bleeding, infection, tissue reaction, nerve injury and allergic reaction were discussed. The patient was agreeable and verbalized an understanding  3. Patient declines Medrol Dosepak secondary to diabetic side effects  4. Patient may benefit from a trial of duloxetine for both mood and pain. She will discuss this with her PCP  5. Patient to continue tizanidine 4 mg every 8 hours as needed as prescribed. This medication was refilled today  6. Patient provided with information regarding cannabis which was provided. I did explain that per our opioid policy,  should she decide to move forward with medical cannabis, she will need to let our office know and we will wean her off her opioid medications verbalized  7. Encourage patient to establish with pain management as recommended by orthopedic surgery  8. Patient may continue meloxicam as prescribed  9. Patient will follow-up in 4 weeks or sooner if needed    History of Present Illness: The patient is a 79 y.o. female with a history of diabetes since [address] [place] [address] spinal cord stimulator last seen on 8/17/2023 who presents for a follow up office visit in regards to chronic lumbosacral back rob that radiates into the lower extremities. n endorse bladder incontinence that she denies any fecal incontinence or saddle anesthesia. Patient is in significant pain today. She denies any inciting event or trauma. She has been evaluated orthopedic surgery who recommended weight loss and smoking cessation. She is not finding relief with her spinal cord stimulator device. She has failed lumbar RFA and epidural steroid injections. She states that she does get 50% improvement from bilateral SI joint injections for a few months and would like to repeat injection    She rates her pain a 10 out of 10 on the numeric pain rating scale. Her pain is constant and is described as sharp.   She continues on Norco 5/325 mg 1 tablet daily as needed, meloxicam 15 mg as needed and tizanidine 4 mg as needed without relief. Pain Contract Signed: 6/22/2023  Last Urine Drug Screen: 12/8/2023  Diane/SOAPP 6/22/2023  Tow per pt. 12/8/2023    I have personally reviewed and/or updated the patient's past medical history, past surgical history, family history, social history, current medications, allergies, and vital signs today. Review of Systems:    Review of Systems   Respiratory:  Negative for shortness of breath. Cardiovascular:  Negative for chest pain. Gastrointestinal:  Negative for constipation, diarrhea, nausea and vomiting. Musculoskeletal:  Positive for back pain and gait problem. Negative for arthralgias, joint swelling and myalgias. Skin:  Negative for rash. Neurological:  Negative for dizziness, seizures and weakness. All other systems reviewed and are negative.         Past Medical History:   Diagnosis Date    Allergic reaction to bee sting     last assessed - 13Jun2016    Asthma     Chronic narcotic dependence ([address])     Chronic pain syndrome     Depression     Diabetes mellitus ([address])     Esophageal reflux     Hyperlipidemia     Hypertension     Hypovitaminosis D 4/12/2018    Lumbar radiculopathy     Lyme disease     last assessed - 28Jun2016    Obesity     Opioid dependence ([address])     PPD positive     had treatment w/ [facility] 15 years ago    Vitamin D deficiency        Past Surgical History:   Procedure Laterality Date    APPENDECTOMY      CHOLECYSTECTOMY      JOINT REPLACEMENT      R knee, B/l total hip    IN REVJ/RMVL IMPLANTED SPINAL NEUROSTIM GENERATOR Left 12/13/2016    Procedure: REPLACEMENT BUTTOCK SPINAL CORD STIMULATOR IPG;  Surgeon: Sherman Salas MD;  Location:  MAIN OR;  Service: Neurosurgery    SHOULDER SURGERY      Resolved - 1992    SPINAL CORD STIMULATOR IMPLANT      spinal stereotaxis stimulation of cord    TOTAL HIP ARTHROPLASTY Bilateral        Family History   Problem Relation Age of Onset    Diabetes Mother         Diabetes mellitus Cancer Mother 61        uterine    Cancer Father 54        brain    Cervical cancer Sister 54    No Known Problems Sister     No Known Problems Sister     No Known Problems Daughter     Diabetes Maternal Grandmother     No Known Problems Maternal Grandfather     No Known Problems Paternal Grandmother     No Known Problems Paternal Grandfather     Breast cancer Paternal Uncle 54    Cancer Paternal Uncle 66    Brain cancer Family     Breast cancer Family     Cervical cancer Family     Diabetes Family         Diabetes mellitus    Hypertension Family     Ovarian cancer Family     Stroke Family         Stroke complications    No Known Problems Brother     No Known Problems Maternal Aunt     No Known Problems Maternal Aunt     No Known Problems Maternal Aunt        Social History     Occupational History    Not on file   Tobacco Use    Smoking status: Every Day     Packs/day: 1.00     Years: 10.00     Total pack years: 10.00     Types: Cigarettes     Start date: 2013    Smokeless tobacco: Never   Vaping Use    Vaping Use: Never used   Substance and Sexual Activity    Alcohol use: No     Alcohol/week: 0.0 standard drinks of alcohol    Drug use: No     Comment: Denied history of drug use    Sexual activity: Not on file         Current Outpatient Medications:     albuterol (Proventil HFA) 90 mcg/act inhaler, Inhale 2 puffs every 6 (six) hours as needed for wheezing, Disp: 6.7 g, Rfl: 5    atorvastatin (LIPITOR) 10 mg tablet, TAKE 1 TABLET BY MOUTH EVERY DAY, Disp: 90 tablet, Rfl: 1    Blood Glucose Monitoring Suppl (ONE TOUCH ULTRA MINI) w/Device KIT, by Does not apply route daily, Disp: 1 each, Rfl: 0    Cholecalciferol (VITAMIN D3) 46067 units CAPS, Take 1 capsule by mouth once a week, Disp: , Rfl: 11    EPINEPHrine (EPIPEN) 0.3 mg/0.3 mL SOAJ, Inject 0.3 mL (0.3 mg total) into a muscle as needed for anaphylaxis, Disp: 2 each, Rfl: 5    glimepiride (AMARYL) 1 mg tablet, Take 1 tablet (1 mg total) by mouth daily with breakfast, Disp: 90 tablet, Rfl: 1    HYDROcodone-acetaminophen (Norco) 5-325 mg per tablet, Take 1 tab 2-3 times a day PRN pain, Disp: 75 tablet, Rfl: 0    lisinopril (ZESTRIL) 20 mg tablet, TAKE 1 TABLET BY MOUTH EVERY DAY, Disp: 90 tablet, Rfl: 3    meloxicam (MOBIC) 15 mg tablet, Take 1 tablet (15 mg total) by mouth daily, Disp: 90 tablet, Rfl: 1    Nerve Stimulator (STANDARD TENS) ISAEL, by Does not apply route 4 (four) times a day as needed (MUSCLE SPASMS), Disp: 1 Device, Rfl: 0    nicotine (NICODERM CQ) 14 mg/24hr TD 24 hr patch, Place 1 patch on the skin over 24 hours every 24 hours, Disp: 14 patch, Rfl: 0    nicotine (NICODERM CQ) 21 mg/24 hr TD 24 hr patch, Place 1 patch on the skin over 24 hours every 24 hours, Disp: 42 patch, Rfl: 0    nicotine (NICODERM CQ) 7 mg/24hr TD 24 hr patch, Place 1 patch on the skin over 24 hours every 24 hours, Disp: 14 patch, Rfl: 0    OneTouch Ultra test strip, USE TO TEST BLOOD SUGAR ONCE A DAY, Disp: 100 strip, Rfl: 5    tiZANidine (ZANAFLEX) 4 mg tablet, Take 1 tablet (4 mg total) by mouth every 8 (eight) hours as needed for muscle spasms, Disp: 90 tablet, Rfl: 3    topiramate (TOPAMAX) 100 mg tablet, TAKE 1 TABLET BY MOUTH TWICE A DAY, Disp: 180 tablet, Rfl: 1    venlafaxine (EFFEXOR-XR) 75 mg 24 hr capsule, TAKE 1 CAPSULE BY MOUTH EVERY DAY, Disp: 90 capsule, Rfl: 0    ergocalciferol (Drisdol) 1.25 MG (51381 UT) capsule, Take 50,000 Units by mouth (Patient not taking: Reported on 12/6/2023), Disp: , Rfl:     Allergies   Allergen Reactions    Bee Venom Anaphylaxis    Other Other (See Comments)     Stainless steel and surgical steel  *Severe blistering*    Morphine And Related Hives, Diarrhea and Vomiting    Acetazolamide Rash    Aleve [Naproxen Sodium] Hives    Augmentin [Amoxicillin-Pot Clavulanate] Hives    Gabapentin Hives    Glyburide Hives    Metformin And Related Hives    Motrin [Ibuprofen] Hives    Nortriptyline Hives    Penicillins Hives    Soy Lecithin [Lecithin] Hives    Sulfa Antibiotics GI Intolerance    Tradjenta [Linagliptin] Hives    Tramadol Other (See Comments)     Sweating        Physical Exam:    /61   Pulse 73   Wt 124 kg (273 lb)   LMP  (LMP Unknown)   BMI 48.36 kg/m²     Constitutional:normal, well developed, well nourished, alert, in no distress and non-toxic and no overt pain behavior. Eyes:anicteric  HEENT:grossly intact  Neck:supple, symmetric, trachea midline and no masses   Pulmonary:even and unlabored  Cardiovascular:No edema or pitting edema present  Skin:Normal without rashes or lesions and well hydrated  Psychiatric:Mood and affect appropriate  Neurologic:Cranial Nerves II-XII grossly intact  Musculoskeletal:antalgic, ambulating with a cane. Bilateral lumbar paraspinal musculature tender to palpation. Bilateral lower extremity strength 5 out of 5 in all muscle ribs. Sensation intact light touch in L3-S1 dermatomes bilaterally. Negative straight leg raise bilaterally. Positive Juan M's, Gaenslen's, and AP compression bilaterally. Positive lumbar facet loading maneuvers        Imaging  [place] spine and pain procedure    (Results Pending)     Narrative & Impression  CT LUMBAR SPINE     INDICATION:   Progressive lumbar radiculopathy and lower extremity weakness. COMPARISON: CT lumbar spine study of November 26, 2017. TECHNIQUE:  Contiguous axial images through the lumbar spine were obtained. Sagittal and coronal reconstructions were performed. Rotational 3D reformatted images of the lumbar spine were created at the imaging workstation and sent to PACS. IV Contrast: None     Radiation dose length product (DLP) for this visit:  858 mGy-cm . This examination, like all CT scans performed in the [facility], was performed utilizing techniques to minimize radiation dose exposure, including the use of iterative   reconstruction and automated exposure control. IMAGE QUALITY:  Diagnostic.      FINDINGS: ALIGNMENT:  There are 5 lumbar type vertebral bodies. Normal alignment of the lumbar spine. No spondylolysis or spondylolisthesis. VERTEBRAE:  No fracture. No lytic or blastic lesion. Mild diffuse osteopenia. DEGENERATIVE CHANGES: There is moderate congenital spinal stenosis throughout the visualized thoracolumbar spine with superimposed degenerative changes as discussed below. S1 superior endplate degenerative sclerosis. Scattered intervertebral disc gas noted at L1-L2 with posterior disc space narrowing. Degenerative disc gas at L5-S1. Lower Thoracic spine: Degenerative disc of gas and the space narrowing at T11-T12 with an inferior endplate Schmorl's node deformity at T11. Additional findings of marked facet arthropathy and buckling of the calcified ligamentum flavum at this level   with a mild disc bulge results in moderate to severe narrowing of the spinal canal in the transverse dimension. There is severe bilateral foraminal stenosis. Schmorl's node deformities are noted at the T12-L1 level with facet arthropathy and mild spinal stenosis. There is severe bilateral foraminal stenosis. L1-2: Posterior disc base narrowing, intervertebral disc [place], bilateral facet arthropathy and buckling of the calcified ligamentum flavum. There is a disc bulge, eccentric to the right. Severe spinal stenosis is noted at this level with severe   bilateral foraminal stenosis. The degenerative changes have progressed since the 2020 study. L2-3: Relatively preserved disc height. Tiny Schmorl's node deformities. Left far lateral bridging osteophytosis. A circumferential disc bulge is noted with moderate bilateral facet arthropathy and buckling of the calcified ligamentum flavum resulting in   moderate to severe spinal stenosis. There is moderate to severe bilateral foraminal stenosis. L3-4: Preserved disc height with far lateral osteophytosis, right greater than left.  A circumferential disc bulge is noted with severe bilateral facet arthropathy and buckling of the calcified ligamentum flavum. There is severe spinal stenosis and   circumferential effacement/compression of the thecal sac. L4-5: Mild disc space narrowing and minimal far lateral osteophytosis. Circumferential disc bulge with severe facet arthropathy and suggestion of buckling of the ligamentum flavum. There is suggestion of moderate spinal stenosis and bilateral foraminal   narrowing. There is mild progression of spinal canal and foraminal stenosis at this level. L5-S1: Relatively preserved disc height. Intravertebral discal gas is new when compared to the prior study. Right greater than left far lateral osteophytosis. Moderate bilateral facet arthropathy. Mild spinal stenosis and bilateral foraminal   encroachment. No significant interval change. PARASPINAL SOFT TISSUES: Surgical clips from prior cholecystectomy. MISCELLANEOUS: Spinal stimulator device with the lead tips identified at the approximate T9 level. The generator pack for the stimulator is projecting over the left gluteal soft tissues. Bilateral total hip arthroplasties and cholecystectomy clips in the right upper quadrant. IMPRESSION:     Partially visualized spinal stimulator device with the generator in the left gluteal region and lead electrodes identified at the approximate T9 level. Congenital and progressive superimposed degenerative disease throughout the lumbar spine with multiple levels of severe spinal canal and foraminal stenosis as discussed above. Moderate to severe spinal canal and bilateral foraminal stenosis in the visualized lower thoracic spine at T11-T12. Consider dedicated MR imaging of the thoracic spine to assess the distal thoracic cord and conus if there the spinal stimulator device is MR conditional and  meets MR eligibility criteria.      Consultation with the spine surgical service is recommended for the above-mentioned findings.      I personally discussed this study with Amy Driscoll on 5/15/2023 11:43 AM.               Orders Placed This Encounter   Procedures    [place] spine and pain procedure    MM ALL_Prescribed Meds and Special Instructions    MM DT_Alprazolam Definitive Test    MM DT_Amphetamine Definitive Test    MM DT_Aripiprazole Definitive Test    MM DT_Bath Salts Definitive Test    MM DT_Buprenorphine Definitive Test    MM DT_Butalbital Definitive Test    MM DT_Clonazepam Definitive Test    MM DT_Clozapine Definitive Test    MM DT_Cocaine Definitive Test    MM DT_Codeine Definitive Test    MM DT_Desipramine Definitive Test    MM DT_Dextromethorphan Definitive Test    MM Diazepam Definitive Test    MM DT_Ethyl Glucuronide/Ethyl Sulfate Definitive Test    MM DT_Fentanyl Definitive Test    MM DT_Haloperidol Definitive Test    MM DT_Heroin Definitive Test    MM DT_Hydrocodone Definitive Test    MM DT_Hydromorphone Definitive Test    MM DT_Imipramine Definitive Test    MM DT_Kratom Definitive Test    MM DT_Levorphanol Definitive Test    MM Lorazepam Definitive Test    MM DT_MDMA Definitive Test    MM DT_Meperidine Definitive Test    MM DT_Methadone Definitive Test    MM DT_Methamphetamine Definitive Test    MM DT_Morphine Definitive Test    MM DT_Olanzapine Definitive Test    MM DT_Oxazepam Definitive Test    MM DT_Oxycodone Definitive Test    MM DT_Oxymorphone Definitive Test    MM DT_Phencyclidine Definitive Test    MM DT_Phenobarbital Definitive Test    MM DT_Phentermine Definitive Test    MM DT_Quetiapine Definitive Test    MM DT_Risperidone Definitive Test    MM DT_Secobarbital Definitive Test    MM DT_Spice Definitive Test    MM DT_Tapentadol Definitive Test    MM DT_Temazapam Definitive Test    MM DT_THC Definitive Test    MM DT_Tramadol Definitive Test    MM DT_Methylphenidate Definitive Test

## 2023-12-08 ENCOUNTER — OFFICE VISIT (OUTPATIENT)
Dept: PAIN MEDICINE | Facility: CLINIC | Age: 68
End: 2023-12-08
Payer: MEDICARE

## 2023-12-08 VITALS
HEART RATE: 73 BPM | DIASTOLIC BLOOD PRESSURE: 61 MMHG | SYSTOLIC BLOOD PRESSURE: 119 MMHG | WEIGHT: 273 LBS | BODY MASS INDEX: 48.36 KG/M2

## 2023-12-08 DIAGNOSIS — G89.4 CHRONIC PAIN SYNDROME: Primary | ICD-10-CM

## 2023-12-08 DIAGNOSIS — M79.18 MYOFASCIAL PAIN SYNDROME: ICD-10-CM

## 2023-12-08 DIAGNOSIS — Z79.891 LONG-TERM CURRENT USE OF OPIATE ANALGESIC: ICD-10-CM

## 2023-12-08 DIAGNOSIS — M46.1 SACROILIITIS (HCC): ICD-10-CM

## 2023-12-08 DIAGNOSIS — F11.20 UNCOMPLICATED OPIOID DEPENDENCE (HCC): ICD-10-CM

## 2023-12-08 DIAGNOSIS — M54.16 LUMBAR RADICULOPATHY: ICD-10-CM

## 2023-12-08 PROCEDURE — 99214 OFFICE O/P EST MOD 30 MIN: CPT | Performed by: NURSE PRACTITIONER

## 2023-12-08 RX ORDER — HYDROCODONE BITARTRATE AND ACETAMINOPHEN 5; 325 MG/1; MG/1
TABLET ORAL
Qty: 75 TABLET | Refills: 0 | Status: SHIPPED | OUTPATIENT
Start: 2023-12-08

## 2023-12-08 RX ORDER — TIZANIDINE 4 MG/1
4 TABLET ORAL EVERY 8 HOURS PRN
Qty: 90 TABLET | Refills: 3 | Status: SHIPPED | OUTPATIENT
Start: 2023-12-08

## 2023-12-08 NOTE — PATIENT INSTRUCTIONS
Opioid Safety   WHAT YOU NEED TO KNOW:   An opioid medicine is used to treat pain. Examples are oxycodone, morphine, fentanyl, or codeine. Pain control and management may help you rest, heal, and return to your daily activities. You and your family will receive information about how to manage your pain at home. The instructions will include what to do if you have side effects as your pain is managed. You will get information on how to handle opioid medicine safely. You will also get suggestions on how to control pain without opioids. It is important to follow all instructions so your pain is managed effectively. DISCHARGE INSTRUCTIONS:   Call your local emergency number (911 in the ), or have someone else call if:   You have a seizure. You cannot be woken. You have trouble staying awake and your breathing is slow or shallow. Your speech is slurred, or you are confused. You are dizzy or stumble when you walk. Call your doctor, or have someone close to you call if:   You are extremely drowsy, or you have trouble staying awake or speaking. You have pale or clammy skin. You have blue fingernails or lips. Your heartbeat is slower than normal.    You cannot stop vomiting. You have questions or concerns about your condition or care. Use opioids safely:   Take prescribed opioids exactly as directed. Opioids come with directions based on the kind and how it is given. Talk to your healthcare provider or a pharmacist if you have any questions. Do not take more than the recommended amount. Too much can cause a life-threatening overdose. Do not continue to take it after your pain stops. You may develop tolerance. This means you keep needing higher doses to get the same effect. You may also develop opioid use disorder. This means you are not able to control your opioid use. Do not give opioids to others or take opioids that belong to someone else.   The kind or amount one person takes may not be right for another. The person you share them with may also be taking medicines that do not mix with opioids. The person may drink alcohol or use other drugs that can cause life-threatening problems when mixed with opioids. Do not mix opioids with other medicines or alcohol. The combination can cause an overdose, or cause you to stop breathing. Alcohol, sleeping pills, and medicines such as antihistamines can make you sleepy. A combination with opioids can lead to a coma. Do not drive or operate heavy machinery after you use an opioid. You may feel drowsy or have trouble concentrating. You can injure yourself or others if you drive or use heavy machinery when you are not alert. Your provider or pharmacist can tell you how long to wait after a dose before you do these activities. Talk to your healthcare provider if you have any side effects. Side effects include nausea, sleepiness, itching, and trouble thinking clearly. Your provider may need to make changes to the kind or amount of opioid you are taking. Your provider can also help you find ways to prevent or relieve side effects. Manage constipation:  Constipation is the most common side effect of opioid medicine. Constipation is when you have hard, dry bowel movements, or you go longer than usual between bowel movements. Tell your healthcare provider about all changes in your bowel movements while you are taking opioids. Your provider may recommend laxative medicine to help you have a bowel movement. Your provider may also change the kind of opioid you are taking, or change when you take it. The following are more ways you can prevent or relieve constipation:  Drink liquids as directed. You may need to drink extra liquids to help soften and move your bowels. Ask how much liquid to drink each day and which liquids are best for you. Eat high-fiber foods. This may help decrease constipation by adding bulk to your bowel movements.  High-fiber foods include fruits, vegetables, whole-grain breads and cereals, and beans. Your healthcare provider or dietitian can help you create a high-fiber meal plan. Your provider may also recommend a fiber supplement if you cannot get enough fiber from food. Exercise regularly. Regular physical activity can help stimulate your intestines. Walking is a good exercise to prevent or relieve constipation. Ask which exercises are best for you. Schedule a time each day to have a bowel movement. This may help train your body to have regular bowel movements. Bend forward while you are on the toilet to help move the bowel movement out. Sit on the toilet for at least 10 minutes, even if you do not have a bowel movement. Store opioids safely:   Store opioids where others cannot easily get them. Keep them in a locked cabinet or secure area. Do not  keep them in a purse or other bag you carry with you. A person may be looking for something else and find the opioids. Make sure opioids are stored out of the reach of children. A child can easily overdose on opioids. Opioids may look like candy to a small child. The best way to dispose of opioids: The laws vary by country and area. In the [facility], the best way is to return the opioids through a take-back program. This program is offered by the [employer] The following are options for using the program:  Take the opioids to a [employer] collection site. The site is often a law enforcement center. Call your local law enforcement center for scheduled take-back days in your area. You will be given information on where to go if the collection site is in a different location. Take the opioids to an approved pharmacy or hospital.  A pharmacy or hospital may be set up as a collection site. You will need to ask if it is a [employer]A collection site if you were not directed there.  A pharmacy or doctor's office may not be able to take back opioids unless it is a [employer]A site. Use a mail-back system. This means you are given containers to put the opioids into. You will then mail them in the containers. Use a take-back drop box. This is a place to leave the opioids at any time. People and animals will not be able to get into the box. Your local law enforcement agency can tell you where to find a drop box in your area. Other safe ways to dispose of opioids: The medicine may come with disposal instructions. The instructions may vary depending on the brand of medicine you are using. Instructions may come in a Medication Guide, but not every medicine has one. You may instead get instructions from your pharmacy or doctor. Follow instructions carefully. The following are general guidelines to follow:  Find out if you can flush the opioid. Some opioids can be flushed down the toilet or poured into the sink. You will need to contact authorities in your area to see if this is an option for you. The FDA also offers a list of medicines that are safe to flush down the toilet. You can check the list if you cannot get the information for your local area. Ask your waste management company about rules for putting opioids in the trash. The company will be able to give you specific directions. Scratch out personal information on the original medicine label so it cannot be read. Then put it in the trash. Do not label the trash or put any information on it about the opioids. It should look like regular household trash so no one is tempted to look for the opioids. Keep the trash out of the reach of children and animals. Always make sure trash is secure. Talk to officials if you live in a facility. If you live in a nursing home or assisted living center, talk to an official. The person will know the rules for your area. Other ways to manage pain:   Ask your healthcare provider about non-opioid medicines to control pain.   Some medicines may even work better than opioids, depending on the cause of your pain. Nonprescription medicines include NSAIDs (such as ibuprofen) and acetaminophen. Prescription medicines include muscle relaxers, antidepressants, and steroids. Pain may be managed without any medicines. Some ways to relieve pain include massage, aromatherapy, or meditation. Physical or occupational therapy may also help. Follow up with your doctor or pain specialist as directed: You may need to have your dose adjusted. Your doctor or pain specialist can also help you find ways to manage pain without opioids. Write down your questions so you remember to ask them during your visits. For more information:   Drug Enforcement Administration  [address] [facility] , [address]  Phone: 3- 480 - [phone]  Web Address: [employer]. [employer]gov/drug_disposal/    [address] [facility] [address] and Drug Administration  [address]  [facility] , [address]  Phone: 1- 659 - [phone]  Web Address: http://[employer]/  © Copyright [employer] 2023 Information is for End User's use only and may not be sold, redistributed or otherwise used for commercial purposes. The above information is an  only. It is not intended as medical advice for individual conditions or treatments. Talk to your doctor, nurse or pharmacist before following any medical regimen to see if it is safe and effective for you.

## 2023-12-11 LAB
6MAM UR QL CFM: NEGATIVE NG/ML
7AMINOCLONAZEPAM UR QL CFM: NEGATIVE NG/ML
A-OH ALPRAZ UR QL CFM: NEGATIVE NG/ML
AMPHET UR QL CFM: NEGATIVE NG/ML
AMPHET UR QL CFM: NEGATIVE NG/ML
BUPRENORPHINE UR QL CFM: NEGATIVE NG/ML
BUTALBITAL UR QL CFM: NEGATIVE NG/ML
BZE UR QL CFM: NEGATIVE NG/ML
CODEINE UR QL CFM: NEGATIVE NG/ML
DESIPRAMINE UR QL CFM: NEGATIVE NG/ML
DESIPRAMINE UR QL CFM: NEGATIVE NG/ML
EDDP UR QL CFM: NEGATIVE NG/ML
ETHYL GLUCURONIDE UR QL CFM: NEGATIVE NG/ML
ETHYL SULFATE UR QL SCN: NEGATIVE NG/ML
EUTYLONE UR QL: NEGATIVE NG/ML
FENTANYL UR QL CFM: NEGATIVE NG/ML
GLIADIN IGG SER IA-ACNC: NEGATIVE NG/ML
GLUCOSE 30M P 50 G LAC PO SERPL-MCNC: NEGATIVE NG/ML
HYDROCODONE UR QL CFM: ABNORMAL NG/ML
HYDROCODONE UR QL CFM: ABNORMAL NG/ML
HYDROMORPHONE UR QL CFM: ABNORMAL NG/ML
IMIPRAMINE UR QL CFM: NEGATIVE NG/ML
LORAZEPAM UR QL CFM: NEGATIVE NG/ML
MDMA UR QL CFM: NEGATIVE NG/ML
ME-PHENIDATE UR QL CFM: NEGATIVE NG/ML
MEPERIDINE UR QL CFM: NEGATIVE NG/ML
METHADONE UR QL CFM: NEGATIVE NG/ML
METHAMPHET UR QL CFM: NEGATIVE NG/ML
MORPHINE UR QL CFM: NEGATIVE NG/ML
MORPHINE UR QL CFM: NEGATIVE NG/ML
NORBUPRENORPHINE UR QL CFM: NEGATIVE NG/ML
NORDIAZEPAM UR QL CFM: NEGATIVE NG/ML
NORFENTANYL UR QL CFM: NEGATIVE NG/ML
NORHYDROCODONE UR QL CFM: ABNORMAL NG/ML
NORHYDROCODONE UR QL CFM: ABNORMAL NG/ML
NORMEPERIDINE UR QL CFM: NEGATIVE NG/ML
NOROXYCODONE UR QL CFM: NEGATIVE NG/ML
OLANZAPINE QUANTIFICATION: NEGATIVE NG/ML
OPC-3373 QUANTIFICATION: NEGATIVE
OXAZEPAM UR QL CFM: NEGATIVE NG/ML
OXYCODONE UR QL CFM: NEGATIVE NG/ML
OXYMORPHONE UR QL CFM: NEGATIVE NG/ML
OXYMORPHONE UR QL CFM: NEGATIVE NG/ML
PARA-FLUOROFENTANYL QUANTIFICATION: NORMAL NG/ML
PCP UR QL CFM: NEGATIVE NG/ML
PHENOBARB UR QL CFM: NEGATIVE NG/ML
RESULT ALL_PRESCRIBED MEDS AND SPECIAL INSTRUCTIONS: NORMAL
SECOBARBITAL UR QL CFM: NEGATIVE NG/ML
SL AMB 4-ANPP QUANTIFICATION: NORMAL NG/ML
SL AMB 5F-ADB-M7 METABOLITE QUANTIFICATION: NEGATIVE NG/ML
SL AMB 7-OH-MITRAGYNINE (KRATOM ALKALOID) QUANTIFICATION: NEGATIVE NG/ML
SL AMB AB-FUBINACA-M3 METABOLITE QUANTIFICATION: NEGATIVE NG/ML
SL AMB ACETYL FENTANYL QUANTIFICATION: NORMAL NG/ML
SL AMB ACETYL NORFENTANYL QUANTIFICATION: NORMAL NG/ML
SL AMB ACRYL FENTANYL QUANTIFICATION: NORMAL NG/ML
SL AMB CARFENTANIL QUANTIFICATION: NORMAL NG/ML
SL AMB CLOZAPINE QUANTIFICATION: NEGATIVE NG/ML
SL AMB CTHC (MARIJUANA METABOLITE) QUANTIFICATION: ABNORMAL NG/ML
SL AMB DEXTROMETHORPHAN QUANTIFICATION: NEGATIVE NG/ML
SL AMB DEXTRORPHAN (DEXTROMETHORPHAN METABOLITE) QUANT: NEGATIVE NG/ML
SL AMB DEXTRORPHAN (DEXTROMETHORPHAN METABOLITE) QUANT: NEGATIVE NG/ML
SL AMB HALOPERIDOL  QUANTIFICATION: NEGATIVE NG/ML
SL AMB HALOPERIDOL METABOLITE QUANTIFICATION: NEGATIVE NG/ML
SL AMB HYDROXYRISPERIDONE QUANTIFICATION: NEGATIVE NG/ML
SL AMB JWH018 METABOLITE QUANTIFICATION: NEGATIVE NG/ML
SL AMB JWH073 METABOLITE QUANTIFICATION: NEGATIVE NG/ML
SL AMB MDMB-FUBINACA-M1 METABOLITE QUANTIFICATION: NEGATIVE NG/ML
SL AMB METHYLONE QUANTIFICATION: NEGATIVE NG/ML
SL AMB N-DESMETHYL-TRAMADOL QUANTIFICATION: NEGATIVE NG/ML
SL AMB N-DESMETHYLCLOZAPINE QUANTIFICATION: NEGATIVE NG/ML
SL AMB NORQUETIAPINE QUANTIFICATION: NEGATIVE NG/ML
SL AMB PHENTERMINE QUANTIFICATION: NEGATIVE NG/ML
SL AMB QUETIAPINE QUANTIFICATION: NEGATIVE NG/ML
SL AMB RCS4 METABOLITE QUANTIFICATION: NEGATIVE NG/ML
SL AMB RISPERIDONE QUANTIFICATION: NEGATIVE NG/ML
SL AMB RITALINIC ACID QUANTIFICATION: NEGATIVE NG/ML
SPECIMEN DRAWN SERPL: NEGATIVE NG/ML
TAPENTADOL UR QL CFM: NEGATIVE NG/ML
TEMAZEPAM UR QL CFM: NEGATIVE NG/ML
TEMAZEPAM UR QL CFM: NEGATIVE NG/ML
TRAMADOL UR QL CFM: NEGATIVE NG/ML
URATE/CREAT 24H UR: NEGATIVE NG/ML

## 2023-12-11 NOTE — TELEPHONE ENCOUNTER
Called patient back scheduled procedure.    Reviewed all instructions by phone upon scheduling   Mailed copy to home

## 2023-12-11 NOTE — TELEPHONE ENCOUNTER
Caller: Helder Lara    Doctor: Adina Matias    Reason for call: pt was calling to schedule an injection appt.  Please Advise     Call back#: [phone]

## 2023-12-12 ENCOUNTER — TELEPHONE (OUTPATIENT)
Dept: PAIN MEDICINE | Facility: CLINIC | Age: 68
End: 2023-12-12

## 2023-12-12 ENCOUNTER — HOSPITAL ENCOUNTER (OUTPATIENT)
Dept: RADIOLOGY | Facility: CLINIC | Age: 68
Discharge: HOME | End: 2023-12-12
Payer: MEDICARE

## 2023-12-12 VITALS
SYSTOLIC BLOOD PRESSURE: 145 MMHG | TEMPERATURE: 98.3 F | RESPIRATION RATE: 18 BRPM | OXYGEN SATURATION: 94 % | HEART RATE: 74 BPM | DIASTOLIC BLOOD PRESSURE: 72 MMHG

## 2023-12-12 DIAGNOSIS — M46.1 SACROILIITIS (HCC): ICD-10-CM

## 2023-12-12 PROCEDURE — 27096 INJECT SACROILIAC JOINT: CPT | Performed by: ANESTHESIOLOGY

## 2023-12-12 RX ORDER — BUPIVACAINE HCL/PF 2.5 MG/ML
4 VIAL (ML) INJECTION ONCE
Status: COMPLETED | OUTPATIENT
Start: 2023-12-12 | End: 2023-12-12

## 2023-12-12 RX ORDER — METHYLPREDNISOLONE ACETATE 80 MG/ML
80 INJECTION, SUSPENSION INTRA-ARTICULAR; INTRALESIONAL; INTRAMUSCULAR; PARENTERAL; SOFT TISSUE ONCE
Status: COMPLETED | OUTPATIENT
Start: 2023-12-12 | End: 2023-12-12

## 2023-12-12 RX ORDER — LIDOCAINE HYDROCHLORIDE 10 MG/ML
5 INJECTION, SOLUTION EPIDURAL; INFILTRATION; INTRACAUDAL; PERINEURAL ONCE
Status: COMPLETED | OUTPATIENT
Start: 2023-12-12 | End: 2023-12-12

## 2023-12-12 RX ADMIN — LIDOCAINE HYDROCHLORIDE 5 ML: 10 INJECTION, SOLUTION EPIDURAL; INFILTRATION; INTRACAUDAL; PERINEURAL at 10:56

## 2023-12-12 RX ADMIN — IOHEXOL 1 ML: 300 INJECTION, SOLUTION INTRAVENOUS at 10:56

## 2023-12-12 RX ADMIN — METHYLPREDNISOLONE ACETATE 80 MG: 80 INJECTION, SUSPENSION INTRA-ARTICULAR; INTRALESIONAL; INTRAMUSCULAR; SOFT TISSUE at 10:57

## 2023-12-12 RX ADMIN — BUPIVACAINE HYDROCHLORIDE 4 ML: 2.5 INJECTION, SOLUTION EPIDURAL; INFILTRATION; INTRACAUDAL at 10:57

## 2023-12-12 NOTE — DISCHARGE INSTRUCTIONS

## 2023-12-12 NOTE — H&P
History of Present Illness: The patient is a 79 y.o. female who presents with complaints of low back and buttock pain.      Past Medical History:   Diagnosis Date    Allergic reaction to bee sting     last assessed - 13Jun2016    Asthma     Chronic narcotic dependence ([address])     Chronic pain syndrome     Depression     Diabetes mellitus ([address])     Esophageal reflux     Hyperlipidemia     Hypertension     Hypovitaminosis D 4/12/2018    Lumbar radiculopathy     Lyme disease     last assessed - 28Jun2016    Obesity     Opioid dependence ([address])     PPD positive     had treatment w/ INH 15 years ago    Vitamin D deficiency        Past Surgical History:   Procedure Laterality Date    APPENDECTOMY      CHOLECYSTECTOMY      JOINT REPLACEMENT      R knee, B/l total hip    ME REVJ/RMVL IMPLANTED SPINAL NEUROSTIM GENERATOR Left 12/13/2016    Procedure: REPLACEMENT BUTTOCK SPINAL CORD STIMULATOR IPG;  Surgeon: Wil Richard MD;  Location: [facility];  Service: Neurosurgery    SHOULDER SURGERY      Resolved - [address]      spinal stereotaxis stimulation of cord    TOTAL HIP ARTHROPLASTY Bilateral          Current Outpatient Medications:     albuterol (Proventil HFA) 90 mcg/act inhaler, Inhale 2 puffs every 6 (six) hours as needed for wheezing, Disp: 6.7 g, Rfl: 5    atorvastatin (LIPITOR) 10 mg tablet, TAKE 1 TABLET BY MOUTH EVERY DAY, Disp: 90 tablet, Rfl: 1    Blood Glucose Monitoring Suppl (ONE TOUCH ULTRA MINI) w/Device KIT, by Does not apply route daily, Disp: 1 each, Rfl: 0    Cholecalciferol (VITAMIN D3) 54179 units CAPS, Take 1 capsule by mouth once a week, Disp: , Rfl: 11    EPINEPHrine (EPIPEN) 0.3 mg/0.3 mL SOAJ, Inject 0.3 mL (0.3 mg total) into a muscle as needed for anaphylaxis, Disp: 2 each, Rfl: 5    ergocalciferol (Drisdol) 1.25 MG (33750 UT) capsule, Take 50,000 Units by mouth (Patient not taking: Reported on 12/6/2023), Disp: , Rfl:     glimepiride (AMARYL) 1 mg tablet, Take 1 tablet (1 mg total) by mouth daily with breakfast, Disp: 90 tablet, Rfl: 1    lisinopril (ZESTRIL) 20 mg tablet, TAKE 1 TABLET BY MOUTH EVERY DAY, Disp: 90 tablet, Rfl: 3    meloxicam (MOBIC) 15 mg tablet, Take 1 tablet (15 mg total) by mouth daily, Disp: 90 tablet, Rfl: 1    Nerve Stimulator (STANDARD TENS) ISAEL, by Does not apply route 4 (four) times a day as needed (MUSCLE SPASMS), Disp: 1 Device, Rfl: 0    nicotine (NICODERM CQ) 14 mg/24hr TD 24 hr patch, Place 1 patch on the skin over 24 hours every 24 hours, Disp: 14 patch, Rfl: 0    nicotine (NICODERM CQ) 21 mg/24 hr TD 24 hr patch, Place 1 patch on the skin over 24 hours every 24 hours, Disp: 42 patch, Rfl: 0    nicotine (NICODERM CQ) 7 mg/24hr TD 24 hr patch, Place 1 patch on the skin over 24 hours every 24 hours, Disp: 14 patch, Rfl: 0    OneTouch Ultra test strip, USE TO TEST BLOOD SUGAR ONCE A DAY, Disp: 100 strip, Rfl: 5    tiZANidine (ZANAFLEX) 4 mg tablet, Take 1 tablet (4 mg total) by mouth every 8 (eight) hours as needed for muscle spasms, Disp: 90 tablet, Rfl: 3    topiramate (TOPAMAX) 100 mg tablet, TAKE 1 TABLET BY MOUTH TWICE A DAY, Disp: 180 tablet, Rfl: 1    venlafaxine (EFFEXOR-XR) 75 mg 24 hr capsule, TAKE 1 CAPSULE BY MOUTH EVERY DAY, Disp: 90 capsule, Rfl: 0    Allergies   Allergen Reactions    Bee Venom Anaphylaxis    Other Other (See Comments)     Stainless steel and surgical steel  *Severe blistering*    Morphine And Related Hives, Diarrhea and Vomiting    Acetazolamide Rash    Aleve [Naproxen Sodium] Hives    Augmentin [Amoxicillin-Pot Clavulanate] Hives    Gabapentin Hives    Glyburide Hives    Metformin And Related Hives    Motrin [Ibuprofen] Hives    Nortriptyline Hives    Penicillins Hives    Soy Lecithin [Lecithin] Hives    Sulfa Antibiotics GI Intolerance    Tradjenta [Linagliptin] Hives    Tramadol Other (See Comments)     Sweating        Physical Exam:   Vitals:    12/12/23 1031   BP: 150/84   Pulse: 66   Resp: 18   Temp: 98.3 °F (36.8 °C)   SpO2: 95%     General: Awake, Alert, Oriented x 3, Mood and affect appropriate  Respiratory: Respirations even and unlabored  Cardiovascular: Peripheral pulses intact; no edema  Musculoskeletal Exam: TTP over bilateral SI joints    ASA Score: 3    Patient/Chart Verification  Patient ID Verified: Verbal  ID Band Applied: No  Consents Confirmed: Procedural, To be obtained in the Pre-Procedure area  Interval H&P(within 24 hr) Complete (required for Outpatients and Surgery Admit only): To be obtained in the Pre-Procedure area  Allergies Reviewed: Yes  Anticoag/NSAID held?: NA  Currently on antibiotics?: No  Pregnancy denied?: NA    Assessment:   1.  Sacroiliitis (HCC)        Plan: B/L SIJ injection

## 2023-12-12 NOTE — TELEPHONE ENCOUNTER
----- Message from [facility] sent at 12/12/2023  9:43 AM EST -----  Patient's UDS is positive for THC. Unfortunately, per opioid policy patient signed with our office, patient will be weaned off opioid medication and will be non opioid therapy from our practice. Patient just filled RX for Walker on 12/10. She will use   this RX to wean off. She can use 2 tablets PRN daily x 2 week, then 1 tablet daily PRN pain x 1 week, the 1 tablet every other day PRN pain x 1 week, then stop.

## 2023-12-19 ENCOUNTER — TELEPHONE (OUTPATIENT)
Dept: PAIN MEDICINE | Facility: CLINIC | Age: 68
End: 2023-12-19

## 2024-01-05 DIAGNOSIS — F33.41 RECURRENT MAJOR DEPRESSIVE DISORDER, IN PARTIAL REMISSION (HCC): ICD-10-CM

## 2024-01-05 RX ORDER — VENLAFAXINE HYDROCHLORIDE 75 MG/1
75 CAPSULE, EXTENDED RELEASE ORAL DAILY
Qty: 90 CAPSULE | Refills: 0 | Status: SHIPPED | OUTPATIENT
Start: 2024-01-05

## 2024-02-12 ENCOUNTER — OFFICE VISIT (OUTPATIENT)
Dept: INTERNAL MEDICINE CLINIC | Facility: OTHER | Age: 69
End: 2024-02-12
Payer: MEDICARE

## 2024-02-12 VITALS
TEMPERATURE: 98.7 F | HEIGHT: 63 IN | WEIGHT: 275.2 LBS | HEART RATE: 81 BPM | DIASTOLIC BLOOD PRESSURE: 84 MMHG | OXYGEN SATURATION: 97 % | RESPIRATION RATE: 20 BRPM | BODY MASS INDEX: 48.76 KG/M2 | SYSTOLIC BLOOD PRESSURE: 138 MMHG

## 2024-02-12 DIAGNOSIS — J01.90 ACUTE BACTERIAL SINUSITIS: Primary | ICD-10-CM

## 2024-02-12 DIAGNOSIS — B96.89 ACUTE BACTERIAL SINUSITIS: Primary | ICD-10-CM

## 2024-02-12 DIAGNOSIS — J40 BRONCHITIS: ICD-10-CM

## 2024-02-12 PROCEDURE — 99213 OFFICE O/P EST LOW 20 MIN: CPT | Performed by: INTERNAL MEDICINE

## 2024-02-12 RX ORDER — AZITHROMYCIN 250 MG/1
TABLET, FILM COATED ORAL
Qty: 6 TABLET | Refills: 0 | Status: SHIPPED | OUTPATIENT
Start: 2024-02-12 | End: 2024-02-16

## 2024-02-12 RX ORDER — DEXTROMETHORPHAN HYDROBROMIDE AND PROMETHAZINE HYDROCHLORIDE 15; 6.25 MG/5ML; MG/5ML
5 SYRUP ORAL 4 TIMES DAILY PRN
Qty: 180 ML | Refills: 0 | Status: SHIPPED | OUTPATIENT
Start: 2024-02-12

## 2024-02-12 NOTE — PROGRESS NOTES
Name: Lise Song      : 1955      MRN: [id number]  Encounter Provider: Augie Flores DO  Encounter Date: 2024   Encounter department: [facility]    Assessment & Plan     1. Acute bacterial sinusitis  -     azithromycin (ZITHROMAX) 250 mg tablet; Take 2 tablets today then 1 tablet daily x 4 days    2. Bronchitis  -     promethazine-dextromethorphan (PHENERGAN-DM) 6.25-15 mg/5 mL oral syrup; Take 5 mL by mouth 4 (four) times a day as needed for cough      Patient likely has bacterial sinusitis given persistence of symptoms after 2 weeks of supportive care.  Will give a 5-day course of azithromycin as well as Phenergan.  Patient was instructed to call the office if symptoms do not resolve in one week.  She was advised to continue supportive care measures.    Subjective      Patient presenting as a same-day visit with chief complaint of productive cough.  Patient reports for the last 2 weeks she has had a cough productive of yellow sputum.  Over the last few days sputum has gotten darker and increased in frequency.  She also has associated sinus pressure/pain, headaches, and fullness in her ears.  She denies sore throat, fever, chills, or shortness of breath.  She has multiple family members currently dealing with bronchitis and one who recently underwent treatment for pneumonia.  She has tried DayQuil/NyQuil/Robitussin with minimal relief.  She has an albuterol inhaler but denies increased use since symptom onset.  She has a history of bacterial sinusitis requiring antibiotics a few years ago.              Review of Systems   Constitutional:  Positive for fatigue. Negative for chills and fever.   HENT:  Positive for congestion, postnasal drip, rhinorrhea, sinus pressure and sinus pain. Negative for ear discharge, ear pain and sore throat.    Eyes:  Negative for pain, discharge and visual disturbance.   Respiratory:  Negative for cough and shortness of  breath.    Cardiovascular:  Negative for chest pain and palpitations.   Gastrointestinal:  Negative for abdominal pain and vomiting.   Genitourinary:  Negative for dysuria and hematuria.   Musculoskeletal:  Negative for arthralgias and back pain.   Skin:  Negative for color change and rash.   Neurological:  Positive for headaches. Negative for syncope.   All other systems reviewed and are negative.      Current Outpatient Medications on File Prior to Visit   Medication Sig    albuterol (Proventil HFA) 90 mcg/act inhaler Inhale 2 puffs every 6 (six) hours as needed for wheezing    atorvastatin (LIPITOR) 10 mg tablet TAKE 1 TABLET BY MOUTH EVERY DAY    Cholecalciferol (VITAMIN D3) 50066 units CAPS Take 1 capsule by mouth once a week    EPINEPHrine (EPIPEN) 0.3 mg/0.3 mL SOAJ Inject 0.3 mL (0.3 mg total) into a muscle as needed for anaphylaxis    glimepiride (AMARYL) 1 mg tablet Take 1 tablet (1 mg total) by mouth daily with breakfast    lisinopril (ZESTRIL) 20 mg tablet TAKE 1 TABLET BY MOUTH EVERY DAY    meloxicam (MOBIC) 15 mg tablet Take 1 tablet (15 mg total) by mouth daily    Nerve Stimulator (STANDARD TENS) ISAEL by Does not apply route 4 (four) times a day as needed (MUSCLE SPASMS)    OneTouch Ultra test strip USE TO TEST BLOOD SUGAR ONCE A DAY    tiZANidine (ZANAFLEX) 4 mg tablet Take 1 tablet (4 mg total) by mouth every 8 (eight) hours as needed for muscle spasms    topiramate (TOPAMAX) 100 mg tablet TAKE 1 TABLET BY MOUTH TWICE A DAY    venlafaxine (EFFEXOR-XR) 75 mg 24 hr capsule TAKE 1 CAPSULE BY MOUTH EVERY DAY    Blood Glucose Monitoring Suppl (ONE TOUCH ULTRA MINI) w/Device KIT by Does not apply route daily    ergocalciferol (Drisdol) 1.25 MG (84366 UT) capsule Take 50,000 Units by mouth (Patient not taking: Reported on 12/6/2023)    nicotine (NICODERM CQ) 14 mg/24hr TD 24 hr patch Place 1 patch on the skin over 24 hours every 24 hours    nicotine (NICODERM CQ) 21 mg/24 hr TD 24 hr patch Place 1 patch on  "the skin over 24 hours every 24 hours    nicotine (NICODERM CQ) 7 mg/24hr TD 24 hr patch Place 1 patch on the skin over 24 hours every 24 hours       Objective     /84 (BP Location: Left arm, Patient Position: Sitting, Cuff Size: Large)   Pulse 81   Temp 98.7 °F (37.1 °C) (Temporal)   Resp 20   Ht 5' 3\" (1.6 m)   Wt 125 kg (275 lb 3.2 oz)   LMP  (LMP Unknown)   SpO2 97%   BMI 48.75 kg/m²     Physical Exam  Constitutional:       Appearance: Normal appearance.   HENT:      Head: Normocephalic and atraumatic.      Comments: Positive transillumination test in the bilateral frontal and maxillary sinuses.     Right Ear: Ear canal and external ear normal.      Left Ear: Ear canal and external ear normal.      Ears:      Comments: Serous fluid behind tympanic membranes bilaterally     Mouth/Throat:      Mouth: Mucous membranes are moist.      Pharynx: Oropharynx is clear.      Comments: Mild cobblestoning of the pharynx  Eyes:      Extraocular Movements: Extraocular movements intact.      Conjunctiva/sclera: Conjunctivae normal.      Pupils: Pupils are equal, round, and reactive to light.   Cardiovascular:      Rate and Rhythm: Normal rate and regular rhythm.      Pulses: Normal pulses.      Heart sounds: Normal heart sounds.   Pulmonary:      Effort: Pulmonary effort is normal.      Breath sounds: Normal breath sounds. No wheezing, rhonchi or rales.   Abdominal:      General: Abdomen is flat. Bowel sounds are normal.      Palpations: Abdomen is soft.   Skin:     General: Skin is warm and dry.   Neurological:      Mental Status: She is alert and oriented to person, place, and time.       Augie Flores,     "

## 2024-02-15 DIAGNOSIS — G89.4 CHRONIC PAIN SYNDROME: ICD-10-CM

## 2024-02-15 DIAGNOSIS — M54.50 CHRONIC BILATERAL LOW BACK PAIN WITHOUT SCIATICA: ICD-10-CM

## 2024-02-15 DIAGNOSIS — G89.29 CHRONIC BILATERAL LOW BACK PAIN WITHOUT SCIATICA: ICD-10-CM

## 2024-02-15 RX ORDER — TOPIRAMATE 100 MG/1
100 TABLET, FILM COATED ORAL 2 TIMES DAILY
Qty: 180 TABLET | Refills: 1 | Status: SHIPPED | OUTPATIENT
Start: 2024-02-15

## 2024-04-02 DIAGNOSIS — E78.5 HYPERLIPIDEMIA, UNSPECIFIED HYPERLIPIDEMIA TYPE: ICD-10-CM

## 2024-04-02 RX ORDER — ATORVASTATIN CALCIUM 10 MG/1
TABLET, FILM COATED ORAL
Qty: 90 TABLET | Refills: 1 | Status: SHIPPED | OUTPATIENT
Start: 2024-04-02

## 2024-04-11 DIAGNOSIS — F33.41 RECURRENT MAJOR DEPRESSIVE DISORDER, IN PARTIAL REMISSION (HCC): ICD-10-CM

## 2024-04-11 RX ORDER — VENLAFAXINE HYDROCHLORIDE 75 MG/1
75 CAPSULE, EXTENDED RELEASE ORAL DAILY
Qty: 90 CAPSULE | Refills: 0 | Status: SHIPPED | OUTPATIENT
Start: 2024-04-11

## 2024-05-20 DIAGNOSIS — E11.21 TYPE 2 DIABETES MELLITUS WITH DIABETIC NEPHROPATHY, WITHOUT LONG-TERM CURRENT USE OF INSULIN (HCC): ICD-10-CM

## 2024-05-20 DIAGNOSIS — M46.1 SACROILIITIS (HCC): ICD-10-CM

## 2024-05-21 ENCOUNTER — TELEPHONE (OUTPATIENT)
Age: 69
End: 2024-05-21

## 2024-05-21 RX ORDER — MELOXICAM 15 MG/1
15 TABLET ORAL DAILY
Qty: 30 TABLET | Refills: 0 | Status: SHIPPED | OUTPATIENT
Start: 2024-05-21

## 2024-05-21 RX ORDER — GLIMEPIRIDE 1 MG/1
1 TABLET ORAL
Qty: 30 TABLET | Refills: 0 | Status: SHIPPED | OUTPATIENT
Start: 2024-05-21

## 2024-05-21 NOTE — TELEPHONE ENCOUNTER
Pt called to check the status of her medication refills requested for glimepiride (AMARYL) 1 mg tablet  and meloxicam (MOBIC) 15 mg tablet . After chart review I informed her that the rx's were approved by provider and sent to her pharmacy today.

## 2024-06-18 ENCOUNTER — APPOINTMENT (OUTPATIENT)
Dept: LAB | Facility: IMAGING CENTER | Age: 69
End: 2024-06-18
Payer: MEDICARE

## 2024-06-18 DIAGNOSIS — M46.1 SACROILIITIS (HCC): ICD-10-CM

## 2024-06-18 DIAGNOSIS — E11.21 TYPE 2 DIABETES MELLITUS WITH DIABETIC NEPHROPATHY, WITHOUT LONG-TERM CURRENT USE OF INSULIN (HCC): ICD-10-CM

## 2024-06-18 LAB
ALBUMIN SERPL BCP-MCNC: 4 G/DL (ref 3.5–5)
ALP SERPL-CCNC: 107 U/L (ref 34–104)
ALT SERPL W P-5'-P-CCNC: 26 U/L (ref 7–52)
ANION GAP SERPL CALCULATED.3IONS-SCNC: 9 MMOL/L (ref 4–13)
AST SERPL W P-5'-P-CCNC: 22 U/L (ref 13–39)
BASOPHILS # BLD AUTO: 0.08 THOUSANDS/ÂΜL (ref 0–0.1)
BASOPHILS NFR BLD AUTO: 1 % (ref 0–1)
BILIRUB SERPL-MCNC: 0.39 MG/DL (ref 0.2–1)
BUN SERPL-MCNC: 20 MG/DL (ref 5–25)
CALCIUM SERPL-MCNC: 9.8 MG/DL (ref 8.4–10.2)
CHLORIDE SERPL-SCNC: 109 MMOL/L (ref 96–108)
CHOLEST SERPL-MCNC: 158 MG/DL
CO2 SERPL-SCNC: 23 MMOL/L (ref 21–32)
CREAT SERPL-MCNC: 1.05 MG/DL (ref 0.6–1.3)
CREAT UR-MCNC: 97.8 MG/DL
EOSINOPHIL # BLD AUTO: 0.24 THOUSAND/ÂΜL (ref 0–0.61)
EOSINOPHIL NFR BLD AUTO: 3 % (ref 0–6)
ERYTHROCYTE [DISTWIDTH] IN BLOOD BY AUTOMATED COUNT: 14.2 % (ref 11.6–15.1)
EST. AVERAGE GLUCOSE BLD GHB EST-MCNC: 183 MG/DL
GFR SERPL CREATININE-BSD FRML MDRD: 55 ML/MIN/1.73SQ M
GLUCOSE P FAST SERPL-MCNC: 176 MG/DL (ref 65–99)
HBA1C MFR BLD: 8 %
HCT VFR BLD AUTO: 43.2 % (ref 34.8–46.1)
HDLC SERPL-MCNC: 55 MG/DL
HGB BLD-MCNC: 13.5 G/DL (ref 11.5–15.4)
IMM GRANULOCYTES # BLD AUTO: 0.02 THOUSAND/UL (ref 0–0.2)
IMM GRANULOCYTES NFR BLD AUTO: 0 % (ref 0–2)
LDLC SERPL CALC-MCNC: 82 MG/DL (ref 0–100)
LYMPHOCYTES # BLD AUTO: 1.79 THOUSANDS/ÂΜL (ref 0.6–4.47)
LYMPHOCYTES NFR BLD AUTO: 25 % (ref 14–44)
MCH RBC QN AUTO: 29 PG (ref 26.8–34.3)
MCHC RBC AUTO-ENTMCNC: 31.3 G/DL (ref 31.4–37.4)
MCV RBC AUTO: 93 FL (ref 82–98)
MICROALBUMIN UR-MCNC: 15.3 MG/L
MICROALBUMIN/CREAT 24H UR: 16 MG/G CREATININE (ref 0–30)
MONOCYTES # BLD AUTO: 0.68 THOUSAND/ÂΜL (ref 0.17–1.22)
MONOCYTES NFR BLD AUTO: 9 % (ref 4–12)
NEUTROPHILS # BLD AUTO: 4.49 THOUSANDS/ÂΜL (ref 1.85–7.62)
NEUTS SEG NFR BLD AUTO: 62 % (ref 43–75)
NONHDLC SERPL-MCNC: 103 MG/DL
NRBC BLD AUTO-RTO: 0 /100 WBCS
PLATELET # BLD AUTO: 211 THOUSANDS/UL (ref 149–390)
PMV BLD AUTO: 11.8 FL (ref 8.9–12.7)
POTASSIUM SERPL-SCNC: 4.3 MMOL/L (ref 3.5–5.3)
PROT SERPL-MCNC: 7.2 G/DL (ref 6.4–8.4)
RBC # BLD AUTO: 4.65 MILLION/UL (ref 3.81–5.12)
SODIUM SERPL-SCNC: 141 MMOL/L (ref 135–147)
TRIGL SERPL-MCNC: 103 MG/DL
WBC # BLD AUTO: 7.3 THOUSAND/UL (ref 4.31–10.16)

## 2024-06-18 PROCEDURE — 85025 COMPLETE CBC W/AUTO DIFF WBC: CPT

## 2024-06-18 PROCEDURE — 82570 ASSAY OF URINE CREATININE: CPT

## 2024-06-18 PROCEDURE — 82043 UR ALBUMIN QUANTITATIVE: CPT

## 2024-06-18 PROCEDURE — 80061 LIPID PANEL: CPT

## 2024-06-18 PROCEDURE — 83036 HEMOGLOBIN GLYCOSYLATED A1C: CPT

## 2024-06-18 PROCEDURE — 80053 COMPREHEN METABOLIC PANEL: CPT

## 2024-06-18 PROCEDURE — 36415 COLL VENOUS BLD VENIPUNCTURE: CPT

## 2024-06-18 RX ORDER — GLIMEPIRIDE 1 MG/1
1 TABLET ORAL
Qty: 30 TABLET | Refills: 0 | Status: SHIPPED | OUTPATIENT
Start: 2024-06-18 | End: 2024-06-26 | Stop reason: SDUPTHER

## 2024-06-18 RX ORDER — MELOXICAM 15 MG/1
15 TABLET ORAL DAILY
Qty: 30 TABLET | Refills: 0 | Status: SHIPPED | OUTPATIENT
Start: 2024-06-18 | End: 2024-06-26 | Stop reason: SDUPTHER

## 2024-06-18 NOTE — TELEPHONE ENCOUNTER
Patient requires updated blood work and has previously placed orders. Please contact patient to go for labs. Courtesy refill provided.  CMP/A1c    A [employer] message was sent to the patient letting them know a courtesy refill has been provided and to complete lab(s) prior to next refill. Please follow up to make sure pt receives/reads message and completes blood work.

## 2024-06-18 NOTE — TELEPHONE ENCOUNTER
Reason for call:   [x] Refill   [] Prior Auth  [] Other:     Office:   [x] PCP/Provider - MARVIN Casanova  PCP - General  [] Specialty/Provider -     Medication:   glimepiride (AMARYL) 1 mg tablet 1 mg, Daily with breakfast # 90       meloxicam (MOBIC) 15 mg tablet 15 mg, Daily # 90             Pharmacy: [id number] [address]      Does the patient have enough for 3 days?   [] Yes   [x] No - Send as HP to POD

## 2024-06-19 ENCOUNTER — RA CDI HCC (OUTPATIENT)
Dept: OTHER | Facility: HOSPITAL | Age: 69
End: 2024-06-19

## 2024-06-19 NOTE — PROGRESS NOTES
HCC coding opportunities          Chart Reviewed number of suggestions sent to Provider: 2   E11.22  E11.65    Patients Insurance     Medicare Insurance: Medicare

## 2024-06-25 NOTE — PROGRESS NOTES
Assessment/Plan:    Peripheral edema  Patient is not taking any diuretics at present and has had frequent treatment with steroids in the past several months  She complains of increasing weight and increasing edema  Will try Lasix 40 mg daily p r n  Open wound of right foot  She also dropped something heavy on her right foot last week  Her big toe is currently  black and blue and has obvious abrasion but appears clean  She states that has been steadily healing  She knows that if it starts to get worse though she needs to see her foot doctor Dr Claudell Mode  At present there is no surrounding erythema or purulent drainage    Cellulitis of right breast  Patient also has developed a sore lump with surrounding erythema of her right medial breast   She is diabetic and has been on steroids and she tried to RACHEL hit herself  Her last mammo was reportedly year ago  Will start doxycycline refer to surgery for drainage in further eval    DMII (diabetes mellitus, type 2) ([facility])  Lab Results   Component Value Date    HGBA1C 6 8 (H) 04/18/2018       No results for input(s): POCGLU in the last 72 hours  Blood Sugar Average: Last 72 hrs:   her diabetes has actually been well controlled over the past year despite her weight and her steroid use  She is due for repeat studies       Diagnoses and all orders for this visit:    Peripheral edema  -     furosemide (LASIX) 40 mg tablet; Take 1 tablet (40 mg total) by mouth 2 (two) times a day    Cellulitis of right breast  -     doxycycline hyclate (VIBRAMYCIN) 100 mg capsule; Take 1 capsule (100 mg total) by mouth every 12 (twelve) hours for 10 days  -     Ambulatory referral to General Surgery; Future          Subjective:      Patient ID: Nikita Salinas is a 66 y o  female  Patient is here for multiple problems  First she has increasing peripheral edema but no shortness of breath PND orthopnea or chest pain   She feels it is related to steroid use infections does not have a LM for patient 40mg BID, not 80mg.    water pill anymore  It is better in the morning  Also her weight has steadily been increasing since placed on steroids    Her neck specialist complaint is a painful lump of her right medial breast which  Four days ago  She felt it was a pimple at 1st and tried to RACHEL it  It does not resolved  Her family does run a history of frequent abscesses but she herself has not had that is problem  Her last mammogram was approximately year ago by her but a history of breast cancer is in the family    Next she has a bruise and abrasion of her right big toe after dropping something on it a week ago  She states that is getting better  She has had no fever or drainage from it but understands that she has to go to her podiatrist if he gets worse  Lastly her diabetes is actually stayed relatively stable even with steroids although she is due for repeat A1c            Review of Systems   Constitutional: Positive for unexpected weight change  Negative for chills, fatigue and fever  HENT: Negative for congestion, ear pain, hearing loss, postnasal drip, sinus pressure, sore throat, trouble swallowing and voice change  Eyes: Negative for visual disturbance  Respiratory: Negative for cough, chest tightness, shortness of breath and wheezing  Cardiovascular: Positive for leg swelling  Negative for chest pain and palpitations  Gastrointestinal: Negative for abdominal distention, abdominal pain, anal bleeding, blood in stool, constipation, diarrhea and nausea  Endocrine: Negative for cold intolerance, polydipsia, polyphagia and polyuria  Genitourinary: Negative for dysuria, flank pain, frequency, hematuria and urgency  Cystic lesion right medial breast   Musculoskeletal: Positive for back pain  Negative for arthralgias, gait problem, joint swelling, myalgias and neck pain  Skin: Positive for wound  Negative for rash  Allergic/Immunologic: Negative for immunocompromised state     Neurological: Negative for dizziness, syncope, facial asymmetry, weakness, light-headedness, numbness and headaches  Hematological: Negative for adenopathy  Psychiatric/Behavioral: Negative for confusion, sleep disturbance and suicidal ideas  Agitation: right big toe          Objective:      /70 (BP Location: Left arm, Patient Position: Sitting)   Pulse 76   Temp 98 8 °F (37 1 °C) (Tympanic)   Ht 5' 5" (1 651 m)   Wt (!) 139 kg (305 lb 9 6 oz)   LMP  (LMP Unknown)   SpO2 96%   BMI 50 85 kg/m²          Physical Exam   Constitutional: She is oriented to person, place, and time  She appears well-developed and well-nourished  No distress  Morbidly obese   HENT:   Right Ear: External ear normal    Left Ear: External ear normal    Nose: Nose normal    Mouth/Throat: Oropharynx is clear and moist  No oropharyngeal exudate  Eyes: EOM are normal  Pupils are equal, round, and reactive to light  Neck: Normal range of motion  Neck supple  No JVD present  No thyromegaly present  Cardiovascular: Normal rate, regular rhythm, normal heart sounds and intact distal pulses  Exam reveals no gallop  No murmur heard  Pulmonary/Chest: Effort normal and breath sounds normal  No respiratory distress  She has no wheezes  She has no rales  Abdominal: Soft  Bowel sounds are normal  She exhibits no distension and no mass  There is no tenderness  Genitourinary:   Genitourinary Comments: Right breast shows a 1-2 cm cystic lesion surrounded by faint erythema and is mildly tender   Musculoskeletal: Normal range of motion  She exhibits edema  She exhibits no tenderness  Abrasion and ecchymosis of right big toe   Lymphadenopathy:     She has no cervical adenopathy  Neurological: She is alert and oriented to person, place, and time  No cranial nerve deficit  Abnormal muscle tone: rally  Coordination normal    Wide-based gait   Skin: No rash noted  Psychiatric: She has a normal mood and affect   Her behavior is normal  Judgment and thought content normal    Vitals reviewed

## 2024-06-26 ENCOUNTER — OFFICE VISIT (OUTPATIENT)
Dept: INTERNAL MEDICINE CLINIC | Facility: OTHER | Age: 69
End: 2024-06-26
Payer: MEDICARE

## 2024-06-26 VITALS
TEMPERATURE: 97.8 F | RESPIRATION RATE: 18 BRPM | HEART RATE: 68 BPM | SYSTOLIC BLOOD PRESSURE: 122 MMHG | OXYGEN SATURATION: 97 % | DIASTOLIC BLOOD PRESSURE: 64 MMHG | HEIGHT: 63 IN | BODY MASS INDEX: 48.9 KG/M2 | WEIGHT: 276 LBS

## 2024-06-26 DIAGNOSIS — J45.901 ASTHMA EXACERBATION IN COPD  (HCC): ICD-10-CM

## 2024-06-26 DIAGNOSIS — E11.21 TYPE 2 DIABETES MELLITUS WITH DIABETIC NEPHROPATHY, WITHOUT LONG-TERM CURRENT USE OF INSULIN (HCC): Primary | ICD-10-CM

## 2024-06-26 DIAGNOSIS — F33.41 RECURRENT MAJOR DEPRESSIVE DISORDER, IN PARTIAL REMISSION (HCC): ICD-10-CM

## 2024-06-26 DIAGNOSIS — M54.16 LUMBAR RADICULOPATHY: ICD-10-CM

## 2024-06-26 DIAGNOSIS — N18.30 STAGE 3 CHRONIC KIDNEY DISEASE, UNSPECIFIED WHETHER STAGE 3A OR 3B CKD (HCC): ICD-10-CM

## 2024-06-26 DIAGNOSIS — E66.01 MORBID OBESITY (HCC): ICD-10-CM

## 2024-06-26 DIAGNOSIS — G89.4 CHRONIC PAIN SYNDROME: ICD-10-CM

## 2024-06-26 DIAGNOSIS — J44.1 ASTHMA EXACERBATION IN COPD  (HCC): ICD-10-CM

## 2024-06-26 DIAGNOSIS — E78.49 OTHER HYPERLIPIDEMIA: ICD-10-CM

## 2024-06-26 DIAGNOSIS — M06.09 RHEUMATOID ARTHRITIS OF MULTIPLE SITES WITH NEGATIVE RHEUMATOID FACTOR (HCC): ICD-10-CM

## 2024-06-26 DIAGNOSIS — E78.5 HYPERLIPIDEMIA, UNSPECIFIED HYPERLIPIDEMIA TYPE: ICD-10-CM

## 2024-06-26 DIAGNOSIS — Z72.0 TOBACCO ABUSE: ICD-10-CM

## 2024-06-26 DIAGNOSIS — G89.29 CHRONIC BILATERAL LOW BACK PAIN WITHOUT SCIATICA: ICD-10-CM

## 2024-06-26 DIAGNOSIS — I10 PRIMARY HYPERTENSION: ICD-10-CM

## 2024-06-26 DIAGNOSIS — M54.50 CHRONIC BILATERAL LOW BACK PAIN WITHOUT SCIATICA: ICD-10-CM

## 2024-06-26 DIAGNOSIS — I10 ESSENTIAL HYPERTENSION: ICD-10-CM

## 2024-06-26 DIAGNOSIS — M46.1 SACROILIITIS (HCC): ICD-10-CM

## 2024-06-26 DIAGNOSIS — Z23 ENCOUNTER FOR IMMUNIZATION: ICD-10-CM

## 2024-06-26 DIAGNOSIS — F11.20 UNCOMPLICATED OPIOID DEPENDENCE (HCC): ICD-10-CM

## 2024-06-26 PROBLEM — M47.816 LUMBAR SPONDYLOSIS: Status: RESOLVED | Noted: 2020-09-10 | Resolved: 2024-06-26

## 2024-06-26 PROBLEM — F32.A DEPRESSION: Status: RESOLVED | Noted: 2017-11-26 | Resolved: 2024-06-26

## 2024-06-26 PROCEDURE — 90677 PCV20 VACCINE IM: CPT

## 2024-06-26 PROCEDURE — 99214 OFFICE O/P EST MOD 30 MIN: CPT | Performed by: NURSE PRACTITIONER

## 2024-06-26 PROCEDURE — G0439 PPPS, SUBSEQ VISIT: HCPCS | Performed by: NURSE PRACTITIONER

## 2024-06-26 PROCEDURE — G0009 ADMIN PNEUMOCOCCAL VACCINE: HCPCS

## 2024-06-26 RX ORDER — LISINOPRIL 20 MG/1
20 TABLET ORAL DAILY
Qty: 90 TABLET | Refills: 1 | Status: SHIPPED | OUTPATIENT
Start: 2024-06-26

## 2024-06-26 RX ORDER — MELOXICAM 15 MG/1
15 TABLET ORAL DAILY
Qty: 90 TABLET | Refills: 1 | Status: SHIPPED | OUTPATIENT
Start: 2024-06-26

## 2024-06-26 RX ORDER — OXYCODONE HYDROCHLORIDE AND ACETAMINOPHEN 5; 325 MG/1; MG/1
1 TABLET ORAL AS NEEDED
COMMUNITY

## 2024-06-26 RX ORDER — TOPIRAMATE 100 MG/1
100 TABLET, FILM COATED ORAL 2 TIMES DAILY
Qty: 180 TABLET | Refills: 1 | Status: SHIPPED | OUTPATIENT
Start: 2024-06-26

## 2024-06-26 RX ORDER — ATORVASTATIN CALCIUM 10 MG/1
10 TABLET, FILM COATED ORAL DAILY
Qty: 90 TABLET | Refills: 1 | Status: SHIPPED | OUTPATIENT
Start: 2024-06-26

## 2024-06-26 RX ORDER — VENLAFAXINE HYDROCHLORIDE 75 MG/1
75 CAPSULE, EXTENDED RELEASE ORAL DAILY
Qty: 90 CAPSULE | Refills: 1 | Status: SHIPPED | OUTPATIENT
Start: 2024-06-26

## 2024-06-26 RX ORDER — GLIMEPIRIDE 1 MG/1
1 TABLET ORAL
Qty: 90 TABLET | Refills: 1 | Status: SHIPPED | OUTPATIENT
Start: 2024-06-26

## 2024-06-26 NOTE — ASSESSMENT & PLAN NOTE
Lab Results   Component Value Date    EGFR 55 06/18/2024    EGFR 56 05/12/2023    EGFR 60 02/17/2023    CREATININE 1.05 06/18/2024    CREATININE 1.04 05/12/2023    CREATININE 1.04 02/17/2023   Encouraged good oral hydration   Avoid nephrotoxins

## 2024-06-26 NOTE — PATIENT INSTRUCTIONS
Medicare Preventive Visit Patient Instructions  Thank you for completing your Welcome to Medicare Visit or Medicare Annual Wellness Visit today. Your next wellness visit will be due in one year (6/27/2025).  The screening/preventive services that you may require over the next 5-10 years are detailed below. Some tests may not apply to you based off risk factors and/or age. Screening tests ordered at today's visit but not completed yet may show as past due. Also, please note that scanned in results may not display below.  Preventive Screenings:  Service Recommendations Previous Testing/Comments   Colorectal Cancer Screening  * Colonoscopy    * Fecal Occult Blood Test (FOBT)/Fecal Immunochemical Test (FIT)  * Fecal DNA/Cologuard Test  * Flexible Sigmoidoscopy Age: 45-75 years old   Colonoscopy: every 10 years (may be performed more frequently if at higher risk)  OR  FOBT/FIT: every 1 year  OR  Cologuard: every 3 years  OR  Sigmoidoscopy: every 5 years  Screening may be recommended earlier than age 45 if at higher risk for colorectal cancer. Also, an individualized decision between you and your healthcare provider will decide whether screening between the ages of 76-85 would be appropriate. Colonoscopy: 02/06/2012  FOBT/FIT: Not on file  Cologuard: 09/18/2023  Sigmoidoscopy: Not on file    Screening Current     Breast Cancer Screening Age: 40+ years old  Frequency: every 1-2 years  Not required if history of left and right mastectomy Mammogram: 08/29/2023    Screening Current   Cervical Cancer Screening Between the ages of 21-29, pap smear recommended once every 3 years.   Between the ages of 30-65, can perform pap smear with HPV co-testing every 5 years.   Recommendations may differ for women with a history of total hysterectomy, cervical cancer, or abnormal pap smears in past. Pap Smear: Not on file    Screening Not Indicated   Hepatitis C Screening Once for adults born between 1945 and 1965  More frequently in  patients at high risk for Hepatitis C Hep C Antibody: 04/04/2018    Screening Current   Diabetes Screening 1-2 times per year if you're at risk for diabetes or have pre-diabetes Fasting glucose: 176 mg/dL (6/18/2024)  A1C: 8.0 % (6/18/2024)  Screening Not Indicated  History Diabetes   Cholesterol Screening Once every 5 years if you don't have a lipid disorder. May order more often based on risk factors. Lipid panel: 06/18/2024    Screening Not Indicated  History Lipid Disorder     Other Preventive Screenings Covered by Medicare:  Abdominal Aortic Aneurysm (AAA) Screening: covered once if your at risk. You're considered to be at risk if you have a family history of AAA.  Lung Cancer Screening: covers low dose CT scan once per year if you meet all of the following conditions: (1) Age 55-77; (2) No signs or symptoms of lung cancer; (3) Current smoker or have quit smoking within the last 15 years; (4) You have a tobacco smoking history of at least 20 pack years (packs per day multiplied by number of years you smoked); (5) You get a written order from a healthcare provider.  Glaucoma Screening: covered annually if you're considered high risk: (1) You have diabetes OR (2) Family history of glaucoma OR (3)  aged 50 and older OR (4)  American aged 65 and older  Osteoporosis Screening: covered every 2 years if you meet one of the following conditions: (1) You're estrogen deficient and at risk for osteoporosis based off medical history and other findings; (2) Have a vertebral abnormality; (3) On glucocorticoid therapy for more than 3 months; (4) Have primary hyperparathyroidism; (5) On osteoporosis medications and need to assess response to drug therapy.   Last bone density test (DXA Scan): 08/29/2023.  HIV Screening: covered annually if you're between the age of 15-65. Also covered annually if you are younger than 15 and older than 65 with risk factors for HIV infection. For pregnant patients, it is  covered up to 3 times per pregnancy.    Immunizations:  Immunization Recommendations   Influenza Vaccine Annual influenza vaccination during flu season is recommended for all persons aged >= 6 months who do not have contraindications   Pneumococcal Vaccine   * Pneumococcal conjugate vaccine = PCV13 (Prevnar 13), PCV15 (Vaxneuvance), PCV20 (Prevnar 20)  * Pneumococcal polysaccharide vaccine = PPSV23 (Pneumovax) Adults 19-63 yo with certain risk factors or if 65+ yo  If never received any pneumonia vaccine: recommend Prevnar 20 (PCV20)  Give PCV20 if previously received 1 dose of PCV13 or PPSV23   Hepatitis B Vaccine 3 dose series if at intermediate or high risk (ex: diabetes, end stage renal disease, liver disease)   Respiratory syncytial virus (RSV) Vaccine - COVERED BY MEDICARE PART D  * RSVPreF3 (Arexvy) CDC recommends that adults 60 years of age and older may receive a single dose of RSV vaccine using shared clinical decision-making (SCDM)   Tetanus (Td) Vaccine - COST NOT COVERED BY MEDICARE PART B Following completion of primary series, a booster dose should be given every 10 years to maintain immunity against tetanus. Td may also be given as tetanus wound prophylaxis.   Tdap Vaccine - COST NOT COVERED BY MEDICARE PART B Recommended at least once for all adults. For pregnant patients, recommended with each pregnancy.   Shingles Vaccine (Shingrix) - COST NOT COVERED BY MEDICARE PART B  2 shot series recommended in those 19 years and older who have or will have weakened immune systems or those 50 years and older     Health Maintenance Due:      Topic Date Due   • Breast Cancer Screening: Mammogram  08/29/2024   • Colorectal Cancer Screening  09/18/2026   • Hepatitis C Screening  Completed     Immunizations Due:      Topic Date Due   • Pneumococcal Vaccine: 65+ Years (3 of 3 - PPSV23 or PCV20) 05/11/2022   • COVID-19 Vaccine (3 - 2023-24 season) 09/01/2023     Advance Directives   What are advance directives?   Advance directives are legal documents that state your wishes and plans for medical care. These plans are made ahead of time in case you lose your ability to make decisions for yourself. Advance directives can apply to any medical decision, such as the treatments you want, and if you want to donate organs.   What are the types of advance directives?  There are many types of advance directives, and each state has rules about how to use them. You may choose a combination of any of the following:  Living will:  This is a written record of the treatment you want. You can also choose which treatments you do not want, which to limit, and which to stop at a certain time. This includes surgery, medicine, IV fluid, and tube feedings.   Durable power of  for healthcare (DPAHC):  This is a written record that states who you want to make healthcare choices for you when you are unable to make them for yourself. This person, called a proxy, is usually a family member or a friend. You may choose more than 1 proxy.  Do not resuscitate (DNR) order:  A DNR order is used in case your heart stops beating or you stop breathing. It is a request not to have certain forms of treatment, such as CPR. A DNR order may be included in other types of advance directives.  Medical directive:  This covers the care that you want if you are in a coma, near death, or unable to make decisions for yourself. You can list the treatments you want for each condition. Treatment may include pain medicine, surgery, blood transfusions, dialysis, IV or tube feedings, and a ventilator (breathing machine).  Values history:  This document has questions about your views, beliefs, and how you feel and think about life. This information can help others choose the care that you would choose.  Why are advance directives important?  An advance directive helps you control your care. Although spoken wishes may be used, it is better to have your wishes written down.  Spoken wishes can be misunderstood, or not followed. Treatments may be given even if you do not want them. An advance directive may make it easier for your family to make difficult choices about your care.   Urinary Incontinence   Urinary incontinence (UI)  is when you lose control of your bladder. UI develops because your bladder cannot store or empty urine properly. The 3 most common types of UI are stress incontinence, urge incontinence, or both.  Medicines:   May be given to help strengthen your bladder control. Report any side effects of medication to your healthcare provider.  Do pelvic muscle exercises often:  Your pelvic muscles help you stop urinating. Squeeze these muscles tight for 5 seconds, then relax for 5 seconds. Gradually work up to squeezing for 10 seconds. Do 3 sets of 15 repetitions a day, or as directed. This will help strengthen your pelvic muscles and improve bladder control.  Train your bladder:  Go to the bathroom at set times, such as every 2 hours, even if you do not feel the urge to go. You can also try to hold your urine when you feel the urge to go. For example, hold your urine for 5 minutes when you feel the urge to go. As that becomes easier, hold your urine for 10 minutes.   Self-care:   Keep a UI record.  Write down how often you leak urine and how much you leak. Make a note of what you were doing when you leaked urine.  Drink liquids as directed. You may need to limit the amount of liquid you drink to help control your urine leakage. Do not drink any liquid right before you go to bed. Limit or do not have drinks that contain caffeine or alcohol.   Prevent constipation.  Eat a variety of high-fiber foods. Good examples are high-fiber cereals, beans, vegetables, and whole-grain breads. Walking is the best way to trigger your intestines to have a bowel movement.  Exercise regularly and maintain a healthy weight.  Weight loss and exercise will decrease pressure on your bladder and help  you control your leakage.   Use a catheter as directed  to help empty your bladder. A catheter is a tiny, plastic tube that is put into your bladder to drain your urine.   Go to behavior therapy as directed.  Behavior therapy may be used to help you learn to control your urge to urinate.    Cigarette Smoking and Your Health   Risks to your health if you smoke:  Nicotine and other chemicals found in tobacco damage every cell in your body. Even if you are a light smoker, you have an increased risk for cancer, heart disease, and lung disease. If you are pregnant or have diabetes, smoking increases your risk for complications.   Benefits to your health if you stop smoking:   You decrease respiratory symptoms such as coughing, wheezing, and shortness of breath.   You reduce your risk for cancers of the lung, mouth, throat, kidney, bladder, pancreas, stomach, and cervix. If you already have cancer, you increase the benefits of chemotherapy. You also reduce your risk for cancer returning or a second cancer from developing.   You reduce your risk for heart disease, blood clots, heart attack, and stroke.   You reduce your risk for lung infections, and diseases such as pneumonia, asthma, chronic bronchitis, and emphysema.  Your circulation improves. More oxygen can be delivered to your body. If you have diabetes, you lower your risk for complications, such as kidney, artery, and eye diseases. You also lower your risk for nerve damage. Nerve damage can lead to amputations, poor vision, and blindness.  You improve your body's ability to heal and to fight infections.  For more information and support to stop smoking:   [employer]  Phone: 4- 769 - 215-9109  Web Address: www.[employer]  Weight Management   Why it is important to manage your weight:  Being overweight increases your risk of health conditions such as heart disease, high blood pressure, type 2 diabetes, and certain types of cancer. It can also increase your risk  for osteoarthritis, sleep apnea, and other respiratory problems. Aim for a slow, steady weight loss. Even a small amount of weight loss can lower your risk of health problems.  How to lose weight safely:  A safe and healthy way to lose weight is to eat fewer calories and get regular exercise. You can lose up about 1 pound a week by decreasing the number of calories you eat by 500 calories each day.   Healthy meal plan for weight management:  A healthy meal plan includes a variety of foods, contains fewer calories, and helps you stay healthy. A healthy meal plan includes the following:  Eat whole-grain foods more often.  A healthy meal plan should contain fiber. Fiber is the part of grains, fruits, and vegetables that is not broken down by your body. Whole-grain foods are healthy and provide extra fiber in your diet. Some examples of whole-grain foods are whole-wheat breads and pastas, oatmeal, brown rice, and bulgur.  Eat a variety of vegetables every day.  Include dark, leafy greens such as spinach, kale, nayeli greens, and mustard greens. Eat yellow and orange vegetables such as carrots, sweet potatoes, and winter squash.   Eat a variety of fruits every day.  Choose fresh or canned fruit (canned in its own juice or light syrup) instead of juice. Fruit juice has very little or no fiber.  Eat low-fat dairy foods.  Drink fat-free (skim) milk or 1% milk. Eat fat-free yogurt and low-fat cottage cheese. Try low-fat cheeses such as mozzarella and other reduced-fat cheeses.  Choose meat and other protein foods that are low in fat.  Choose beans or other legumes such as split peas or lentils. Choose fish, skinless poultry (chicken or turkey), or lean cuts of red meat (beef or pork). Before you cook meat or poultry, cut off any visible fat.   Use less fat and oil.  Try baking foods instead of frying them. Add less fat, such as margarine, sour cream, regular salad dressing and mayonnaise to foods. Eat fewer high-fat foods.  Some examples of high-fat foods include french fries, doughnuts, ice cream, and cakes.  Eat fewer sweets.  Limit foods and drinks that are high in sugar. This includes candy, cookies, regular soda, and sweetened drinks.  Exercise:  Exercise at least 30 minutes per day on most days of the week. Some examples of exercise include walking, biking, dancing, and swimming. You can also fit in more physical activity by taking the stairs instead of the elevator or parking farther away from stores. Ask your healthcare provider about the best exercise plan for you.   Narcotic (Opioid) Safety    Use narcotics safely:  Take prescribed narcotics exactly as directed  Do not give narcotics to others or take narcotics that belong to someone else  Do not mix narcotics without medicines or alcohol  Do not drive or operate heavy machinery after you take the narcotic  Monitor for side effects and notify your healthcare provider if you experienced side effects such as nausea, sleepiness, itching, or trouble thinking clearly.    Manage constipation:    Constipation is the most common side effect of narcotic medicine. Constipation is when you have hard, dry bowel movements, or you go longer than usual between bowel movements. Tell your healthcare provider about all changes in your bowel movements while you are taking narcotics. He or she may recommend laxative medicine to help you have a bowel movement. He or she may also change the kind of narcotic you are taking, or change when you take it. The following are more ways you can prevent or relieve constipation:    Drink liquids as directed.  You may need to drink extra liquids to help soften and move your bowels. Ask how much liquid to drink each day and which liquids are best for you.  Eat high-fiber foods.  This may help decrease constipation by adding bulk to your bowel movements. High-fiber foods include fruits, vegetables, whole-grain breads and cereals, and beans. Your healthcare  provider or dietitian can help you create a high-fiber meal plan. Your provider may also recommend a fiber supplement if you cannot get enough fiber from food.  Exercise regularly.  Regular physical activity can help stimulate your intestines. Walking is a good exercise to prevent or relieve constipation. Ask which exercises are best for you.  Schedule a time each day to have a bowel movement.  This may help train your body to have regular bowel movements. Bend forward while you are on the toilet to help move the bowel movement out. Sit on the toilet for at least 10 minutes, even if you do not have a bowel movement.    Store narcotics safely:   Store narcotics where others cannot easily get them.  Keep them in a locked cabinet or secure area. Do not  keep them in a purse or other bag you carry with you. A person may be looking for something else and find the narcotics.  Make sure narcotics are stored out of the reach of children.  A child can easily overdose on narcotics. Narcotics may look like candy to a small child.    The best way to dispose of narcotics:      The laws vary by country and area. In the [place], the best way is to return the narcotics through a take-back program. This program is offered by the [place] Drug Enforcement Agency (SANDRA). The following are options for using the program:  Take the narcotics to a SANDRA collection site.  The site is often a law enforcement center. Call your local law enforcement center for scheduled take-back days in your area. You will be given information on where to go if the collection site is in a different location.  Take the narcotics to an approved pharmacy or hospital.  A pharmacy or hospital may be set up as a collection site. You will need to ask if it is a SANDRA collection site if you were not directed there. A pharmacy or doctor's office may not be able to take back narcotics unless it is a SANDRA site.  Use a mail-back system.  This means you are given containers to  put the narcotics into. You will then mail them in the containers.  Use a take-back drop box.  This is a place to leave the narcotics at any time. People and animals will not be able to get into the box. Your local law enforcement agency can tell you where to find a drop box in your area.    Other ways to manage pain:   Ask your healthcare provider about non-narcotic medicines to control pain.  Nonprescription medicines include NSAIDs (such as ibuprofen) and acetaminophen. Prescription medicines include muscle relaxers, antidepressants, and steroids.  Pain may be managed without any medicines.  Some ways to relieve pain include massage, aromatherapy, or meditation. Physical or occupational therapy may also help.    For more information:   Drug Enforcement Administration  [address] [place] [address]  Phone: 2- 988 - 499-1463  Web Address: https://www.[employer]iversion.[facility].gov/drug_disposal/    [place] Food and Drug Administration  [address] [place] [address]  Phone: 4- 306 - 173-3574  Web Address: http://www.fda.gov     © Copyright [employer] 2018 Information is for End User's use only and may not be sold, redistributed or otherwise used for commercial purposes. All illustrations and images included in CareNotes® are the copyrighted property of [employer] or [employer]

## 2024-06-26 NOTE — PROGRESS NOTES
Diabetic Foot Exam    Patient's shoes and socks removed.    Right Foot/Ankle   Right Foot Inspection  Skin Exam: skin normal and skin intact. No dry skin, no warmth, no callus, no erythema, no maceration, no abnormal color, no pre-ulcer, no ulcer and no callus.     Toe Exam: ROM and strength within normal limits.     Sensory   Monofilament testing: intact    Vascular  Capillary refills: < 3 seconds  The right DP pulse is 2+.     Left Foot/Ankle  Left Foot Inspection  Skin Exam: skin normal and skin intact. No dry skin, no warmth, no erythema, no maceration, normal color, no pre-ulcer, no ulcer and no callus.     Toe Exam: ROM and strength within normal limits.     Sensory   Monofilament testing: intact    Vascular  Capillary refills: < 3 seconds  The left DP pulse is 2+.     Assign Risk Category  No deformity present  No loss of protective sensation  No weak pulses  Risk: 0  Ambulatory Visit  Name: Lise Song      : 1955      MRN: [id number]  Encounter Provider: MARVIN Casanova  Encounter Date: 2024   Encounter department: [facility] [place]    Assessment & Plan   1. Type 2 diabetes mellitus with diabetic nephropathy, without long-term current use of insulin ([facility])  Assessment & Plan:    Lab Results   Component Value Date    HGBA1C 8.0 (H) 2024   -Uncontrolled, continue current dose of glipizide, add on Januvia  -Follow-up in 3 months  -Currently on ACE  -Foot exam done while in office today  Orders:  -     Comprehensive metabolic panel; Future; Expected date: 2024  -     CBC and differential; Future; Expected date: 2024  -     Hemoglobin A1C; Future; Expected date: 2024  -     glimepiride (AMARYL) 1 mg tablet; Take 1 tablet (1 mg total) by mouth daily with breakfast  -     sitaGLIPtin (JANUVIA) 100 mg tablet; Take 1 tablet (100 mg total) by mouth daily  2. Hyperlipidemia, unspecified hyperlipidemia type  -     atorvastatin  (LIPITOR) 10 mg tablet; Take 1 tablet (10 mg total) by mouth daily  3. Chronic pain syndrome  -     topiramate (TOPAMAX) 100 mg tablet; Take 1 tablet (100 mg total) by mouth 2 (two) times a day  4. Chronic bilateral low back pain without sciatica  -     topiramate (TOPAMAX) 100 mg tablet; Take 1 tablet (100 mg total) by mouth 2 (two) times a day  5. Sacroiliitis (HCC)  -     meloxicam (MOBIC) 15 mg tablet; Take 1 tablet (15 mg total) by mouth daily  6. Essential hypertension  -     lisinopril (ZESTRIL) 20 mg tablet; Take 1 tablet (20 mg total) by mouth daily  7. Recurrent major depressive disorder, in partial remission ([facility])  Assessment & Plan:  -controlled with Effexor  Orders:  -     venlafaxine (EFFEXOR-XR) 75 mg 24 hr capsule; Take 1 capsule (75 mg total) by mouth daily  8. Tobacco abuse  Assessment & Plan:  Encouraged smoking cessation   Script given for Lung CT screening     9. Rheumatoid arthritis of multiple sites with negative rheumatoid factor ([facility])  Assessment & Plan:  Currently follows rheumatology   10. Uncomplicated opioid dependence ([facility])  11. Asthma exacerbation in COPD  ([facility])  Assessment & Plan:  Well controlled on albuterol   Encouraged smoking cessation  12. Morbid obesity ([facility])  Assessment & Plan:  -Encouraged dietary changes and increased activity  13. Stage 3 chronic kidney disease, unspecified whether stage 3a or 3b CKD ([facility])  Assessment & Plan:  Lab Results   Component Value Date    EGFR 55 06/18/2024    EGFR 56 05/12/2023    EGFR 60 02/17/2023    CREATININE 1.05 06/18/2024    CREATININE 1.04 05/12/2023    CREATININE 1.04 02/17/2023   Encouraged good oral hydration   Avoid nephrotoxins   14. Primary hypertension  Assessment & Plan:  Well controlled with lisinopril.   15. Lumbar radiculopathy  Assessment & Plan:  Continues to follow pain management  Controlled with tizanidine and Topamax  16. Encounter for immunization  -     Pneumococcal Conjugate Vaccine 20-valent (Pcv20)  17. Other  hyperlipidemia  Assessment & Plan:  -Controlled on Lipitor  -ASCVD 23.7%      Depression Screening and Follow-up Plan: Patient was screened for depression during today's encounter. They screened negative with a PHQ-9 score of 2.    Urinary Incontinence Plan of Care: counseling topics discussed: use restroom every 2 hours, limit alcohol, caffeine, spicy foods, and acidic foods, limiting fluid intake 3-4 hours before bed and preventing constipation.     Tobacco Cessation Counseling: Tobacco cessation counseling was provided. The patient is sincerely urged to quit consumption of tobacco. She is not ready to quit tobacco. Medication options and side effects of medication discussed. Patient refused medication.       Preventive health issues were discussed with patient, and age appropriate screening tests were ordered as noted in patient's After Visit Summary. Personalized health advice and appropriate referrals for health education or preventive services given if needed, as noted in patient's After Visit Summary.    History of Present Illness     Presents today For follow up.   She reports that she will get fasting blood work tomorrow.     Type 2 diabetes mellitus- -140s, hemoglobin A1c 6.4-->8.0, fasting glucose 176, uncontrolled on glimepiride, up-to-date with eye exam     HTN- well controlled on lisinopril, denies side effects with medication    Depression-patient reports she is currently well controlled on Effexor    Hyperlipidemia-well-controlled on Lipitor, ASCVD 23.7%, denies side effects with this medication, Maye triglycerides 103, LDL 82    Lumbar spinal stenosis-currently follows pain management, has spinal stimulator, had recent MRI due to increased falls, recently started using a cane for ambulation, was working with PT but does not feel like it is helping     Asthma exacerbation in COPD - controlled with albuterol as needed, no recent excerbations     Polymyalgia rheumatica-currently follows  rheumatology with [facility]    Tobacco abuse- one pack a day for about 10 years, has tried chanix in the past with the side effect vomiting, or diabetes nicotine patch at last office visit however patient reports that she did not start using the skin and does not desire to use this medication    CKD stage III-GFR 55         Patient Care Team:  MARVIN Casanova as PCP - General (Family Medicine)  MD Mellisa Alexander MD (Pain Medicine)  Dontae Basilio DO (Physical Medicine and Rehabilitation)    Review of Systems   Constitutional:  Negative for activity change, appetite change, chills, diaphoresis and fever.   HENT:  Negative for congestion, ear discharge, ear pain, postnasal drip, rhinorrhea, sinus pressure, sinus pain and sore throat.    Eyes:  Negative for pain, discharge, itching and visual disturbance.   Respiratory:  Negative for cough, chest tightness, shortness of breath and wheezing.    Cardiovascular:  Negative for chest pain, palpitations and leg swelling.   Gastrointestinal:  Negative for abdominal pain, constipation, diarrhea, nausea and vomiting.   Endocrine: Negative for polydipsia, polyphagia and polyuria.   Genitourinary:  Negative for difficulty urinating, dysuria and urgency.   Musculoskeletal:  Negative for arthralgias, back pain and neck pain.   Skin:  Negative for rash and wound.   Neurological:  Negative for dizziness, weakness, numbness and headaches.     Medical History Reviewed by provider this encounter:  Problems       Annual Wellness Visit Questionnaire   Lise is here for her Subsequent Wellness visit.     Health Risk Assessment:   Patient rates overall health as fair. Patient feels that their physical health rating is same. Patient is satisfied with their life. Eyesight was rated as same. Hearing was rated as same. Patient feels that their emotional and mental health rating is same. Patients states they are never, rarely angry. Patient  states they are sometimes unusually tired/fatigued. Pain experienced in the last 7 days has been some. Patient's pain rating has been 6/10. Patient states that she has experienced no weight loss or gain in last 6 months. Pain in her back, follows pain management     Depression Screening:   PHQ-9 Score: 2      Fall Risk Screening:   In the past year, patient has experienced: no history of falling in past year      Urinary Incontinence Screening:   Patient has leaked urine accidently in the last six months.     Home Safety:  Patient has trouble with stairs inside or outside of their home. Patient has working smoke alarms and has working carbon monoxide detector. Home safety hazards include: none.     Nutrition:   Current diet is Diabetic.     Medications:   Patient is currently taking over-the-counter supplements. OTC medications include: see medication list. Patient is able to manage medications.     Activities of Daily Living (ADLs)/Instrumental Activities of Daily Living (IADLs):   Walk and transfer into and out of bed and chair?: Yes  Dress and groom yourself?: Yes    Bathe or shower yourself?: Yes    Feed yourself? Yes  Do your laundry/housekeeping?: Yes  Manage your money, pay your bills and track your expenses?: Yes  Make your own meals?: Yes    Do your own shopping?: Yes    Previous Hospitalizations:   Any hospitalizations or ED visits within the last 12 months?: No      Advance Care Planning:   Living will: No    Durable POA for healthcare: No    Advanced directive: No    Advanced directive counseling given: Yes    ACP document given: Yes      Cognitive Screening:   Provider or family/friend/caregiver concerned regarding cognition?: No    PREVENTIVE SCREENINGS      Cardiovascular Screening:    General: Screening Not Indicated, History Lipid Disorder, Risks and Benefits Discussed and Screening Current      Diabetes Screening:     General: Screening Not Indicated, History Diabetes, Risks and Benefits Discussed  and Screening Current      Colorectal Cancer Screening:     General: Screening Current      Breast Cancer Screening:     General: Screening Current      Cervical Cancer Screening:    General: Screening Not Indicated      Osteoporosis Screening:    General: Risks and Benefits Discussed and Screening Current      Abdominal Aortic Aneurysm (AAA) Screening:        General: Screening Not Indicated      Lung Cancer Screening:     General: Screening Not Indicated      Hepatitis C Screening:    General: Screening Current    Screening, Brief Intervention, and Referral to Treatment (SBIRT)    Screening      AUDIT-C Screenin) How often did you have a drink containing alcohol in the past year? never  2) How many drinks did you have on a typical day when you were drinking in the past year? 0  3) How often did you have 6 or more drinks on one occasion in the past year? never    AUDIT-C Score: 0  Interpretation: Score 0-2 (female): Negative screen for alcohol misuse    Single Item Drug Screening:  How often have you used an illegal drug (including marijuana) or a prescription medication for non-medical reasons in the past year? never    Single Item Drug Screen Score: 0  Interpretation: Negative screen for possible drug use disorder    Review of Current Opioid Use  Opioid Risk Tool (ORT) Score: 0  Opioid Risk Tool (ORT) Interpretation: Score 0-3: Low risk for opioid misuse    Other Counseling Topics:   Car/seat belt/driving safety, skin self-exam, sunscreen and regular weightbearing exercise and calcium and vitamin D intake. -[place] shingles RSV and pneumonia vaccination, patient is agreeable to pneumonia vaccination    Social Determinants of Health     Financial Resource Strain: Low Risk  (2023)    Overall Financial Resource Strain (CARDIA)     Difficulty of Paying Living Expenses: Not very hard   Food Insecurity: No Food Insecurity (2024)    Hunger Vital Sign     Worried About Running Out of Food in the Last Year:  "Never true     Ran Out of Food in the Last Year: Never true   Transportation Needs: No Transportation Needs (6/26/2024)    PRAPARE - Transportation     Lack of Transportation (Medical): No     Lack of Transportation (Non-Medical): No   Housing Stability: Unknown (6/26/2024)    Housing Stability Vital Sign     Unable to Pay for Housing in the Last Year: No     Homeless in the Last Year: No   Utilities: Not At Risk (6/26/2024)    [facility] Utilities     Threatened with loss of utilities: No     No results found.    Objective     /64 (BP Location: Right arm, Patient Position: Sitting, Cuff Size: Large)   Pulse 68   Temp 97.8 °F (36.6 °C) (Temporal)   Resp 18   Ht 5' 3\" (1.6 m)   Wt 125 kg (276 lb)   LMP  (LMP Unknown)   SpO2 97%   BMI 48.89 kg/m²     Physical Exam  Constitutional:       General: She is not in acute distress.     Appearance: She is well-developed. She is not diaphoretic.   HENT:      Head: Normocephalic and atraumatic.      Right Ear: External ear normal.      Left Ear: External ear normal.      Nose: Nose normal.      Mouth/Throat:      Mouth: Mucous membranes are moist.      Pharynx: No oropharyngeal exudate or posterior oropharyngeal erythema.   Eyes:      General:         Right eye: No discharge.         Left eye: No discharge.      Conjunctiva/sclera: Conjunctivae normal.      Pupils: Pupils are equal, round, and reactive to light.   Neck:      Thyroid: No thyromegaly.   Cardiovascular:      Rate and Rhythm: Normal rate and regular rhythm.      Pulses: no weak pulses.           Dorsalis pedis pulses are 2+ on the right side and 2+ on the left side.      Heart sounds: Normal heart sounds. No murmur heard.     No friction rub. No gallop.   Pulmonary:      Effort: Pulmonary effort is normal. No respiratory distress.      Breath sounds: Normal breath sounds. No stridor. No wheezing or rales.   Abdominal:      General: Bowel sounds are normal. There is no distension.      Palpations: Abdomen is " soft.      Tenderness: There is no abdominal tenderness.   Musculoskeletal:      Cervical back: Normal range of motion and neck supple.      Comments: Ambulates with cane    Feet:      Right foot:      Skin integrity: No ulcer, skin breakdown, erythema, warmth, callus or dry skin.      Left foot:      Skin integrity: No ulcer, skin breakdown, erythema, warmth, callus or dry skin.   Lymphadenopathy:      Cervical: No cervical adenopathy.   Skin:     General: Skin is warm and dry.      Findings: No erythema or rash.   Neurological:      Mental Status: She is alert and oriented to person, place, and time.   Psychiatric:         Behavior: Behavior normal.         Thought Content: Thought content normal.         Judgment: Judgment normal.

## 2024-06-26 NOTE — ASSESSMENT & PLAN NOTE
Lab Results   Component Value Date    HGBA1C 8.0 (H) 06/18/2024   -Uncontrolled, continue current dose of glipizide, add on Januvia  -Follow-up in 3 months  -Currently on ACE  -Foot exam done while in office today

## 2024-09-16 ENCOUNTER — RA CDI HCC (OUTPATIENT)
Dept: OTHER | Facility: HOSPITAL | Age: 69
End: 2024-09-16

## 2024-09-20 ENCOUNTER — APPOINTMENT (OUTPATIENT)
Dept: LAB | Facility: IMAGING CENTER | Age: 69
End: 2024-09-20
Payer: MEDICARE

## 2024-09-20 DIAGNOSIS — E11.21 TYPE 2 DIABETES MELLITUS WITH DIABETIC NEPHROPATHY, WITHOUT LONG-TERM CURRENT USE OF INSULIN (HCC): ICD-10-CM

## 2024-09-20 LAB
25(OH)D3 SERPL-MCNC: 36 NG/ML (ref 30–100)
ALBUMIN SERPL BCG-MCNC: 3.9 G/DL (ref 3.5–5)
ALP SERPL-CCNC: 111 U/L (ref 34–104)
ALT SERPL W P-5'-P-CCNC: 21 U/L (ref 7–52)
ANION GAP SERPL CALCULATED.3IONS-SCNC: 7 MMOL/L (ref 4–13)
AST SERPL W P-5'-P-CCNC: 17 U/L (ref 13–39)
BASOPHILS # BLD AUTO: 0.09 THOUSANDS/ΜL (ref 0–0.1)
BASOPHILS NFR BLD AUTO: 1 % (ref 0–1)
BILIRUB SERPL-MCNC: 0.24 MG/DL (ref 0.2–1)
BUN SERPL-MCNC: 23 MG/DL (ref 5–25)
CALCIUM SERPL-MCNC: 9.5 MG/DL (ref 8.4–10.2)
CHLORIDE SERPL-SCNC: 111 MMOL/L (ref 96–108)
CHOLEST SERPL-MCNC: 151 MG/DL
CO2 SERPL-SCNC: 23 MMOL/L (ref 21–32)
CREAT SERPL-MCNC: 1.09 MG/DL (ref 0.6–1.3)
EOSINOPHIL # BLD AUTO: 0.25 THOUSAND/ΜL (ref 0–0.61)
EOSINOPHIL NFR BLD AUTO: 3 % (ref 0–6)
ERYTHROCYTE [DISTWIDTH] IN BLOOD BY AUTOMATED COUNT: 14 % (ref 11.6–15.1)
GFR SERPL CREATININE-BSD FRML MDRD: 52 ML/MIN/1.73SQ M
GLUCOSE P FAST SERPL-MCNC: 187 MG/DL (ref 65–99)
HCT VFR BLD AUTO: 43.3 % (ref 34.8–46.1)
HDLC SERPL-MCNC: 46 MG/DL
HGB BLD-MCNC: 13.8 G/DL (ref 11.5–15.4)
IMM GRANULOCYTES # BLD AUTO: 0.03 THOUSAND/UL (ref 0–0.2)
IMM GRANULOCYTES NFR BLD AUTO: 0 % (ref 0–2)
LDLC SERPL CALC-MCNC: 86 MG/DL (ref 0–100)
LYMPHOCYTES # BLD AUTO: 1.7 THOUSANDS/ΜL (ref 0.6–4.47)
LYMPHOCYTES NFR BLD AUTO: 20 % (ref 14–44)
MCH RBC QN AUTO: 29.5 PG (ref 26.8–34.3)
MCHC RBC AUTO-ENTMCNC: 31.9 G/DL (ref 31.4–37.4)
MCV RBC AUTO: 93 FL (ref 82–98)
MONOCYTES # BLD AUTO: 0.76 THOUSAND/ΜL (ref 0.17–1.22)
MONOCYTES NFR BLD AUTO: 9 % (ref 4–12)
NEUTROPHILS # BLD AUTO: 5.83 THOUSANDS/ΜL (ref 1.85–7.62)
NEUTS SEG NFR BLD AUTO: 67 % (ref 43–75)
NONHDLC SERPL-MCNC: 105 MG/DL
NRBC BLD AUTO-RTO: 0 /100 WBCS
PLATELET # BLD AUTO: 203 THOUSANDS/UL (ref 149–390)
PMV BLD AUTO: 12.1 FL (ref 8.9–12.7)
POTASSIUM SERPL-SCNC: 4.7 MMOL/L (ref 3.5–5.3)
PROT SERPL-MCNC: 7.2 G/DL (ref 6.4–8.4)
RBC # BLD AUTO: 4.68 MILLION/UL (ref 3.81–5.12)
SODIUM SERPL-SCNC: 141 MMOL/L (ref 135–147)
TRIGL SERPL-MCNC: 96 MG/DL
WBC # BLD AUTO: 8.66 THOUSAND/UL (ref 4.31–10.16)

## 2024-09-20 PROCEDURE — 80053 COMPREHEN METABOLIC PANEL: CPT

## 2024-09-21 LAB
EST. AVERAGE GLUCOSE BLD GHB EST-MCNC: 171 MG/DL
HBA1C MFR BLD: 7.6 %

## 2024-09-24 NOTE — PROGRESS NOTES
Assessment/Plan:    HTN (hypertension)  Well controlled with lisinopril.     Asthma exacerbation in COPD ([facility])  Well controlled on albuterol   Encouraged smoking cessation    Type 2 diabetes mellitus with diabetic nephropathy, without long-term current use of insulin ([facility])    Lab Results   Component Value Date    HGBA1C 7.6 (H) 09/20/2024   -Uncontrolled, increase glipizide to 4 mg daily  -Follow-up in 6 months  -Currently on ACE      Lumbar radiculopathy  Continues to follow pain management  Controlled with tizanidine and Topamax    Rheumatoid arthritis of multiple sites with negative rheumatoid factor ([facility])  Currently follows rheumatology     Stage 3 chronic kidney disease, unspecified whether stage 3a or 3b CKD ([facility])  Lab Results   Component Value Date    EGFR 52 09/20/2024    EGFR 55 06/18/2024    EGFR 56 05/12/2023    CREATININE 1.09 09/20/2024    CREATININE 1.05 06/18/2024    CREATININE 1.04 05/12/2023   Encouraged good oral hydration   Avoid nephrotoxins     Tobacco abuse  Encouraged smoking cessation   Script given for Lung CT screening       Recurrent major depressive disorder, in partial remission ([facility])  -controlled with Effexor    Other hyperlipidemia  -Controlled on Lipitor  -ASCVD 30.3%    Morbid obesity ([facility])  -Encouraged dietary changes and increased activity              Diagnoses and all orders for this visit:    Screening for metabolic disorder    Encounter for screening mammogram for breast cancer  -     Mammo screening bilateral w 3d and cad; Future    Hyperlipidemia, unspecified hyperlipidemia type  -     atorvastatin (LIPITOR) 10 mg tablet; Take 1 tablet (10 mg total) by mouth daily    Elevated fasting glucose    Type 2 diabetes mellitus with diabetic nephropathy, without long-term current use of insulin ([facility])  -     glimepiride (AMARYL) 4 mg tablet; Take 1 tablet (4 mg total) by mouth daily with breakfast  -     Hemoglobin A1C; Future  -     Comprehensive metabolic panel; Future  -     CBC  and differential; Future  -     Albumin / creatinine urine ratio; Future  -     Lipid Panel with Direct LDL reflex; Future    Essential hypertension  -     lisinopril (ZESTRIL) 20 mg tablet; Take 1 tablet (20 mg total) by mouth daily    Sacroiliitis (HCC)    Myofascial pain syndrome  -     tiZANidine (ZANAFLEX) 4 mg tablet; Take 1 tablet (4 mg total) by mouth every 8 (eight) hours as needed for muscle spasms    Chronic pain syndrome  -     topiramate (TOPAMAX) 100 mg tablet; Take 1 tablet (100 mg total) by mouth 2 (two) times a day    Chronic bilateral low back pain without sciatica  -     topiramate (TOPAMAX) 100 mg tablet; Take 1 tablet (100 mg total) by mouth 2 (two) times a day    Recurrent major depressive disorder, in partial remission (HCC)  -     venlafaxine (EFFEXOR-XR) 75 mg 24 hr capsule; Take 1 capsule (75 mg total) by mouth daily    Needs flu shot  -     influenza vaccine, high-dose, PF 0.5 mL (Fluzone High Dose)    Primary hypertension    Asthma exacerbation in COPD  (HCC)    Lumbar radiculopathy    Rheumatoid arthritis of multiple sites with negative rheumatoid factor (HCC)    Stage 3 chronic kidney disease, unspecified whether stage 3a or 3b CKD (HCC)    Tobacco abuse    Other hyperlipidemia    Morbid obesity (HCC)          Subjective:      Patient ID: Lise Song is a 69 y.o. female.    Presents today For follow up.   Reviewed fasting blood work while in office today    Type 2 diabetes mellitus- -140s, hemoglobin A1c was 8.0--->7.6, fasting glucose 187, uncontrolled on glimepiride, Januvia was added at last office visit (but she did not start taking this medication), up-to-date with eye exam     HTN- well controlled on lisinopril, denies side effects with medication    Depression-patient reports she is currently well controlled on Effexor    Hyperlipidemia-well-controlled on Lipitor, ASCVD 24.6%, denies side effects with this medication, cholesterol 151 triglycerides 96 HDL 46 LDL  86    Lumbar spinal stenosis-currently follows pain management, has spinal stimulator, had recent MRI due to increased falls, recently started using a cane for ambulation, was working with PT but does not feel like it is helping     Asthma exacerbation in COPD - controlled with albuterol as needed, no recent excerbations     Polymyalgia rheumatica-currently follows rheumatology with [facility]    Tobacco abuse- one pack a day for about 10 years, has tried chanix in the past with the side effect vomiting, or diabetes nicotine patch at last office visit however patient reports that she did not start using the skin and does not desire to use this medication    CKD stage III-GFR 52    Refuses mammogram at this time, she reports that she will consider getting 1 next year, discussed risks of not doing annual screenings      The following portions of the patient's history were reviewed and updated as appropriate: allergies, current medications, past family history, past medical history, past social history, past surgical history, and problem list.    Review of Systems   Constitutional:  Negative for activity change, appetite change, chills, diaphoresis and fever.   HENT:  Negative for congestion, ear discharge, ear pain, postnasal drip, rhinorrhea, sinus pressure, sinus pain and sore throat.    Eyes:  Negative for pain, discharge, itching and visual disturbance.   Respiratory:  Negative for cough, chest tightness, shortness of breath and wheezing.    Cardiovascular:  Negative for chest pain, palpitations and leg swelling.   Gastrointestinal:  Negative for abdominal pain, constipation, diarrhea, nausea and vomiting.   Endocrine: Negative for polydipsia, polyphagia and polyuria.   Genitourinary:  Negative for difficulty urinating, dysuria and urgency.   Musculoskeletal:  Negative for arthralgias, back pain and neck pain.   Skin:  Negative for rash and wound.   Neurological:  Negative for dizziness, weakness, numbness  and headaches.         Past Medical History:   Diagnosis Date    Allergic reaction to bee sting     last assessed - 13Jun2016    Asthma     Chronic narcotic dependence (HCC)     Chronic pain syndrome     Depression     Diabetes mellitus (HCC)     Esophageal reflux     Hyperlipidemia     Hypertension     Hypovitaminosis D 4/12/2018    Lumbar radiculopathy     Lyme disease     last assessed - 28Jun2016    Obesity     Opioid dependence (HCC)     PPD positive     had treatment w/ INH 15 years ago    Vitamin D deficiency          Current Outpatient Medications:     albuterol (Proventil HFA) 90 mcg/act inhaler, Inhale 2 puffs every 6 (six) hours as needed for wheezing, Disp: 6.7 g, Rfl: 5    atorvastatin (LIPITOR) 10 mg tablet, Take 1 tablet (10 mg total) by mouth daily, Disp: 90 tablet, Rfl: 1    Blood Glucose Monitoring Suppl (ONE TOUCH ULTRA MINI) w/Device KIT, by Does not apply route daily, Disp: 1 each, Rfl: 0    Cholecalciferol (VITAMIN D3) 00190 units CAPS, Take 1 capsule by mouth once a week, Disp: , Rfl: 11    EPINEPHrine (EPIPEN) 0.3 mg/0.3 mL SOAJ, Inject 0.3 mL (0.3 mg total) into a muscle as needed for anaphylaxis, Disp: 2 each, Rfl: 5    glimepiride (AMARYL) 4 mg tablet, Take 1 tablet (4 mg total) by mouth daily with breakfast, Disp: 90 tablet, Rfl: 1    lisinopril (ZESTRIL) 20 mg tablet, Take 1 tablet (20 mg total) by mouth daily, Disp: 90 tablet, Rfl: 1    meloxicam (MOBIC) 15 mg tablet, Take 1 tablet (15 mg total) by mouth daily, Disp: 90 tablet, Rfl: 1    Nerve Stimulator (STANDARD TENS) ISAEL, by Does not apply route 4 (four) times a day as needed (MUSCLE SPASMS), Disp: 1 Device, Rfl: 0    OneTouch Ultra test strip, USE TO TEST BLOOD SUGAR ONCE A DAY, Disp: 100 strip, Rfl: 5    oxyCODONE-acetaminophen (PERCOCET) 5-325 mg per tablet, Take 1 tablet by mouth if needed for moderate pain, Disp: , Rfl:     tiZANidine (ZANAFLEX) 4 mg tablet, Take 1 tablet (4 mg total) by mouth every 8 (eight) hours as needed  for muscle spasms, Disp: 90 tablet, Rfl: 1    topiramate (TOPAMAX) 100 mg tablet, Take 1 tablet (100 mg total) by mouth 2 (two) times a day, Disp: 180 tablet, Rfl: 1    venlafaxine (EFFEXOR-XR) 75 mg 24 hr capsule, Take 1 capsule (75 mg total) by mouth daily, Disp: 90 capsule, Rfl: 1    Allergies   Allergen Reactions    Bee Venom Anaphylaxis    Other Other (See Comments)     Stainless steel and surgical steel  *Severe blistering*    Morphine And Codeine Hives, Diarrhea and Vomiting    Acetazolamide Rash    Aleve [Naproxen Sodium] Hives    Augmentin [Amoxicillin-Pot Clavulanate] Hives    Gabapentin Hives    Glyburide Hives    Metformin And Related Hives    Motrin [Ibuprofen] Hives    Nortriptyline Hives    Penicillins Hives    Soy Lecithin [Lecithin] Hives    Sulfa Antibiotics GI Intolerance    Tradjenta [Linagliptin] Hives    Tramadol Other (See Comments)     Sweating        Social History   Past Surgical History:   Procedure Laterality Date    APPENDECTOMY      CHOLECYSTECTOMY      JOINT REPLACEMENT      R knee, B/l total hip    [place] REVJ/RMVL IMPL SPI NPG/RCVR DTCH CONNJ ELTRD RA Left 12/13/2016    Procedure: REPLACEMENT BUTTOCK SPINAL CORD STIMULATOR IPG;  Surgeon: Idris Mcadams MD;  Location:  MAIN OR;  Service: Neurosurgery    SHOULDER SURGERY      Resolved - 1992    SPINAL CORD STIMULATOR IMPLANT      spinal stereotaxis stimulation of cord    TOTAL HIP ARTHROPLASTY Bilateral      Family History   Problem Relation Age of Onset    Diabetes Mother         Diabetes mellitus    Cancer Mother 63        uterine    Cancer Father 55        brain    Cervical cancer Sister 55    No Known Problems Sister     No Known Problems Sister     No Known Problems Daughter     Diabetes Maternal Grandmother     No Known Problems Maternal Grandfather     No Known Problems Paternal Grandmother     No Known Problems Paternal Grandfather     Breast cancer Paternal Uncle 55    Cancer Paternal Uncle 78    Brain cancer Family     Breast  "cancer Family     Cervical cancer Family     Diabetes Family         Diabetes mellitus    Hypertension Family     Ovarian cancer Family     Stroke Family         Stroke complications    No Known Problems Brother     No Known Problems Maternal Aunt     No Known Problems Maternal Aunt     No Known Problems Maternal Aunt        Objective:  /80 (BP Location: Left arm, Patient Position: Sitting, Cuff Size: Adult)   Pulse 90   Temp 98.6 °F (37 °C) (Temporal)   Ht 5' 3\" (1.6 m)   Wt 122 kg (268 lb)   LMP  (LMP Unknown)   SpO2 99%   BMI 47.47 kg/m²              Physical Exam  Constitutional:       General: She is not in acute distress.     Appearance: She is well-developed. She is not diaphoretic.   HENT:      Head: Normocephalic and atraumatic.      Right Ear: External ear normal.      Left Ear: External ear normal.      Nose: Nose normal.      Mouth/Throat:      Mouth: Mucous membranes are moist.      Pharynx: No oropharyngeal exudate or posterior oropharyngeal erythema.   Eyes:      General:         Right eye: No discharge.         Left eye: No discharge.      Conjunctiva/sclera: Conjunctivae normal.      Pupils: Pupils are equal, round, and reactive to light.   Neck:      Thyroid: No thyromegaly.   Cardiovascular:      Rate and Rhythm: Normal rate and regular rhythm.      Heart sounds: Normal heart sounds. No murmur heard.     No friction rub. No gallop.   Pulmonary:      Effort: Pulmonary effort is normal. No respiratory distress.      Breath sounds: Normal breath sounds. No stridor. No wheezing or rales.   Abdominal:      General: Bowel sounds are normal. There is no distension.      Palpations: Abdomen is soft.      Tenderness: There is no abdominal tenderness.   Musculoskeletal:      Cervical back: Normal range of motion and neck supple.   Lymphadenopathy:      Cervical: No cervical adenopathy.   Skin:     General: Skin is warm and dry.      Findings: No erythema or rash.   Neurological:      Mental " Status: She is alert and oriented to person, place, and time.   Psychiatric:         Behavior: Behavior normal.         Thought Content: Thought content normal.         Judgment: Judgment normal.

## 2024-09-25 ENCOUNTER — OFFICE VISIT (OUTPATIENT)
Dept: INTERNAL MEDICINE CLINIC | Facility: OTHER | Age: 69
End: 2024-09-25
Payer: MEDICARE

## 2024-09-25 VITALS
WEIGHT: 268 LBS | SYSTOLIC BLOOD PRESSURE: 138 MMHG | BODY MASS INDEX: 47.48 KG/M2 | DIASTOLIC BLOOD PRESSURE: 80 MMHG | HEIGHT: 63 IN | HEART RATE: 90 BPM | OXYGEN SATURATION: 99 % | TEMPERATURE: 98.6 F

## 2024-09-25 DIAGNOSIS — M54.50 CHRONIC BILATERAL LOW BACK PAIN WITHOUT SCIATICA: ICD-10-CM

## 2024-09-25 DIAGNOSIS — E78.5 HYPERLIPIDEMIA, UNSPECIFIED HYPERLIPIDEMIA TYPE: ICD-10-CM

## 2024-09-25 DIAGNOSIS — F33.41 RECURRENT MAJOR DEPRESSIVE DISORDER, IN PARTIAL REMISSION (HCC): ICD-10-CM

## 2024-09-25 DIAGNOSIS — J44.1 ASTHMA EXACERBATION IN COPD (HCC): ICD-10-CM

## 2024-09-25 DIAGNOSIS — M46.1 SACROILIITIS (HCC): ICD-10-CM

## 2024-09-25 DIAGNOSIS — N18.30 STAGE 3 CHRONIC KIDNEY DISEASE, UNSPECIFIED WHETHER STAGE 3A OR 3B CKD (HCC): ICD-10-CM

## 2024-09-25 DIAGNOSIS — Z13.228 SCREENING FOR METABOLIC DISORDER: Primary | ICD-10-CM

## 2024-09-25 DIAGNOSIS — I10 PRIMARY HYPERTENSION: ICD-10-CM

## 2024-09-25 DIAGNOSIS — M06.09 RHEUMATOID ARTHRITIS OF MULTIPLE SITES WITH NEGATIVE RHEUMATOID FACTOR (HCC): ICD-10-CM

## 2024-09-25 DIAGNOSIS — E11.21 TYPE 2 DIABETES MELLITUS WITH DIABETIC NEPHROPATHY, WITHOUT LONG-TERM CURRENT USE OF INSULIN (HCC): ICD-10-CM

## 2024-09-25 DIAGNOSIS — M54.16 LUMBAR RADICULOPATHY: ICD-10-CM

## 2024-09-25 DIAGNOSIS — Z72.0 TOBACCO ABUSE: ICD-10-CM

## 2024-09-25 DIAGNOSIS — R73.01 ELEVATED FASTING GLUCOSE: ICD-10-CM

## 2024-09-25 DIAGNOSIS — M79.18 MYOFASCIAL PAIN SYNDROME: ICD-10-CM

## 2024-09-25 DIAGNOSIS — Z12.31 ENCOUNTER FOR SCREENING MAMMOGRAM FOR BREAST CANCER: ICD-10-CM

## 2024-09-25 DIAGNOSIS — I10 ESSENTIAL HYPERTENSION: ICD-10-CM

## 2024-09-25 DIAGNOSIS — J45.901 ASTHMA EXACERBATION IN COPD (HCC): ICD-10-CM

## 2024-09-25 DIAGNOSIS — G89.4 CHRONIC PAIN SYNDROME: ICD-10-CM

## 2024-09-25 DIAGNOSIS — E78.49 OTHER HYPERLIPIDEMIA: ICD-10-CM

## 2024-09-25 DIAGNOSIS — Z23 NEEDS FLU SHOT: ICD-10-CM

## 2024-09-25 DIAGNOSIS — G89.29 CHRONIC BILATERAL LOW BACK PAIN WITHOUT SCIATICA: ICD-10-CM

## 2024-09-25 DIAGNOSIS — E66.01 MORBID OBESITY (HCC): ICD-10-CM

## 2024-09-25 PROCEDURE — G0008 ADMIN INFLUENZA VIRUS VAC: HCPCS

## 2024-09-25 PROCEDURE — 90662 IIV NO PRSV INCREASED AG IM: CPT

## 2024-09-25 PROCEDURE — 99214 OFFICE O/P EST MOD 30 MIN: CPT | Performed by: NURSE PRACTITIONER

## 2024-09-25 RX ORDER — VENLAFAXINE HYDROCHLORIDE 75 MG/1
75 CAPSULE, EXTENDED RELEASE ORAL DAILY
Qty: 90 CAPSULE | Refills: 1 | Status: SHIPPED | OUTPATIENT
Start: 2024-09-25

## 2024-09-25 RX ORDER — LISINOPRIL 20 MG/1
20 TABLET ORAL DAILY
Qty: 90 TABLET | Refills: 1 | Status: SHIPPED | OUTPATIENT
Start: 2024-09-25

## 2024-09-25 RX ORDER — GLIMEPIRIDE 4 MG/1
4 TABLET ORAL
Qty: 90 TABLET | Refills: 1 | Status: SHIPPED | OUTPATIENT
Start: 2024-09-25

## 2024-09-25 RX ORDER — TOPIRAMATE 100 MG/1
100 TABLET, FILM COATED ORAL 2 TIMES DAILY
Qty: 180 TABLET | Refills: 1 | Status: SHIPPED | OUTPATIENT
Start: 2024-09-25

## 2024-09-25 RX ORDER — ATORVASTATIN CALCIUM 10 MG/1
10 TABLET, FILM COATED ORAL DAILY
Qty: 90 TABLET | Refills: 1 | Status: SHIPPED | OUTPATIENT
Start: 2024-09-25

## 2024-09-25 NOTE — ASSESSMENT & PLAN NOTE
Lab Results   Component Value Date    EGFR 52 09/20/2024    EGFR 55 06/18/2024    EGFR 56 05/12/2023    CREATININE 1.09 09/20/2024    CREATININE 1.05 06/18/2024    CREATININE 1.04 05/12/2023   Encouraged good oral hydration   Avoid nephrotoxins

## 2024-09-25 NOTE — ASSESSMENT & PLAN NOTE
Lab Results   Component Value Date    HGBA1C 7.6 (H) 09/20/2024   -Uncontrolled, increase glipizide to 4 mg daily  -Follow-up in 6 months  -Currently on ACE

## 2024-11-05 NOTE — TELEPHONE ENCOUNTER
Attempted to reach patient, LMOM to c/b in regards to prescription refill 
Aware  Thanks 
Copied from encounter from earlier today:    other (return nurse call)     Sagrario Rueda MA routed conversation to [facility] 29 minutes ago (3:49 PM)      Angelina Chandler [phone]  Sagrario Rueda MA 31 minutes ago (3:47 PM)      Joanne Gonzalez 31 minutes ago (3:47 PM)        Patient returning call to nurse  Tried nurse triage line was told that Gamaliel Brand the nurse will call patient back within the next few minutes    Gave pt message
HPI    Pt states no changes noticed, doing well    (+) dorzolamide BID OU  (+) latanoprost qPM OU  Last edited by Nas Lewis, OD on 11/5/2024  9:21 AM.            Assessment /Plan     For exam results, see Encounter Report.    Primary open angle glaucoma (POAG) of both eyes, mild stage    Combined forms of age-related cataract of both eyes  -     Ambulatory referral/consult to Ophthalmology; Future; Expected date: 12/05/2024    Dry eye syndrome, bilateral    Early dry stage nonexudative age-related macular degeneration of both eyes      1. Primary open angle glaucoma (POAG) of both eyes, mild stage (Primary)  TMax 23 / 23   / 618    IOP with current drop regimen  Continue dorzolamide bid  OU  latanoprost qPM OU    Visit today is associated with current or anticipated ongoing medical care related to this patients single serious condition/complex condition (primary open angle of glaucoma of both eyes, mild)     Recheck hvf / oct / iop in 3-6 months    2. Combined forms of age-related cataract of both eyes  Moderate cataracts OU  Notes difficulty with ADLs  Discussed risk/benefit of cataract surgery   Discussed options: cat eval with Dr. Vieyra vs [facility]    - Ambulatory referral/consult to Ophthalmology; Future    3. Dry eye syndrome, bilateral  Discussed ocular affects of dry eyes. Recommend OTC artificial tears 2-4 times a day in both eyes.   Discussed chronicity of TREASURE. RTC if symptoms not alleviated by continued use of artificial tears.    4. Early dry stage nonexudative age-related macular degeneration of both eyes  Mild druse OU  Last mac OCT 08/2024  Continue otc AREDs   Monitor                    
LMOM on home/cell # for pt to C/B, C/B # provided and office hrs 
S/W pt  Advised pt of the same  Pt stated she does not need a refill at this time  Advised pt to C/B 48 hrs in advance prior to running out to request a medication refill  Pt verbalized understanding 
The patient should contact our office first 
100

## 2024-11-25 ENCOUNTER — TELEPHONE (OUTPATIENT)
Age: 69
End: 2024-11-25

## 2024-11-25 NOTE — TELEPHONE ENCOUNTER
Patient call about expose to COVID -19 and would like to know what should she do. Because she has bad sinus right now and would like to make sure she does not get any worse. Please feel free to reach out to the patient. Thank you

## 2024-11-26 ENCOUNTER — OFFICE VISIT (OUTPATIENT)
Age: 69
End: 2024-11-26
Payer: MEDICARE

## 2024-11-26 VITALS
DIASTOLIC BLOOD PRESSURE: 80 MMHG | WEIGHT: 259.4 LBS | SYSTOLIC BLOOD PRESSURE: 120 MMHG | BODY MASS INDEX: 44.28 KG/M2 | OXYGEN SATURATION: 96 % | TEMPERATURE: 97.9 F | HEART RATE: 69 BPM | HEIGHT: 64 IN

## 2024-11-26 DIAGNOSIS — U07.1 COVID-19: ICD-10-CM

## 2024-11-26 DIAGNOSIS — Z12.31 ENCOUNTER FOR SCREENING MAMMOGRAM FOR MALIGNANT NEOPLASM OF BREAST: Primary | ICD-10-CM

## 2024-11-26 LAB
SARS-COV-2 AG UPPER RESP QL IA: POSITIVE
VALID CONTROL: ABNORMAL

## 2024-11-26 PROCEDURE — 99213 OFFICE O/P EST LOW 20 MIN: CPT | Performed by: NURSE PRACTITIONER

## 2024-11-26 PROCEDURE — 87811 SARS-COV-2 COVID19 W/OPTIC: CPT | Performed by: NURSE PRACTITIONER

## 2024-11-26 PROCEDURE — G2211 COMPLEX E/M VISIT ADD ON: HCPCS | Performed by: NURSE PRACTITIONER

## 2024-11-26 RX ORDER — ERGOCALCIFEROL 1.25 MG/1
50000 CAPSULE, LIQUID FILLED ORAL WEEKLY
COMMUNITY
Start: 2024-10-03

## 2024-11-26 NOTE — PROGRESS NOTES
COVID-19 Outpatient Progress Note  Name: Lise Song      : 1955      MRN: [id number]  Encounter Provider: MARVIN Casanova  Encounter Date: 2024   Encounter department: [facility] [place]    Assessment & Plan  Encounter for screening mammogram for malignant neoplasm of breast    Orders:    Mammo screening bilateral w 3d and cad; Future    COVID-19    Orders:    POCT Rapid Covid Ag    Disposition:     Patient was advised that they can go back to your normal activities when, for at least 24 hours, both are true: their symptoms are getting better overall and they have not had a fever (and are not using fever-reducing medication).    When going back to your normal activities, take added precaution over the next 5 days, such as taking additional steps for  air, hygiene, masks, physical distancing, and/or testing when you will be around other people indoors. You may still be able to spread the virus that made you sick, even if you are feeling better.    If patient develops a fever or starts to feel worse after they have gone back to normal activities, stay home and away from others again until, for at least 24 hours, both are true: symptoms are improving overall and they have not had a fever (and are not using fever-reducing medication). Then take added precaution for the next 5 days.      Discussed symptom directed medication options with patient. Discussed vitamin D, vitamin C, and/or zinc supplementation with patient.   Patient defers antiviral treatment   Illness appears to be viral in nature.   Rest and fluids advised.   Educated that the course of this illness could be 2-4 weeks.   Discussed symptomatic relief, such as warm steam inhalations, tylenol/ibuprofen for fevers and body aches, rest, and drink plenty of fluids.  Warm salt gargles for sore throat.   Discussed red flag signs to go to the ER, such as chest pain or shortness of breath.    Return to the office for reevaluation if symptoms worsen or do not improve in 1-2 weeks       I have spent a total time of 15 minutes on the day of the encounter for this patient including          Encounter provider: MARVIN Casanova     Provider located at: [facility] [place] [address] [place] [address] [phone]     Recent Visits  No visits were found meeting these conditions.  Showing recent visits within past 7 days and meeting all other requirements  Today's Visits  Date Type Provider Dept   11/26/24 Office Visit MARVIN Casanova [facility]   Showing today's visits and meeting all other requirements  Future Appointments  No visits were found meeting these conditions.  Showing future appointments within next 150 days and meeting all other requirements    History of Present Illness      Subjective:   Lise Song is a 69 y.o. female who has been screened for COVID-19. Symptom change since last report: unchanged. Patient's symptoms include nasal congestion, cough and headache. Patient denies fever, chills, rhinorrhea, sore throat, shortness of breath, chest tightness, abdominal pain, nausea, vomiting and diarrhea.     - Date of symptom onset: 11/24/2024  - Date of positive COVID-19 test: 11/26/2024. Type of test: Rapid antigen.     COVID-19 vaccination status: Fully vaccinated (primary series)    Lise has been staying home and has isolated themselves in her home. She is taking care to not share personal items and is cleaning all surfaces that are touched often, like counters, tabletops, and doorknobs using household cleaning sprays or wipes. She is wearing a mask when she leaves her room.     Lab Results   Component Value Date    SARSCOVAG Positive (A) 11/26/2024       Review of Systems   Constitutional:  Negative for activity change, appetite change, chills, diaphoresis and fever.   HENT:  Positive for  "congestion. Negative for ear discharge, ear pain, postnasal drip, rhinorrhea, sinus pressure, sinus pain and sore throat.    Eyes:  Negative for pain, discharge, itching and visual disturbance.   Respiratory:  Positive for cough. Negative for chest tightness, shortness of breath and wheezing.    Cardiovascular:  Negative for chest pain, palpitations and leg swelling.   Gastrointestinal:  Negative for abdominal pain, constipation, diarrhea, nausea and vomiting.   Endocrine: Negative for polydipsia, polyphagia and polyuria.   Genitourinary:  Negative for difficulty urinating, dysuria and urgency.   Musculoskeletal:  Negative for arthralgias, back pain and neck pain.   Skin:  Negative for rash and wound.   Neurological:  Positive for headaches. Negative for dizziness, weakness and numbness.     Objective   /80 (BP Location: Left arm, Patient Position: Sitting, Cuff Size: Large)   Pulse 69   Temp 97.9 °F (36.6 °C) (Temporal)   Ht 5' 3.86\" (1.622 m)   Wt 118 kg (259 lb 6.4 oz)   LMP  (LMP Unknown)   SpO2 96%   BMI 44.72 kg/m²     Physical Exam  Constitutional:       General: She is not in acute distress.     Appearance: She is well-developed. She is not diaphoretic.   HENT:      Head: Normocephalic and atraumatic.      Right Ear: External ear normal.      Left Ear: External ear normal.      Nose: Nose normal.      Mouth/Throat:      Mouth: Mucous membranes are moist.      Pharynx: No oropharyngeal exudate or posterior oropharyngeal erythema.   Eyes:      General:         Right eye: No discharge.         Left eye: No discharge.      Conjunctiva/sclera: Conjunctivae normal.      Pupils: Pupils are equal, round, and reactive to light.   Neck:      Thyroid: No thyromegaly.   Cardiovascular:      Rate and Rhythm: Normal rate and regular rhythm.      Heart sounds: Normal heart sounds. No murmur heard.     No friction rub. No gallop.   Pulmonary:      Effort: Pulmonary effort is normal. No respiratory distress.     "  Breath sounds: Normal breath sounds. No stridor. No wheezing or rales.   Abdominal:      General: Bowel sounds are normal. There is no distension.      Palpations: Abdomen is soft.      Tenderness: There is no abdominal tenderness.   Musculoskeletal:      Cervical back: Normal range of motion and neck supple.   Lymphadenopathy:      Cervical: No cervical adenopathy.   Skin:     General: Skin is warm and dry.      Findings: No erythema or rash.   Neurological:      Mental Status: She is alert and oriented to person, place, and time.   Psychiatric:         Behavior: Behavior normal.         Thought Content: Thought content normal.         Judgment: Judgment normal.

## 2024-11-26 NOTE — PROGRESS NOTES
Name: Lise Song      : 1955      MRN: [id number]  Encounter Provider: MARVIN Casanova  Encounter Date: 2024   Encounter department: [facility]  :  Assessment & Plan  Encounter for screening mammogram for malignant neoplasm of breast                History of Present Illness {?Quick Links Encounters * My Last Note * Last Note in Specialty * Snapshot * Since Last Visit * History :18944}    Patient presents today with URI like symptoms.   Reports exposure to COVID 19    Tested positive while in the office today     URI   This is a new problem. Episode onset: Saturday. There has been no fever. Associated symptoms include congestion, coughing and sinus pain. Pertinent negatives include no abdominal pain, chest pain, diarrhea, dysuria, ear pain, headaches, nausea, neck pain, rash, rhinorrhea, sore throat, vomiting or wheezing. She has tried nothing for the symptoms. The treatment provided no relief.     Review of Systems   Constitutional:  Negative for activity change, appetite change, chills, diaphoresis and fever.   HENT:  Positive for congestion and sinus pain. Negative for ear discharge, ear pain, postnasal drip, rhinorrhea, sinus pressure and sore throat.    Eyes:  Negative for pain, discharge, itching and visual disturbance.   Respiratory:  Positive for cough. Negative for chest tightness, shortness of breath and wheezing.    Cardiovascular:  Negative for chest pain, palpitations and leg swelling.   Gastrointestinal:  Negative for abdominal pain, constipation, diarrhea, nausea and vomiting.   Endocrine: Negative for polydipsia, polyphagia and polyuria.   Genitourinary:  Negative for difficulty urinating, dysuria and urgency.   Musculoskeletal:  Negative for arthralgias, back pain and neck pain.   Skin:  Negative for rash and wound.   Neurological:  Negative for dizziness, weakness, numbness and headaches.   {Select to insert medical history  sections (Optional):67439}     Objective {?Quick Links Trend Vitals * Enter New Vitals * Results Review * Timeline (Adult) * Labs * Imaging * Cardiology * Procedures * Lung Cancer Screening * Surgical eConsent :65071}  LMP  (LMP Unknown)      Physical Exam  Constitutional:       General: She is not in acute distress.     Appearance: She is well-developed. She is not diaphoretic.   HENT:      Head: Normocephalic and atraumatic.      Right Ear: External ear normal.      Left Ear: External ear normal.      Nose: Nose normal.      Mouth/Throat:      Mouth: Mucous membranes are moist.      Pharynx: No oropharyngeal exudate or posterior oropharyngeal erythema.   Eyes:      General:         Right eye: No discharge.         Left eye: No discharge.      Conjunctiva/sclera: Conjunctivae normal.      Pupils: Pupils are equal, round, and reactive to light.   Neck:      Thyroid: No thyromegaly.   Cardiovascular:      Rate and Rhythm: Normal rate and regular rhythm.      Heart sounds: Normal heart sounds. No murmur heard.     No friction rub. No gallop.   Pulmonary:      Effort: Pulmonary effort is normal. No respiratory distress.      Breath sounds: Normal breath sounds. No stridor. No wheezing or rales.   Abdominal:      General: Bowel sounds are normal. There is no distension.      Palpations: Abdomen is soft.      Tenderness: There is no abdominal tenderness.   Musculoskeletal:      Cervical back: Normal range of motion and neck supple.   Lymphadenopathy:      Cervical: No cervical adenopathy.   Skin:     General: Skin is warm and dry.      Findings: No erythema or rash.   Neurological:      Mental Status: She is alert and oriented to person, place, and time.   Psychiatric:         Behavior: Behavior normal.         Thought Content: Thought content normal.         Judgment: Judgment normal.   {Administrative / Billing Section (Optional):57132}

## 2025-01-16 DIAGNOSIS — M79.18 MYOFASCIAL PAIN SYNDROME: ICD-10-CM

## 2025-01-16 DIAGNOSIS — M46.1 SACROILIITIS (HCC): ICD-10-CM

## 2025-01-16 RX ORDER — MELOXICAM 15 MG/1
15 TABLET ORAL DAILY
Qty: 90 TABLET | Refills: 1 | Status: SHIPPED | OUTPATIENT
Start: 2025-01-16

## 2025-01-16 NOTE — TELEPHONE ENCOUNTER
[facility] informs patient she needs new script for tiZANidine.     Reason for call:   [x] Refill   [] Prior Auth  [] Other:     Office:   [x] PCP/Provider - MARVIN Casanova   [] Specialty/Provider -     Medication: tiZANidine (ZANAFLEX) 4 mg tablet / Take 1 tablet (4 mg total) by mouth every 8 (eight) hours as needed for muscle spasms     meloxicam (MOBIC) 15 mg tablet / Take 1 tablet (15 mg total) by mouth daily     Pharmacy: [facility] #[id number] [address]     Does the patient have enough for 3 days?   [] Yes   [x] No - Send as HP to POD

## 2025-01-29 NOTE — ASSESSMENT & PLAN NOTE
Dermatology Rooming Note    Najma Rodríguez's goals for this visit include:   Chief Complaint   Patient presents with    Derm Problem     PT started having rashes about 4 days ago. 3 different type of rashes all over arms and chest. They have began spreading.     Kade Brown - EMT     She continues to have chronic low back pain but still does not really try to lose weight

## 2025-02-19 ENCOUNTER — TELEPHONE (OUTPATIENT)
Dept: INTERNAL MEDICINE CLINIC | Facility: OTHER | Age: 70
End: 2025-02-19

## 2025-02-19 NOTE — TELEPHONE ENCOUNTER
Patient dropped off paper in office to release medical records to her new primary care doctor and asked me to cancel all upcoming appointments. Please advise and remove Saige as PCP.     New Doctor: [facility]   [address] [place] [address] [address]  [place] [address] (p) [phone]  Dr. Amina Costello

## 2025-02-22 NOTE — TELEPHONE ENCOUNTER
02/21/25 11:23 PM        The office's request has been received, reviewed, and the patient chart updated. The PCP has successfully been removed with a patient attribution note. This message will now be completed.        Thank you  Jerardo Caputo

## 2025-03-14 DIAGNOSIS — M79.18 MYOFASCIAL PAIN SYNDROME: ICD-10-CM

## 2025-03-14 NOTE — TELEPHONE ENCOUNTER
Reason for call:   [x] Refill   [] Prior Auth  [] Other:     Office:   [x] PCP/Provider -   [] Specialty/Provider -     Medication: tiZANidine (ZANAFLEX) 4 mg tablet     Dose/Frequency: Take 1 tablet (4 mg total) by mouth every 8 (eight) hours as needed     Quantity: 90 TABLET    Pharmacy: [facility] # [id number]    Local Pharmacy   Does the patient have enough for 3 days?   [] Yes   [x] No - Send as HP to POD    Mail Away Pharmacy   Does the patient have enough for 10 days?   [] Yes   [] No - Send as HP to POD

## 2025-03-17 NOTE — TELEPHONE ENCOUNTER
Last office visit 11/26  Next Office Visit not scheduled sent patient a my chart message to schedule a follow up appointment for continued medication refills

## 2025-03-28 NOTE — TELEPHONE ENCOUNTER
Left message on machine for patient to call the office to schedule a follow up appointment in May.